# Patient Record
Sex: MALE | Race: BLACK OR AFRICAN AMERICAN | NOT HISPANIC OR LATINO | Employment: UNEMPLOYED | URBAN - METROPOLITAN AREA
[De-identification: names, ages, dates, MRNs, and addresses within clinical notes are randomized per-mention and may not be internally consistent; named-entity substitution may affect disease eponyms.]

---

## 2018-03-27 ENCOUNTER — HOSPITAL ENCOUNTER (EMERGENCY)
Facility: HOSPITAL | Age: 53
End: 2018-03-28
Attending: EMERGENCY MEDICINE | Admitting: EMERGENCY MEDICINE
Payer: COMMERCIAL

## 2018-03-27 DIAGNOSIS — F32.A DEPRESSION: ICD-10-CM

## 2018-03-27 DIAGNOSIS — R45.851 SUICIDAL IDEATION: Primary | ICD-10-CM

## 2018-03-27 LAB
AMPHETAMINES SERPL QL SCN: NEGATIVE
BARBITURATES UR QL: NEGATIVE
BENZODIAZ UR QL: NEGATIVE
COCAINE UR QL: NEGATIVE
ETHANOL EXG-MCNC: 0 MG/DL
METHADONE UR QL: NEGATIVE
OPIATES UR QL SCN: NEGATIVE
PCP UR QL: NEGATIVE
THC UR QL: POSITIVE

## 2018-03-27 PROCEDURE — 80307 DRUG TEST PRSMV CHEM ANLYZR: CPT

## 2018-03-27 PROCEDURE — 82075 ASSAY OF BREATH ETHANOL: CPT
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
## 2018-03-27 NOTE — ED NOTES
Patient provided with sandwich, crackers and soda  Remains cooperative        Meryl Ayala RN  03/27/18 0679

## 2018-03-27 NOTE — ED NOTES
AS Per Mike B from [facility] Patient is approved 3 days once admitted   Admitting placement to called [phone]

## 2018-03-27 NOTE — LETTER
[facility]  [address] [address]  Dept: [phone]      EMTALA TRANSFER CONSENT    NAME Zenaida Hanson                                         1965                              MRN [id number]    I have been informed of my rights regarding examination, treatment, and transfer   by Dr Bernard Lynn DO    Benefits: Specialized equipment and/or services available at the receiving facility (Include comment)________________________    Risks: Potential for delay in receiving treatment      Consent for Transfer:  I acknowledge that my medical condition has been evaluated and explained to me by the emergency department physician or other qualified medical person and/or my attending physician, who has recommended that I be transferred to the service of  Accepting Physician: Dr Beth Yuen at [address] [place] [address] Name, [address] : [address]  The above potential benefits of such transfer, the potential risks associated with such transfer, and the probable risks of not being transferred have been explained to me, and I fully understand them  The doctor has explained that, in my case, the benefits of transfer outweigh the risks  I agree to be transferred  I authorize the performance of emergency medical procedures and treatments upon me in both transit and upon arrival at the receiving facility  Additionally, I authorize the release of any and all medical records to the receiving facility and request they be transported with me, if possible  I understand that the safest mode of transportation during a medical emergency is an ambulance and that the Hospital advocates the use of this mode of transport  Risks of traveling to the receiving facility by car, including absence of medical control, life sustaining equipment, such as oxygen, and medical personnel has been explained to me and I fully understand them      (MARICEL CORRECT BOX BELOW)  [ X ]  I consent to the stated transfer and to be transported by ambulance/helicopter  [  ]  I consent to the stated transfer, but refuse transportation by ambulance and accept full responsibility for my transportation by car  I understand the risks of non-ambulance transfers and I exonerate the Hospital and its staff from any deterioration in my condition that results from this refusal     X___________________________________________    DATE  18  TIME________  Signature of patient or legally responsible individual signing on patient behalf           RELATIONSHIP TO PATIENT_________________________          Provider Certification    NAME Nisa Jaffe                                         1965                              MRN [id number]    A medical screening exam was performed on the above named patient  Based on the examination:    Condition Necessitating Transfer The primary encounter diagnosis was Suicidal ideation  A diagnosis of Depression was also pertinent to this visit  Patient Condition: The patient has been stabilized such that within reasonable medical probability, no material deterioration of the patient condition or the condition of the unborn child(flash) is likely to result from the transfer    Reason for Transfer: Level of Care needed not available at this facility    Transfer Requirements: [address]   · Space available and qualified personnel available for treatment as acknowledged by Shereen Rendon [phone]  · Agreed to accept transfer and to provide appropriate medical treatment as acknowledged by       Dr Duglas Ghosh  · Appropriate medical records of the examination and treatment of the patient are provided at the time of transfer   [address] [facility], [address] me  · Transfer will be performed by qualified personnel from [facility]  and appropriate transfer equipment as required, including the use of necessary and appropriate life support measures      Provider Certification: I have examined the patient and explained the following risks and benefits of being transferred/refusing transfer to the patient/family:  General risk, such as traffic hazards, adverse weather conditions, rough terrain or turbulence, possible failure of equipment (including vehicle or aircraft), or consequences of actions of persons outside the control of the transport personnel      Based on these reasonable risks and benefits to the patient and/or the unborn child(flash), and based upon the information available at the time of the patients examination, I certify that the medical benefits reasonably to be expected from the provision of appropriate medical treatments at another medical facility outweigh the increasing risks, if any, to the individuals medical condition, and in the case of labor to the unborn child, from effecting the transfer      X____________________________________________ DATE 03/28/18        TIME_______      ORIGINAL - SEND TO MEDICAL RECORDS   COPY - SEND WITH PATIENT DURING TRANSFER

## 2018-03-27 NOTE — LETTER
Section I - General Information    Name of Patient: Caden Ortiz                 : 1965    Medicare #:____________________  Transport Date: 18 (PCS is valid for round trips on this date and for all repetitive trips in the 60-day range as noted below )  Origin: [address] [address]                                                         Destination:________________________________________________  Is the pt's stay covered under Medicare Part A (PPS/DRG)     (_) YES  (_) NO  Closest appropriate facility?  (_) YES  (_) NO  If no, why is transport to more distant facility required?________________________  If hosp-hosp transfer, describe services needed at 2nd facility not available at 1st facility? _________________________________  If hospice pt, is this transport related to pt's terminal illness? (_) YES (_) NO Describe____________________________________    Section II - Medical Necessity Questionnaire  Ambulance transportation is medically necessary only if other means of transport are contraindicated or would be potentially harmful to the patient  To meet this requirement, the patient must either be "bed confined" or suffer from a condition such that transport by means other than ambulance is contraindicated by the patient's condition   The following questions must be answered by the medical professional signing below for this form to be valid:    1)  Describe the MEDICAL CONDITION (physical and/or mental) of this patient AT [address] [place] [address] that requires the patient to be transported in an ambulance and why transport by other means is contraindicated by the patient's condition:__________________________________________________________________________________________________    2) Is the patient "bed confined" as defined below?     (_) YES  (_) NO  To be "be confined" the patient must satisfy all three of the following conditions: (1) unable to get up from bed without Assistance; AND (2) unable to ambulate; AND (3) unable to sit in a chair or wheelchair  3) Can this patient safely be transported by car or wheelchair [place] (i e , seated during transport without a medical attendant or monitoring)?   (_) YES  (_) NO    4) In addition to completing questions 1-3 above, please check any of the following conditions that apply*:  *Note: supporting documentation for any boxes checked must be maintained in the patient's medical records  (_)Contractures   (_)Non-Healed Fractures  (_)Patient is confused (_)Patient is comatose (_)Moderate/severe pain on movement (_)Danger to self/others  (_)IV meds/fluids required (_)Patient is combative(_)Need or possible need for restraints (_)DVT requires elevation of lower extremity  (_)Medical attendant required (_)Requires oxygen-unable to self administer (_)Special handling/isolation/infection control precautions required (_)Unable to tolerate seated position for time needed to transport (_)Hemodynamic monitoring required en route (_)Unable to sit in a chair or wheelchair due to decubitus ulcers or other wounds (_)Cardiac monitoring required en route (_)Morbid obesity requires additional personnel/equipment to safely handle patient (_)Orthopedic device (backboard, halo, pins, traction, brace, wedge, etc,) requiring special handling during transport (_)Other(specify)_______________________________________________    Section III - Signature of Physician or Healthcare Professional  I certify that the above information is true and correct based on my evaluation of this patient, and represent that the patient requires transport by ambulance and that other forms of transport are contraindicated   I understand that this information will be used by the Centers for Medicare and Medicaid Services (CMS) to support the determination of medical necessity for ambulance services, and I represent that I have personal knowledge of the patient's condition at time of transport  (_) If this box is checked, I also certify that the patient is physically or mentally incapable of signing the ambulance service's claim and that the institution with which I am affiliated has furnished care, services, or assistance to the patient  My signature below is made on behalf of the patient pursuant to 42 CFR §424 36(b)(4)  In accordance with 42 CFR §424 37, the specific reason(s) that the patient is physically or mentally incapable of signing the claim form is as follows: _________________________________________________________________________________________________________      Signature of Physician* or Healthcare Professional______________________________________________________________  Signature Date 03/28/18 (For scheduled repetitive transports, this form is not valid for transports performed more than 60 days after this date)    Printed Name & Credentials of Physician or Healthcare Professional (MD, DO, RN, etc )________________________________  *Form must be signed by patient's attending physician for scheduled, repetitive transports   For non-repetitive, unscheduled ambulance transports, if unable to obtain the signature of the attending physician, any of the following may sign (choose appropriate option below)  (_) Physician Assistant (_)  Clinical Nurse Specialist (_)  Registered Nurse  (_)  Nurse Practitioner  (_) Discharge Planner
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
## 2018-03-28 ENCOUNTER — HOSPITAL ENCOUNTER (INPATIENT)
Facility: HOSPITAL | Age: 53
LOS: 2 days | Discharge: HOME | DRG: 751 | End: 2018-03-30
Attending: PSYCHIATRY & NEUROLOGY | Admitting: PSYCHIATRY & NEUROLOGY
Payer: COMMERCIAL

## 2018-03-28 VITALS
OXYGEN SATURATION: 98 % | HEART RATE: 66 BPM | RESPIRATION RATE: 16 BRPM | SYSTOLIC BLOOD PRESSURE: 148 MMHG | DIASTOLIC BLOOD PRESSURE: 100 MMHG | TEMPERATURE: 98.3 F

## 2018-03-28 DIAGNOSIS — F33.2 SEVERE EPISODE OF RECURRENT MAJOR DEPRESSIVE DISORDER, WITHOUT PSYCHOTIC FEATURES (HCC): Primary | ICD-10-CM

## 2018-03-28 DIAGNOSIS — F32.A DEPRESSION: ICD-10-CM

## 2018-03-28 LAB
BILIRUB UR QL STRIP: NEGATIVE
CLARITY UR: CLEAR
COLOR UR: YELLOW
GLUCOSE UR STRIP-MCNC: NEGATIVE MG/DL
HGB UR QL STRIP.AUTO: NEGATIVE
KETONES UR STRIP-MCNC: NEGATIVE MG/DL
LEUKOCYTE ESTERASE UR QL STRIP: NEGATIVE
NITRITE UR QL STRIP: NEGATIVE
PH UR STRIP.AUTO: 5.5 [PH] (ref 4.5–8)
PROT UR STRIP-MCNC: NEGATIVE MG/DL
SP GR UR STRIP.AUTO: 1.02 (ref 1–1.03)
UROBILINOGEN UR QL STRIP.AUTO: 1 E.U./DL

## 2018-03-28 PROCEDURE — 81003 URINALYSIS AUTO W/O SCOPE: CPT | Performed by: PHYSICIAN ASSISTANT

## 2018-03-28 PROCEDURE — 99285 EMERGENCY DEPT VISIT HI MDM: CPT

## 2018-03-28 RX ORDER — LORAZEPAM 2 MG/ML
2 INJECTION INTRAMUSCULAR EVERY 6 HOURS PRN
Status: DISCONTINUED | OUTPATIENT
Start: 2018-03-28 | End: 2018-03-30 | Stop reason: HOSPADM

## 2018-03-28 RX ORDER — OLANZAPINE 10 MG/1
10 TABLET ORAL
Status: CANCELLED | OUTPATIENT
Start: 2018-03-28

## 2018-03-28 RX ORDER — TRAZODONE HYDROCHLORIDE 50 MG/1
50 TABLET ORAL
Status: CANCELLED | OUTPATIENT
Start: 2018-03-28

## 2018-03-28 RX ORDER — HALOPERIDOL 5 MG
5 TABLET ORAL EVERY 6 HOURS PRN
Status: DISCONTINUED | OUTPATIENT
Start: 2018-03-28 | End: 2018-03-30 | Stop reason: HOSPADM

## 2018-03-28 RX ORDER — OLANZAPINE 10 MG/1
10 INJECTION, POWDER, LYOPHILIZED, FOR SOLUTION INTRAMUSCULAR
Status: CANCELLED | OUTPATIENT
Start: 2018-03-28

## 2018-03-28 RX ORDER — MAGNESIUM HYDROXIDE/ALUMINUM HYDROXICE/SIMETHICONE 120; 1200; 1200 MG/30ML; MG/30ML; MG/30ML
30 SUSPENSION ORAL EVERY 4 HOURS PRN
Status: CANCELLED | OUTPATIENT
Start: 2018-03-28

## 2018-03-28 RX ORDER — LORAZEPAM 1 MG/1
1 TABLET ORAL EVERY 6 HOURS PRN
Status: CANCELLED | OUTPATIENT
Start: 2018-03-28

## 2018-03-28 RX ORDER — HALOPERIDOL 5 MG/ML
5 INJECTION INTRAMUSCULAR EVERY 6 HOURS PRN
Status: DISCONTINUED | OUTPATIENT
Start: 2018-03-28 | End: 2018-03-30 | Stop reason: HOSPADM

## 2018-03-28 RX ORDER — BENZTROPINE MESYLATE 1 MG/ML
1 INJECTION INTRAMUSCULAR; INTRAVENOUS EVERY 6 HOURS PRN
Status: DISCONTINUED | OUTPATIENT
Start: 2018-03-28 | End: 2018-03-30 | Stop reason: HOSPADM

## 2018-03-28 RX ORDER — HALOPERIDOL 5 MG
5 TABLET ORAL EVERY 6 HOURS PRN
Status: CANCELLED | OUTPATIENT
Start: 2018-03-28

## 2018-03-28 RX ORDER — OLANZAPINE 10 MG/1
10 INJECTION, POWDER, LYOPHILIZED, FOR SOLUTION INTRAMUSCULAR
Status: DISCONTINUED | OUTPATIENT
Start: 2018-03-28 | End: 2018-03-30 | Stop reason: HOSPADM

## 2018-03-28 RX ORDER — HALOPERIDOL 5 MG/ML
5 INJECTION INTRAMUSCULAR EVERY 6 HOURS PRN
Status: CANCELLED | OUTPATIENT
Start: 2018-03-28

## 2018-03-28 RX ORDER — ACETAMINOPHEN 325 MG/1
650 TABLET ORAL EVERY 6 HOURS PRN
Status: DISCONTINUED | OUTPATIENT
Start: 2018-03-28 | End: 2018-03-30 | Stop reason: HOSPADM

## 2018-03-28 RX ORDER — TRAZODONE HYDROCHLORIDE 50 MG/1
50 TABLET ORAL
Status: DISCONTINUED | OUTPATIENT
Start: 2018-03-28 | End: 2018-03-30 | Stop reason: HOSPADM

## 2018-03-28 RX ORDER — LORAZEPAM 2 MG/ML
2 INJECTION INTRAMUSCULAR EVERY 6 HOURS PRN
Status: CANCELLED | OUTPATIENT
Start: 2018-03-28

## 2018-03-28 RX ORDER — OLANZAPINE 10 MG/1
10 TABLET ORAL
Status: DISCONTINUED | OUTPATIENT
Start: 2018-03-28 | End: 2018-03-30 | Stop reason: HOSPADM

## 2018-03-28 RX ORDER — RISPERIDONE 1 MG/1
1 TABLET, ORALLY DISINTEGRATING ORAL
Status: DISCONTINUED | OUTPATIENT
Start: 2018-03-28 | End: 2018-03-30 | Stop reason: HOSPADM

## 2018-03-28 RX ORDER — ACETAMINOPHEN 325 MG/1
650 TABLET ORAL EVERY 6 HOURS PRN
Status: CANCELLED | OUTPATIENT
Start: 2018-03-28

## 2018-03-28 RX ORDER — RISPERIDONE 1 MG/1
1 TABLET, ORALLY DISINTEGRATING ORAL
Status: CANCELLED | OUTPATIENT
Start: 2018-03-28

## 2018-03-28 RX ORDER — BENZTROPINE MESYLATE 1 MG/1
1 TABLET ORAL
Status: DISCONTINUED | OUTPATIENT
Start: 2018-03-28 | End: 2018-03-30 | Stop reason: HOSPADM

## 2018-03-28 RX ORDER — BENZTROPINE MESYLATE 1 MG/ML
1 INJECTION INTRAMUSCULAR; INTRAVENOUS EVERY 6 HOURS PRN
Status: CANCELLED | OUTPATIENT
Start: 2018-03-28

## 2018-03-28 RX ORDER — LORAZEPAM 1 MG/1
1 TABLET ORAL EVERY 6 HOURS PRN
Status: DISCONTINUED | OUTPATIENT
Start: 2018-03-28 | End: 2018-03-30 | Stop reason: HOSPADM

## 2018-03-28 RX ORDER — MAGNESIUM HYDROXIDE/ALUMINUM HYDROXICE/SIMETHICONE 120; 1200; 1200 MG/30ML; MG/30ML; MG/30ML
30 SUSPENSION ORAL EVERY 4 HOURS PRN
Status: DISCONTINUED | OUTPATIENT
Start: 2018-03-28 | End: 2018-03-30 | Stop reason: HOSPADM

## 2018-03-28 RX ORDER — BENZTROPINE MESYLATE 0.5 MG/1
1 TABLET ORAL
Status: CANCELLED | OUTPATIENT
Start: 2018-03-28

## 2018-03-28 RX ADMIN — CARBAMIDE PEROXIDE 6.5% 5 DROP: 6.5 LIQUID AURICULAR (OTIC) at 08:35

## 2018-03-28 NOTE — PLAN OF CARE
Problem: SELF HARM/SUICIDALITY  Goal: Will have no self-injury during hospital stay  INTERVENTIONS:  - Q 15 MINUTES: Routine safety checks  - Q WAKING SHIFT & PRN: Assess risk to determine if routine checks are adequate to maintain patient safety  - Encourage patient to participate actively in care by formulating a plan to combat response to suicidal ideation, identify supports and resources  Outcome: Progressing      Problem: DEPRESSION  Goal: Will be euthymic at discharge  INTERVENTIONS:  - Administer medication as ordered  - Provide emotional support via 1:1 interaction with staff  - Encourage involvement in milieu/groups/activities  - Monitor for social isolation  Outcome: Not Progressing

## 2018-03-28 NOTE — ED NOTES
Pt currently taking shower and completing other hygiene activities        Elaina Rodríguez, RN  03/28/18 9197
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
## 2018-03-28 NOTE — ED NOTES
Patient was accepted to [address]  to leave the [facility]  CW called Moi Alvarez informed them of this information   aware of this information     Bed Search will continue in am

## 2018-03-28 NOTE — EMTALA/ACUTE CARE TRANSFER
[facility]   [address] [address]  Dept: [phone]      EMTALA TRANSFER CONSENT    NAME Tamanna Rincon                                         1965                              MRN [id number]    I have been informed of my rights regarding examination, treatment, and transfer   by Dr Kofi Nelson 24, DO    Benefits: Specialized equipment and/or services available at the receiving facility (Include comment)________________________    Risks: Potential for delay in receiving treatment      Transfer Request   I acknowledge that my medical condition has been evaluated and explained to me by the emergency department physician or other qualified medical person and/or my attending physician who has recommended and offered to me further medical examination and treatment  I understand the Hospital's obligation with respect to the treatment and stabilization of my emergency medical condition  I nevertheless request to be transferred  I release the Hospital, the doctor, and any other persons caring for me from all responsibility or liability for any injury or ill effects that may result from my transfer and agree to accept all responsibility for the consequences of my choice to transfer, rather than receive stabilizing treatment at the Hospital  I understand that because the transfer is my request, my insurance may not provide reimbursement for the services  The Hospital will assist and direct me and my family in how to make arrangements for transfer, but the hospital is not liable for any fees charged by the transport service  In spite of this understanding, I refuse to consent to further medical examination and treatment which has been offered to me, and request transfer to    I authorize the performance of emergency medical procedures and treatments upon me in both transit and upon arrival at the receiving facility    Additionally, I authorize the release of any and all medical records to the receiving facility and request they be transported with me, if possible  I authorize the performance of emergency medical procedures and treatments upon me in both transit and upon arrival at the receiving facility  Additionally, I authorize the release of any and all medical records to the receiving facility and request they be transported with me, if possible  I understand that the safest mode of transportation during a medical emergency is an ambulance and that the Hospital advocates the use of this mode of transport  Risks of traveling to the receiving facility by car, including absence of medical control, life sustaining equipment, such as oxygen, and medical personnel has been explained to me and I fully understand them  (MARICEL CORRECT BOX BELOW)  [  ]  I consent to the stated transfer and to be transported by ambulance/helicopter  [  ]  I consent to the stated transfer, but refuse transportation by ambulance and accept full responsibility for my transportation by car  I understand the risks of non-ambulance transfers and I exonerate the Hospital and its staff from any deterioration in my condition that results from this refusal     X___________________________________________    DATE  18  TIME________  Signature of patient or legally responsible individual signing on patient behalf           RELATIONSHIP TO PATIENT_________________________          Provider Certification    NAME Iqra Parhampper                                         1965                              MRN [id number]    A medical screening exam was performed on the above named patient  Based on the examination:    Condition Necessitating Transfer The primary encounter diagnosis was Suicidal ideation  A diagnosis of Depression was also pertinent to this visit      Patient Condition: The patient has been stabilized such that within reasonable medical probability, no material deterioration of the patient condition or the condition of the unborn child(flash) is likely to result from the transfer    Reason for Transfer: Level of Care needed not available at this facility    Transfer Requirements: Facility     · Space available and qualified personnel available for treatment as acknowledged by    · Agreed to accept transfer and to provide appropriate medical treatment as acknowledged by          · Appropriate medical records of the examination and treatment of the patient are provided at the time of transfer   [address] [facility], [address] _______  · Transfer will be performed by qualified personnel from    and appropriate transfer equipment as required, including the use of necessary and appropriate life support measures  Provider Certification: I have examined the patient and explained the following risks and benefits of being transferred/refusing transfer to the patient/family:  General risk, such as traffic hazards, adverse weather conditions, rough terrain or turbulence, possible failure of equipment (including vehicle or aircraft), or consequences of actions of persons outside the control of the transport personnel      Based on these reasonable risks and benefits to the patient and/or the unborn child(flash), and based upon the information available at the time of the patients examination, I certify that the medical benefits reasonably to be expected from the provision of appropriate medical treatments at another medical facility outweigh the increasing risks, if any, to the individuals medical condition, and in the case of labor to the unborn child, from effecting the transfer      X____________________________________________ DATE 03/27/18        TIME_______      ORIGINAL - SEND TO MEDICAL RECORDS   COPY - SEND WITH PATIENT DURING TRANSFER

## 2018-03-28 NOTE — ED NOTES
Pt awake and eating breakfast tray; pt reports b/l ear fullness and inability to hear  Dr Renee Patricia alerted        Kieth Cardona RN  03/28/18 8095

## 2018-03-28 NOTE — ED NOTES
Report called to Tiana at [facility] pt scheduled for transport at 1300 will call with and change in pt condition        Yohana Holm RN  03/28/18 9311

## 2018-03-28 NOTE — TREATMENT PLAN
Nursing team will meet with you twice a day to assess for suicidal thoughts and depression  We will educate you on your illness, medications and help you develop alternative coping skills to deal with life stressors

## 2018-03-28 NOTE — ED NOTES
Patient is accepted at [facility]  Patient is accepted by Leopoldo Slice per [facility] is arranged with [employer] is scheduled for 1pm           Nurse report is to be called to [phone] prior to patient transfer

## 2018-03-29 PROBLEM — F33.2 SEVERE EPISODE OF RECURRENT MAJOR DEPRESSIVE DISORDER, WITHOUT PSYCHOTIC FEATURES (HCC): Status: ACTIVE | Noted: 2018-03-29

## 2018-03-29 LAB
ALBUMIN SERPL BCP-MCNC: 3.1 G/DL (ref 3.5–5)
ALP SERPL-CCNC: 69 U/L (ref 46–116)
ALT SERPL W P-5'-P-CCNC: 84 U/L (ref 12–78)
ANION GAP SERPL CALCULATED.3IONS-SCNC: 9 MMOL/L (ref 4–13)
AST SERPL W P-5'-P-CCNC: 50 U/L (ref 5–45)
BASOPHILS # BLD AUTO: 0.04 THOUSANDS/ΜL (ref 0–0.1)
BASOPHILS NFR BLD AUTO: 1 % (ref 0–1)
BILIRUB SERPL-MCNC: 0.4 MG/DL (ref 0.2–1)
BUN SERPL-MCNC: 14 MG/DL (ref 5–25)
CALCIUM SERPL-MCNC: 8.7 MG/DL (ref 8.3–10.1)
CHLORIDE SERPL-SCNC: 103 MMOL/L (ref 100–108)
CHOLEST SERPL-MCNC: 178 MG/DL (ref 50–200)
CO2 SERPL-SCNC: 26 MMOL/L (ref 21–32)
CREAT SERPL-MCNC: 1.03 MG/DL (ref 0.6–1.3)
EOSINOPHIL # BLD AUTO: 0.24 THOUSAND/ΜL (ref 0–0.61)
EOSINOPHIL NFR BLD AUTO: 5 % (ref 0–6)
ERYTHROCYTE [DISTWIDTH] IN BLOOD BY AUTOMATED COUNT: 14.8 % (ref 11.6–15.1)
EST. AVERAGE GLUCOSE BLD GHB EST-MCNC: 126 MG/DL
GFR SERPL CREATININE-BSD FRML MDRD: 95 ML/MIN/1.73SQ M
GLUCOSE P FAST SERPL-MCNC: 101 MG/DL (ref 65–99)
GLUCOSE SERPL-MCNC: 101 MG/DL (ref 65–140)
HBA1C MFR BLD: 6 % (ref 4.2–6.3)
HCT VFR BLD AUTO: 43.9 % (ref 36.5–49.3)
HDLC SERPL-MCNC: 79 MG/DL (ref 40–60)
HGB BLD-MCNC: 14.6 G/DL (ref 12–17)
LDLC SERPL CALC-MCNC: 83 MG/DL (ref 0–100)
LYMPHOCYTES # BLD AUTO: 1.75 THOUSANDS/ΜL (ref 0.6–4.47)
LYMPHOCYTES NFR BLD AUTO: 34 % (ref 14–44)
MCH RBC QN AUTO: 25.4 PG (ref 26.8–34.3)
MCHC RBC AUTO-ENTMCNC: 33.3 G/DL (ref 31.4–37.4)
MCV RBC AUTO: 77 FL (ref 82–98)
MONOCYTES # BLD AUTO: 0.64 THOUSAND/ΜL (ref 0.17–1.22)
MONOCYTES NFR BLD AUTO: 13 % (ref 4–12)
NEUTROPHILS # BLD AUTO: 2.41 THOUSANDS/ΜL (ref 1.85–7.62)
NEUTS SEG NFR BLD AUTO: 47 % (ref 43–75)
PLATELET # BLD AUTO: 212 THOUSANDS/UL (ref 149–390)
PMV BLD AUTO: 10.6 FL (ref 8.9–12.7)
POTASSIUM SERPL-SCNC: 3.9 MMOL/L (ref 3.5–5.3)
PROT SERPL-MCNC: 7.4 G/DL (ref 6.4–8.2)
RBC # BLD AUTO: 5.74 MILLION/UL (ref 3.88–5.62)
RPR SER QL: NORMAL
SODIUM SERPL-SCNC: 138 MMOL/L (ref 136–145)
TRIGL SERPL-MCNC: 79 MG/DL
TSH SERPL DL<=0.05 MIU/L-ACNC: 1.01 UIU/ML (ref 0.36–3.74)
WBC # BLD AUTO: 5.08 THOUSAND/UL (ref 4.31–10.16)

## 2018-03-29 PROCEDURE — 86592 SYPHILIS TEST NON-TREP QUAL: CPT | Performed by: PHYSICIAN ASSISTANT

## 2018-03-29 PROCEDURE — 85025 COMPLETE CBC W/AUTO DIFF WBC: CPT | Performed by: PHYSICIAN ASSISTANT

## 2018-03-29 PROCEDURE — 99223 1ST HOSP IP/OBS HIGH 75: CPT | Performed by: PSYCHIATRY & NEUROLOGY

## 2018-03-29 PROCEDURE — 80053 COMPREHEN METABOLIC PANEL: CPT | Performed by: PHYSICIAN ASSISTANT

## 2018-03-29 PROCEDURE — 84443 ASSAY THYROID STIM HORMONE: CPT | Performed by: PHYSICIAN ASSISTANT

## 2018-03-29 PROCEDURE — 83036 HEMOGLOBIN GLYCOSYLATED A1C: CPT | Performed by: PHYSICIAN ASSISTANT

## 2018-03-29 PROCEDURE — 99254 IP/OBS CNSLTJ NEW/EST MOD 60: CPT | Performed by: PHYSICIAN ASSISTANT

## 2018-03-29 PROCEDURE — 80061 LIPID PANEL: CPT | Performed by: PHYSICIAN ASSISTANT

## 2018-03-29 RX ORDER — ESCITALOPRAM OXALATE 5 MG/1
5 TABLET ORAL DAILY
Status: DISCONTINUED | OUTPATIENT
Start: 2018-03-29 | End: 2018-03-30 | Stop reason: HOSPADM

## 2018-03-29 RX ADMIN — ESCITALOPRAM 5 MG: 5 TABLET, FILM COATED ORAL at 11:25

## 2018-03-29 NOTE — PROGRESS NOTES
Friendly and cooperative  Present in milieu, attending groups and social with peers  Denies SI/HI  Expressed readiness for discharge  Excited to spend the Easter holiday with friends  Restful sleep  Requested and signed 72 hour notice today at 1030

## 2018-03-29 NOTE — DISCHARGE INSTR - OTHER ORDERS
[facility]  [address], [place] [address]  Tel: [phone]  Fax:([phone])    [facility] Crisis    Crisis Intervention: [address] is a confidential 7 days/week telephone support service manned by trained mental health consumers   [employer] operates daily but is not able to [address]   · [facility] provides support, a listening ear and can provide information about available services       · [facility] specializes in the concerns of mental health consumers, their families and friends   However, we are also here for anyone who has a mental health concern, is confused about or just doesn't know anything about mental health or where to get information       To reach Praneeth, call [phone] accepts calls between 6:00 AM to 10:00 AM and from 4:00 PM to 42:88 AM or click on the link to view additional information

## 2018-03-29 NOTE — H&P
Psychiatric Evaluation - [address] [place] 46 y o  male MRN: [id number]  Unit/Bed#: Wilma Gan 819-43 Encounter: [id number]    Assessment/Plan   Principal Problem:    Severe episode of recurrent major depressive disorder, without psychotic features ([facility] )    Plan:   1  Check admission labs  2  Collaborate with family for baseline assessment and disposition planning  3  Lexapro 5 mg daily for depression management  4  Patient signed 72 hr notice and focused on discharge  Since admission to unit- no 302able behavior noted  Initiate discharge planning for tomorrow if patient reports no behavior of dangerousness to self or others during this observation  Risks, benefits and possible side effects of Medications:   Risks, benefits, and possible side effects of medications explained to patient and patient verbalizes understanding  Chief Complaint: "I am feeling good now"    History of Present Illness     Patient is a 46 y o  male presents on 61 51 81 with suicidal ideation expressed to his outpatient therapist   Therapist referred him to emergency room for evaluation and was admitted to psychiatry unit further  During evaluation patient remained calmer and cooperative but guarded during entire evaluation  Patient reports that he lost many family members in 1 year  He reports following up with outpatient psychotherapist this point with he reports that he is making slow progress and have intermittent suicidal ideations for longtime  He reports that he never have intentions to act on  Patient reports low energy, guilt but denies hopelessness, suicidal ideation, homicidal ideation, manic or psychotic symptoms  Patient did agreed that does struggle with depression with family members loss  Did agreed with plan of initiating antidepressant and agreed with initiating Lexapro 5 mg daily  Patient is focused on leaving the hospital to attend a party on Easter with friends    Describes his friends as support for him and meeting with them is therapeutic for him  Patient is not in agreement with staying in the hospital for further observation and medication management if indicated  Patient signed a 72 hour notice after this evaluation  Medical Review Of Systems:  none    Psychiatric Review Of Systems:  sleep: no  appetite changes: yes  weight changes: no  energy/anergy: no  interest/pleasure/anhedonia: no  somatic symptoms: no  anxiety/panic: yes  jonas: no  guilty/hopeless: no  self injurious behavior/risky behavior: no    Historical Information     Past Psychiatric History:   History of recent admission to [facility] ~1month ago for depression  Currently in treatment with therapist   Past Suicide attempts: no  Past Violent behavior: no  Past Psychiatric medication trial: do not remember    Substance Abuse History:  UDS + Marijuana  Patient denies drug abuse      Social History     Tobacco History     Smoking Status  Former Smoker    Smokeless Tobacco Use  Never Used          Alcohol History     Alcohol Use Status  Yes Comment  social drinker          Drug Use     Drug Use Status  Yes Types  Marijuana Frequency  1 time/week Comment  last night           Sexual Activity     Sexually Active  Not Asked          Activities of Daily Living    Not Asked               Additional Substance Use Detail     Questions Responses    Alcohol Use Frequency Denies use in past 12 months    Cannabis frequency 1-2 times/week    Comment: Past occasional use on 3/27/2018 Past occasional use ->1-2 times/week on 3/28/2018     Heroin Frequency Denies use in past 12 months    Crack Cocaine Frequency Denies use in past 12 months    Methamphetamine Frequency Denies use in past 12 months    Narcotic Frequency Denies use in past 12 months    Benzodiazepine Frequency Denies use in past 12 months    Amphetamine frequency Denies use in past 12 months    Barbituate Frequency Denies use use in past 12 months    Opiate frequency Denies use in past 12 months    Last reviewed by Blayne Garay RN on 3/28/2018        I have assessed this patient for substance use within the past 12 months      Family Psychiatric History:   Not known to patient    Social History:  Marital history: single  Living arrangement, social support: Support systems: lives alone  Occupational History: unemployed  Functioning Relationships: poor support system  Other Pertinent History: Financial      Traumatic History:   Abuse: none  Other Traumatic Events: see [facility]    Past Medical History:   Diagnosis Date    Depression            Meds/Allergies   all current active meds have been reviewed  No Known Allergies    Objective   Vital signs in last 24 hours:  Temp:  [95 8 °F (35 4 °C)-97 °F (36 1 °C)] 95 8 °F (35 4 °C)  HR:  [56-96] 77  Resp:  [15-20] 16  BP: (117-159)/() 159/94    No intake or output data in the 24 hours ending 03/29/18 1331    Mental Status Evaluation:  Appearance:  casually dressed   Behavior:  guarded   Speech:  soft   Mood:  anxious and depressed   Affect:  constricted   Language: naming objects   Thought Process:  normal   Thought Content:  obsessions   Perceptual Disturbances: None   Risk Potential: Suicidal Ideations none, Homicidal Ideations none and Potential for Aggression No   Sensorium:  person and place   Cognition:  grossly intact   Consciousness:  awake    Attention: attention span appeared shorter than expected for age   Intellect: normal   Fund of Knowledge: awareness of current events: fair   Insight:  fair   Judgment: fair   Muscle Strength and Tone: arm(s): bilateral   Gait/Station: normal gait/station   Motor Activity: no abnormal movements     Laboratory results:    I have personally reviewed all pertinent laboratory/tests results    Labs in last 72 hours:   Recent Labs      03/29/18   0638   WBC  5 08   RBC  5 74*   HGB  14 6   HCT  43 9   PLT  212   RDW  14 8   NEUTROABS  2 41   NA  138   K  3 9   CL  103   CO2  26   BUN  14 CREATININE  1 03   GLUCOSE  101   CALCIUM  8 7   AST  50*   ALT  84*   ALKPHOS  69   PROT  7 4   BILITOT  0 40   CHOL  178   HDL  79*   TRIG  79   LDLCALC  83   [id number]  1 012     Admission Labs:   Admission on 03/28/2018   Component Date Value    Color, UA 03/28/2018 Yellow     Clarity, UA 03/28/2018 Clear     Specific Gravity, UA 03/28/2018 1 020     pH, UA 03/28/2018 5 5     Leukocytes, UA 03/28/2018 Negative     Nitrite, UA 03/28/2018 Negative     Protein, UA 03/28/2018 Negative     Glucose, UA 03/28/2018 Negative     Ketones, UA 03/28/2018 Negative     Urobilinogen, UA 03/28/2018 1 0     Bilirubin, UA 03/28/2018 Negative     Blood, UA 03/28/2018 Negative     WBC 03/29/2018 5 08     RBC 03/29/2018 5 74*    Hemoglobin 03/29/2018 14 6     Hematocrit 03/29/2018 43 9     MCV 03/29/2018 77*    MCH 03/29/2018 25 4*    MCHC 03/29/2018 33 3     RDW 03/29/2018 14 8     MPV 03/29/2018 10 6     Platelets 57/52/5695 212     Neutrophils Relative 03/29/2018 47     Lymphocytes Relative 03/29/2018 34     Monocytes Relative 03/29/2018 13*    Eosinophils Relative 03/29/2018 5     Basophils Relative 03/29/2018 1     Neutrophils Absolute 03/29/2018 2 41     Lymphocytes Absolute 03/29/2018 1 75     Monocytes Absolute 03/29/2018 0 64     Eosinophils Absolute 03/29/2018 0 24     Basophils Absolute 03/29/2018 0 04     Sodium 03/29/2018 138     Potassium 03/29/2018 3 9     Chloride 03/29/2018 103     CO2 03/29/2018 26     Anion Gap 03/29/2018 9     BUN 03/29/2018 14     Creatinine 03/29/2018 1 03     Glucose 03/29/2018 101     Glucose, Fasting 03/29/2018 101*    Calcium 03/29/2018 8 7     AST 03/29/2018 50*    ALT 03/29/2018 84*    Alkaline Phosphatase 03/29/2018 69     Total Protein 03/29/2018 7 4     Albumin 03/29/2018 3 1*    Total Bilirubin 03/29/2018 0 40     eGFR 03/29/2018 95     Cholesterol 03/29/2018 178     Triglycerides 03/29/2018 79     HDL, Direct 03/29/2018 79*    LDL Calculated 03/29/2018 83     Hemoglobin A1C 03/29/2018 6 0     EAG 03/29/2018 126     TSH 3RD [facility] 03/29/2018 1 012        Risks / Benefits of Treatment:     Risks, benefits, and possible side effects of medications explained to patient  The patient verbalizes understanding and agreement for treatment  Counseling / Coordination of Care:     Patient's presentation on admission and proposed treatment plan discussed with treatment team   Diagnosis, medication changes and treatment plan reviewed with patient  Recent stressors discussed with patient     Events leading to admission reviewed with patient  Importance of medication and treatment compliance reviewed with patient  Inpatient Psychiatric Certification:     Certification: Based upon physical, mental and social evaluations, I certify that inpatient psychiatric services are medically necessary for this patient for a duration of 3 midnights for the treatment of Severe episode of recurrent major depressive disorder, without psychotic features ([address] )    Available alternative community resources do not meet the patient's mental health care needs  I further attest that an established written individualized plan of care has been implemented and is outlined in the patient's medical records  This note has been constructed using a voice recognition system  There may be translation, syntax,  or grammatical errors  If you have any questions, please contact the dictating provider

## 2018-03-29 NOTE — PROGRESS NOTES
Pt pleasant during interaction  Denies SI and depression and anxiety  Pt stated that he was looking forward to attending NA this evening  Has been seclusive and sleeping for a majority of the evening  Denies questions or concerns at this time  Will continue to monitor

## 2018-03-29 NOTE — CASE MANAGEMENT
CM met with pt who confirms he was feeling suicidal following the loss of 5 of his family members in the last year  Pt states he is feeling much better and is denying SI/HI  Pt was conversant, agreeable and pleasant during interaction  Pt signed a 72 hour notice and feels ready for dc on Friday  Pt has outpatient appointments with [facility] (t) [phone] and (f) [phone]  CM spoke with Jeffery at the  who made an appointment with Isela Andrews (therapist) for 4/3/18 @ 2pm and Dr Annamarie Sams (psychiatrist) for 5/3/18 @ [facility]ely mentioned that pt will be able to get med changes done much earlier if needed  CM updated pt's dc instructions  Pt reports he is able to return home at time of dc  Pt's income is from [facility]  He denies having a weapon or a means to kill himself  CM provided pt with crisis numbers for [facility]  Pt reports he is feeling much better and verbalized readiness for dc to this   Pt updated on outpatient appointments made on his behalf  Pt verbalized understanding  Pt has prior admissions in [place] in 2018 for 45 days  Pt reports he stabilized and felt the program was very helpful  Pt reports he has no more family members left but he does have friends who are supportive  Pt reports he occasionally smokes Marijuana but declines any D&A services  Pt signed HENRIK's for [facility] only  Pt signed [place] plan

## 2018-03-29 NOTE — CONSULTS
Consultation - Shellie Cohen 46 y o  male MRN: [id number]    Unit/Bed#: Juan Carlos Sep 582-70 Encounter: [id number]      Assessment/Plan     Assessment/Plan:  1  Major Depressive Disorder with SI    -Medically cleared for psychiatric evaluation and treatment  2  Hearing loss   -chronic    History of Present Illness   HPI: Shellie Cohen is a 46y o  year old male who presents with depression and SI  He talked to his therapist about his worsening depression and he was referred to the ED for evaluation  He reports he is feeling much better now  He reports recent stressor of loss of his girlfriend to an accident  He denies chronic illness including heart disease, asthma, dm, htn, hld, or stroke  He denies recent illness including fevers, chills, cp, sob, cough, n/v/d, abdominal pain or runny/stuffy nose  [facility]  Consult performed by: Caryn Sullivan ordered by: Peggy Durand          Review of Systems   Constitutional: Negative for appetite change, chills, fatigue and fever  HENT: Negative  Eyes: Negative  Respiratory: Negative for cough, choking, chest tightness, shortness of breath and wheezing  Cardiovascular: Negative for chest pain and palpitations  Gastrointestinal: Negative for abdominal pain, constipation, diarrhea, nausea and vomiting  Genitourinary: Negative  Musculoskeletal: Negative  Skin: Negative  Neurological: Negative  Psychiatric/Behavioral: Positive for dysphoric mood and suicidal ideas  Negative for hallucinations  Historical Information   Past Medical History:   Diagnosis Date    Depression      No past surgical history on file    Social History   History   Alcohol Use    Yes     Comment: social drinker     History   Drug Use    Frequency: 1 0 time per week    Types: Marijuana     Comment: last night      History   Smoking Status    Former Smoker   Smokeless Tobacco    Never Used     Family History: non-contributory    Meds/Allergies   PTA meds:   None     No Known Allergies    Objective   Vitals: Blood pressure 159/94, pulse 77, temperature (!) 95 8 °F (35 4 °C), temperature source Tympanic, resp  rate 16, height 5' 8" (1 727 m), weight 74 1 kg (163 lb 4 8 oz), SpO2 99 %  No intake or output data in the 24 hours ending 03/29/18 0952  Invasive Devices          No matching active lines, drains, or airways          Physical Exam   Constitutional: He is oriented to person, place, and time  He appears well-developed and well-nourished  HENT:   Head: Normocephalic and atraumatic  Eyes: EOM are normal  Pupils are equal, round, and reactive to light  Neck: Normal range of motion  Cardiovascular: Normal rate and regular rhythm  Exam reveals no gallop and no friction rub  No murmur heard  Pulmonary/Chest: Effort normal  He has no wheezes  He has no rales  Abdominal: Soft  He exhibits no distension  There is no tenderness  There is no guarding  Neurological: He is alert and oriented to person, place, and time  Skin: Skin is warm and dry  Psychiatric: His speech is normal and behavior is normal  Thought content normal  His mood appears anxious  Lab Results: I have personally reviewed pertinent reports        Code Status: Level 1 - Full Code

## 2018-03-29 NOTE — TREATMENT PLAN
TREATMENT PLAN REVIEW - [address] P Alannah 46 y o  1965 male MRN: [id number]    6 [address] [place] [address] Room / Bed: [address] [facility] Encounter: [id number]          Admit Date/Time:  3/28/2018  1:49 PM    Treatment Team: Attending Provider: Aria Haile; Registered Nurse: Carlito Choi RN; Patient Care Technician: Tawanna El; Occupational Therapy Assistant: WILLA Marrero    Diagnosis: Principal Problem:    Severe episode of recurrent major depressive disorder, without psychotic features [facility]    Patient Strengths/Assets: cooperative, good past treatment response, motivation for treatment/growth, patient is on a voluntary commitment    Patient Barriers/Limitations: limited support system, low self esteem    Short Term Goals: decrease in depressive symptoms, decrease in anxiety symptoms, decrease in suicidal thoughts    Long Term Goals: improvement in depression, improvement in anxiety, stabilization of mood, free of suicidal thoughts, acceptance of need for psychiatric medications, acceptance of need for psychiatric treatment, acceptance of need for psychiatric follow up after discharge    Progress Towards Goals: starting psychiatric medications as prescribed    Recommended Treatment: medication management, patient medication education, group therapy, milieu therapy, continued Behavioral Health psychiatric evaluation/assessment process    Treatment Frequency: daily medication monitoring, group and milieu therapy daily, monitoring through interdisciplinary rounds, monitoring through weekly patient care conferences    Expected Discharge Date: 5-7 days    Discharge Plan: referral for outpatient medication management with a psychiatrist, referral for outpatient psychotherapy    Treatment Plan Created/Updated By: Aira Haile

## 2018-03-29 NOTE — PLAN OF CARE
Problem: Ineffective Coping  Goal: Participates in unit activities  Interventions:  - Provide therapeutic environment   - Provide required programming   - Redirect inappropriate behaviors    Outcome: Progressing  Pt attended all therapeutic groups and participated appropriate  He was pleasant and social with peers

## 2018-03-30 VITALS
BODY MASS INDEX: 24.75 KG/M2 | SYSTOLIC BLOOD PRESSURE: 118 MMHG | TEMPERATURE: 97.2 F | DIASTOLIC BLOOD PRESSURE: 75 MMHG | HEART RATE: 63 BPM | HEIGHT: 68 IN | WEIGHT: 163.3 LBS | RESPIRATION RATE: 18 BRPM | OXYGEN SATURATION: 99 %

## 2018-03-30 PROCEDURE — 99239 HOSP IP/OBS DSCHRG MGMT >30: CPT | Performed by: PSYCHIATRY & NEUROLOGY

## 2018-03-30 RX ORDER — ESCITALOPRAM OXALATE 5 MG/1
5 TABLET ORAL DAILY
Qty: 30 TABLET | Refills: 1 | Status: SHIPPED | OUTPATIENT
Start: 2018-03-31 | End: 2018-06-28 | Stop reason: HOSPADM

## 2018-03-30 RX ADMIN — ESCITALOPRAM 5 MG: 5 TABLET, FILM COATED ORAL at 09:19
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
## 2018-03-30 NOTE — DISCHARGE INSTR - LAB
Contact Information: If you have any questions, concerns, pended studies, tests and/or procedures, or emergencies regarding your inpatient behavioral health visit  Please contact [facility] behavioral [facility] unit [phone] and ask to speak to a , nurse or physician  A contact is available 24 hours/ 7 days a week at this number  Summary of Procedures Performed During your Stay:  Below is a list of major procedures performed during your hospital stay and a summary of results:  - No major procedures performed  Pending Studies     None        If studies are pending at discharge, follow up with your PCP and/or referring provider

## 2018-03-30 NOTE — PROGRESS NOTES
Friendly and cooperative  Expressed readiness for discharge  States his depression comes in waves and denies depression at this time  Discussed compliance with medications and OP appointments  Looking forward to the holiday on Sunday and verbalized supports within the community  No questions or concerns

## 2018-04-07 ENCOUNTER — HOSPITAL ENCOUNTER (EMERGENCY)
Facility: HOSPITAL | Age: 53
Discharge: HOME | End: 2018-04-07
Attending: EMERGENCY MEDICINE
Payer: COMMERCIAL

## 2018-04-07 VITALS
HEART RATE: 68 BPM | RESPIRATION RATE: 16 BRPM | TEMPERATURE: 97.8 F | DIASTOLIC BLOOD PRESSURE: 73 MMHG | BODY MASS INDEX: 24.33 KG/M2 | SYSTOLIC BLOOD PRESSURE: 115 MMHG | WEIGHT: 160 LBS | OXYGEN SATURATION: 95 %

## 2018-04-07 DIAGNOSIS — R45.851 SUICIDAL IDEATION: ICD-10-CM

## 2018-04-07 DIAGNOSIS — F32.A DEPRESSION: ICD-10-CM

## 2018-04-07 DIAGNOSIS — F10.929 ALCOHOL INTOXICATION (HCC): Primary | ICD-10-CM

## 2018-04-07 LAB
AMPHETAMINES SERPL QL SCN: NEGATIVE
BARBITURATES UR QL: NEGATIVE
BENZODIAZ UR QL: NEGATIVE
COCAINE UR QL: NEGATIVE
ETHANOL EXG-MCNC: 0.05 MG/DL
ETHANOL EXG-MCNC: 0.18 MG/DL
METHADONE UR QL: NEGATIVE
OPIATES UR QL SCN: NEGATIVE
PCP UR QL: NEGATIVE
THC UR QL: POSITIVE

## 2018-04-07 PROCEDURE — 99284 EMERGENCY DEPT VISIT MOD MDM: CPT

## 2018-04-07 PROCEDURE — 80307 DRUG TEST PRSMV CHEM ANLYZR: CPT | Performed by: EMERGENCY MEDICINE

## 2018-04-07 PROCEDURE — 82075 ASSAY OF BREATH ETHANOL: CPT | Performed by: EMERGENCY MEDICINE

## 2018-04-07 RX ORDER — ESCITALOPRAM OXALATE 5 MG/1
5 TABLET ORAL DAILY
Status: DISCONTINUED | OUTPATIENT
Start: 2018-04-07 | End: 2018-04-07 | Stop reason: HOSPADM

## 2018-04-07 RX ADMIN — ESCITALOPRAM 5 MG: 5 TABLET, FILM COATED ORAL at 10:32

## 2018-04-07 NOTE — ED CARE HANDOFF
Emergency Department Sign Out Note        Sign out and transfer of care from [facility]  See Separate Emergency Department note  The patient, Yohana Reynolds, was evaluated by the previous provider for depression, suicidal and was intoxicated       Workup Completed:  Labs and BAT reeval and eval by crisis  ED Course / Workup Pending (followup): Patient is nolonger suicidal   He is no longer intoxicated  Evaluation was performed by crisis and he is safe for discharge  ED Course      Procedures  MDM  CritCare Time      Disposition  Final diagnoses:   Alcohol intoxication ([facility] [address] )   Suicidal ideation   Depression     Time reflects when diagnosis was documented in both MDM as applicable and the Disposition within this note     Time User Action Codes Description Comment    4/7/2018  4:31 AM Narciso Filter Add [F10 929] Alcohol intoxication ([facility] [address] )     4/7/2018  4:31 AM Narciso Filter Add [R45 851] Suicidal ideation     4/7/2018  4:31 AM Smeriglio, Charlyne Buerger Add [F32 9] Depression       ED Disposition     None      Follow-up Information    None       Patient's Medications   Discharge Prescriptions    No medications on file     No discharge procedures on file         ED Provider  Electronically Signed by     Allyson Gipson MD  04/07/18 7796

## 2018-04-07 NOTE — DISCHARGE INSTRUCTIONS
Alcohol Intoxication   WHAT YOU NEED TO KNOW:   Alcohol intoxication is a harmful physical condition caused when you drink more alcohol than your body can handle  It is also called ethanol poisoning, or being drunk  DISCHARGE INSTRUCTIONS:   Medicine: You may be given medicine to manage the signs and symptoms of alcohol intoxication  Take your medicine as directed  Contact your healthcare provider if you think your medicine is not helping or if you have side effects  Tell him if you are allergic to any medicine  Keep a list of the medicines, vitamins, and herbs you take  Include the amounts, and when and why you take them  Bring the list or the pill bottles to follow-up visits  Keep the list with you in case of emergency  Follow up with your healthcare provider as directed:  Write down your questions so you remember to ask them during your visits  Limit or avoid alcohol:  Men should not have more than 2 drinks per day  Women should not have more than 1 drink per day  A drink is 12 ounces of beer, 5 ounces of wine, or 1½ ounces of liquor  Do not drive or operate machines when you drink alcohol:  Make sure you always have someone to drive you when you drink alcohol  For more information:   · Alcoholics Anonymous  Web Address: http://www [employer]/  Contact your healthcare provider if:   · You need help to stop drinking alcohol  · You have trouble with work or school because you drink too much alcohol  · You have physical or verbal fights because of alcohol  · You have questions or concerns about your condition or care  Return to the emergency department if:   · You have sudden trouble breathing or chest pain  · You have a seizure  · You feel sad enough to harm yourself or others  · You have hallucinations (you see or hear things that are not real)  · You cannot stop vomiting  · You were in an accident because of alcohol    © 2017 [employer] Information is for End User's use only and may not be sold, redistributed or otherwise used for commercial purposes  All illustrations and images included in CareNotes® are the copyrighted property of A D A M , Inc  or [employer]  The above information is an  only  It is not intended as medical advice for individual conditions or treatments  Talk to your doctor, nurse or pharmacist before following any medical regimen to see if it is safe and effective for you  Depression   WHAT YOU NEED TO KNOW:   Depression is a medical condition that causes feelings of sadness or hopelessness that do not go away  Depression may cause you to lose interest in things you used to enjoy  These feelings may interfere with your daily life  DISCHARGE INSTRUCTIONS:   Call 911 for any of the following:   · You think about harming yourself or someone else  Contact your healthcare provider if:   · Your symptoms do not improve  · You cannot make it to your next appointment  · You have new symptoms  · You have questions or concerns about your condition or care  Medicines:   · Antidepressants  may be given to improve or balance your mood  You may need to take this medicine for several weeks before you begin to feel better  · Take your medicine as directed  Contact your healthcare provider if you think your medicine is not helping or if you have side effects  Tell him of her if you are allergic to any medicine  Keep a list of the medicines, vitamins, and herbs you take  Include the amounts, and when and why you take them  Bring the list or the pill bottles to follow-up visits  Carry your medicine list with you in case of an emergency  Therapy  may be used to treat your depression  A therapist will help you learn to cope with your thoughts and feelings  This can be done alone or in a group  It may also be done with family members or a significant other  Self-care:   · Get regular physical activity    Try to exercise for 30 minutes, 3 to 5 days a week  Work with your healthcare provider to develop an exercise plan that you enjoy  Physical activity may improve your symptoms  · Get enough sleep  Create a routine to help you relax before bed  You can listen to music, read, or do yoga  Try to go to bed and wake up at the same time every day  Sleep is important for emotional health  · Eat a variety of healthy foods from all of the food groups  A healthy meal plan is low in fat, salt, and added sugar  Ask your healthcare provider for more information about a meal plan that is right for you  · Do not drink alcohol or use drugs  Alcohol and drugs can make your symptoms worse  Follow up with your healthcare provider as directed: Your healthcare provider will monitor your progress at follow-up visits  He or she will also monitor your medicine if you take antidepressants  Your healthcare provider will ask if the medicine is helping  Tell him or her about any side effects or problems you may have with your medicine  The type or amount of medicine may need to be changed  Write down your questions so you remember to ask them during your visits  © 2017 [address] [facility] Information is for End User's use only and may not be sold, redistributed or otherwise used for commercial purposes  All illustrations and images included in CareNotes® are the copyrighted property of A D A M , Inc  or [employer]  The above information is an  only  It is not intended as medical advice for individual conditions or treatments  Talk to your doctor, nurse or pharmacist before following any medical regimen to see if it is safe and effective for you

## 2018-04-07 NOTE — ED NOTES
Paged and spoke with Sony Vasquez, , that we have a new patient to be seen  States he will be in       Mary Donahue, RN  04/07/18 7914

## 2018-04-07 NOTE — ED PROVIDER NOTES
History  Chief Complaint   Patient presents with    Psychiatric Evaluation     patient reports increased depression and SI after losing someone close to him   patient denies plan, states "I would kill myself"  denies HI   reports VH and AH  Patient is a 53-year-old male that presents for acute on chronic symptoms of depression, suicidal ideation and alcohol intoxication  He states he was recently admitted to a psychiatric facility  Patient has not been taking his medications  Patient states he has been suffering from a stressful life event  He states that recently he lost a girlfriend  History provided by:  Patient   used: No    Psychiatric Evaluation   Presenting symptoms: hallucinations and suicidal thoughts    Presenting symptoms: no self-mutilation    Degree of incapacity (severity): Moderate  Onset quality:  Gradual  Timing:  Constant  Progression:  Worsening  Chronicity:  Recurrent  Context: alcohol use, drug abuse and noncompliance    Treatment compliance:  Some of the time  Relieved by:  Nothing  Ineffective treatments:  None tried  Associated symptoms: anxiety, irritability and poor judgment    Associated symptoms: no chest pain and no headaches    Risk factors: hx of mental illness, hx of suicide attempts and recent psychiatric admission        Prior to Admission Medications   Prescriptions Last Dose Informant Patient Reported? Taking?   escitalopram (LEXAPRO) 5 mg tablet   No No   Sig: Take 1 tablet (5 mg total) by mouth daily At 9AM      Facility-Administered Medications: None       Past Medical History:   Diagnosis Date    Depression        History reviewed  No pertinent surgical history  History reviewed  No pertinent family history  I have reviewed and agree with the history as documented      Social History   Substance Use Topics    Smoking status: Current Some Day Smoker    Smokeless tobacco: Never Used    Alcohol use Yes      Comment: social drinker Review of Systems   Constitutional: Positive for irritability  Negative for chills and fever  Respiratory: Negative for chest tightness and shortness of breath  Cardiovascular: Negative for chest pain  Gastrointestinal: Negative for nausea and vomiting  Skin: Negative for color change, pallor, rash and wound  Allergic/Immunologic: Negative for immunocompromised state  Neurological: Negative for dizziness, light-headedness and headaches  Psychiatric/Behavioral: Positive for hallucinations and suicidal ideas  Negative for behavioral problems, confusion and self-injury  The patient is nervous/anxious  All other systems reviewed and are negative  Physical Exam  ED Triage Vitals [04/07/18 0335]   Temperature Pulse Respirations Blood Pressure SpO2   97 8 °F (36 6 °C) 68 16 115/73 95 %      Temp Source Heart Rate Source Patient Position - Orthostatic VS BP Location FiO2 (%)   Oral -- Sitting Left arm --      Pain Score       --           Orthostatic Vital Signs  Vitals:    04/07/18 0335   BP: 115/73   Pulse: 68   Patient Position - Orthostatic VS: Sitting       Physical Exam   Constitutional: He is oriented to person, place, and time  He appears well-developed and well-nourished  No distress  HENT:   Head: Normocephalic and atraumatic  Mouth/Throat: Oropharynx is clear and moist    Eyes: EOM are normal  Pupils are equal, round, and reactive to light  Right eye exhibits no discharge  Left eye exhibits no discharge  Right conjunctiva is injected  Left conjunctiva is injected  No scleral icterus  Cardiovascular: Normal rate, regular rhythm, normal heart sounds and intact distal pulses  Exam reveals no friction rub  No murmur heard  Pulmonary/Chest: Effort normal and breath sounds normal  No stridor  No respiratory distress  He has no wheezes  He has no rales  Musculoskeletal: He exhibits no edema, tenderness or deformity     Neurological: He is alert and oriented to person, place, and time    Skin: Skin is warm and dry  He is not diaphoretic  Psychiatric: His speech is normal  His affect is blunt  He is agitated  He is not actively hallucinating  Thought content is not paranoid and not delusional  Cognition and memory are impaired  He expresses suicidal ideation  He expresses no homicidal ideation  He expresses suicidal plans  He expresses no homicidal plans  He is attentive  Nursing note and vitals reviewed  ED Medications  Medications - No data to display    Diagnostic Studies  Results Reviewed     Procedure Component Value Units Date/Time    Rapid drug screen, urine [[id number]]  (Abnormal) Collected:  04/07/18 0402    Lab Status:  Final result Specimen:  Urine from Urine, Clean Catch Updated:  04/07/18 0426     Amph/Meth UR Negative     Barbiturate Ur Negative     Benzodiazepine Urine Negative     Cocaine Urine Negative     Methadone Urine Negative     Opiate Urine Negative     PCP Ur Negative     THC Urine Positive (A)    Narrative:         Presumptive report  If requested, specimen will be sent to reference lab for confirmation  FOR MEDICAL PURPOSES ONLY  IF CONFIRMATION NEEDED PLEASE CONTACT THE LAB WITHIN 5 DAYS      Drug Screen Cutoff Levels:  AMPHETAMINE/METHAMPHETAMINES  1000 ng/mL  BARBITURATES     200 ng/mL  BENZODIAZEPINES     200 ng/mL  COCAINE      300 ng/mL  METHADONE      300 ng/mL  OPIATES      300 ng/mL  PHENCYCLIDINE     25 ng/mL  THC       50 ng/mL    POCT alcohol breath test [[id number]]  (Normal) Resulted:  04/07/18 0350    Lab Status:  Final result Updated:  04/07/18 0350     EXTBreath Alcohol 0 176                 No orders to display              Procedures  Procedures       Phone Contacts  ED Phone Contact    ED Course  ED Course                                MDM  Number of Diagnoses or Management Options  Alcohol intoxication ([facility] [address] ): new and requires workup  Depression: new and requires workup  Suicidal ideation: new and requires workup  Diagnosis management comments: Patient is intoxicated  Patient is having thoughts of wanting to harm himself  He told me he wants to slit his throat with a knife  Symptoms started about an hour prior to arrival   Patient states that he was told to come to the hospital in the symptoms developed  Will have crisis evaluate when sober  Amount and/or Complexity of Data Reviewed  Clinical lab tests: ordered and reviewed  Tests in the medicine section of CPT®: ordered and reviewed  Review and summarize past medical records: yes    Patient Progress  Patient progress: stable    CritCare Time    Disposition  Final diagnoses:   Alcohol intoxication ([facility] )   Suicidal ideation   Depression     Time reflects when diagnosis was documented in both MDM as applicable and the Disposition within this note     Time User Action Codes Description Comment    4/7/2018  4:31 AM Gómez Horta Add [F10 929] Alcohol intoxication ([facility] [address] )     4/7/2018  4:31 AM Gómez Horta Add [R45 851] Suicidal ideation     4/7/2018  4:31 AM Shakeel Roldan Add [F32 9] Depression       ED Disposition     None      Follow-up Information    None       Patient's Medications   Discharge Prescriptions    No medications on file     No discharge procedures on file      ED Provider  Electronically Signed by           Roni Uribe DO  04/07/18 0486

## 2018-04-07 NOTE — ED NOTES
Pt very irritable and defensive during RN assessment  Pt explained hospital policy and asked if he had any alcoholic beverages tonight and pt denied  When RN used the breathalyzer pt states "Can you just let me catch my breath alright? My wife just  " Pt loud and hostile when asked questions  Pt redirected and explained hospital behavioral health policy and why breathalyzer was necessary  Pt able to perform breathalyzer after RN explained procedure several times        Stacey Knowles, DONNIE  [phone]

## 2018-04-07 NOTE — ED NOTES
Pt is a 46 y o  male who was brought to the ED with   Chief Complaint   Patient presents with    Psychiatric Evaluation     patient reports increased depression and SI after losing someone close to him   patient denies plan, states "I would kill myself"  denies HI   reports VH and AH  Pt brought to the ED with complaints of etoh intoxication and S/I, Pt reports that last night he was drinking and thinking about his girlfriend (who dided 2 months) Pt reports that he has out pt MH services through the [facility] and sees a Psych Dr and therapis there  Pt reports that he got drunk and was feeling depressed and having S/I  Pt now reports deneies S/i  H/I,A/H,V/H  Intake Assessment completed, Safety risk Assessment completed, Pt reports that he plan to speak with is therapist about his feelings, CW discussed this case and pt plan with ED Physician who is in agreement with this plan  Pt will be discharged per ED Physician, Brie Jiménez gave pt a referral for follow up care        [address] [place] [address]

## 2018-04-07 NOTE — ED NOTES
Patient awake for breakfast   Cooperative with staff  Informed of the time and that crisis  will be in this morning to do an evaluation  States he drank last night to "drain away" his suicidal thoughts  Feels he is not so suicidal today, but was very close to doing it last night  OOB to bathroom after eating then returned to bed       Nargis Deshpande RN  04/07/18 0085

## 2018-04-08 ENCOUNTER — HOSPITAL ENCOUNTER (EMERGENCY)
Facility: HOSPITAL | Age: 53
End: 2018-04-08
Attending: EMERGENCY MEDICINE
Payer: COMMERCIAL

## 2018-04-08 VITALS
TEMPERATURE: 98.1 F | RESPIRATION RATE: 18 BRPM | OXYGEN SATURATION: 98 % | HEART RATE: 78 BPM | SYSTOLIC BLOOD PRESSURE: 121 MMHG | DIASTOLIC BLOOD PRESSURE: 74 MMHG

## 2018-04-08 DIAGNOSIS — F43.8 CHRONIC SORROW: Primary | ICD-10-CM

## 2018-04-08 DIAGNOSIS — R45.89 SUICIDAL BEHAVIOR: ICD-10-CM

## 2018-04-08 LAB
AMPHETAMINES SERPL QL SCN: NEGATIVE
BARBITURATES UR QL: NEGATIVE
BENZODIAZ UR QL: NEGATIVE
COCAINE UR QL: POSITIVE
ETHANOL EXG-MCNC: 0.01 MG/DL
METHADONE UR QL: NEGATIVE
OPIATES UR QL SCN: NEGATIVE
PCP UR QL: NEGATIVE
THC UR QL: POSITIVE

## 2018-04-08 PROCEDURE — 99285 EMERGENCY DEPT VISIT HI MDM: CPT

## 2018-04-08 PROCEDURE — 80307 DRUG TEST PRSMV CHEM ANLYZR: CPT | Performed by: EMERGENCY MEDICINE

## 2018-04-08 PROCEDURE — 82075 ASSAY OF BREATH ETHANOL: CPT | Performed by: EMERGENCY MEDICINE

## 2018-04-08 NOTE — ED NOTES
Patient is accepted at [facility]  Patient is accepted by Dr Lea Dinh per [address] is [facility] with [facility] is scheduled for 2130

## 2018-04-08 NOTE — ED NOTES
Patient presented to the ED after having increased depression  Patient was seen a few days ago in the ED for the same issue however patient stated he is unable to cope with his depression and needs to sign himself in for inpatient treatment so he does not hurt himself  Patient has been having suicdal ideations with a plan to cut himself  Patient states his girlfriend was killed in a automobile accident in January and has had other loses recently as well  Patient states he has been depressed and is unable to hold of job for fear of lose of control  Patient states he has been working with [employer] (daniel [phone]) and [employer] [place] [employer] Trsea Jarvis) to 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

## 2018-04-08 NOTE — LETTER
[facility]  [address] [facility] [address]  Dept: [phone]      EMTALA TRANSFER CONSENT    NAME Julio Taylor                                         1965                              MRN [id number]    I have been informed of my rights regarding examination, treatment, and transfer   by Dr Manasa To DO    Benefits: Continuity of care    Risks: Potential for delay in receiving treatment      Consent for Transfer:  I acknowledge that my medical condition has been evaluated and explained to me by the emergency department physician or other qualified medical person and/or my attending physician, who has recommended that I be transferred to the service of  Accepting Physician: Dr Bin Cao at [address] [place] [address] Rd Name, [address] : [place], [facility]  The above potential benefits of such transfer, the potential risks associated with such transfer, and the probable risks of not being transferred have been explained to me, and I fully understand them  The doctor has explained that, in my case, the benefits of transfer outweigh the risks  I agree to be transferred  I authorize the performance of emergency medical procedures and treatments upon me in both transit and upon arrival at the receiving facility  Additionally, I authorize the release of any and all medical records to the receiving facility and request they be transported with me, if possible  I understand that the safest mode of transportation during a medical emergency is an ambulance and that the Hospital advocates the use of this mode of transport  Risks of traveling to the receiving facility by car, including absence of medical control, life sustaining equipment, such as oxygen, and medical personnel has been explained to me and I fully understand them  (MARICEL CORRECT BOX BELOW)  [ X ]  I consent to the stated transfer and to be transported by ambulance/helicopter    [  ] I consent to the stated transfer, but refuse transportation by ambulance and accept full responsibility for my transportation by car  I understand the risks of non-ambulance transfers and I exonerate the Hospital and its staff from any deterioration in my condition that results from this refusal     X___________________________________________    DATE  18  TIME________  Signature of patient or legally responsible individual signing on patient behalf           RELATIONSHIP TO PATIENT_________________________                  Provider Certification    NAME Lael Severs Culpepper                                         1965                              MRN [id number]    A medical screening exam was performed on the above named patient  Based on the examination:    Condition Necessitating Transfer The primary encounter diagnosis was Chronic sorrow  A diagnosis of Suicidal behavior was also pertinent to this visit  Patient Condition: The patient has been stabilized such that within reasonable medical probability, no material deterioration of the patient condition or the condition of the unborn child(flash) is likely to result from the transfer    Reason for Transfer: Level of Care needed not available at this facility    Transfer Requirements: [facility]   · Space available and qualified personnel available for treatment as acknowledged by Tonie Perry  · Agreed to accept transfer and to provide appropriate medical treatment as acknowledged by       Dr Chuck Randall  · Appropriate medical records of the examination and treatment of the patient are provided at the time of transfer   [address], [address] _[place]______  · Transfer will be performed by qualified personnel from [facility]  and appropriate transfer equipment as required, including the use of necessary and appropriate life support measures      Provider Certification: I have examined the patient and explained the following risks and benefits of being transferred/refusing transfer to the patient/family:  The patient is stable for psychiatric transfer because they are medically stable, and is protected from harming him/herself or others during transport, General risk, such as traffic hazards, adverse weather conditions, rough terrain or turbulence, possible failure of equipment (including vehicle or aircraft), or consequences of actions of persons outside the control of the transport personnel      Based on these reasonable risks and benefits to the patient and/or the unborn child(flash), and based upon the information available at the time of the patients examination, I certify that the medical benefits reasonably to be expected from the provision of appropriate medical treatments at another medical facility outweigh the increasing risks, if any, to the individuals medical condition, and in the case of labor to the unborn child, from effecting the transfer      X____________________________________________ DATE 04/08/18        TIME_______      ORIGINAL - SEND TO MEDICAL RECORDS   COPY - SEND WITH PATIENT DURING TRANSFER

## 2018-04-08 NOTE — ED NOTES
Insurance Authorization:   Phone call placed to [employer] number: [phone]  Spoke to Libia Tomlin  3 days approved pending bed search  Level of care: inpatient mental health  Review on; depending on bed availability  Authorization # call upon arrival

## 2018-04-08 NOTE — ED PROVIDER NOTES
History  Chief Complaint   Patient presents with    Suicidal     Pt states his depression has been worsening  Pt states he has been having suicidal ideations and states "I would cut myself"  Pt denies HI  Pt states he has been hearing a voice telling him to cut himself  Denies visual hallucinations  Suicidal   Presenting symptoms: depression, hallucinations (command auditory, hx of these in the past) and suicidal thoughts    Presenting symptoms: no aggressive behavior, no agitation, no homicidal ideas, no paranoid behavior and no suicide attempt    Degree of incapacity (severity):  Severe  Onset quality:  Gradual  Duration:  3 months  Timing:  Constant  Progression:  Worsening  Chronicity:  Chronic  Context: stressful life event    Treatment compliance: All of the time  Relieved by:  Nothing  Worsened by:  Nothing  Associated symptoms: anhedonia and feelings of worthlessness    Associated symptoms: no abdominal pain, no appetite change, no chest pain and no fatigue    Risk factors: hx of mental illness and hx of suicide attempts        Prior to Admission Medications   Prescriptions Last Dose Informant Patient Reported? Taking?   escitalopram (LEXAPRO) 5 mg tablet 4/8/2018 at Unknown time  No Yes   Sig: Take 1 tablet (5 mg total) by mouth daily At 9AM      Facility-Administered Medications: None       Past Medical History:   Diagnosis Date    Depression        History reviewed  No pertinent surgical history  History reviewed  No pertinent family history  I have reviewed and agree with the history as documented  Social History   Substance Use Topics    Smoking status: Current Some Day Smoker     Packs/day: 0 20    Smokeless tobacco: Never Used    Alcohol use Yes      Comment: 40oz of beer a week        Review of Systems   Constitutional: Negative for activity change, appetite change, fatigue and fever  HENT: Negative for congestion, dental problem, ear pain, rhinorrhea and sore throat      Eyes: Negative for pain and redness  Respiratory: Negative for chest tightness, shortness of breath and wheezing  Cardiovascular: Negative for chest pain and palpitations  Gastrointestinal: Negative for abdominal pain, blood in stool, constipation, diarrhea, nausea and vomiting  Endocrine: Negative for cold intolerance and heat intolerance  Genitourinary: Negative for dysuria, frequency and hematuria  Musculoskeletal: Negative for arthralgias and myalgias  Skin: Negative for color change, pallor and rash  Neurological: Negative for weakness and numbness  Hematological: Does not bruise/bleed easily  Psychiatric/Behavioral: Positive for dysphoric mood, hallucinations (command auditory, hx of these in the past) and suicidal ideas  Negative for agitation, homicidal ideas and paranoia  Physical Exam  ED Triage Vitals [04/08/18 1540]   Temperature Pulse Respirations Blood Pressure SpO2   98 1 °F (36 7 °C) 93 18 146/76 98 %      Temp Source Heart Rate Source Patient Position - Orthostatic VS BP Location FiO2 (%)   Oral -- Sitting Right arm --      Pain Score       No Pain           Orthostatic Vital Signs  Vitals:    04/08/18 1540   BP: 146/76   Pulse: 93   Patient Position - Orthostatic VS: Sitting       Physical Exam   Constitutional: He is oriented to person, place, and time  He appears well-developed and well-nourished  HENT:   Mouth/Throat: No oropharyngeal exudate  TMs normal bilaterally no pharyngeal erythema no rhinorrhea nontender palpation of sinuses, normal looking turbinates   Eyes: Conjunctivae and EOM are normal    Neck: Normal range of motion  Neck supple  No meningeal signs   Cardiovascular: Normal rate, regular rhythm, normal heart sounds and intact distal pulses  Pulmonary/Chest: Effort normal and breath sounds normal  No respiratory distress  He has no wheezes  He has no rales  He exhibits no tenderness  Abdominal: Soft   Bowel sounds are normal  He exhibits no distension and no mass  There is no tenderness  No hernia  No cvat   Musculoskeletal: Normal range of motion  He exhibits no edema  Lymphadenopathy:     He has no cervical adenopathy  Neurological: He is alert and oriented to person, place, and time  No cranial nerve deficit  Skin: No rash noted  No erythema  No edema   Psychiatric: His speech is normal and behavior is normal  Judgment normal  Cognition and memory are normal  He exhibits a depressed mood  He expresses suicidal ideation  He expresses no homicidal ideation  He expresses suicidal plans  Nursing note and vitals reviewed  ED Medications  Medications - No data to display    Diagnostic Studies  Results Reviewed     Procedure Component Value Units Date/Time    Rapid drug screen, urine [[id number]] Collected:  04/08/18 1614    Lab Status: In process Specimen:  Urine from Urine, Clean Catch Updated:  04/08/18 1617    POCT alcohol breath test [[id number]]  (Normal) Resulted:  04/08/18 1613    Lab Status:  Final result Updated:  04/08/18 1613     EXTBreath Alcohol 0 014                 No orders to display              Procedures  Procedures       Phone Contacts  ED Phone Contact    ED Course  ED Course                                MDM  Number of Diagnoses or Management Options  Chronic sorrow:   Suicidal behavior:   Diagnosis management comments: Depression and SI-will consult crisis for mental health evaluation/treatment      CritCare Time    Disposition  Final diagnoses:   Chronic sorrow   Suicidal behavior     Time reflects when diagnosis was documented in both MDM as applicable and the Disposition within this note     Time User Action Codes Description Comment    4/8/2018  4:29 PM Creasie Cotton Add [F32 9] Depression     4/8/2018  4:29 PM Creasie Cotton Remove [F32 9] Depression     4/8/2018  4:29 PM Creasie Cotton Add [F43 8] Chronic sorrow     4/8/2018  4:29 PM Creasie Cotton Add [R46 89] Suicidal behavior       ED Disposition     ED Disposition Condition Comment    Transfer to Behavioral Health        Follow-up Information    None       Patient's Medications   Discharge Prescriptions    No medications on file     No discharge procedures on file      ED Provider  Electronically Signed by           Govind House MD  04/08/18 7539

## 2018-04-08 NOTE — LETTER
Section I - General Information    Name of Patient: Arthea Angelucci                 : 1965    Medicare #:____________________  Transport Date: 18 (PCS is valid for round trips on this date and for all repetitive trips in the 60-day range as noted below )  Origin: [place] [address]                                                         Destination:____[facility]____________________________________________  Is the pt's stay covered under Medicare Part A (PPS/DRG)     (_) YES  (X) NO  Closest appropriate facility?  (_) YES  (_) NO  If no, why is transport to more distant facility required?___201__________________  If hosp-hosp transfer, describe services needed at 2nd facility not available at 1st facility? _________________________________  If hospice pt, is this transport related to pt's terminal illness? (_) YES (_) NO Describe____________________________________    Section II - Medical Necessity Questionnaire  Ambulance transportation is medically necessary only if other means of transport are contraindicated or would be potentially harmful to the patient  To meet this requirement, the patient must either be "bed confined" or suffer from a condition such that transport by means other than ambulance is contraindicated by the patient's condition   The following questions must be answered by the medical professional signing below for this form to be valid:    1)  Describe the MEDICAL CONDITION (physical and/or mental) of this patient AT [address] [place] [address] that requires the patient to be transported in an ambulance and why transport by other means is contraindicated by the patient's condition:________Behavioral Health Transport_______________________________________________________________    2) Is the patient "bed confined" as defined below?     (_) YES  (X) NO  To be "be confined" the patient must satisfy all three of the following conditions: (1) unable to get up from bed without Assistance; AND (2) unable to ambulate; AND (3) unable to sit in a chair or wheelchair  3) Can this patient safely be transported by car or wheelchair [place] (i e , seated during transport without a medical attendant or monitoring)?   (_) YES  (X) NO    4) In addition to completing questions 1-3 above, please check any of the following conditions that apply*:  *Note: supporting documentation for any boxes checked must be maintained in the patient's medical records  (_)Contractures   (_)Non-Healed Fractures  (_)Patient is confused (_)Patient is comatose (_)Moderate/severe pain on movement (X)Danger to self/others  (_)IV meds/fluids required (_)Patient is combative(_)Need or possible need for restraints (_)DVT requires elevation of lower extremity  (_)Medical attendant required (_)Requires oxygen-unable to self administer (_)Special handling/isolation/infection control precautions required (_)Unable to tolerate seated position for time needed to transport (_)Hemodynamic monitoring required en route (_)Unable to sit in a chair or wheelchair due to decubitus ulcers or other wounds (_)Cardiac monitoring required en route (_)Morbid obesity requires additional personnel/equipment to safely handle patient (_)Orthopedic device (backboard, halo, pins, traction, brace, wedge, etc,) requiring special handling during transport (_)Other(specify)_______________________________________________    Section III - Signature of Physician or Healthcare Professional  I certify that the above information is true and correct based on my evaluation of this patient, and represent that the patient requires transport by ambulance and that other forms of transport are contraindicated   I understand that this information will be used by the Centers for Medicare and Medicaid Services (CMS) to support the determination of medical necessity for ambulance services, and I represent that I have personal knowledge of the patient's condition at time of transport  (_) If this box is checked, I also certify that the patient is physically or mentally incapable of signing the ambulance service's claim and that the institution with which I am affiliated has furnished care, services, or assistance to the patient  My signature below is made on behalf of the patient pursuant to 42 CFR §424 36(b)(4)  In accordance with 42 CFR §424 37, the specific reason(s) that the patient is physically or mentally incapable of signing the claim form is as follows: _________________________________________________________________________________________________________      Signature of Physician* or Healthcare Professional______________________________________________________________  Signature Date 04/08/18 (For scheduled repetitive transports, this form is not valid for transports performed more than 60 days after this date)    Printed Name & Credentials of Physician or Healthcare Professional (MD, DO, RN, etc )___Marcia PRECIADO (Crisis Worker)  *Form must be signed by patient's attending physician for scheduled, repetitive transports   For non-repetitive, unscheduled ambulance transports, if unable to obtain the signature of the attending physician, any of the following may sign (choose appropriate option below)  (_) Physician Assistant (_)  Clinical Nurse Specialist (_)  Registered Nurse  (_)  Nurse Practitioner  (X) Discharge Planner

## 2018-04-08 NOTE — ED NOTES
Faxed referral to [facility] per [place] from [place] request  Patient is willing to go to [facility]       Faxed to [phone]

## 2018-04-09 NOTE — ED NOTES
Pt aware he is leaving for [facility] in approximately 2 hours     Elwyn Heimlich, RN  04/08/18 2011

## 2018-06-19 ENCOUNTER — HOSPITAL ENCOUNTER (EMERGENCY)
Facility: HOSPITAL | Age: 53
End: 2018-06-20
Attending: EMERGENCY MEDICINE
Payer: COMMERCIAL

## 2018-06-19 DIAGNOSIS — F32.A DEPRESSION: ICD-10-CM

## 2018-06-19 DIAGNOSIS — F33.2 SEVERE EPISODE OF RECURRENT MAJOR DEPRESSIVE DISORDER, WITHOUT PSYCHOTIC FEATURES (HCC): ICD-10-CM

## 2018-06-19 DIAGNOSIS — F10.929 ALCOHOL INTOXICATION (HCC): Primary | ICD-10-CM

## 2018-06-19 DIAGNOSIS — R45.851 SUICIDAL IDEATIONS: ICD-10-CM

## 2018-06-19 PROCEDURE — 80307 DRUG TEST PRSMV CHEM ANLYZR: CPT | Performed by: EMERGENCY MEDICINE

## 2018-06-19 PROCEDURE — 36415 COLL VENOUS BLD VENIPUNCTURE: CPT | Performed by: EMERGENCY MEDICINE

## 2018-06-19 PROCEDURE — 80320 DRUG SCREEN QUANTALCOHOLS: CPT | Performed by: EMERGENCY MEDICINE

## 2018-06-19 NOTE — LETTER
[facility]  [address] [address]  Dept: [phone]      EMTALA TRANSFER CONSENT    NAME Yohana Reynolds                                         1965                              MRN [id number]    I have been informed of my rights regarding examination, treatment, and transfer   by Dr Tawana De Oliveira DO    Benefits: Specialized equipment and/or services available at the receiving facility (Include comment)________________________    Risks: Potential for delay in receiving treatment      Consent for Transfer:  I acknowledge that my medical condition has been evaluated and explained to me by the emergency department physician or other qualified medical person and/or my attending physician, who has recommended that I be transferred to the service of  Accepting Physician: DR Harsha Torres at [address] [place] [address] Name, [address] : Jorge Alberto Rodriguez ([address]) [place], [place]  The above potential benefits of such transfer, the potential risks associated with such transfer, and the probable risks of not being transferred have been explained to me, and I fully understand them  The doctor has explained that, in my case, the benefits of transfer outweigh the risks  I agree to be transferred  I authorize the performance of emergency medical procedures and treatments upon me in both transit and upon arrival at the receiving facility  Additionally, I authorize the release of any and all medical records to the receiving facility and request they be transported with me, if possible  I understand that the safest mode of transportation during a medical emergency is an ambulance and that the Hospital advocates the use of this mode of transport  Risks of traveling to the receiving facility by car, including absence of medical control, life sustaining equipment, such as oxygen, and medical personnel has been explained to me and I fully understand them      ([place] BELOW)  [  ]  I consent to the stated transfer and to be transported by ambulance/helicopter  [  ]  I consent to the stated transfer, but refuse transportation by ambulance and accept full responsibility for my transportation by car  I understand the risks of non-ambulance transfers and I exonerate the Hospital and its staff from any deterioration in my condition that results from this refusal     X___________________________________________    DATE  18  TIME________  Signature of patient or legally responsible individual signing on patient behalf           RELATIONSHIP TO PATIENT_________________________          Provider Certification    NAME Art Jaffe                                         1965                              MRN [id number]    A medical screening exam was performed on the above named patient  Based on the examination:    Condition Necessitating Transfer The primary encounter diagnosis was Alcohol intoxication ([facility] [address] )  Diagnoses of Depression, Suicidal ideations, and Severe episode of recurrent major depressive disorder, without psychotic features ([facility] [address] ) were also pertinent to this visit  Patient Condition: The patient has been stabilized such that within reasonable medical probability, no material deterioration of the patient condition or the condition of the unborn child(flash) is likely to result from the transfer    Reason for Transfer: Level of Care needed not available at this facility    Transfer Requirements: [facility] ([address] [address]) [place], [place]   · Space available and qualified personnel available for treatment as acknowledged by Ivan Vieyra ([address] [place] [address]) [phone]  · Agreed to accept transfer and to provide appropriate medical treatment as acknowledged by       DR Iveth Dinero  · Appropriate medical records of the examination and treatment of the patient are provided at the time of transfer   [facility], [address] _______  · Transfer will be performed by qualified personnel from [facility]  and appropriate transfer equipment as required, including the use of necessary and appropriate life support measures  Provider Certification: I have examined the patient and explained the following risks and benefits of being transferred/refusing transfer to the patient/family:  General risk, such as traffic hazards, adverse weather conditions, rough terrain or turbulence, possible failure of equipment (including vehicle or aircraft), or consequences of actions of persons outside the control of the transport personnel      Based on these reasonable risks and benefits to the patient and/or the unborn child(flash), and based upon the information available at the time of the patients examination, I certify that the medical benefits reasonably to be expected from the provision of appropriate medical treatments at another medical facility outweigh the increasing risks, if any, to the individuals medical condition, and in the case of labor to the unborn child, from effecting the transfer      X____________________________________________ DATE 06/20/18        TIME_______      ORIGINAL - SEND TO MEDICAL RECORDS   COPY - SEND WITH PATIENT DURING TRANSFER

## 2018-06-19 NOTE — LETTER
Section I - General Information    Name of Patient: Margaret Vargas                 : 1965    Medicare #:____________________  Transport Date: 18 (PCS is valid for round trips on this date and for all repetitive trips in the 60-day range as noted below )  [facility]                                                         Destination:[address] _[address]_______________________________________________  Is the pt's stay covered under Medicare Part A (PPS/DRG)     (_) YES  (X) NO  Closest appropriate facility? (X) YES  (_) NO  If no, why is transport to more distant facility required?________________________  If hosp-hosp transfer, describe services needed at 2nd facility not available at 1st facility? _________________________________  If hospice pt, is this transport related to pt's terminal illness? (_) YES (X) NO Describe____________________________________    Section II - Medical Necessity Questionnaire  Ambulance transportation is medically necessary only if other means of transport are contraindicated or would be potentially harmful to the patient  To meet this requirement, the patient must either be "bed confined" or suffer from a condition such that transport by means other than ambulance is contraindicated by the patient's condition   The following questions must be answered by the medical professional signing below for this form to be valid:    1)  Describe the MEDICAL CONDITION (physical and/or mental) of this patient AT [address] [place] [address] that requires the patient to be transported in an ambulance and why transport by other means is contraindicated by the patient's condition:__________________________________________________________________________________________________    2) Is the patient "bed confined" as defined below?     (_) YES  (X) NO  To be "be confined" the patient must satisfy all three of the following conditions: (1) unable to get up from bed without Assistance; AND (2) unable to ambulate; AND (3) unable to sit in a chair or wheelchair  3) Can this patient safely be transported by car or wheelchair Daxa Pratt (i e , seated during transport without a medical attendant or monitoring)?   (_) YES  (X) NO    4) In addition to completing questions 1-3 above, please check any of the following conditions that apply*:  *Note: supporting documentation for any boxes checked must be maintained in the patient's medical records  (_)Contractures   (_)Non-Healed Fractures  (_)Patient is confused (_)Patient is comatose (_)Moderate/severe pain on movement (X)Danger to self/others  (_)IV meds/fluids required (_)Patient is combative(_)Need or possible need for restraints (_)DVT requires elevation of lower extremity  (_)Medical attendant required (_)Requires oxygen-unable to self administer (_)Special handling/isolation/infection control precautions required (_)Unable to tolerate seated position for time needed to transport (_)Hemodynamic monitoring required en route (_)Unable to sit in a chair or wheelchair due to decubitus ulcers or other wounds (_)Cardiac monitoring required en route (_)Morbid obesity requires additional personnel/equipment to safely handle patient (_)Orthopedic device (backboard, halo, pins, traction, brace, wedge, etc,) requiring special handling during transport (_)Other(specify)_______________________________________________    Section III - Signature of Physician or Healthcare Professional  I certify that the above information is true and correct based on my evaluation of this patient, and represent that the patient requires transport by ambulance and that other forms of transport are contraindicated   I understand that this information will be used by the Centers for Medicare and Medicaid Services (CMS) to support the determination of medical necessity for ambulance services, and I represent that I have personal knowledge of the patient's condition at time of transport  (_) If this box is checked, I also certify that the patient is physically or mentally incapable of signing the ambulance service's claim and that the institution with which I am affiliated has furnished care, services, or assistance to the patient  My signature below is made on behalf of the patient pursuant to 42 CFR §424 36(b)(4)  In accordance with 42 CFR §424 37, the specific reason(s) that the patient is physically or mentally incapable of signing the claim form is as follows: _________________________________________________________________________________________________________      Signature of Physician* or Healthcare Professional______________________________________________________________  Signature Date 06/20/18 (For scheduled repetitive transports, this form is not valid for transports performed more than 60 days after this date)    Printed Name & Credentials of Physician or Healthcare Professional (MD, DO, RN, etc )________________________________  *Form must be signed by patient's attending physician for scheduled, repetitive transports   For non-repetitive, unscheduled ambulance transports, if unable to obtain the signature of the attending physician, any of the following may sign (choose appropriate option below)  (_) Physician Assistant (_)  Clinical Nurse Specialist (_)  Registered Nurse  (_)  Nurse Practitioner  (X) Discharge Planner

## 2018-06-20 ENCOUNTER — HOSPITAL ENCOUNTER (INPATIENT)
Facility: HOSPITAL | Age: 53
LOS: 8 days | Discharge: HOME | DRG: 751 | End: 2018-06-28
Attending: PSYCHIATRY & NEUROLOGY | Admitting: PSYCHIATRY & NEUROLOGY
Payer: COMMERCIAL

## 2018-06-20 VITALS
WEIGHT: 174.16 LBS | DIASTOLIC BLOOD PRESSURE: 83 MMHG | BODY MASS INDEX: 26.48 KG/M2 | OXYGEN SATURATION: 100 % | SYSTOLIC BLOOD PRESSURE: 122 MMHG | HEART RATE: 69 BPM | RESPIRATION RATE: 18 BRPM | TEMPERATURE: 98 F

## 2018-06-20 DIAGNOSIS — F33.2 SEVERE EPISODE OF RECURRENT MAJOR DEPRESSIVE DISORDER, WITHOUT PSYCHOTIC FEATURES (HCC): Primary | ICD-10-CM

## 2018-06-20 LAB
AMPHETAMINES SERPL QL SCN: NEGATIVE
BARBITURATES UR QL: NEGATIVE
BENZODIAZ UR QL: NEGATIVE
BILIRUB UR QL STRIP: NEGATIVE
CLARITY UR: CLEAR
COCAINE UR QL: NEGATIVE
COLOR UR: YELLOW
ETHANOL SERPL-MCNC: 126 MG/DL (ref 0–3)
GLUCOSE UR STRIP-MCNC: NEGATIVE MG/DL
HGB UR QL STRIP.AUTO: NEGATIVE
KETONES UR STRIP-MCNC: NEGATIVE MG/DL
LEUKOCYTE ESTERASE UR QL STRIP: NEGATIVE
METHADONE UR QL: NEGATIVE
NITRITE UR QL STRIP: NEGATIVE
OPIATES UR QL SCN: NEGATIVE
PCP UR QL: NEGATIVE
PH UR STRIP.AUTO: 7 [PH] (ref 4.5–8)
PROT UR STRIP-MCNC: NEGATIVE MG/DL
SP GR UR STRIP.AUTO: 1.01 (ref 1–1.03)
THC UR QL: POSITIVE
UROBILINOGEN UR QL STRIP.AUTO: 2 E.U./DL

## 2018-06-20 PROCEDURE — 99285 EMERGENCY DEPT VISIT HI MDM: CPT

## 2018-06-20 PROCEDURE — 81003 URINALYSIS AUTO W/O SCOPE: CPT | Performed by: PHYSICIAN ASSISTANT

## 2018-06-20 RX ORDER — MAGNESIUM HYDROXIDE/ALUMINUM HYDROXICE/SIMETHICONE 120; 1200; 1200 MG/30ML; MG/30ML; MG/30ML
30 SUSPENSION ORAL EVERY 4 HOURS PRN
Status: CANCELLED | OUTPATIENT
Start: 2018-06-20

## 2018-06-20 RX ORDER — ESCITALOPRAM OXALATE 5 MG/1
5 TABLET ORAL DAILY
Status: CANCELLED | OUTPATIENT
Start: 2018-06-21

## 2018-06-20 RX ORDER — OLANZAPINE 10 MG/1
10 TABLET ORAL
Status: DISCONTINUED | OUTPATIENT
Start: 2018-06-20 | End: 2018-06-28 | Stop reason: HOSPADM

## 2018-06-20 RX ORDER — ESCITALOPRAM OXALATE 5 MG/1
5 TABLET ORAL DAILY
Status: DISCONTINUED | OUTPATIENT
Start: 2018-06-21 | End: 2018-06-21

## 2018-06-20 RX ORDER — LORAZEPAM 1 MG/1
1 TABLET ORAL EVERY 6 HOURS PRN
Status: DISCONTINUED | OUTPATIENT
Start: 2018-06-20 | End: 2018-06-28 | Stop reason: HOSPADM

## 2018-06-20 RX ORDER — RISPERIDONE 1 MG/1
1 TABLET, ORALLY DISINTEGRATING ORAL
Status: CANCELLED | OUTPATIENT
Start: 2018-06-20

## 2018-06-20 RX ORDER — ACETAMINOPHEN 325 MG/1
650 TABLET ORAL EVERY 6 HOURS PRN
Status: CANCELLED | OUTPATIENT
Start: 2018-06-20

## 2018-06-20 RX ORDER — OLANZAPINE 10 MG/1
10 TABLET ORAL
Status: CANCELLED | OUTPATIENT
Start: 2018-06-20

## 2018-06-20 RX ORDER — BENZTROPINE MESYLATE 1 MG/ML
1 INJECTION INTRAMUSCULAR; INTRAVENOUS EVERY 6 HOURS PRN
Status: CANCELLED | OUTPATIENT
Start: 2018-06-20

## 2018-06-20 RX ORDER — RISPERIDONE 1 MG/1
1 TABLET, ORALLY DISINTEGRATING ORAL
Status: DISCONTINUED | OUTPATIENT
Start: 2018-06-20 | End: 2018-06-28 | Stop reason: HOSPADM

## 2018-06-20 RX ORDER — OLANZAPINE 10 MG/1
10 INJECTION, POWDER, LYOPHILIZED, FOR SOLUTION INTRAMUSCULAR
Status: CANCELLED | OUTPATIENT
Start: 2018-06-20

## 2018-06-20 RX ORDER — LORAZEPAM 2 MG/ML
2 INJECTION INTRAMUSCULAR EVERY 6 HOURS PRN
Status: DISCONTINUED | OUTPATIENT
Start: 2018-06-20 | End: 2018-06-28 | Stop reason: HOSPADM

## 2018-06-20 RX ORDER — LORAZEPAM 2 MG/ML
2 INJECTION INTRAMUSCULAR EVERY 6 HOURS PRN
Status: CANCELLED | OUTPATIENT
Start: 2018-06-20

## 2018-06-20 RX ORDER — HALOPERIDOL 5 MG
5 TABLET ORAL EVERY 6 HOURS PRN
Status: DISCONTINUED | OUTPATIENT
Start: 2018-06-20 | End: 2018-06-28 | Stop reason: HOSPADM

## 2018-06-20 RX ORDER — HALOPERIDOL 5 MG/ML
5 INJECTION INTRAMUSCULAR EVERY 6 HOURS PRN
Status: DISCONTINUED | OUTPATIENT
Start: 2018-06-20 | End: 2018-06-28 | Stop reason: HOSPADM

## 2018-06-20 RX ORDER — TRAZODONE HYDROCHLORIDE 50 MG/1
50 TABLET ORAL
Status: DISCONTINUED | OUTPATIENT
Start: 2018-06-20 | End: 2018-06-28 | Stop reason: HOSPADM

## 2018-06-20 RX ORDER — HALOPERIDOL 5 MG/ML
5 INJECTION INTRAMUSCULAR EVERY 6 HOURS PRN
Status: CANCELLED | OUTPATIENT
Start: 2018-06-20

## 2018-06-20 RX ORDER — BENZTROPINE MESYLATE 1 MG/1
1 TABLET ORAL EVERY 6 HOURS PRN
Status: DISCONTINUED | OUTPATIENT
Start: 2018-06-20 | End: 2018-06-28 | Stop reason: HOSPADM

## 2018-06-20 RX ORDER — ACETAMINOPHEN 325 MG/1
650 TABLET ORAL EVERY 6 HOURS PRN
Status: DISCONTINUED | OUTPATIENT
Start: 2018-06-20 | End: 2018-06-28 | Stop reason: HOSPADM

## 2018-06-20 RX ORDER — BENZTROPINE MESYLATE 1 MG/ML
1 INJECTION INTRAMUSCULAR; INTRAVENOUS EVERY 6 HOURS PRN
Status: DISCONTINUED | OUTPATIENT
Start: 2018-06-20 | End: 2018-06-28 | Stop reason: HOSPADM

## 2018-06-20 RX ORDER — HALOPERIDOL 5 MG
5 TABLET ORAL EVERY 6 HOURS PRN
Status: CANCELLED | OUTPATIENT
Start: 2018-06-20

## 2018-06-20 RX ORDER — BENZTROPINE MESYLATE 0.5 MG/1
1 TABLET ORAL EVERY 6 HOURS PRN
Status: CANCELLED | OUTPATIENT
Start: 2018-06-20

## 2018-06-20 RX ORDER — TRAZODONE HYDROCHLORIDE 50 MG/1
50 TABLET ORAL
Status: CANCELLED | OUTPATIENT
Start: 2018-06-20

## 2018-06-20 RX ORDER — LORAZEPAM 1 MG/1
1 TABLET ORAL EVERY 6 HOURS PRN
Status: CANCELLED | OUTPATIENT
Start: 2018-06-20

## 2018-06-20 RX ORDER — ESCITALOPRAM OXALATE 5 MG/1
5 TABLET ORAL DAILY
Status: DISCONTINUED | OUTPATIENT
Start: 2018-06-20 | End: 2018-06-20 | Stop reason: HOSPADM

## 2018-06-20 RX ORDER — OLANZAPINE 10 MG/1
10 INJECTION, POWDER, LYOPHILIZED, FOR SOLUTION INTRAMUSCULAR
Status: DISCONTINUED | OUTPATIENT
Start: 2018-06-20 | End: 2018-06-28 | Stop reason: HOSPADM

## 2018-06-20 RX ORDER — MAGNESIUM HYDROXIDE/ALUMINUM HYDROXICE/SIMETHICONE 120; 1200; 1200 MG/30ML; MG/30ML; MG/30ML
30 SUSPENSION ORAL EVERY 4 HOURS PRN
Status: DISCONTINUED | OUTPATIENT
Start: 2018-06-20 | End: 2018-06-28 | Stop reason: HOSPADM

## 2018-06-20 RX ADMIN — ESCITALOPRAM 5 MG: 5 TABLET, FILM COATED ORAL at 09:11

## 2018-06-20 NOTE — PROGRESS NOTES
Pt admitted status 201 from [facility] ED  Pt stated he is here "because I lost my mom and am going through a depression " Pt reports multiple deaths of family members in the past year  Pt said he was having SI after finding out news about mom yesterday  Denies SI now and contracts for safety  Reports being noncompliant with lexapro for the past month  Pt said he does not remember if he has ever attempted suicide  Attempted to clarify this statement and pt said "I don't want to talk about suicide, that would make me want to do it " Occasional THC use  Reports drinking one beer about once per week  Denies medical hx  Denies legal problems  Currently lives with uncle  Says he is on housing list  Cooperative with assessment but rambles and has difficulty staying on topic  Oriented to unit rules/routine  Contraband check/skin assessment completed

## 2018-06-20 NOTE — CASE MANAGEMENT
SW met with pt to discuss I/P hospitalization  Pt went to [facility] ED with depression and SI due to hearing news of his mothers passing  Pt felt he needed to check himself in  Pt was pleasant and conversant with this writer  Pt was admitted to [facility] back in April 2018 after a number of deaths in the family  Pt reports he wants to get help now before things spiral out of control  Pt reports having another hospitalization somewhere near [place] since then  Pt has also been in [facility] ED with alcohol intoxication recently  Pt reports he is open with [facility] services and has outpatient with [facility] called his therapist Nikki Balbuena ([phone]) who reports he has an appointment on 6/26/18 @ 1:45  Pt has only seen therapist once and has not yet seen a psychiatrist at [facility]  Pt is currently living with his Loralee Curling but his address on the system if [address] [place] [address] , [place], [place], [facility] which is the [place]  Pt's uncle has dementia and so he is unable to answer the phone  Pt informed this writer that pt's therapist and ICM would be able to corroborate pt's living situation  SW will explore this  Pt denies having access to a dangerous weapon and denies having any legal problems  Pt does admit he was in detention for a number of years prior to 2010 but has had no legal involvement since that time       Pt signed HENRIK's for the following people:    [address] [place] [address] ([phone]); SANTIAGO ([phone]);

## 2018-06-20 NOTE — ED NOTES
Pt awake and alert, resting comfortably on ED stretcher with easy non labored respirations   Pt remains under continuous visual and video monitoring      Morales Gill RN  06/20/18 6401

## 2018-06-20 NOTE — ED NOTES
Patient reports that he is homeless and has been depressed for months since the death of his girlfriend  Today he learned that his mother (who was living in another state) was dead  He said he became so sad and lonely that he began having thoughts that he should kill himself  He reports even hearing a voice tell him to kill himself once  He denies HI  He says he fears that he will act on his impulsive thoughts to kill himself if he is alone over the next few days  He wishes inpaitient treatment and signed a 201

## 2018-06-20 NOTE — ED NOTES
Patient is accepted at [facility] ([address])  Patient is accepted by Dr Jax Blanc(CW)    Transportation is arranged with [employer] is scheduled for 06/20/2018 @1145  Patient may go to the floor at [facility] ( [facility])        Nurse report is to be called to [phone] prior to patient transfer      [address] [place] [address]

## 2018-06-20 NOTE — ED NOTES
Report received and care assumed at this time  Pt appears to be sleeping with easy nonlabored respirations   Pt remains on continuous visual and video monitoring      Elaine Levy RN  06/20/18 5366

## 2018-06-20 NOTE — TREATMENT PLAN
RN will assess patient at least twice daily for symptoms of admission, educate patient on medications and diagnosis, and assist pt in developing and utilizing healthy coping skills

## 2018-06-20 NOTE — EMTALA/ACUTE CARE TRANSFER
[facility]   [address] [address]  Dept: [phone]      EMTALA TRANSFER CONSENT    NAME Sona Veloz                                         1965                              MRN [id number]    I have been informed of my rights regarding examination, treatment, and transfer   by Dr Luis Antonio DO    Benefits: Specialized equipment and/or services available at the receiving facility (Include comment)________________________    Risks: Potential for delay in receiving treatment      Transfer Request   I acknowledge that my medical condition has been evaluated and explained to me by the emergency department physician or other qualified medical person and/or my attending physician who has recommended and offered to me further medical examination and treatment  I understand the Hospital's obligation with respect to the treatment and stabilization of my emergency medical condition  I nevertheless request to be transferred  I release the Hospital, the doctor, and any other persons caring for me from all responsibility or liability for any injury or ill effects that may result from my transfer and agree to accept all responsibility for the consequences of my choice to transfer, rather than receive stabilizing treatment at the Hospital  I understand that because the transfer is my request, my insurance may not provide reimbursement for the services  The Hospital will assist and direct me and my family in how to make arrangements for transfer, but the hospital is not liable for any fees charged by the transport service  In spite of this understanding, I refuse to consent to further medical examination and treatment which has been offered to me, and request transfer to  [facility] Name, [address] : Zachary Cherry ([address]) [place]   I authorize the performance of emergency medical procedures and treatments upon me in both transit and upon arrival at the receiving facility  Additionally, I authorize the release of any and all medical records to the receiving facility and request they be transported with me, if possible  I authorize the performance of emergency medical procedures and treatments upon me in both transit and upon arrival at the receiving facility  Additionally, I authorize the release of any and all medical records to the receiving facility and request they be transported with me, if possible  I understand that the safest mode of transportation during a medical emergency is an ambulance and that the Hospital advocates the use of this mode of transport  Risks of traveling to the receiving facility by car, including absence of medical control, life sustaining equipment, such as oxygen, and medical personnel has been explained to me and I fully understand them  ([place] CORRECT BOX BELOW)  [  ]  I consent to the stated transfer and to be transported by ambulance/helicopter  [  ]  I consent to the stated transfer, but refuse transportation by ambulance and accept full responsibility for my transportation by car  I understand the risks of non-ambulance transfers and I exonerate the Hospital and its staff from any deterioration in my condition that results from this refusal     X___________________________________________    DATE  18  TIME________  Signature of patient or legally responsible individual signing on patient behalf           RELATIONSHIP TO PATIENT_________________________          Provider Certification    NAME Travis Jaffe                                         1965                              MRN [id number]    A medical screening exam was performed on the above named patient  Based on the examination:    Condition Necessitating Transfer The primary encounter diagnosis was Alcohol intoxication ([facility] [address] )   Diagnoses of Depression, Suicidal ideations, and Severe episode of recurrent major depressive disorder, without psychotic features [facility]) were also pertinent to this visit  Patient Condition: The patient has been stabilized such that within reasonable medical probability, no material deterioration of the patient condition or the condition of the unborn child(flash) is likely to result from the transfer    Reason for Transfer: Level of Care needed not available at this facility    Transfer Requirements: [facility] ([address] [address]) THAIS Perales   · Space available and qualified personnel available for treatment as acknowledged by Izaiah Vivar ([facility]) [phone]  · Agreed to accept transfer and to provide appropriate medical treatment as acknowledged by       DR Obie Knox  · Appropriate medical records of the examination and treatment of the patient are provided at the time of transfer   [facility], [address] _______  · Transfer will be performed by qualified personnel from [facility]  and appropriate transfer equipment as required, including the use of necessary and appropriate life support measures      Provider Certification: I have examined the patient and explained the following risks and benefits of being transferred/refusing transfer to the patient/family:  General risk, such as traffic hazards, adverse weather conditions, rough terrain or turbulence, possible failure of equipment (including vehicle or aircraft), or consequences of actions of persons outside the control of the transport personnel      Based on these reasonable risks and benefits to the patient and/or the unborn child(flash), and based upon the information available at the time of the patients examination, I certify that the medical benefits reasonably to be expected from the provision of appropriate medical treatments at another medical facility outweigh the increasing risks, if any, to the individuals medical condition, and in the case of labor to the unborn child, from effecting the transfer      X____________________________________________ DATE 06/20/18        TIME_______      ORIGINAL - SEND TO MEDICAL RECORDS   COPY - SEND WITH PATIENT DURING TRANSFER

## 2018-06-20 NOTE — ED PROVIDER NOTES
History  Chief Complaint   Patient presents with    Suicidal     Reports that he just found out that they found his mother dead in [facility] second death in last 4 months  Reports that now it is time for him to kill himself  States, "Yeah I got all kinds of plans, whichever comes up first " Joce VALADEZ, ,        46year old male presents for evaluation of suicidal ideation  States he just found out that his mother was found dead in a motel room in [place] 20 minutes PTA and now wants to kill himself  Patient reports walking to a bridge in order to jump off but there was a barrier so he brought himself here instead  Previous hx of suicidal ideations  Denies HI/AVH  Admits to drinking 1 40oz beer tonight  Denies drug abuse  Also complains of back pain from walking around a lot today  Declined pain medication  Prior to Admission Medications   Prescriptions Last Dose Informant Patient Reported? Taking?   escitalopram (LEXAPRO) 5 mg tablet   No No   Sig: Take 1 tablet (5 mg total) by mouth daily At 9AM      Facility-Administered Medications: None       Past Medical History:   Diagnosis Date    Depression     Psychiatric illness        History reviewed  No pertinent surgical history  History reviewed  No pertinent family history  I have reviewed and agree with the history as documented  Social History   Substance Use Topics    Smoking status: Former Smoker     Packs/day: 0 20    Smokeless tobacco: Never Used    Alcohol use Yes      Comment: 40oz of beer a week        Review of Systems   Constitutional: Negative for chills, diaphoresis and fever  HENT: Negative for congestion and rhinorrhea  Eyes: Negative for pain and visual disturbance  Respiratory: Negative for cough, shortness of breath and wheezing  Cardiovascular: Negative for chest pain and leg swelling  Gastrointestinal: Negative for abdominal pain, diarrhea, nausea and vomiting     Genitourinary: Negative for difficulty urinating, dysuria, frequency and urgency  Musculoskeletal: Negative for back pain and neck pain  Skin: Negative for color change and rash  Neurological: Negative for syncope, numbness and headaches  Psychiatric/Behavioral: Positive for dysphoric mood and suicidal ideas  All other systems reviewed and are negative  Physical Exam  ED Triage Vitals [06/19/18 2325]   Temperature Pulse Respirations Blood Pressure SpO2   97 9 °F (36 6 °C) 72 16 141/66 96 %      Temp Source Heart Rate Source Patient Position - Orthostatic VS BP Location FiO2 (%)   Oral Monitor Sitting Right arm --      Pain Score       No Pain           Orthostatic Vital Signs  Vitals:    06/19/18 2325 06/19/18 2345 06/20/18 0825   BP: 141/66 141/66 122/83   Pulse: 72 72 69   Patient Position - Orthostatic VS: Sitting  Sitting       Physical Exam   Constitutional: He is oriented to person, place, and time  He appears well-developed and well-nourished  No distress  HENT:   Head: Normocephalic and atraumatic  Eyes: Conjunctivae and EOM are normal  Pupils are equal, round, and reactive to light  Pupils 3mm equal and reactive   Neck: Normal range of motion  Neck supple  Cardiovascular: Normal rate and regular rhythm  Pulmonary/Chest: Effort normal and breath sounds normal  No respiratory distress  He has no wheezes  He has no rales  Abdominal: Soft  Bowel sounds are normal  There is no tenderness  There is no guarding  Musculoskeletal: Normal range of motion  He exhibits no edema or tenderness  Neurological: He is alert and oriented to person, place, and time  No cranial nerve deficit  Skin: Skin is warm  He is not diaphoretic  No erythema  Psychiatric: He has a normal mood and affect  His behavior is normal    Nursing note and vitals reviewed        ED Medications  Medications - No data to display    Diagnostic Studies  Results Reviewed     Procedure Component Value Units Date/Time    Rapid drug screen, urine [[id number]] (Abnormal) Collected:  06/19/18 2344    Lab Status:  Final result Specimen:  Urine from Urine, Clean Catch Updated:  06/20/18 0210     Amph/Meth UR Negative     Barbiturate Ur Negative     Benzodiazepine Urine Negative     Cocaine Urine Negative     Methadone Urine Negative     Opiate Urine Negative     PCP Ur Negative     THC Urine Positive (A)    Narrative:         Presumptive report  If requested, specimen will be sent to reference lab for confirmation  FOR MEDICAL PURPOSES ONLY  IF CONFIRMATION NEEDED PLEASE CONTACT THE LAB WITHIN 5 DAYS  Drug Screen Cutoff Levels:  AMPHETAMINE/METHAMPHETAMINES  1000 ng/mL  BARBITURATES     200 ng/mL  BENZODIAZEPINES     200 ng/mL  COCAINE      300 ng/mL  METHADONE      300 ng/mL  OPIATES      300 ng/mL  PHENCYCLIDINE     25 ng/mL  THC       50 ng/mL    Ethanol [[id number]]  (Abnormal) Collected:  06/19/18 2349    Lab Status:  Final result Specimen:  Blood from Arm, Right Updated:  06/20/18 0047     Ethanol Lvl 126 (H) mg/dL                  No orders to display         Procedures  Procedures      Phone Consults  ED Phone Contact    ED Course                               MDM  Number of Diagnoses or Management Options  Diagnosis management comments: 46year old male presents for SI  Obtain ethanol level, UDS, consult crisis       CritCare Time    Disposition  Final diagnoses:   Alcohol intoxication ([facility] [address] )   Depression   Suicidal ideations     Time reflects when diagnosis was documented in both MDM as applicable and the Disposition within this note     Time User Action Codes Description Comment    6/20/2018  3:59 AM [facility] Add [F10 929] Alcohol intoxication ([facility] [address] )     6/20/2018  4:00 AM [facility] M Add [F32 9] Depression     6/20/2018  4:00 AM [facility] Add [R45 851] Suicidal ideations     6/20/2018  9:07 AM Lind Dakins Add [F33 2] Severe episode of recurrent major depressive disorder, without psychotic features ([facility] [address] )     6/20/2018  9:07 AM Anabell Nicolas Marilou Panda [F33 2] Severe episode of recurrent major depressive disorder, without psychotic features [facility])       ED Disposition     ED Disposition Condition Comment    Transfer to Behavioral Health        MD Documentation      Most Recent Value   Patient Condition  The patient has been stabilized such that within reasonable medical probability, no material deterioration of the patient condition or the condition of the unborn child(flash) is likely to result from the transfer   Reason for Transfer  Level of Care needed not available at this facility   Benefits of Transfer  Specialized equipment and/or services available at the receiving facility (Include comment)________________________   Risks of Transfer  Potential for delay in receiving treatment   Accepting Physician  DR Kurt Still [address] Name, [address] [place], [place]    (Name & Tel number)  Maliha Marquez ([employer]) [phone]   Transported by (Company and Unit #)  [facility]   Sending MD  DR Donny Junior   Provider Certification  General risk, such as traffic hazards, adverse weather conditions, rough terrain or turbulence, possible failure of equipment (including vehicle or aircraft), or consequences of actions of persons outside the control of the transport personnel      RN Documentation      Most [facility] Name, [address] ([facility]) [place], [place]    (Name & Tel number)  Maliha Marquez ([employer]) [phone]   Medications Reviewed with Next Provider of Service  Yes   Transport Mode  Ambulance [SLETS]   Transported by [employer] and Unit #)  SLETS   Level of Care  Basic life support [SLETS]   Copies of Medical Records Sent  Transfer form   Patient Belongings Disposition  Sent with patient      Follow-up Information    None         Discharge Medication List as of 6/20/2018  1:06 PM      CONTINUE these medications which have NOT CHANGED    Details escitalopram (LEXAPRO) 5 mg tablet Take 1 tablet (5 mg total) by mouth daily At 9AM, Starting Sat 3/31/2018, Print           No discharge procedures on file  ED Provider  Attending physically available and evaluated [address]  I managed the patient along with the ED Attending      Electronically Signed by         Regla Mcnair DO  06/22/18 1930

## 2018-06-20 NOTE — ED NOTES
Patient moved into room 12  Pillow and blanket provided for patient  Patient used restroom upon entry to 15  Patient voices no needs at this time  Lights dimmed for patient        Dian Ceja RN  06/20/18 7123

## 2018-06-20 NOTE — ED NOTES
Pt appears to be sleeping, easy non labored respirations noted   Pt remains under continuous visual and video monitoring      Elaine Levy RN  06/20/18 0422

## 2018-06-20 NOTE — ED NOTES
Crisis worker contacted [facility] at [phone] and spoke with Care Manager, Bethel CREWS who authorized 3 days acute inpatient mental health treatment pending admission  He asked that the accepting facility contact [facility] at the number above upon admission to receive the authorization number

## 2018-06-20 NOTE — ED ATTENDING ATTESTATION
IFrance Cranker, DO, saw and evaluated the patient  I have discussed the patient with the resident/non-physician practitioner and agree with the resident's/non-physician practitioner's findings, Plan of Care, and MDM as documented in the resident's/non-physician practitioner's note, except where noted  All available labs and Radiology studies were reviewed  At this point I agree with the current assessment done in the Emergency Department  I have conducted an independent evaluation of this patient a history and physical is as follows:    47 yo male with depression and suicidal thoughts since learning this evening his mother passed away  Pt states his mother is a heroin user who lives in [place] and he got a call she had been found dead from [facility] so he walked to a bridge and was planning on jumping but there was a barrier to prevent jumpers, so he drank a 36 and when he still felt hopeless he came in for eval   Will check RUDS, ETOH and reassess when sober  Denies drug use      Final diagnoses:   Alcohol intoxication ([facility] [address] )   Depression   Suicidal ideations     Critical Care Time  CritCare Time    Procedures

## 2018-06-20 NOTE — PROGRESS NOTES
Admission Belongings:  Bedside- Erasmo Jumbo, jeans, socks & chap stick   Locker- Shoes w/laces, hat, lighter,  Paperwork in plastic bag

## 2018-06-21 LAB
ALBUMIN SERPL BCP-MCNC: 3.2 G/DL (ref 3.5–5)
ALP SERPL-CCNC: 69 U/L (ref 46–116)
ALT SERPL W P-5'-P-CCNC: 100 U/L (ref 12–78)
ANION GAP SERPL CALCULATED.3IONS-SCNC: 8 MMOL/L (ref 4–13)
AST SERPL W P-5'-P-CCNC: 60 U/L (ref 5–45)
BASOPHILS # BLD AUTO: 0.07 THOUSANDS/ΜL (ref 0–0.1)
BASOPHILS NFR BLD AUTO: 1 % (ref 0–1)
BILIRUB SERPL-MCNC: 0.5 MG/DL (ref 0.2–1)
BUN SERPL-MCNC: 11 MG/DL (ref 5–25)
CALCIUM SERPL-MCNC: 8.6 MG/DL (ref 8.3–10.1)
CHLORIDE SERPL-SCNC: 103 MMOL/L (ref 100–108)
CHOLEST SERPL-MCNC: 171 MG/DL (ref 50–200)
CO2 SERPL-SCNC: 28 MMOL/L (ref 21–32)
CREAT SERPL-MCNC: 1.13 MG/DL (ref 0.6–1.3)
EOSINOPHIL # BLD AUTO: 0.37 THOUSAND/ΜL (ref 0–0.61)
EOSINOPHIL NFR BLD AUTO: 7 % (ref 0–6)
ERYTHROCYTE [DISTWIDTH] IN BLOOD BY AUTOMATED COUNT: 13.9 % (ref 11.6–15.1)
EST. AVERAGE GLUCOSE BLD GHB EST-MCNC: 126 MG/DL
GFR SERPL CREATININE-BSD FRML MDRD: 85 ML/MIN/1.73SQ M
GLUCOSE SERPL-MCNC: 93 MG/DL (ref 65–140)
HBA1C MFR BLD: 6 % (ref 4.2–6.3)
HCT VFR BLD AUTO: 47.7 % (ref 36.5–49.3)
HDLC SERPL-MCNC: 72 MG/DL (ref 40–60)
HGB BLD-MCNC: 15.8 G/DL (ref 12–17)
IMM GRANULOCYTES # BLD AUTO: 0.03 THOUSAND/UL (ref 0–0.2)
IMM GRANULOCYTES NFR BLD AUTO: 1 % (ref 0–2)
LDLC SERPL CALC-MCNC: 84 MG/DL (ref 0–100)
LYMPHOCYTES # BLD AUTO: 1.82 THOUSANDS/ΜL (ref 0.6–4.47)
LYMPHOCYTES NFR BLD AUTO: 32 % (ref 14–44)
MCH RBC QN AUTO: 26.2 PG (ref 26.8–34.3)
MCHC RBC AUTO-ENTMCNC: 33.1 G/DL (ref 31.4–37.4)
MCV RBC AUTO: 79 FL (ref 82–98)
MONOCYTES # BLD AUTO: 0.88 THOUSAND/ΜL (ref 0.17–1.22)
MONOCYTES NFR BLD AUTO: 16 % (ref 4–12)
NEUTROPHILS # BLD AUTO: 2.45 THOUSANDS/ΜL (ref 1.85–7.62)
NEUTS SEG NFR BLD AUTO: 43 % (ref 43–75)
NONHDLC SERPL-MCNC: 99 MG/DL
NRBC BLD AUTO-RTO: 0 /100 WBCS
PLATELET # BLD AUTO: 201 THOUSANDS/UL (ref 149–390)
PMV BLD AUTO: 10.5 FL (ref 8.9–12.7)
POTASSIUM SERPL-SCNC: 4 MMOL/L (ref 3.5–5.3)
PROT SERPL-MCNC: 7.5 G/DL (ref 6.4–8.2)
RBC # BLD AUTO: 6.04 MILLION/UL (ref 3.88–5.62)
RPR SER QL: NORMAL
SODIUM SERPL-SCNC: 139 MMOL/L (ref 136–145)
TRIGL SERPL-MCNC: 74 MG/DL
TSH SERPL DL<=0.05 MIU/L-ACNC: 1.33 UIU/ML (ref 0.36–3.74)
WBC # BLD AUTO: 5.62 THOUSAND/UL (ref 4.31–10.16)

## 2018-06-21 PROCEDURE — 80053 COMPREHEN METABOLIC PANEL: CPT | Performed by: PHYSICIAN ASSISTANT

## 2018-06-21 PROCEDURE — 99221 1ST HOSP IP/OBS SF/LOW 40: CPT | Performed by: PSYCHIATRY & NEUROLOGY

## 2018-06-21 PROCEDURE — 85025 COMPLETE CBC W/AUTO DIFF WBC: CPT | Performed by: PHYSICIAN ASSISTANT

## 2018-06-21 PROCEDURE — 83036 HEMOGLOBIN GLYCOSYLATED A1C: CPT | Performed by: PHYSICIAN ASSISTANT

## 2018-06-21 PROCEDURE — 99251 PR INITL INPATIENT CONSULT NEW/ESTAB PT 20 MIN: CPT | Performed by: PHYSICIAN ASSISTANT

## 2018-06-21 PROCEDURE — 80061 LIPID PANEL: CPT | Performed by: PHYSICIAN ASSISTANT

## 2018-06-21 PROCEDURE — 84443 ASSAY THYROID STIM HORMONE: CPT | Performed by: PHYSICIAN ASSISTANT

## 2018-06-21 PROCEDURE — 86592 SYPHILIS TEST NON-TREP QUAL: CPT | Performed by: PHYSICIAN ASSISTANT

## 2018-06-21 RX ORDER — ESCITALOPRAM OXALATE 10 MG/1
10 TABLET ORAL DAILY
Status: DISCONTINUED | OUTPATIENT
Start: 2018-06-22 | End: 2018-06-28 | Stop reason: HOSPADM

## 2018-06-21 RX ADMIN — ESCITALOPRAM 5 MG: 5 TABLET, FILM COATED ORAL at 09:51

## 2018-06-21 RX ADMIN — CARBAMIDE PEROXIDE 6.5% 5 DROP: 6.5 LIQUID AURICULAR (OTIC) at 13:54

## 2018-06-21 NOTE — PLAN OF CARE
Problem: SELF HARM/SUICIDALITY  Goal: Will have no self-injury during hospital stay  INTERVENTIONS:  - Q 15 MINUTES: Routine safety checks  - Q WAKING SHIFT & PRN: Assess risk to determine if routine checks are adequate to maintain patient safety  - Encourage patient to participate actively in care by formulating a plan to combat response to suicidal ideation, identify supports and resources   Outcome: Progressing      Problem: DEPRESSION  Goal: Will be euthymic at discharge  INTERVENTIONS:  - Administer medication as ordered  - Provide emotional support via 1:1 interaction with staff  - Encourage involvement in milieu/groups/activities  - Monitor for social isolation   Outcome: Progressing      Problem: DISCHARGE PLANNING  Goal: Discharge to home or other facility with appropriate resources  INTERVENTIONS:  - Identify barriers to discharge w/patient and caregiver  - Arrange for needed discharge resources and transportation as appropriate  - Identify discharge learning needs (meds, wound care, etc )  - Arrange for interpretive services to assist at discharge as needed  - Refer to Case Management Department for coordinating discharge planning if the patient needs post-hospital services based on physician/advanced practitioner order or complex needs related to functional status, cognitive ability, or social support system   Outcome: Progressing      Problem: Ineffective Coping  Goal: Participates in unit activities  Interventions:  - Provide therapeutic environment   - Provide required programming   - Redirect inappropriate behaviors    Outcome: Progressing

## 2018-06-21 NOTE — PROGRESS NOTES
Pt remains calm and appropriate this evening  Denies SI/HI, pt asking questions regarding unit rules and schedule however remains pleasant and cooperative  Pt social with select peers and walking halls at times  Denies any concerns or questions at this time

## 2018-06-21 NOTE — PROGRESS NOTES
Pt calm and friendly in 1:1  Lying quietly in his bed  States he is feeling depressed but said he did go to two morning groups  He has been eating his meals  Pt states that he feels hopeful that he will get better over time but that losing his mother was very difficult  Pt spoke about also losing his girlfriend in January  Denies having SI  Denies any hallucinations  States he is pleased with the medication he is on adding "the doctor increased one of my pills, but I am not sure what it is " This nurse to follow up on identifying medication to let pt know what medicine he is taking  Pt straining to hear at times, ear drops ordered to help loosen earwax, to be administered later this evening  Pt reports living with his uncle and that this living arrangement is working well

## 2018-06-21 NOTE — H&P
Psychiatric Evaluation - [address] [place] 46 y o  male MRN: [id number]  Unit/Bed#: Krystyna Mann 014-96 Encounter: [id number]    Assessment/Plan   Principal Problem:    Severe episode of recurrent major depressive disorder, without psychotic features ([facility] [address] )    Plan:   1  Check admission labs  2  Collaborate with family for baseline assessment and disposition planning  3  Restart lexapro 5 mg daily and increase to 10 mg daily tomorrow for depression management  Risks, benefits and possible side effects of Medications:   Risks, benefits, and possible side effects of medications explained to patient and patient verbalizes understanding  Chief Complaint: "I don't want to go on living like this"    History of Present Illness     Patient is a 46 y o  male presents on 12 with worsening depression, anxiety and suicidal ideation wit multiple plans "all kinds of plans"  Patient is known to me from his recent admission under my care  Patient reports worsening depression due to multiple losses in family  Patient does report increased anxiety and crying episode with hopelessness and suicidal ideation  Patient did remained child-like and continues to deny endorsing manic or psychotic symptoms  Patient did agreed with above planning of restarting Lexapro at 5 mg and increasing to 10 mg for improvement with depression and anxiety  He reports feeling safe in the hospital and is consenting for safety on the unit      Stressors: poor support; financial stressors    Medical Review Of Systems:  none    Psychiatric Review Of Systems:  sleep: yes  appetite changes: yes  weight changes: no  energy/anergy: yes  interest/pleasure/anhedonia: yes  somatic symptoms: yes  anxiety/panic: yes  jonas: no  guilty/hopeless: yes  self injurious behavior/risky behavior: no    Historical Information     Past Psychiatric History:   History of recent admission to [facility] in March 2018 and in [facility] ~4 month ago for depression  Currently in treatment with therapist   Past Suicide attempts: no  Past Violent behavior: no  Past Psychiatric medication trial: lexapro  Substance Abuse History:  denies    Social History     Tobacco History     Smoking Status  Former Smoker Smoking Frequency  0 2 packs/day    Smokeless Tobacco Use  Never Used          Alcohol History     Alcohol Use Status  Yes Comment  40oz of beer a week          Drug Use     Drug Use Status  No Comment  last night           Sexual Activity     Sexually Active  Not Asked          Activities of Daily Living    Not Asked               Additional Substance Use Detail     Questions Responses    Substance Use Assessment Substance use within the past 12 months    Alcohol Use Frequency Denies use in past 12 months    Cannabis frequency 1-2 times/week    Comment: Past occasional use on 3/27/2018 Past occasional use ->1-2 times/week on 3/28/2018     Heroin Frequency Denies use in past 12 months    Cannabis method Smoke    Comment: Smoke on 6/20/2018     Cocaine frequency Never used    Comment: Never used on 6/20/2018     Crack Cocaine Frequency Denies use in past 12 months    Methamphetamine Frequency Denies use in past 12 months    Narcotic Frequency Denies use in past 12 months    Benzodiazepine Frequency Denies use in past 12 months    Amphetamine frequency Denies use in past 12 months    Barbituate Frequency Denies use use in past 12 months    Inhalant frequency Never used    Comment: Never used on 6/20/2018     Hallucinogen frequency Never used    Comment: Never used on 6/20/2018     Ecstasy frequency Never used    Comment: Never used on 6/20/2018     Other drug frequency Never used    Comment: Never used on 6/20/2018     Opiate frequency Denies use in past 12 months    Last reviewed by Susanna Olivo RN on 6/20/2018        I have assessed this patient for substance use within the past 12 months    Family Psychiatric History:   Not known to patient     Social History:  Marital history: single  Living arrangement, social support: Support systems: lives alone  Occupational History: unemployed  Functioning Relationships: poor support system  Other Pertinent History: Financial     Traumatic History:   Abuse: none  Other Traumatic Events: see HPI    Past Medical History:   Diagnosis Date    Depression     Psychiatric illness      Meds/Allergies   all current active meds have been reviewed  No Known Allergies    Objective   Vital signs in last 24 hours:  Temp:  [96 5 °F (35 8 °C)-98 °F (36 7 °C)] 97 2 °F (36 2 °C)  HR:  [52-69] 55  Resp:  [17-18] 17  BP: (122-150)/(83-94) 139/94    No intake or output data in the 24 hours ending 06/21/18 0750    Mental Status Evaluation:  Appearance:  casually dressed   Behavior:  guarded   Speech:  soft   Mood:  anxious and depressed   Affect:  constricted   Language: naming objects and repeating phrases   Thought Process:  circumstantial   Thought Content:  obsessions   Perceptual Disturbances: None   Risk Potential: Suicidal Ideations without plan, Homicidal Ideations none and Potential for Aggression No   Sensorium:  person, place and time/date   Cognition:  grossly intact   Consciousness:  awake    Attention: attention span appeared shorter than expected for age   Intellect: normal   Fund of Knowledge: awareness of current events: fair   Insight:  limited   Judgment: limited   Muscle Strength and Tone: arm(s): bilateral   Gait/Station: normal gait/station   Motor Activity: no abnormal movements     Laboratory results:    I have personally reviewed all pertinent laboratory/tests results    Labs in last 72 hours:   Recent Labs      06/21/18   0540   WBC  5 62   RBC  6 04*   HGB  15 8   HCT  47 7   PLT  201   RDW  13 9   NEUTROABS  2 45   NA  139   K  4 0   CL  103   CO2  28   BUN  11   CREATININE  1 13   GLUCOSE  93   CALCIUM  8 6   AST  60*   ALT  100*   ALKPHOS  69   PROT  7 5   BILITOT  0 50   CHOL  171   HDL  72*   TRIG  74   LDLCALC 80   [id number]  1 328     Admission Labs:   Admission on 06/20/2018   Component Date Value    Color, UA 06/20/2018 Yellow     Clarity, UA 06/20/2018 Clear     Specific Gravity, UA 06/20/2018 1 015     pH, UA 06/20/2018 7 0     Leukocytes, UA 06/20/2018 Negative     Nitrite, UA 06/20/2018 Negative     Protein, UA 06/20/2018 Negative     Glucose, UA 06/20/2018 Negative     Ketones, UA 06/20/2018 Negative     Urobilinogen, UA 06/20/2018 2 0*    Bilirubin, UA 06/20/2018 Negative     Blood, UA 06/20/2018 Negative     WBC 06/21/2018 5 62     RBC 06/21/2018 6 04*    Hemoglobin 06/21/2018 15 8     Hematocrit 06/21/2018 47 7     MCV 06/21/2018 79*    MCH 06/21/2018 26 2*    MCHC 06/21/2018 33 1     RDW 06/21/2018 13 9     MPV 06/21/2018 10 5     Platelets 72/84/6903 201     nRBC 06/21/2018 0     Neutrophils Relative 06/21/2018 43     Immat GRANS % 06/21/2018 1     Lymphocytes Relative 06/21/2018 32     Monocytes Relative 06/21/2018 16*    Eosinophils Relative 06/21/2018 7*    Basophils Relative 06/21/2018 1     Neutrophils Absolute 06/21/2018 2 45     Immature Grans Absolute 06/21/2018 0 03     Lymphocytes Absolute 06/21/2018 1 82     Monocytes Absolute 06/21/2018 0 88     Eosinophils Absolute 06/21/2018 0 37     Basophils Absolute 06/21/2018 0 07     Sodium 06/21/2018 139     Potassium 06/21/2018 4 0     Chloride 06/21/2018 103     CO2 06/21/2018 28     Anion Gap 06/21/2018 8     BUN 06/21/2018 11     Creatinine 06/21/2018 1 13     Glucose 06/21/2018 93     Calcium 06/21/2018 8 6     AST 06/21/2018 60*    ALT 06/21/2018 100*    Alkaline Phosphatase 06/21/2018 69     Total Protein 06/21/2018 7 5     Albumin 06/21/2018 3 2*    Total Bilirubin 06/21/2018 0 50     eGFR 06/21/2018 85     Cholesterol 06/21/2018 171     Triglycerides 06/21/2018 74     HDL, Direct 06/21/2018 72*    LDL Calculated 06/21/2018 84     Non-HDL-Chol (CHOL-HDL) 06/21/2018 99     TSH 3RD [facility] 06/21/2018 1 328      Risks / Benefits of Treatment:     Risks, benefits, and possible side effects of medications explained to patient  The patient verbalizes understanding and agreement for treatment  Counseling / Coordination of Care:     Patient's presentation on admission and proposed treatment plan discussed with treatment team   Diagnosis, medication changes and treatment plan reviewed with patient  Recent stressors discussed with patient     Events leading to admission reviewed with patient  Importance of medication and treatment compliance reviewed with patient  Inpatient Psychiatric Certification:     Certification: Based upon physical, mental and social evaluations, I certify that inpatient psychiatric services are medically necessary for this patient for a duration of 7 midnights for the treatment of Severe episode of recurrent major depressive disorder, without psychotic features ([facility] [address] )    Available alternative community resources do not meet the patient's mental health care needs  I further attest that an established written individualized plan of care has been implemented and is outlined in the patient's medical records  This note has been constructed using a voice recognition system  There may be translation, syntax,  or grammatical errors  If you have any questions, please contact the dictating provider
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
## 2018-06-21 NOTE — CONSULTS
Consultation - Shraddha Lomeli 46 y o  male MRN: [id number]    Unit/Bed#: [facility] 152-66 Encounter: [id number]      Assessment/Plan     Assessment:  1  Depression with SI  -medically cleared for inpatient psychiatric evaluation and treatment     2  Cerumen impaction  -Debrox drops BID  -Education provided on safe ear hygiene     History of Present Illness   HPI: Shraddha Lomeli is a 46y o  year old male who presents with increased depression  He reports he just learned of his mother's passing and that made him fear for what he would do to himself  He denies chronic illness, denies open wounds or pain, denies recent illness  Inpatient [facility]  Consult performed by: Tracy Mccall ordered by: Ruchi Cordova          Review of Systems   Constitutional: Negative for activity change, chills and diaphoresis  HENT: Negative for ear discharge, ear pain, nosebleeds, postnasal drip, rhinorrhea, sinus pain, sinus pressure and sore throat  Dimnished hearing     Eyes: Negative  Respiratory: Negative  Cardiovascular: Negative  Gastrointestinal: Negative  Genitourinary: Negative  Neurological: Negative  Psychiatric/Behavioral: Positive for dysphoric mood and suicidal ideas  Historical Information   Past Medical History:   Diagnosis Date    Depression     Psychiatric illness      No past surgical history on file  Social History   History   Alcohol Use    Yes     Comment: 40oz of beer a week     History   Drug Use No     Comment: last night      History   Smoking Status    Former Smoker    Packs/day: 0 20   Smokeless Tobacco    Never Used     Family History: non-contributory    Meds/Allergies   all current active meds have been reviewed  No Known Allergies    Objective   Vitals: Blood pressure 147/100, pulse 65, temperature (!) 97 2 °F (36 2 °C), temperature source Tympanic, resp   rate 18, height 5' 6" (1 676 m), weight 77 kg (169 lb 11 2 oz)  No intake or output data in the 24 hours ending 06/21/18 1234  Invasive Devices          No matching active lines, drains, or airways          Physical Exam   Constitutional: He is oriented to person, place, and time  He appears well-developed and well-nourished  No distress  HENT:   Head: Normocephalic and atraumatic  Eyes: EOM are normal  Pupils are equal, round, and reactive to light  Neck: Normal range of motion  Cardiovascular: Normal rate and regular rhythm  Exam reveals no gallop and no friction rub  No murmur heard  Pulmonary/Chest: Effort normal  He has no wheezes  He has no rales  Abdominal: Soft  He exhibits no distension  There is no tenderness  Neurological: He is alert and oriented to person, place, and time  Psychiatric: His speech is normal and behavior is normal  Thought content normal  His mood appears not anxious  He exhibits a depressed mood  Lab Results: I have personally reviewed pertinent reports  Imaging Studies: I have personally reviewed pertinent reports

## 2018-06-21 NOTE — TREATMENT PLAN
TREATMENT PLAN REVIEW - [address] P Alannah 46 y o  1965 male MRN: [id number]    [address] [place] [address] Room / Bed: Krystyna Sawyer/[facility] 439-55 Encounter: [id number]          Admit Date/Time:  6/20/2018  1:10 PM    Treatment Team: Attending Provider: Valeriy Aviles; Patient Care Technician: Jordi Ayala; Medications RN: Trent Rosales RN; Charge Nurse: Swapna Garvey RN; Patient Care Technician: Destinee Solorzano; Patient Care Technician: Sara Carolina; Occupational Therapy Assistant: WILLA Gee;  Medications RN: Mario Palmer RN    Diagnosis: Principal Problem:    Severe episode of recurrent major depressive disorder, without psychotic features [facility])    Patient Strengths/Assets: good past treatment response, patient is on a voluntary commitment    Patient Barriers/Limitations: limited motivation, low self esteem, noncompliant with medication    Short Term Goals: decrease in depressive symptoms, decrease in anxiety symptoms, decrease in suicidal thoughts    Long Term Goals: improvement in depression, improvement in anxiety, stabilization of mood, free of suicidal thoughts, acceptance of need for psychiatric medications, acceptance of need for psychiatric treatment, acceptance of need for psychiatric follow up after discharge    Progress Towards Goals: starting psychiatric medications as prescribed    Recommended Treatment: medication management, patient medication education, group therapy, milieu therapy, continued Behavioral Health psychiatric evaluation/assessment process    Treatment Frequency: daily medication monitoring, group and milieu therapy daily, monitoring through interdisciplinary rounds, monitoring through weekly patient care conferences    Expected Discharge Date: 5-7 days    Discharge Plan: referral for outpatient medication management with a psychiatrist, referral for outpatient psychotherapy    Treatment Plan Created/Updated By: Ron Samano

## 2018-06-21 NOTE — PROGRESS NOTES
Pt informed that the medication he is taking that was increased is Lexapro, teaching performed by this nurse and Macy Vásquez receptive to education

## 2018-06-21 NOTE — PLAN OF CARE
Problem: DEPRESSION  Goal: Will be euthymic at discharge  INTERVENTIONS:  - Administer medication as ordered  - Provide emotional support via 1:1 interaction with staff  - Encourage involvement in milieu/groups/activities  - Monitor for social isolation   Outcome: Progressing      Problem: DISCHARGE PLANNING  Goal: Discharge to home or other facility with appropriate resources  INTERVENTIONS:  - Identify barriers to discharge w/patient and caregiver  - Arrange for needed discharge resources and transportation as appropriate  - Identify discharge learning needs (meds, wound care, etc )  - Arrange for interpretive services to assist at discharge as needed  - Refer to Case Management Department for coordinating discharge planning if the patient needs post-hospital services based on physician/advanced practitioner order or complex needs related to functional status, cognitive ability, or social support system   Outcome: Progressing

## 2018-06-22 PROCEDURE — 99232 SBSQ HOSP IP/OBS MODERATE 35: CPT | Performed by: PSYCHIATRY & NEUROLOGY

## 2018-06-22 RX ORDER — RISPERIDONE 2 MG/1
2 TABLET, ORALLY DISINTEGRATING ORAL
Status: DISCONTINUED | OUTPATIENT
Start: 2018-06-22 | End: 2018-06-28 | Stop reason: HOSPADM

## 2018-06-22 RX ADMIN — RISPERIDONE 2 MG: 2 TABLET, ORALLY DISINTEGRATING ORAL at 21:53

## 2018-06-22 RX ADMIN — ESCITALOPRAM OXALATE 10 MG: 10 TABLET ORAL at 08:52

## 2018-06-22 RX ADMIN — CARBAMIDE PEROXIDE 6.5% 5 DROP: 6.5 LIQUID AURICULAR (OTIC) at 08:53

## 2018-06-22 NOTE — PLAN OF CARE
Problem: DISCHARGE PLANNING  Goal: Discharge to home or other facility with appropriate resources  INTERVENTIONS:  - Identify barriers to discharge w/patient and caregiver  - Arrange for needed discharge resources and transportation as appropriate  - Identify discharge learning needs (meds, wound care, etc )  - Arrange for interpretive services to assist at discharge as needed  - Refer to Case Management Department for coordinating discharge planning if the patient needs post-hospital services based on physician/advanced practitioner order or complex needs related to functional status, cognitive ability, or social support system   Outcome: Progressing  Pt provided with information on [facility]  Pt has appointments with [facility] and is scheduled to see a therapist on 6/26/18  Pt will need transportation provided  No further needs at [place]

## 2018-06-22 NOTE — DISCHARGE INSTR - OTHER ORDERS
[facility]  [address], [place] [address]  Tel: [phone]  Fax:([phone])    [facility] Crisis    Crisis Intervention: [address] is a confidential 7 days/week telephone support service manned by trained mental health consumers   [employer] operates daily but is not able to [address]   · [employer] provides support, a listening ear and can provide information about available services       · [employer] specializes in the concerns of mental health consumers, their families and friends   However, we are also here for anyone who has a mental health concern, is confused about or just doesn't know anything about mental health or where to get information       To reach Roma, call [phone] accepts calls between 6:00 AM to 10:00 AM and from 4:00 PM to 76:06 AM or click on the link to view additional information  TO APPLY FOR [facility]LTER IN THE [facility]: ([facility] [place] [facility])    1  Obtain a housing voucher at the [address] [place], [place] [address]d at [employer]  2  Photo identification will be required  3  Come to the [facility] [address] between 3:30 P  M  and 7:30 P M  (Dinner is served at 5:30 P M )    Para poder ser [employer] en esta MISSION tienes que hacer lo siguiente:    1  Tienes que obtener un boleto (voucher) de en la estacion de policia [address]  2  Gaston Sluder con retrato es requerida    3  La entrada a la Ronan es de 3:30 P M  Y 7:30 P M  (La comida sera servida a las 5:30 P M )

## 2018-06-22 NOTE — PROGRESS NOTES
Present in milieu, attending groups  Denies SI/HI, depression and anxiety  Reports his mother is being cremated and that is the reason he does not need to leave and attend a   Does not appear sad or distraught  Extremely polite during interaction  Compliant with medications  Reading newspaper in AM  No questions or concerns

## 2018-06-23 PROCEDURE — 99231 SBSQ HOSP IP/OBS SF/LOW 25: CPT | Performed by: PSYCHIATRY & NEUROLOGY

## 2018-06-23 RX ADMIN — CARBAMIDE PEROXIDE 6.5% 5 DROP: 6.5 LIQUID AURICULAR (OTIC) at 09:14

## 2018-06-23 RX ADMIN — ESCITALOPRAM OXALATE 10 MG: 10 TABLET ORAL at 09:13

## 2018-06-23 RX ADMIN — CARBAMIDE PEROXIDE 6.5% 5 DROP: 6.5 LIQUID AURICULAR (OTIC) at 18:07

## 2018-06-23 RX ADMIN — RISPERIDONE 2 MG: 2 TABLET, ORALLY DISINTEGRATING ORAL at 21:32

## 2018-06-23 NOTE — PROGRESS NOTES
Progress Note - Behavioral Health   Raghav Reynoso 46 y o  male MRN: [id number]  Unit/Bed#: [facility] 246-10 Encounter: [id number]    Assessment/Plan   Principal Problem:    Severe episode of recurrent major depressive disorder, without psychotic features ([address] )      Behavior over the last 24 hours:  improved  Sleep: normal  Appetite: normal  Medication side effects: No  ROS: no complaints    Mental Status Evaluation:  Appearance:  age appropriate   Behavior:  guarded   Speech:  normal pitch and normal volume   Mood:  normal   Affect:  normal   Thought Process:  normal   Thought Content:  normal   Perceptual Disturbances: None   Risk Potential: valeriano for safety n milieu   Sensorium:  person, place and time/date   Cognition:  grossly intact   Consciousness:  alert    Attention: attention span and concentration were age appropriate   Insight:  age appropriate   Judgment: age appropriate   Gait/Station: normal gait/station   Motor Activity: no abnormal movements     Progress Toward Goals:  Patient is reporting improvement compared to when he 1st came into the unit two days ago  Reports he is taking his medications and attending milieu activities  Recommended Treatment: Continue with group therapy, milieu therapy and occupational therapy  Risks, benefits and possible side effects of Medications:   Risks, benefits, and possible side effects of medications explained to patient and patient verbalizes understanding        Medications:   all current active meds have been reviewed and current meds:   Current Facility-Administered Medications   Medication Dose Route Frequency    acetaminophen (TYLENOL) tablet 650 mg  650 mg Oral Q6H PRN    aluminum-magnesium hydroxide-simethicone (MYLANTA) 200-200-20 mg/5 mL oral suspension 30 mL  30 mL Oral Q4H PRN    benztropine (COGENTIN) injection 1 mg  1 mg Intramuscular Q6H PRN    benztropine (COGENTIN) tablet 1 mg  1 mg Oral Q6H PRN    carbamide peroxide (DEBROX) 6 5 % otic solution 5 drop  5 drop Both Ears BID    escitalopram (LEXAPRO) tablet 10 mg  10 mg Oral Daily    haloperidol (HALDOL) tablet 5 mg  5 mg Oral Q6H PRN    haloperidol lactate (HALDOL) injection 5 mg  5 mg Intramuscular Q6H PRN    LORazepam (ATIVAN) 2 mg/mL injection 2 mg  2 mg Intramuscular Q6H PRN    LORazepam (ATIVAN) tablet 1 mg  1 mg Oral Q6H PRN    magnesium hydroxide (MILK OF MAGNESIA) 400 mg/5 mL oral suspension 30 mL  30 mL Oral Daily PRN    OLANZapine (ZyPREXA) IM injection 10 mg  10 mg Intramuscular Q3H PRN    OLANZapine (ZyPREXA) tablet 10 mg  10 mg Oral Q3H PRN    risperiDONE (RisperDAL M-TABS) dispersible tablet 1 mg  1 mg Oral Q3H PRN    risperiDONE (RisperDAL M-TABS) dispersible tablet 2 mg  2 mg Oral HS    traZODone (DESYREL) tablet 50 mg  50 mg Oral HS PRN

## 2018-06-23 NOTE — PROGRESS NOTES
Pt pleasant in conversation, speaks fast & rambles at times  Bizarre affect  Denies SI/HI/AH/VH  Denies depression or anxiety  Denies drug use (except for 'in the past')  Pt said, 'I'm doing okay today, I feel much better, you see, I always bring myself to the hospital when someone I know dies'  Pt has been observed appropriately social with peers

## 2018-06-23 NOTE — PROGRESS NOTES
Pt remains pleasant and calm this evening  Denies SI/HI, anxiety and depression  Pt scant in conversation however remains appropriate  Social with peers on the unit and attending groups  Denies any concerns or questions at this time

## 2018-06-24 PROCEDURE — 99231 SBSQ HOSP IP/OBS SF/LOW 25: CPT | Performed by: PSYCHIATRY & NEUROLOGY

## 2018-06-24 RX ADMIN — CARBAMIDE PEROXIDE 6.5% 5 DROP: 6.5 LIQUID AURICULAR (OTIC) at 09:24

## 2018-06-24 RX ADMIN — ESCITALOPRAM OXALATE 10 MG: 10 TABLET ORAL at 09:17

## 2018-06-24 RX ADMIN — RISPERIDONE 2 MG: 2 TABLET, ORALLY DISINTEGRATING ORAL at 22:22

## 2018-06-24 NOTE — PROGRESS NOTES
Pt remains pleasant this evening, denies SI/HI, anxiety and depression  Denies AH/VH  Denies any concerns or questions at this time

## 2018-06-24 NOTE — PROGRESS NOTES
Progress Note - Behavioral Health   Loly Mayen 46 y o  male MRN: [id number]  Unit/Bed#: [facility] 955-24 Encounter: [id number]    Assessment/Plan   Principal Problem:    Severe episode of recurrent major depressive disorder, without psychotic features ([address] )      Behavior over the last 24 hours:  improved  Sleep: normal  Appetite: normal  Medication side effects: No  ROS: no complaints    Mental Status Evaluation:  Appearance:  age appropriate   Behavior:  normal   Speech:  normal pitch and normal volume   Mood:  normal   Affect:  normal   Thought Process:  normal   Thought Content:  normal   Perceptual Disturbances: None   Risk Potential: conracting for safety in milieu   Sensorium:  person, place and time/date   Cognition:  grossly intact   Consciousness:  alert    Attention: attention span and concentration were age appropriate   Insight:  age appropriate   Judgment: age appropriate   Gait/Station: normal gait/station   Motor Activity: no abnormal movements     Progress Toward Goals: patient feeling improved compared to admission  Compliant with medications  Attending groups and being active in the milieu  Recommended Treatment: Continue with group therapy, milieu therapy and occupational therapy  Risks, benefits and possible side effects of Medications:   Risks, benefits, and possible side effects of medications explained to patient and patient verbalizes understanding        Medications:   all current active meds have been reviewed and current meds:   Current Facility-Administered Medications   Medication Dose Route Frequency    acetaminophen (TYLENOL) tablet 650 mg  650 mg Oral Q6H PRN    aluminum-magnesium hydroxide-simethicone (MYLANTA) 200-200-20 mg/5 mL oral suspension 30 mL  30 mL Oral Q4H PRN    benztropine (COGENTIN) injection 1 mg  1 mg Intramuscular Q6H PRN    benztropine (COGENTIN) tablet 1 mg  1 mg Oral Q6H PRN    carbamide peroxide (DEBROX) 6 5 % otic solution 5 drop  5 drop Both Ears BID    escitalopram (LEXAPRO) tablet 10 mg  10 mg Oral Daily    haloperidol (HALDOL) tablet 5 mg  5 mg Oral Q6H PRN    haloperidol lactate (HALDOL) injection 5 mg  5 mg Intramuscular Q6H PRN    LORazepam (ATIVAN) 2 mg/mL injection 2 mg  2 mg Intramuscular Q6H PRN    LORazepam (ATIVAN) tablet 1 mg  1 mg Oral Q6H PRN    magnesium hydroxide (MILK OF MAGNESIA) 400 mg/5 mL oral suspension 30 mL  30 mL Oral Daily PRN    OLANZapine (ZyPREXA) IM injection 10 mg  10 mg Intramuscular Q3H PRN    OLANZapine (ZyPREXA) tablet 10 mg  10 mg Oral Q3H PRN    risperiDONE (RisperDAL M-TABS) dispersible tablet 1 mg  1 mg Oral Q3H PRN    risperiDONE (RisperDAL M-TABS) dispersible tablet 2 mg  2 mg Oral HS    traZODone (DESYREL) tablet 50 mg  50 mg Oral HS PRN

## 2018-06-24 NOTE — PROGRESS NOTES
Pt remains pleasant in conversation, social with peers on the unit  Denies SI/HI, anxiety and depression  Denies any concerns or questions at this time  Will monitor

## 2018-06-24 NOTE — PROGRESS NOTES
In lounge watching tv, quiet soft spoken  seclusive with select peers, periods of irritability stated he was morning the loss of his mother

## 2018-06-24 NOTE — PLAN OF CARE
Problem: SELF HARM/SUICIDALITY  Goal: Will have no self-injury during hospital stay  INTERVENTIONS:  - Q 15 MINUTES: Routine safety checks  - Q WAKING SHIFT & PRN: Assess risk to determine if routine checks are adequate to maintain patient safety  - Encourage patient to participate actively in care by formulating a plan to combat response to suicidal ideation, identify supports and resources   Outcome: Progressing      Problem: DISCHARGE PLANNING  Goal: Discharge to home or other facility with appropriate resources  INTERVENTIONS:  - Identify barriers to discharge w/patient and caregiver  - Arrange for needed discharge resources and transportation as appropriate  - Identify discharge learning needs (meds, wound care, etc )  - Arrange for interpretive services to assist at discharge as needed  - Refer to Case Management Department for coordinating discharge planning if the patient needs post-hospital services based on physician/advanced practitioner order or complex needs related to functional status, cognitive ability, or social support system   Outcome: Progressing      Problem: Ineffective Coping  Goal: Participates in unit activities  Interventions:  - Provide therapeutic environment   - Provide required programming   - Redirect inappropriate behaviors    Outcome: Progressing

## 2018-06-24 NOTE — PLAN OF CARE
Problem: DEPRESSION  Goal: Will be euthymic at discharge  INTERVENTIONS:  - Administer medication as ordered  - Provide emotional support via 1:1 interaction with staff  - Encourage involvement in milieu/groups/activities  - Monitor for social isolation   Outcome: Completed Date Met: 06/24/18

## 2018-06-25 PROCEDURE — 99232 SBSQ HOSP IP/OBS MODERATE 35: CPT | Performed by: PSYCHIATRY & NEUROLOGY

## 2018-06-25 RX ADMIN — CARBAMIDE PEROXIDE 6.5% 5 DROP: 6.5 LIQUID AURICULAR (OTIC) at 21:23

## 2018-06-25 RX ADMIN — RISPERIDONE 2 MG: 2 TABLET, ORALLY DISINTEGRATING ORAL at 21:23

## 2018-06-25 RX ADMIN — CARBAMIDE PEROXIDE 6.5% 5 DROP: 6.5 LIQUID AURICULAR (OTIC) at 12:17

## 2018-06-25 RX ADMIN — ESCITALOPRAM OXALATE 10 MG: 10 TABLET ORAL at 08:28

## 2018-06-25 NOTE — PROGRESS NOTES
Pleasant and cooperative  Anxious for discharge  Denies SI/HI, AH/VH  Compliant with medications, denies side effects

## 2018-06-25 NOTE — PROGRESS NOTES
Progress Note - Behavioral Health   Dl Call 46 y o  male MRN: [id number]  Unit/Bed#: Wilma Gan 462-73 Encounter: [id number]    Assessment/Plan   Principal Problem:    Severe episode of recurrent major depressive disorder, without psychotic features ([facility] [address] )      Subjective:  Patient is compliant with medications with no acute side effects  Patient reports slow improvement in mood and anxiety  He continues to have fixed and falls believes regarding losing family members  Patient did remained appropriate and does not appear irritable and no behavior of agitation noted over the weekend  Patient agreed with plan of continuation of hospitalization for safety assessment and planning discharge based on his improvement  Patient reports understanding  He is seen out in milieu today attending groups  Current Medications:    Current Facility-Administered Medications:  acetaminophen 650 mg Oral Q6H PRN Creed Hooker, PA-C   aluminum-magnesium hydroxide-simethicone 30 mL Oral Q4H PRN Creed Hooker, PA-C   benztropine 1 mg Intramuscular Q6H PRN Creed Hooker, PA-C   benztropine 1 mg Oral Q6H PRN Creed Hooker, PA-C   carbamide peroxide 5 drop Both Ears BID THAIS Jordan-C   escitalopram 10 mg Oral Daily Shane Gipson   haloperidol 5 mg Oral Q6H PRN Creed Hooker, PA-C   haloperidol lactate 5 mg Intramuscular Q6H PRN Creed Hooker, PA-C   LORazepam 2 mg Intramuscular Q6H PRN Creed Hooker, PA-C   LORazepam 1 mg Oral Q6H PRN Creed Hooker, PA-C   magnesium hydroxide 30 mL Oral Daily PRN Creed Hooker, PA-C   OLANZapine 10 mg Intramuscular Q3H PRN Creed Hooker, PA-C   OLANZapine 10 mg Oral Q3H PRN Creed Hooker, PA-C   risperiDONE 1 mg Oral Q3H PRN Creed Hooker, PA-C   risperiDONE 2 mg Oral HS Shane Gipson   traZODone 50 mg Oral HS PRN Creed Hooker, PA-C       Behavioral Health Medications: all current active meds have been reviewed      Vital signs in last 24 hours:  Temp:  [96 2 °F (35 7 °C)-96 4 °F (35 8 °C)] 96 4 °F (35 8 °C)  HR:  [62-69] 62  Resp:  [16] 16  BP: (109-118)/(69-71) 109/69    Laboratory results:    I have personally reviewed all pertinent laboratory/tests results  Labs in last 72 hours: No results for input(s): WBC, RBC, HGB, HCT, PLT, RDW, NEUTROABS, NA, K, CL, CO2, BUN, CREATININE, GLUCOSE, CALCIUM, AST, ALT, ALKPHOS, PROT, ALBUMIN, BILITOT, CHOL, HDL, TRIG, LDLCALC, VALPROICTOT, CARBAMAZEPIN, LITHIUM, AMMONIA, [id number], FREET4, T3FREE, PREGTESTUR, PREGSERUM, HCG, HCGQUANT, RPR in the last 72 hours      Invalid input(s):  RBC  Admission Labs:   Admission on 06/20/2018   Component Date Value    Color, UA 06/20/2018 Yellow     Clarity, UA 06/20/2018 Clear     Specific Gravity, UA 06/20/2018 1 015     pH, UA 06/20/2018 7 0     Leukocytes, UA 06/20/2018 Negative     Nitrite, UA 06/20/2018 Negative     Protein, UA 06/20/2018 Negative     Glucose, UA 06/20/2018 Negative     Ketones, UA 06/20/2018 Negative     Urobilinogen, UA 06/20/2018 2 0*    Bilirubin, UA 06/20/2018 Negative     Blood, UA 06/20/2018 Negative     WBC 06/21/2018 5 62     RBC 06/21/2018 6 04*    Hemoglobin 06/21/2018 15 8     Hematocrit 06/21/2018 47 7     MCV 06/21/2018 79*    MCH 06/21/2018 26 2*    MCHC 06/21/2018 33 1     RDW 06/21/2018 13 9     MPV 06/21/2018 10 5     Platelets 04/19/5321 201     nRBC 06/21/2018 0     Neutrophils Relative 06/21/2018 43     Immat GRANS % 06/21/2018 1     Lymphocytes Relative 06/21/2018 32     Monocytes Relative 06/21/2018 16*    Eosinophils Relative 06/21/2018 7*    Basophils Relative 06/21/2018 1     Neutrophils Absolute 06/21/2018 2 45     Immature Grans Absolute 06/21/2018 0 03     Lymphocytes Absolute 06/21/2018 1 82     Monocytes Absolute 06/21/2018 0 88     Eosinophils Absolute 06/21/2018 0 37     Basophils Absolute 06/21/2018 0 07     Sodium 06/21/2018 139     Potassium 06/21/2018 4 0     Chloride 06/21/2018 103     CO2 06/21/2018 28     Anion Gap 06/21/2018 8     BUN 06/21/2018 11     Creatinine 06/21/2018 1 13     Glucose 06/21/2018 93     Calcium 06/21/2018 8 6     AST 06/21/2018 60*    ALT 06/21/2018 100*    Alkaline Phosphatase 06/21/2018 69     Total Protein 06/21/2018 7 5     Albumin 06/21/2018 3 2*    Total Bilirubin 06/21/2018 0 50     eGFR 06/21/2018 85     Cholesterol 06/21/2018 171     Triglycerides 06/21/2018 74     HDL, Direct 06/21/2018 72*    LDL Calculated 06/21/2018 84     Non-HDL-Chol (CHOL-HDL) 06/21/2018 99     RPR 06/21/2018 Non-Reactive     Hemoglobin A1C 06/21/2018 6 0     EAG 06/21/2018 126     TSH 3RD GENERATON 06/21/2018 1 328        Psychiatric Review of Systems:  Behavior over the last 24 hours:  Slow improvement  Sleep: normal  Appetite: normal  Medication side effects: No  ROS: no complaints    Mental Status Evaluation:  Appearance:  casually dressed   Behavior:  guarded   Speech:  soft   Mood:  anxious and depressed   Affect:  constricted   Language naming objects and repeating phrases   Thought Process:  circumstantial   Thought Content:  delusions  persecutory   Perceptual Disturbances: None   Risk Potential: Suicidal Ideations without plan, Homicidal Ideations none and Potential for Aggression No   Sensorium:  person, place and time/date   Cognition:  grossly intact   Consciousness:  awake    Attention: attention span appeared shorter than expected for age   Insight:  limited   Judgment: limited   Intellect fair   Gait/Station: normal gait/station   Motor Activity: no abnormal movements     Progress Toward Goals: progressing    Recommended Treatment:   Continue with group therapy, milieu therapy and occupational therapy      Continue following current medications:   Current Facility-Administered Medications:  acetaminophen 650 mg Oral Q6H PRN Eloy Baron PA-C   aluminum-magnesium hydroxide-simethicone 30 mL Oral Q4H PRN Eloy Baron PA-C benztropine 1 mg Intramuscular Q6H PRN Nancy Olvera, PA-C   benztropine 1 mg Oral Q6H PRN Nancy Olvera, PA-C   carbamide peroxide 5 drop Both Ears BID Tiffani Mckenzie, PA-C   escitalopram 10 mg Oral Daily Shane Gipson   haloperidol 5 mg Oral Q6H PRN Nancy Olvera, PA-C   haloperidol lactate 5 mg Intramuscular Q6H PRN Nancy Olvera, PA-C   LORazepam 2 mg Intramuscular Q6H PRN Nancy Olvera, PA-C   LORazepam 1 mg Oral Q6H PRN Nancy Olvera, PA-C   magnesium hydroxide 30 mL Oral Daily PRN Nancy Olvera, PA-C   OLANZapine 10 mg Intramuscular Q3H PRN Nancy Olvera, PA-C   OLANZapine 10 mg Oral Q3H PRN Nancy Olvera, PA-C   risperiDONE 1 mg Oral Q3H PRN Nancy Olvera, PA-C   risperiDONE 2 mg Oral HS Shane Gipson   traZODone 50 mg Oral HS PRN Nancy Olvera, PA-C       Risks, benefits and possible side effects of Medications:   Risks, benefits, and possible side effects of medications explained to patient and patient verbalizes understanding  This note has been constructed using a voice recognition system  There may be translation, syntax,  or grammatical errors  If you have any questions, please contact the dictating provider

## 2018-06-26 PROCEDURE — 99232 SBSQ HOSP IP/OBS MODERATE 35: CPT | Performed by: PSYCHIATRY & NEUROLOGY

## 2018-06-26 RX ADMIN — CARBAMIDE PEROXIDE 6.5% 5 DROP: 6.5 LIQUID AURICULAR (OTIC) at 09:01

## 2018-06-26 RX ADMIN — RISPERIDONE 2 MG: 2 TABLET, ORALLY DISINTEGRATING ORAL at 21:28

## 2018-06-26 RX ADMIN — ESCITALOPRAM OXALATE 10 MG: 10 TABLET ORAL at 09:00

## 2018-06-26 NOTE — PROGRESS NOTES
Progress Note - Behavioral Health   Ran Jeffrey 46 y o  male MRN: [id number]  Unit/Bed#: Dre Kelly 560-18 Encounter: [id number]    Assessment/Plan   Principal Problem:    Severe episode of recurrent major depressive disorder, without psychotic features ([facility] [address] )    Subjective:  Patient is compliant with medications with no acute side effects  Patient reports slow improvement in mood and anxiety with reduction and passive death wishes  Patient continues to have delusional believes about losing family members  He is seen out in milieu attending groups  Current Medications:    Current Facility-Administered Medications:  acetaminophen 650 mg Oral Q6H PRN Sheliah Genre, PA-C   aluminum-magnesium hydroxide-simethicone 30 mL Oral Q4H PRN Sheliah Genre, PA-C   benztropine 1 mg Intramuscular Q6H PRN Sheliah Genre, PA-C   benztropine 1 mg Oral Q6H PRN Sheliah Genre, PA-C   carbamide peroxide 5 drop Both Ears BID Tiffani Mckenzie, PA-C   escitalopram 10 mg Oral Daily [facility]   haloperidol 5 mg Oral Q6H PRN Sheliah Genre, PA-C   haloperidol lactate 5 mg Intramuscular Q6H PRN Sheliah Genre, PA-C   LORazepam 2 mg Intramuscular Q6H PRN Sheliah Genre, PA-C   LORazepam 1 mg Oral Q6H PRN Sheliah Genre, PA-C   magnesium hydroxide 30 mL Oral Daily PRN Sheliah Genre, PA-C   OLANZapine 10 mg Intramuscular Q3H PRN Sheliah Genre, PA-C   OLANZapine 10 mg Oral Q3H PRN Sheliah Genre, PA-C   risperiDONE 1 mg Oral Q3H PRN Sheliah Genre, PA-C   risperiDONE 2 mg Oral HS [facility]   traZODone 50 mg Oral HS PRN Shelia Genre, PA-C       Behavioral Health Medications: all current active meds have been reviewed  Vital signs in last 24 hours:  Temp:  [96 6 °F (35 9 °C)-96 9 °F (36 1 °C)] 96 9 °F (36 1 °C)  HR:  [60-88] 88  Resp:  [16] 16  BP: (127-128)/(68-81) 127/81    Laboratory results:    I have personally reviewed all pertinent laboratory/tests results    Labs in last 72 hours: No results for input(s): WBC, RBC, HGB, HCT, PLT, RDW, NEUTROABS, NA, K, CL, CO2, BUN, CREATININE, GLUCOSE, CALCIUM, AST, ALT, ALKPHOS, PROT, ALBUMIN, BILITOT, CHOL, HDL, TRIG, LDLCALC, VALPROICTOT, CARBAMAZEPIN, LITHIUM, AMMONIA, [id number], FREET4, T3FREE, PREGTESTUR, PREGSERUM, HCG, HCGQUANT, RPR in the last 72 hours      Invalid input(s):  RBC  Admission Labs:   Admission on 06/20/2018   Component Date Value    Color, UA 06/20/2018 Yellow     Clarity, UA 06/20/2018 Clear     Specific Gravity, UA 06/20/2018 1 015     pH, UA 06/20/2018 7 0     Leukocytes, UA 06/20/2018 Negative     Nitrite, UA 06/20/2018 Negative     Protein, UA 06/20/2018 Negative     Glucose, UA 06/20/2018 Negative     Ketones, UA 06/20/2018 Negative     Urobilinogen, UA 06/20/2018 2 0*    Bilirubin, UA 06/20/2018 Negative     Blood, UA 06/20/2018 Negative     WBC 06/21/2018 5 62     RBC 06/21/2018 6 04*    Hemoglobin 06/21/2018 15 8     Hematocrit 06/21/2018 47 7     MCV 06/21/2018 79*    MCH 06/21/2018 26 2*    MCHC 06/21/2018 33 1     RDW 06/21/2018 13 9     MPV 06/21/2018 10 5     Platelets 55/04/1092 201     nRBC 06/21/2018 0     Neutrophils Relative 06/21/2018 43     Immat GRANS % 06/21/2018 1     Lymphocytes Relative 06/21/2018 32     Monocytes Relative 06/21/2018 16*    Eosinophils Relative 06/21/2018 7*    Basophils Relative 06/21/2018 1     Neutrophils Absolute 06/21/2018 2 45     Immature Grans Absolute 06/21/2018 0 03     Lymphocytes Absolute 06/21/2018 1 82     Monocytes Absolute 06/21/2018 0 88     Eosinophils Absolute 06/21/2018 0 37     Basophils Absolute 06/21/2018 0 07     Sodium 06/21/2018 139     Potassium 06/21/2018 4 0     Chloride 06/21/2018 103     CO2 06/21/2018 28     Anion Gap 06/21/2018 8     BUN 06/21/2018 11     Creatinine 06/21/2018 1 13     Glucose 06/21/2018 93     Calcium 06/21/2018 8 6     AST 06/21/2018 60*    ALT 06/21/2018 100*    Alkaline Phosphatase 06/21/2018 69     Total Protein 06/21/2018 7 5     Albumin 06/21/2018 3 2*    Total Bilirubin 06/21/2018 0 50     eGFR 06/21/2018 85     Cholesterol 06/21/2018 171     Triglycerides 06/21/2018 74     HDL, Direct 06/21/2018 72*    LDL Calculated 06/21/2018 84     Non-HDL-Chol (CHOL-HDL) 06/21/2018 99     RPR 06/21/2018 Non-Reactive     Hemoglobin A1C 06/21/2018 6 0     EAG 06/21/2018 126     TSH 3RD GENERATON 06/21/2018 1 328        Psychiatric Review of Systems:  Behavior over the last 24 hours:  improving  Sleep: normal  Appetite: normal  Medication side effects: No  ROS: no complaints    Mental Status Evaluation:  Appearance:  casually dressed   Behavior:  guarded   Speech:  soft   Mood:  anxious   Affect:  increased in range   Language naming objects   Thought Process:  circumstantial   Thought Content:  delusions  persecutory   Perceptual Disturbances: None   Risk Potential: Suicidal Ideations none, Homicidal Ideations none and Potential for Aggression No   Sensorium:  person and place   Cognition:  grossly intact   Consciousness:  awake    Attention: attention span appeared shorter than expected for age   Insight:  limited   Judgment: limited   Intellect fair   Gait/Station: normal gait/station   Motor Activity: no abnormal movements     Progress Toward Goals: progressing    Recommended Treatment:   Continue with group therapy, milieu therapy and occupational therapy      Continue following current medications:   Current Facility-Administered Medications:  acetaminophen 650 mg Oral Q6H PRN Caryn Mo PA-C   aluminum-magnesium hydroxide-simethicone 30 mL Oral Q4H PRN Caryn Mo PA-C   benztropine 1 mg Intramuscular Q6H PRN Caryn Mo PA-C   benztropine 1 mg Oral Q6H PRN Wilfartunmenia LORA Mo   carbamide peroxide 5 drop Both Ears BID Tiffani Mckenzie PA-C   escitalopram 10 mg Oral Daily Shane Gipson   haloperidol 5 mg Oral Q6H PRN Dottieia LORA Mo   haloperidol lactate 5 mg Intramuscular Q6H PRN Delmar Srivastava PA-C   LORazepam 2 mg Intramuscular Q6H PRN Delmar Srivastava PA-C   LORazepam 1 mg Oral Q6H PRN Delmar Srivastava PA-C   magnesium hydroxide 30 mL Oral Daily PRN Delmar Srivastava, PA-OSEI   OLANZapine 10 mg Intramuscular Q3H PRN Delmar Srivastava, PA-OSEI   OLANZapine 10 mg Oral Q3H PRN THAIS Pope-OSEI   risperiDONE 1 mg Oral Q3H PRN Delmar Srivastava PA-C   risperiDONE 2 mg Oral HS [facility]   traZODone 50 mg Oral HS PRN Delmar Srivastava PA-C       Risks, benefits and possible side effects of Medications:   Risks, benefits, and possible side effects of medications explained to patient and patient verbalizes understanding  This note has been constructed using a voice recognition system  There may be translation, syntax,  or grammatical errors  If you have any questions, please contact the dictating provider

## 2018-06-26 NOTE — PLAN OF CARE
Ineffective Coping     Participates in unit activities Progressing        SELF HARM/SUICIDALITY     Will have no self-injury during hospital stay Progressing

## 2018-06-26 NOTE — PROGRESS NOTES
Awake and alert  Friendly and social with peers  Reports feeling "great" "It's a wonderful day"  Attends groups  Compliant with meals and medications

## 2018-06-26 NOTE — PROGRESS NOTES
Patient states he is "great"  Patient is denying all symptoms  Patient has been out in milieu and is social with others  Patient is calm and cooperative

## 2018-06-27 PROCEDURE — 99232 SBSQ HOSP IP/OBS MODERATE 35: CPT | Performed by: PSYCHIATRY & NEUROLOGY

## 2018-06-27 RX ADMIN — RISPERIDONE 2 MG: 2 TABLET, ORALLY DISINTEGRATING ORAL at 21:07

## 2018-06-27 RX ADMIN — ESCITALOPRAM OXALATE 10 MG: 10 TABLET ORAL at 09:08

## 2018-06-27 NOTE — PROGRESS NOTES
Progress Note - Behavioral Health   Saqib Lopez 46 y o  male MRN: [id number]  Unit/Bed#: Marian 562-02 Encounter: [id number]    Assessment/Plan   Principal Problem:    Severe episode of recurrent major depressive disorder, without psychotic features ([facility] [address] )    Subjective:  Patient is compliant with medications with no acute side effects  Patient reports improvement in mood and anxiety  Denies endorsing suicidal or homicidal ideation today  Agreed with plan of discharge tomorrow to outpatient follow-up, patient continues to show signs of improvement today  Current Medications:    Current Facility-Administered Medications:  acetaminophen 650 mg Oral Q6H PRN Gracia Warner, PA-C   aluminum-magnesium hydroxide-simethicone 30 mL Oral Q4H PRN [place] Warner, PA-C   benztropine 1 mg Intramuscular Q6H PRN [place] Warner, PA-C   benztropine 1 mg Oral Q6H PRN [place] Warner, PA-C   carbamide peroxide 5 drop Both Ears BID LORIN JordanC   escitalopram 10 mg Oral Daily Shane Gipson   haloperidol 5 mg Oral Q6H PRN [place] Warner, PA-C   haloperidol lactate 5 mg Intramuscular Q6H PRN [place] Warner, PA-C   LORazepam 2 mg Intramuscular Q6H PRN Gracia Warner, PA-C   LORazepam 1 mg Oral Q6H PRN [place] Warner, PA-C   magnesium hydroxide 30 mL Oral Daily PRN Gracia Warner, PA-C   OLANZapine 10 mg Intramuscular Q3H PRN [place] Warner, PA-C   OLANZapine 10 mg Oral Q3H PRN Gracia Warner, PA-C   risperiDONE 1 mg Oral Q3H PRN [place] Warner, PA-C   risperiDONE 2 mg Oral HS [facility]   traZODone 50 mg Oral HS PRN [place] Warner, PA-C       Behavioral Health Medications: all current active meds have been reviewed  Vital signs in last 24 hours:  Temp:  [96 7 °F (35 9 °C)-97 7 °F (36 5 °C)] 96 7 °F (35 9 °C)  HR:  [56-59] 59  Resp:  [16-18] 18  BP: (121-126)/(68-79) 126/79    Laboratory results:    I have personally reviewed all pertinent laboratory/tests results    Labs in last 72 hours: No results for input(s): WBC, RBC, HGB, HCT, PLT, RDW, NEUTROABS, NA, K, CL, CO2, BUN, CREATININE, GLUCOSE, CALCIUM, AST, ALT, ALKPHOS, PROT, ALBUMIN, BILITOT, CHOL, HDL, TRIG, LDLCALC, VALPROICTOT, CARBAMAZEPIN, LITHIUM, AMMONIA, [id number], FREET4, T3FREE, PREGTESTUR, PREGSERUM, HCG, HCGQUANT, RPR in the last 72 hours      Invalid input(s):  RBC  Admission Labs:   Admission on 06/20/2018   Component Date Value    Color, UA 06/20/2018 Yellow     Clarity, UA 06/20/2018 Clear     Specific Gravity, UA 06/20/2018 1 015     pH, UA 06/20/2018 7 0     Leukocytes, UA 06/20/2018 Negative     Nitrite, UA 06/20/2018 Negative     Protein, UA 06/20/2018 Negative     Glucose, UA 06/20/2018 Negative     Ketones, UA 06/20/2018 Negative     Urobilinogen, UA 06/20/2018 2 0*    Bilirubin, UA 06/20/2018 Negative     Blood, UA 06/20/2018 Negative     WBC 06/21/2018 5 62     RBC 06/21/2018 6 04*    Hemoglobin 06/21/2018 15 8     Hematocrit 06/21/2018 47 7     MCV 06/21/2018 79*    MCH 06/21/2018 26 2*    MCHC 06/21/2018 33 1     RDW 06/21/2018 13 9     MPV 06/21/2018 10 5     Platelets 82/69/4192 201     nRBC 06/21/2018 0     Neutrophils Relative 06/21/2018 43     Immat GRANS % 06/21/2018 1     Lymphocytes Relative 06/21/2018 32     Monocytes Relative 06/21/2018 16*    Eosinophils Relative 06/21/2018 7*    Basophils Relative 06/21/2018 1     Neutrophils Absolute 06/21/2018 2 45     Immature Grans Absolute 06/21/2018 0 03     Lymphocytes Absolute 06/21/2018 1 82     Monocytes Absolute 06/21/2018 0 88     Eosinophils Absolute 06/21/2018 0 37     Basophils Absolute 06/21/2018 0 07     Sodium 06/21/2018 139     Potassium 06/21/2018 4 0     Chloride 06/21/2018 103     CO2 06/21/2018 28     Anion Gap 06/21/2018 8     BUN 06/21/2018 11     Creatinine 06/21/2018 1 13     Glucose 06/21/2018 93     Calcium 06/21/2018 8 6     AST 06/21/2018 60*    ALT 06/21/2018 100*    Alkaline Phosphatase 06/21/2018 69     Total Protein 06/21/2018 7 5     Albumin 06/21/2018 3 2*    Total Bilirubin 06/21/2018 0 50     eGFR 06/21/2018 85     Cholesterol 06/21/2018 171     Triglycerides 06/21/2018 74     HDL, Direct 06/21/2018 72*    LDL Calculated 06/21/2018 84     Non-HDL-Chol (CHOL-HDL) 06/21/2018 99     RPR 06/21/2018 Non-Reactive     Hemoglobin A1C 06/21/2018 6 0     EAG 06/21/2018 126     TSH 3RD GENERATON 06/21/2018 1 328        Psychiatric Review of Systems:  Behavior over the last 24 hours:  improved  Sleep: normal  Appetite: normal  Medication side effects: No  ROS: no complaints    Mental Status Evaluation:  Appearance:  casually dressed   Behavior:  guarded   Speech:  soft   Mood:  anxious   Affect:  constricted   Language naming objects   Thought Process:  circumstantial   Thought Content:  delusions  persecutory   Perceptual Disturbances: None   Risk Potential: Suicidal Ideations none, Homicidal Ideations none and Potential for Aggression No   Sensorium:  person and place   Cognition:  grossly intact   Consciousness:  awake    Attention: attention span and concentration were age appropriate   Insight:  fair   Judgment: fair   Intellect fair   Gait/Station: normal gait/station   Motor Activity: no abnormal movements     Memory: Short and long term memory  fair     Progress Toward Goals: progressing    Recommended Treatment:   Initiate disposition planning  Continue with group therapy, milieu therapy and occupational therapy      Continue following current medications:   Current Facility-Administered Medications:  acetaminophen 650 mg Oral Q6H PRN Romel [place], PA-C   aluminum-magnesium hydroxide-simethicone 30 mL Oral Q4H PRN Romel [place], PA-C   benztropine 1 mg Intramuscular Q6H PRN Romel [place], PA-C   benztropine 1 mg Oral Q6H PRN Romel [place], PA-C   carbamide peroxide 5 drop Both Ears BID Tiffani Mckenzie PA-C   escitalopram 10 mg Oral Daily Shane Brandan   haloperidol 5 mg Oral Q6H PRN Montoya Blakes, PA-C   haloperidol lactate 5 mg Intramuscular Q6H PRN Montoya Blakes, PA-C   LORazepam 2 mg Intramuscular Q6H PRN Montoya Blakes, PA-C   LORazepam 1 mg Oral Q6H PRN Montoya Blakes, PA-C   magnesium hydroxide 30 mL Oral Daily PRN Montoya Blakes, PA-C   OLANZapine 10 mg Intramuscular Q3H PRN Montoya Blakes, PA-C   OLANZapine 10 mg Oral Q3H PRN Montoya Blakes, PA-C   risperiDONE 1 mg Oral Q3H PRN Montoya Blakes, PA-C   risperiDONE 2 mg Oral HS Shane Gipson   traZODone 50 mg Oral HS PRN Montoya Blakes, PA-C       Risks, benefits and possible side effects of Medications:   Risks, benefits, and possible side effects of medications explained to patient and patient verbalizes understanding  This note has been constructed using a voice recognition system  There may be translation, syntax,  or grammatical errors  If you have any questions, please contact the dictating provider

## 2018-06-27 NOTE — DISCHARGE INSTR - APPOINTMENTS
An application was made on your behalf for [employer] cell phone  The phone will be mailed to [employer] [facility]

## 2018-06-28 VITALS
DIASTOLIC BLOOD PRESSURE: 84 MMHG | WEIGHT: 169.7 LBS | HEART RATE: 58 BPM | HEIGHT: 66 IN | TEMPERATURE: 97.6 F | RESPIRATION RATE: 18 BRPM | SYSTOLIC BLOOD PRESSURE: 124 MMHG | BODY MASS INDEX: 27.27 KG/M2

## 2018-06-28 PROCEDURE — 99239 HOSP IP/OBS DSCHRG MGMT >30: CPT | Performed by: NURSE PRACTITIONER

## 2018-06-28 RX ORDER — RISPERIDONE 2 MG/1
2 TABLET, ORALLY DISINTEGRATING ORAL
Qty: 30 TABLET | Refills: 1 | Status: SHIPPED | OUTPATIENT
Start: 2018-06-28 | End: 2022-06-30

## 2018-06-28 RX ORDER — ESCITALOPRAM OXALATE 10 MG/1
10 TABLET ORAL DAILY
Qty: 30 TABLET | Refills: 1 | Status: ON HOLD | OUTPATIENT
Start: 2018-06-29 | End: 2022-06-30

## 2018-06-28 RX ADMIN — ESCITALOPRAM OXALATE 10 MG: 10 TABLET ORAL at 08:42

## 2018-06-28 NOTE — DISCHARGE SUMMARY
Discharge Summary - [address] [place] 46 y o  male MRN: [id number]  Unit/Bed#: [facility] 514-39 Encounter: [id number]     Admission Date: 6/20/2018         Discharge Date: 06/28/2018    Attending Psychiatrist: Olivia Hernandez    Reason for Admission/HPI:     Katheryn He is a 46year old male with a history of depression who was admitted to the inpatient psychiatric unit on a voluntary 201 commitment for worsening depression, anxiety and suicidal ideation with several plans to kill sell "all kinds of plans "  Of note patient was previously discharge from the psychiatric unit 3/28/18 for depression  Aimee Dominguez reports events preceding this admission as multiple losses in his family (mother found dead in hotel room), increased anxiety, crying episodes, suicidal ideation, feelings of hopelessness  Denies manic episodes  He reports his stressors as poor support and financial difficulties  Upon arrival to the psychiatric unit he is child-like, he appeared anxious, tearful, suicidal (feels safe on unit and in hospital and is valeriano/consenting for safety on the unit), and depressed  Past Medical History:   Diagnosis Date    Depression     Psychiatric illness      No past surgical history on file  Medications:    Medications prior to admission:    Prior to Admission Medications   Prescriptions Last Dose Informant Patient Reported?  Taking?   escitalopram (LEXAPRO) 5 mg tablet   No No   Sig: Take 1 tablet (5 mg total) by mouth daily At 9AM      Facility-Administered Medications: None       Allergies:     No Known Allergies    Objective     Vital signs in last 24 hours:    Temp:  [97 6 °F (36 4 °C)-97 8 °F (36 6 °C)] 97 6 °F (36 4 °C)  HR:  [58-80] 58  Resp:  [18] 18  BP: (124-165)/() 124/84    No intake or output data in the 24 hours ending 06/28/18 [facility]:     Aimee Dominguez was admitted to the inpatient psychiatric unit and started on [facility] checks every 5 minutes and encouraged to attend individual therapy, group therapy, milieu therapy and occupational therapy  During the hospitalization he was Behavioral Health checks every 5 minutes and encouraged to attend individual therapy, group therapy, milieu therapy and occupational therapy  Psychiatric medications were adjusted over the hospital stay  To address depressive symptoms and mood instability, Homar Thomas was treated with antidepressant Lexapro and antipsychotic medication Risperdal  Medication doses were adjusted during the hospital course  Lexapro was restarted and titrated to 10 mg po daily  Risperdal was added and titrated to 2 mg po q hs  Prior to beginning of treatment medications risks and benefits and possible side effects including risk of parkinsonian symptoms, Tardive Dyskinesia and metabolic syndrome related to treatment with antipsychotic medications and risk of suicidality related to treatment with antidepressants were reviewed with Homar Thomas  He verbalized understanding and agreement for treatment  Upon admission Homar Thomas was seen for medical clearance for inpatient treatment  Ananth's symptoms slowly improved over the hospital course  Initially after admission he was child-like, he appeared anxious, tearful, had suicidal thoughts though feels safe on unit and in hospital and is valeriano/consenting for safety on the unit, and appears depressed  With adjustment of medications and therapeutic milieu his symptoms gradually improved  At the end of treatment Homar Thomas was doing much better  His mood was doing much better at the time of discharge  Homar Thomas denied suicidal ideation, intent or plan at the time of discharge and denied homicidal ideation, intent or plan at the time of discharge  There was no overt psychosis at the time of discharge  Homar Thomas was participating appropriately in milieu at the time of discharge  Behavior was appropriate on the unit at the time of discharge   Sleep and appetite were improved  Since Beatrice Roberts was doing well at the end of the hospitalization, treatment team felt that he could be safely discharged to outpatient care  [address] Intensive  felt that at the end of the hospital stay Beatrice Roberts was at baseline and was ready for discharge  Beatrice Roberts also felt stable and ready for discharge at the end of the hospital stay  Patient states she lives with his uncle though case management is unable to reach him to confirm this so the patient was provided with information for the [facility]  The outpatient follow up with therapist and psychiatrist at [facility] was arranged by the unit  upon discharge  Mental Status at Time of Discharge:     Appearance:  age appropriate, casually dressed   Behavior:  pleasant, calm, slightly guarded   Speech:  normal rate, soft   Mood:  slightly anxious   Affect:  constricted   Thought Process:  circumstantial   Associations: circumstantial associations   Thought Content:  no overt delusions   Perceptual Disturbances: denies auditory or visual hallucinations when asked   Risk Potential: Suicidal ideation - None  Homicidal ideation - None  Potential for aggression - No   Sensorium:  oriented to person and place   Memory:  recent memory mildly impaired, remote memory mildly impaired   Consciousness:  alert and awake   Attention: attention span and concentration appear shorter than expected for age   Insight:  limited   Judgment: limited   Gait/Station: normal gait/station and normal balance   Motor Activity: no abnormal movements       Admission Diagnosis:    Principal Problem:    Severe episode of recurrent major depressive disorder, without psychotic features ([address] )      Discharge Diagnosis:     Principal Problem:    Severe episode of recurrent major depressive disorder, without psychotic features ([address] )  Resolved Problems:    * No resolved hospital problems   *      Lab Results:   I have personally reviewed all pertinent laboratory/tests results  Most Recent Labs:   Lab Results   Component Value Date    WBC 5 62 06/21/2018    RBC 6 04 (H) 06/21/2018    HGB 15 8 06/21/2018    HCT 47 7 06/21/2018     06/21/2018    RDW 13 9 06/21/2018    NEUTROABS 2 45 06/21/2018     06/21/2018    K 4 0 06/21/2018     06/21/2018    CO2 28 06/21/2018    BUN 11 06/21/2018    CREATININE 1 13 06/21/2018    GLUCOSE 93 06/21/2018    CALCIUM 8 6 06/21/2018    AST 60 (H) 06/21/2018     (H) 06/21/2018    ALKPHOS 69 06/21/2018    PROT 7 5 06/21/2018    BILITOT 0 50 06/21/2018    CHOL 171 06/21/2018    HDL 72 (H) 06/21/2018    TRIG 74 06/21/2018    LDLCALC 84 06/21/2018    OKI1UCMLZEVG 1 328 06/21/2018    RPR Non-Reactive 06/21/2018       Discharge Medications:    See after visit summary for all reconciled discharge medications provided to patient and family  Discharge instructions/Information to patient and family:     See after visit summary for information provided to patient and family  Provisions for Follow-Up Care:    See after visit summary for information related to follow-up care and any pertinent home health orders  Discharge Statement:    I spent 42 minutes discharging the patient  This time was spent on the day of discharge  I had direct contact with the patient on the day of discharge  Additional documentation is required if more than 30 minutes were spent on discharge:    I reviewed with Leopoldo Shrestha importance of compliance with medications and outpatient treatment after discharge  I discussed the medication regimen and possible side effects of the medications with Cydne Foil prior to discharge  At the time of discharge he was tolerating psychiatric medications  I discussed outpatient follow up with Leopoldo Shrestha  I reviewed with Leopoldo Shrestha crisis plan and safety plan upon discharge  Leopoldo Shrestha agreed to abstain from drug and alcohol use after discharge    Leopoldo Shrestha was competent to understand risks and benefits of withholding information and risks and benefits of his actions  Outpatient Smoking Cessation referral was offered to [facility]  He declined the referral   Smoking Cessation medication was offered to [facility]  He declined Smoking Cessation medication  [facility] is currently living with uncle though the hospital cannot confirm this  If he is homeless he agrees for a discharge to a shelter, [employer] information was provided

## 2018-06-28 NOTE — PROGRESS NOTES
Cooperative during interaction  Expressed readiness for discharge  Plans to return to live with his uncle  Encouraged compliance with medications and OP appointments  States he feels "great"  No questions or concerns

## 2018-06-28 NOTE — CASE MANAGEMENT
JESSI met with pt who verbalized readiness for dc  Pt is denying SI and says depression is diminished  Pt will continue to outpatient at [facility] and will continue to work with his - Thomas Camejomahinvladimir ([phone]) through [employer]  JESSI filled out [employer] application  Pt will be discharged on the van at 12:30 to [employer] address so he can touch base with his ICM  Pt also provided with information on [employer]  Pt does report that he lives with his Merrill Sis but this writer was not able to authenticate this  Pt has insurance and money to pay for his prescription medications  Pt will fill them out at his local [facility] in [place]

## 2018-06-28 NOTE — DISCHARGE INSTR - LAB
Contact Information: If you have any questions, concerns, pended studies, tests and/or procedures, or emergencies regarding your inpatient behavioral health visit  Please contact [place] [facility] [phone] and ask to speak to a , nurse or physician  A contact is available 24 hours/ 7 days a week at this number  Summary of Procedures Performed During your Stay:  Below is a list of major procedures performed during your hospital stay and a summary of results:  - No major procedures performed  Pending Studies     None        If studies are pending at discharge, follow up with your PCP and/or referring provider

## 2022-05-04 ENCOUNTER — PATIENT OUTREACH (OUTPATIENT)
Dept: OTHER | Facility: OTHER | Age: 57
End: 2022-05-04

## 2022-05-23 ENCOUNTER — PATIENT OUTREACH (OUTPATIENT)
Dept: OTHER | Facility: OTHER | Age: 57
End: 2022-05-23

## 2022-05-23 NOTE — PROGRESS NOTES
Monday May 23, 2022    Called client to remind him of tomorrow's appointment at [facility] for 3:00 PM ( arrival time of 2:45 PM)  Client given time and location  Client verbalized he would be there  Client pleasant in this encounter  Emotional support given

## 2022-05-23 NOTE — PROGRESS NOTES
Wednesday May 4, 2022    Encountered client at [facility] during outreach hours  Client familiar to [facility] BARBIE Nurse (PN)  Client reports he doesn't have a PCP  Agreeable for appointment at [facility]      Appointment made for 5/24/22 at 3:00 PM   Client made aware  Client pleasant in this encounter  Emotional support given

## 2022-06-24 ENCOUNTER — HOSPITAL ENCOUNTER (EMERGENCY)
Facility: HOSPITAL | Age: 57
End: 2022-06-25
Attending: EMERGENCY MEDICINE
Payer: COMMERCIAL

## 2022-06-24 DIAGNOSIS — R45.851 SUICIDAL IDEATION: Primary | ICD-10-CM

## 2022-06-24 DIAGNOSIS — Z00.8 MEDICAL CLEARANCE FOR PSYCHIATRIC ADMISSION: ICD-10-CM

## 2022-06-24 DIAGNOSIS — F33.2 SEVERE EPISODE OF RECURRENT MAJOR DEPRESSIVE DISORDER, WITHOUT PSYCHOTIC FEATURES (HCC): ICD-10-CM

## 2022-06-24 LAB
ALBUMIN SERPL BCP-MCNC: 3.2 G/DL (ref 3.5–5)
ALP SERPL-CCNC: 94 U/L (ref 46–116)
ALT SERPL W P-5'-P-CCNC: 56 U/L (ref 12–78)
AMPHETAMINES SERPL QL SCN: NEGATIVE
ANION GAP SERPL CALCULATED.3IONS-SCNC: 9 MMOL/L (ref 4–13)
AST SERPL W P-5'-P-CCNC: 40 U/L (ref 5–45)
BARBITURATES UR QL: NEGATIVE
BASOPHILS # BLD AUTO: 0.06 THOUSANDS/ΜL (ref 0–0.1)
BASOPHILS NFR BLD AUTO: 1 % (ref 0–1)
BENZODIAZ UR QL: NEGATIVE
BILIRUB SERPL-MCNC: 0.22 MG/DL (ref 0.2–1)
BILIRUB UR QL STRIP: NEGATIVE
BUN SERPL-MCNC: 11 MG/DL (ref 5–25)
CALCIUM ALBUM COR SERPL-MCNC: 9.1 MG/DL (ref 8.3–10.1)
CALCIUM SERPL-MCNC: 8.5 MG/DL (ref 8.3–10.1)
CARDIAC TROPONIN I PNL SERPL HS: 3 NG/L
CHLORIDE SERPL-SCNC: 103 MMOL/L (ref 100–108)
CLARITY UR: CLEAR
CO2 SERPL-SCNC: 25 MMOL/L (ref 21–32)
COCAINE UR QL: POSITIVE
COLOR UR: YELLOW
CREAT SERPL-MCNC: 1.29 MG/DL (ref 0.6–1.3)
EOSINOPHIL # BLD AUTO: 0.35 THOUSAND/ΜL (ref 0–0.61)
EOSINOPHIL NFR BLD AUTO: 6 % (ref 0–6)
ERYTHROCYTE [DISTWIDTH] IN BLOOD BY AUTOMATED COUNT: 14.7 % (ref 11.6–15.1)
ETHANOL EXG-MCNC: 0 MG/DL
FLUAV RNA RESP QL NAA+PROBE: NEGATIVE
FLUBV RNA RESP QL NAA+PROBE: NEGATIVE
GFR SERPL CREATININE-BSD FRML MDRD: 60 ML/MIN/1.73SQ M
GLUCOSE SERPL-MCNC: 98 MG/DL (ref 65–140)
GLUCOSE UR STRIP-MCNC: NEGATIVE MG/DL
HCT VFR BLD AUTO: 43.1 % (ref 36.5–49.3)
HGB BLD-MCNC: 13.9 G/DL (ref 12–17)
HGB UR QL STRIP.AUTO: NEGATIVE
IMM GRANULOCYTES # BLD AUTO: 0.03 THOUSAND/UL (ref 0–0.2)
IMM GRANULOCYTES NFR BLD AUTO: 1 % (ref 0–2)
KETONES UR STRIP-MCNC: NEGATIVE MG/DL
LEUKOCYTE ESTERASE UR QL STRIP: NEGATIVE
LYMPHOCYTES # BLD AUTO: 2.23 THOUSANDS/ΜL (ref 0.6–4.47)
LYMPHOCYTES NFR BLD AUTO: 35 % (ref 14–44)
MCH RBC QN AUTO: 25.3 PG (ref 26.8–34.3)
MCHC RBC AUTO-ENTMCNC: 32.3 G/DL (ref 31.4–37.4)
MCV RBC AUTO: 79 FL (ref 82–98)
METHADONE UR QL: NEGATIVE
MONOCYTES # BLD AUTO: 0.71 THOUSAND/ΜL (ref 0.17–1.22)
MONOCYTES NFR BLD AUTO: 11 % (ref 4–12)
NEUTROPHILS # BLD AUTO: 2.93 THOUSANDS/ΜL (ref 1.85–7.62)
NEUTS SEG NFR BLD AUTO: 46 % (ref 43–75)
NITRITE UR QL STRIP: NEGATIVE
NRBC BLD AUTO-RTO: 0 /100 WBCS
OPIATES UR QL SCN: NEGATIVE
OXYCODONE+OXYMORPHONE UR QL SCN: NEGATIVE
PCP UR QL: NEGATIVE
PH UR STRIP.AUTO: 6 [PH]
PLATELET # BLD AUTO: 212 THOUSANDS/UL (ref 149–390)
PMV BLD AUTO: 10.4 FL (ref 8.9–12.7)
POTASSIUM SERPL-SCNC: 3.7 MMOL/L (ref 3.5–5.3)
PROT SERPL-MCNC: 7.4 G/DL (ref 6.4–8.2)
PROT UR STRIP-MCNC: NEGATIVE MG/DL
RBC # BLD AUTO: 5.49 MILLION/UL (ref 3.88–5.62)
RSV RNA RESP QL NAA+PROBE: NEGATIVE
SARS-COV-2 RNA RESP QL NAA+PROBE: NEGATIVE
SODIUM SERPL-SCNC: 137 MMOL/L (ref 136–145)
SP GR UR STRIP.AUTO: 1.01 (ref 1–1.03)
THC UR QL: POSITIVE
TSH SERPL DL<=0.05 MIU/L-ACNC: 0.7 UIU/ML (ref 0.45–4.5)
UROBILINOGEN UR QL STRIP.AUTO: 1 E.U./DL
WBC # BLD AUTO: 6.31 THOUSAND/UL (ref 4.31–10.16)

## 2022-06-24 PROCEDURE — 85025 COMPLETE CBC W/AUTO DIFF WBC: CPT | Performed by: PHYSICIAN ASSISTANT

## 2022-06-24 PROCEDURE — 99285 EMERGENCY DEPT VISIT HI MDM: CPT | Performed by: PHYSICIAN ASSISTANT

## 2022-06-24 PROCEDURE — 93005 ELECTROCARDIOGRAM TRACING: CPT

## 2022-06-24 PROCEDURE — 82075 ASSAY OF BREATH ETHANOL: CPT | Performed by: PHYSICIAN ASSISTANT

## 2022-06-24 PROCEDURE — 0241U HB NFCT DS VIR RESP RNA 4 TRGT: CPT | Performed by: PHYSICIAN ASSISTANT

## 2022-06-24 PROCEDURE — 36415 COLL VENOUS BLD VENIPUNCTURE: CPT | Performed by: PHYSICIAN ASSISTANT

## 2022-06-24 PROCEDURE — 80307 DRUG TEST PRSMV CHEM ANLYZR: CPT | Performed by: PHYSICIAN ASSISTANT

## 2022-06-24 PROCEDURE — 80053 COMPREHEN METABOLIC PANEL: CPT | Performed by: PHYSICIAN ASSISTANT

## 2022-06-24 PROCEDURE — 84484 ASSAY OF TROPONIN QUANT: CPT | Performed by: PHYSICIAN ASSISTANT

## 2022-06-24 PROCEDURE — 84443 ASSAY THYROID STIM HORMONE: CPT | Performed by: PHYSICIAN ASSISTANT

## 2022-06-24 PROCEDURE — 81003 URINALYSIS AUTO W/O SCOPE: CPT | Performed by: PHYSICIAN ASSISTANT

## 2022-06-24 PROCEDURE — 99285 EMERGENCY DEPT VISIT HI MDM: CPT

## 2022-06-24 NOTE — ED PROVIDER NOTES
History  Chief Complaint   Patient presents with    Psychiatric Evaluation     Pt reports down on luck - girlfriend left him and reports has nothing left - Pt states time to end his life - pt reports SI to cut his throat - pt reports he thinks there always hope -      57y  o male with PMH of depression presents to the ER for psychiatric evaluation  Patient reports suicidal ideations with a plan to cut his throat with a sharp knife  Patient states he recently walked in on his girlfriend having sexual intercourse with another man  Patient went to the woods today to end his life but kids were there so he did not do it  He denies HI  He reports AH that tell him to kill himself  He denies VH  He does not see a psychiatrist or therapist  He used to take psychiatric medications but stopped taking them  His last admission was at [facility]  He is currently retired and living with his girlfriend  He smokes marijuana and tobacco  He admits to drinking beer occasionally but more lately due to his recent events  He denies fever, chills, URI symptoms, chest pain, dyspnea, N/V/D, abdominal pain, weakness or paresthesias  History provided by:  Patient   used: No        Prior to Admission Medications   Prescriptions Last Dose Informant Patient Reported? Taking?   escitalopram (LEXAPRO) 10 mg tablet   No No   Sig: Take 1 tablet (10 mg total) by mouth daily for 30 days   risperiDONE (RisperDAL M-TABS) 2 mg dispersible tablet   No No   Sig: Take 1 tablet (2 mg total) by mouth daily at bedtime for 30 days      Facility-Administered Medications: None       Past Medical History:   Diagnosis Date    Depression     Psychiatric illness        History reviewed  No pertinent surgical history  History reviewed  No pertinent family history  I have reviewed and agree with the history as documented      E-Cigarette/Vaping    E-Cigarette Use Never User      E-Cigarette/Vaping Substances     Social History     Tobacco Use  Smoking status: Former Smoker     Packs/day: 0 20    Smokeless tobacco: Never Used   Vaping Use    Vaping Use: Never used   Substance Use Topics    Alcohol use: Yes     Comment: 40oz of beer a week    Drug use: No     Frequency: 1 0 times per week     Types: Marijuana     Comment: last night        Review of Systems   Constitutional: Negative for activity change, appetite change, chills and fever  HENT: Negative for congestion, drooling, ear discharge, ear pain, facial swelling, rhinorrhea and sore throat  Eyes: Negative for redness  Respiratory: Negative for cough and shortness of breath  Cardiovascular: Negative for chest pain  Gastrointestinal: Negative for abdominal pain, diarrhea, nausea and vomiting  Musculoskeletal: Negative for neck stiffness  Skin: Negative for rash  Allergic/Immunologic: Negative for food allergies  Neurological: Negative for weakness and numbness  Psychiatric/Behavioral: Positive for hallucinations and suicidal ideas  Physical Exam  Physical Exam  Vitals and nursing note reviewed  Constitutional:       General: He is not in acute distress  Appearance: He is not toxic-appearing  HENT:      Head: Normocephalic and atraumatic  Eyes:      Conjunctiva/sclera: Conjunctivae normal    Neck:      Trachea: No tracheal deviation  Cardiovascular:      Rate and Rhythm: Normal rate  Pulmonary:      Effort: Pulmonary effort is normal  No respiratory distress  Abdominal:      General: There is no distension  Musculoskeletal:      Cervical back: Normal range of motion and neck supple  Skin:     General: Skin is warm and dry  Findings: No rash  Neurological:      Mental Status: He is alert  GCS: GCS eye subscore is 4  GCS verbal subscore is 5  GCS motor subscore is 6  Psychiatric:         Attention and Perception: He perceives auditory hallucinations  He does not perceive visual hallucinations           Mood and Affect: Affect is flat and tearful  Speech: Speech normal          Behavior: Behavior is cooperative  Thought Content: Thought content includes suicidal ideation  Thought content does not include homicidal ideation  Thought content includes suicidal plan  Thought content does not include homicidal plan           Vital Signs  ED Triage Vitals   Temperature Pulse Respirations Blood Pressure SpO2   06/24/22 1802 06/24/22 1802 06/24/22 1802 06/24/22 1802 06/24/22 1802   98 °F (36 7 °C) 74 18 156/78 97 %      Temp Source Heart Rate Source Patient Position - Orthostatic VS BP Location FiO2 (%)   06/24/22 1802 06/24/22 1802 06/24/22 1802 06/24/22 1802 --   Oral Monitor Sitting Right arm       Pain Score       06/24/22 2000       No Pain           Vitals:    06/24/22 1802 06/24/22 2000 06/25/22 0000   BP: 156/78 115/79 112/79   Pulse: 74 66 58   Patient Position - Orthostatic VS: Sitting Lying          Visual Acuity      ED Medications  Medications - No data to display    Diagnostic Studies  Results Reviewed     Procedure Component Value Units Date/Time    HS Troponin I 2hr [[id number]]  (Normal) Collected: 06/24/22 2347    Lab Status: Final result Specimen: Blood from Arm, Left Updated: 06/25/22 0022     hs TnI 2hr 3 ng/L      Delta 2hr hsTnI 0 ng/L     HS Troponin 0hr (reflex protocol) [[id number]]  (Normal) Collected: 06/24/22 2146    Lab Status: Final result Specimen: Blood from Arm, Left Updated: 06/24/22 2219     hs TnI 0hr 3 ng/L     UA w Reflex to Microscopic w Reflex to Culture [[id number]] Collected: 06/24/22 1921    Lab Status: Final result Specimen: Urine, Clean Catch Updated: 06/24/22 2211     Color, UA Yellow     Clarity, UA Clear     Specific Gravity, UA 1 015     pH, UA 6 0     Leukocytes, UA Negative     Nitrite, UA Negative     Protein, UA Negative mg/dl      Glucose, UA Negative mg/dl      Ketones, UA Negative mg/dl      Urobilinogen, UA 1 0 E U /dl      Bilirubin, UA Negative     Occult Blood, UA Negative    TSH [[id number]]  (Normal) Collected: 06/24/22 1934    Lab Status: Final result Specimen: Blood from Arm, Right Updated: 06/24/22 2032     TSH 3RD GENERATON 0 697 uIU/mL     Narrative:      Patients undergoing fluorescein dye angiography may retain small amounts of fluorescein in the body for 48-72 hours post procedure  Samples containing fluorescein can produce falsely depressed TSH values  If the patient had this procedure,a specimen should be resubmitted post fluorescein clearance        Comprehensive metabolic panel [[id number]]  (Abnormal) Collected: 06/24/22 1934    Lab Status: Final result Specimen: Blood from Arm, Right Updated: 06/24/22 2022     Sodium 137 mmol/L      Potassium 3 7 mmol/L      Chloride 103 mmol/L      CO2 25 mmol/L      ANION GAP 9 mmol/L      BUN 11 mg/dL      Creatinine 1 29 mg/dL      Glucose 98 mg/dL      Calcium 8 5 mg/dL      Corrected Calcium 9 1 mg/dL      AST 40 U/L      ALT 56 U/L      Alkaline Phosphatase 94 U/L      Total Protein 7 4 g/dL      Albumin 3 2 g/dL      Total Bilirubin 0 22 mg/dL      eGFR 60 ml/min/1 73sq m     Narrative:      National Kidney Disease Foundation guidelines for Chronic Kidney Disease (CKD):     Stage 1 with normal or high GFR (GFR > 90 mL/min/1 73 square meters)    Stage 2 Mild CKD (GFR = 60-89 mL/min/1 73 square meters)    Stage 3A Moderate CKD (GFR = 45-59 mL/min/1 73 square meters)    Stage 3B Moderate CKD (GFR = 30-44 mL/min/1 73 square meters)    Stage 4 Severe CKD (GFR = 15-29 mL/min/1 73 square meters)    Stage 5 End Stage CKD (GFR <15 mL/min/1 73 square meters)  Note: GFR calculation is accurate only with a steady state creatinine    CBC and differential [[id number]]  (Abnormal) Collected: 06/24/22 1934    Lab Status: Final result Specimen: Blood from Arm, Right Updated: 06/24/22 2002     WBC 6 31 Thousand/uL      RBC 5 49 Million/uL      Hemoglobin 13 9 g/dL      Hematocrit 43 1 %      MCV 79 fL      MCH 25 3 pg      MCHC 32 3 g/dL      RDW 14 7 %      MPV 10 4 fL      Platelets 423 Thousands/uL      nRBC 0 /100 WBCs      Neutrophils Relative 46 %      Immat GRANS % 1 %      Lymphocytes Relative 35 %      Monocytes Relative 11 %      Eosinophils Relative 6 %      Basophils Relative 1 %      Neutrophils Absolute 2 93 Thousands/µL      Immature Grans Absolute 0 03 Thousand/uL      Lymphocytes Absolute 2 23 Thousands/µL      Monocytes Absolute 0 71 Thousand/µL      Eosinophils Absolute 0 35 Thousand/µL      Basophils Absolute 0 06 Thousands/µL     Rapid drug screen, urine [[id number]]  (Abnormal) Collected: 06/24/22 1921    Lab Status: Final result Specimen: Urine, Clean Catch Updated: 06/24/22 1956     Amph/Meth UR Negative     Barbiturate Ur Negative     Benzodiazepine Urine Negative     Cocaine Urine Positive     Methadone Urine Negative     Opiate Urine Negative     PCP Ur Negative     THC Urine Positive     Oxycodone Urine Negative    Narrative:      Presumptive report  If requested, specimen will be sent to reference lab for confirmation  FOR MEDICAL PURPOSES ONLY  IF CONFIRMATION NEEDED PLEASE CONTACT THE LAB WITHIN 5 DAYS  Drug Screen Cutoff Levels:  AMPHETAMINE/METHAMPHETAMINES  1000 ng/mL  BARBITURATES     200 ng/mL  BENZODIAZEPINES     200 ng/mL  COCAINE      300 ng/mL  METHADONE      300 ng/mL  OPIATES      300 ng/mL  PHENCYCLIDINE     25 ng/mL  THC       50 ng/mL  OXYCODONE      100 ng/mL    COVID/FLU/RSV - 2 hour TAT [[id number]]  (Normal) Collected: 06/24/22 1848    Lab Status: Final result Specimen: Nares from Nose Updated: 06/24/22 1950     SARS-CoV-2 Negative     INFLUENZA A PCR Negative     INFLUENZA B PCR Negative     RSV PCR Negative    Narrative:      FOR PEDIATRIC PATIENTS - copy/paste COVID Guidelines URL to browser: [email or web address] org/  [employer]    SARS-CoV-2 assay is a Nucleic Acid Amplification assay intended for the  qualitative detection of nucleic acid from SARS-CoV-2 in nasopharyngeal  swabs  Results are for the presumptive identification of SARS-CoV-2 RNA  Positive results are indicative of infection with SARS-CoV-2, the virus  causing COVID-19, but do not rule out bacterial infection or co-infection  with other viruses  Laboratories within the [place] and its  territories are required to report all positive results to the appropriate  public health authorities  Negative results do not preclude SARS-CoV-2  infection and should not be used as the sole basis for treatment or other  patient management decisions  Negative results must be combined with  clinical observations, patient history, and epidemiological information  This test has not been FDA cleared or approved  This test has been authorized by FDA under an Emergency Use Authorization  (EUA)  This test is only authorized for the duration of time the  declaration that circumstances exist justifying the authorization of the  emergency use of an in vitro diagnostic tests for detection of SARS-CoV-2  virus and/or diagnosis of COVID-19 infection under section 564(b)(1) of  the Act, 21 U  S C  238PQM-4(L)(3), unless the authorization is terminated  or revoked sooner  The test has been validated but independent review by FDA  and CLIA is pending  Test performed using [employer] GeneXpert: This RT-PCR assay targets N2,  a region unique to SARS-CoV-2  A conserved region in the E-gene was chosen  for pan-Sarbecovirus detection which includes SARS-CoV-2      POCT alcohol breath test [[id number]]  (Normal) Resulted: 06/24/22 1844    Lab Status: Final result Updated: 06/24/22 1844     EXTBreath Alcohol 0 00                 No orders to display              Procedures  ECG 12 Lead Documentation Only    Date/Time: 6/25/2022 7:27 PM  Performed by: Radha Guzman PA-C  Authorized by: Radha Guzman PA-C     Indications / Diagnosis:  Psychiatric evaluation  ECG reviewed by me, the ED Provider: yes (Also reviewed by Dr Laron Griffith) Patient location:  ED  Interpretation:     Interpretation: abnormal    Rate:     ECG rate:  59    ECG rate assessment: bradycardic    Rhythm:     Rhythm: sinus bradycardia    Ectopy:     Ectopy: none    QRS:     QRS intervals:  Normal  ST segments:     ST segments:  Normal  T waves:     T waves: inverted      Inverted:  I, II, III, aVF, V3, V4, V5 and V6    ECG 12 Lead Documentation Only    Date/Time: 6/25/2022 11:44 PM  Performed by: Hugo Montesinos PA-C  Authorized by: Hugo Montesinos PA-C     Indications / Diagnosis:  Delta  ECG reviewed by me, the ED Provider: yes (Also reviewed by Dr Rochelle Almodovar)    Patient location:  ED  Previous ECG:     Previous ECG:  Compared to current    Comparison ECG info:  T wave inversion in AVL    Similarity:  Changes noted  Interpretation:     Interpretation: abnormal    Rate:     ECG rate:  58    ECG rate assessment: bradycardic    Rhythm:     Rhythm: sinus bradycardia    Ectopy:     Ectopy: none    QRS:     QRS intervals:  Normal  ST segments:     ST segments:  Normal  T waves:     T waves: inverted      Inverted:  I, II, III, aVL, aVF, V3, V4, V5 and V6             ED Course                               SBIRT 20yo+    Flowsheet Row Most Recent Value   SBIRT (25 yo +)    In order to provide better care to our patients, we are screening all of our patients for alcohol and drug use  Would it be okay to ask you these screening questions? No Filed at: 06/24/2022 1910                    MDM  Number of Diagnoses or Management Options  Suicidal ideation: new and requires workup  Diagnosis management comments: DDX consists of but not limited to: suicidal, schizophrenia, bipolar    Will check labs and consult Crisis  9331 - 201 signed  2026 - EKG shows inverted t waves in almost all leads  No prior EKG  Due to UDS being positive for cocaine, will add troponin and check delta EKG and troponin before clearing patient  Patient denying chest pain and dyspnea   Patient without complaints  0030 - Patient cleared for psychiatric treatment     0101 - Patient signed out to Elis Reyez PA-C awaiting placement  Patient stable  Amount and/or Complexity of Data Reviewed  Clinical lab tests: ordered and reviewed  Review and summarize past medical records: yes  Discuss the patient with other providers: yes  Independent visualization of images, tracings, or specimens: yes    Patient Progress  Patient progress: stable      Disposition  Final diagnoses:   Suicidal ideation     Time reflects when diagnosis was documented in both MDM as applicable and the Disposition within this note     Time User Action Codes Description Comment    6/25/2022 12:25 AM Judson Rodriguez [facility] [address] Suicidal ideation       ED Disposition     ED Disposition   Transfer to [address] [facility]   --    Date/Time   Sat Jun 25, 2022 12:25 AM    Comment   Tutu Friend should be transferred out to behavioral health and has been medically cleared  Follow-up Information    None         Patient's Medications   Discharge Prescriptions    No medications on file       No discharge procedures on file      PDMP Review     None          ED Provider  Electronically Signed by           Sarah Lyman PA-C  06/25/22 0103

## 2022-06-25 ENCOUNTER — HOSPITAL ENCOUNTER (INPATIENT)
Facility: HOSPITAL | Age: 57
LOS: 5 days | Discharge: HOME | DRG: 751 | End: 2022-06-30
Attending: PSYCHIATRY & NEUROLOGY | Admitting: PSYCHIATRY & NEUROLOGY
Payer: COMMERCIAL

## 2022-06-25 VITALS
TEMPERATURE: 98.6 F | DIASTOLIC BLOOD PRESSURE: 100 MMHG | WEIGHT: 143.52 LBS | SYSTOLIC BLOOD PRESSURE: 159 MMHG | RESPIRATION RATE: 18 BRPM | HEART RATE: 69 BPM | OXYGEN SATURATION: 100 % | BODY MASS INDEX: 23.16 KG/M2

## 2022-06-25 DIAGNOSIS — Z00.8 MEDICAL CLEARANCE FOR PSYCHIATRIC ADMISSION: ICD-10-CM

## 2022-06-25 DIAGNOSIS — I51.9 SYSTOLIC DYSFUNCTION: ICD-10-CM

## 2022-06-25 DIAGNOSIS — E55.9 VITAMIN D DEFICIENCY: ICD-10-CM

## 2022-06-25 DIAGNOSIS — F33.3 MAJOR DEPRESSIVE DISORDER, RECURRENT, SEVERE WITH PSYCHOTIC FEATURES (HCC): Primary | Chronic | ICD-10-CM

## 2022-06-25 DIAGNOSIS — F33.2 SEVERE EPISODE OF RECURRENT MAJOR DEPRESSIVE DISORDER, WITHOUT PSYCHOTIC FEATURES (HCC): ICD-10-CM

## 2022-06-25 DIAGNOSIS — R93.1 DECREASED CARDIAC EJECTION FRACTION: ICD-10-CM

## 2022-06-25 LAB
2HR DELTA HS TROPONIN: 0 NG/L
ATRIAL RATE: 58 BPM
ATRIAL RATE: 59 BPM
CARDIAC TROPONIN I PNL SERPL HS: 3 NG/L
P AXIS: 29 DEGREES
P AXIS: 48 DEGREES
PR INTERVAL: 148 MS
PR INTERVAL: 150 MS
QRS AXIS: 107 DEGREES
QRS AXIS: 58 DEGREES
QRSD INTERVAL: 86 MS
QRSD INTERVAL: 90 MS
QT INTERVAL: 442 MS
QT INTERVAL: 446 MS
QTC INTERVAL: 437 MS
QTC INTERVAL: 437 MS
T WAVE AXIS: 230 DEGREES
T WAVE AXIS: 242 DEGREES
VENTRICULAR RATE: 58 BPM
VENTRICULAR RATE: 59 BPM

## 2022-06-25 PROCEDURE — 93010 ELECTROCARDIOGRAM REPORT: CPT | Performed by: INTERNAL MEDICINE

## 2022-06-25 RX ORDER — BENZTROPINE MESYLATE 1 MG/ML
0.5 INJECTION INTRAMUSCULAR; INTRAVENOUS
Status: CANCELLED | OUTPATIENT
Start: 2022-06-25

## 2022-06-25 RX ORDER — PROPRANOLOL HYDROCHLORIDE 10 MG/1
10 TABLET ORAL EVERY 8 HOURS PRN
Status: DISCONTINUED | OUTPATIENT
Start: 2022-06-25 | End: 2022-06-30 | Stop reason: HOSPADM

## 2022-06-25 RX ORDER — IBUPROFEN 600 MG/1
600 TABLET ORAL EVERY 8 HOURS PRN
Status: CANCELLED | OUTPATIENT
Start: 2022-06-25

## 2022-06-25 RX ORDER — POLYETHYLENE GLYCOL 3350 17 G/17G
17 POWDER, FOR SOLUTION ORAL DAILY PRN
Status: DISCONTINUED | OUTPATIENT
Start: 2022-06-25 | End: 2022-06-26

## 2022-06-25 RX ORDER — LANOLIN ALCOHOL/MO/W.PET/CERES
3 CREAM (GRAM) TOPICAL
Status: CANCELLED | OUTPATIENT
Start: 2022-06-25

## 2022-06-25 RX ORDER — LORAZEPAM 2 MG/ML
2 INJECTION INTRAMUSCULAR EVERY 6 HOURS PRN
Status: DISCONTINUED | OUTPATIENT
Start: 2022-06-25 | End: 2022-06-30 | Stop reason: HOSPADM

## 2022-06-25 RX ORDER — MINERAL OIL AND PETROLATUM 150; 830 MG/G; MG/G
1 OINTMENT OPHTHALMIC
Status: DISCONTINUED | OUTPATIENT
Start: 2022-06-25 | End: 2022-06-30 | Stop reason: HOSPADM

## 2022-06-25 RX ORDER — OLANZAPINE 5 MG/1
5 TABLET ORAL
Status: CANCELLED | OUTPATIENT
Start: 2022-06-25

## 2022-06-25 RX ORDER — IBUPROFEN 400 MG/1
800 TABLET ORAL EVERY 8 HOURS PRN
Status: CANCELLED | OUTPATIENT
Start: 2022-06-25

## 2022-06-25 RX ORDER — HYDROXYZINE HYDROCHLORIDE 25 MG/1
25 TABLET, FILM COATED ORAL
Status: CANCELLED | OUTPATIENT
Start: 2022-06-25

## 2022-06-25 RX ORDER — BENZTROPINE MESYLATE 1 MG/ML
1 INJECTION INTRAMUSCULAR; INTRAVENOUS
Status: DISCONTINUED | OUTPATIENT
Start: 2022-06-25 | End: 2022-06-30 | Stop reason: HOSPADM

## 2022-06-25 RX ORDER — ACETAMINOPHEN 325 MG/1
650 TABLET ORAL EVERY 6 HOURS PRN
Status: CANCELLED | OUTPATIENT
Start: 2022-06-25

## 2022-06-25 RX ORDER — BENZTROPINE MESYLATE 1 MG/1
1 TABLET ORAL
Status: CANCELLED | OUTPATIENT
Start: 2022-06-25

## 2022-06-25 RX ORDER — IBUPROFEN 400 MG/1
400 TABLET ORAL EVERY 4 HOURS PRN
Status: DISCONTINUED | OUTPATIENT
Start: 2022-06-25 | End: 2022-06-30 | Stop reason: HOSPADM

## 2022-06-25 RX ORDER — MAGNESIUM HYDROXIDE/ALUMINUM HYDROXICE/SIMETHICONE 120; 1200; 1200 MG/30ML; MG/30ML; MG/30ML
30 SUSPENSION ORAL EVERY 4 HOURS PRN
Status: CANCELLED | OUTPATIENT
Start: 2022-06-25

## 2022-06-25 RX ORDER — HYDROXYZINE 50 MG/1
100 TABLET, FILM COATED ORAL
Status: DISCONTINUED | OUTPATIENT
Start: 2022-06-25 | End: 2022-06-30 | Stop reason: HOSPADM

## 2022-06-25 RX ORDER — OLANZAPINE 5 MG/1
10 TABLET ORAL
Status: CANCELLED | OUTPATIENT
Start: 2022-06-25

## 2022-06-25 RX ORDER — AMOXICILLIN 250 MG
1 CAPSULE ORAL DAILY PRN
Status: CANCELLED | OUTPATIENT
Start: 2022-06-25

## 2022-06-25 RX ORDER — OLANZAPINE 10 MG/1
5 INJECTION, POWDER, LYOPHILIZED, FOR SOLUTION INTRAMUSCULAR
Status: CANCELLED | OUTPATIENT
Start: 2022-06-25

## 2022-06-25 RX ORDER — LORAZEPAM 1 MG/1
1 TABLET ORAL
Status: CANCELLED | OUTPATIENT
Start: 2022-06-25

## 2022-06-25 RX ORDER — BENZTROPINE MESYLATE 1 MG/ML
1 INJECTION INTRAMUSCULAR; INTRAVENOUS
Status: CANCELLED | OUTPATIENT
Start: 2022-06-25

## 2022-06-25 RX ORDER — IBUPROFEN 400 MG/1
400 TABLET ORAL EVERY 4 HOURS PRN
Status: CANCELLED | OUTPATIENT
Start: 2022-06-25

## 2022-06-25 RX ORDER — LORAZEPAM 2 MG/ML
1 INJECTION INTRAMUSCULAR
Status: CANCELLED | OUTPATIENT
Start: 2022-06-25

## 2022-06-25 RX ORDER — OLANZAPINE 5 MG/1
2.5 TABLET ORAL
Status: CANCELLED | OUTPATIENT
Start: 2022-06-25

## 2022-06-25 RX ORDER — OLANZAPINE 10 MG/1
10 INJECTION, POWDER, LYOPHILIZED, FOR SOLUTION INTRAMUSCULAR
Status: DISCONTINUED | OUTPATIENT
Start: 2022-06-25 | End: 2022-06-30 | Stop reason: HOSPADM

## 2022-06-25 RX ORDER — LORAZEPAM 2 MG/ML
1 INJECTION INTRAMUSCULAR EVERY 4 HOURS PRN
Status: CANCELLED | OUTPATIENT
Start: 2022-06-25

## 2022-06-25 RX ORDER — RISPERIDONE 0.5 MG/1
0.5 TABLET, ORALLY DISINTEGRATING ORAL
Status: CANCELLED | OUTPATIENT
Start: 2022-06-25

## 2022-06-25 RX ORDER — HYDROXYZINE HYDROCHLORIDE 25 MG/1
50 TABLET, FILM COATED ORAL
Status: CANCELLED | OUTPATIENT
Start: 2022-06-25

## 2022-06-25 RX ORDER — LORAZEPAM 2 MG/ML
2 INJECTION INTRAMUSCULAR
Status: CANCELLED | OUTPATIENT
Start: 2022-06-25

## 2022-06-25 RX ORDER — HALOPERIDOL 5 MG/ML
2.5 INJECTION INTRAMUSCULAR
Status: CANCELLED | OUTPATIENT
Start: 2022-06-25

## 2022-06-25 RX ORDER — OLANZAPINE 2.5 MG/1
2.5 TABLET ORAL
Status: DISCONTINUED | OUTPATIENT
Start: 2022-06-25 | End: 2022-06-30 | Stop reason: HOSPADM

## 2022-06-25 RX ORDER — DIPHENHYDRAMINE HYDROCHLORIDE 50 MG/ML
50 INJECTION INTRAMUSCULAR; INTRAVENOUS EVERY 6 HOURS PRN
Status: DISCONTINUED | OUTPATIENT
Start: 2022-06-25 | End: 2022-06-30 | Stop reason: HOSPADM

## 2022-06-25 RX ORDER — IBUPROFEN 400 MG/1
800 TABLET ORAL EVERY 8 HOURS PRN
Status: DISCONTINUED | OUTPATIENT
Start: 2022-06-25 | End: 2022-06-30 | Stop reason: HOSPADM

## 2022-06-25 RX ORDER — OLANZAPINE 10 MG/1
2.5 INJECTION, POWDER, LYOPHILIZED, FOR SOLUTION INTRAMUSCULAR
Status: CANCELLED | OUTPATIENT
Start: 2022-06-25

## 2022-06-25 RX ORDER — IBUPROFEN 400 MG/1
400 TABLET ORAL EVERY 6 HOURS PRN
Status: CANCELLED | OUTPATIENT
Start: 2022-06-25

## 2022-06-25 RX ORDER — MAGNESIUM HYDROXIDE/ALUMINUM HYDROXICE/SIMETHICONE 120; 1200; 1200 MG/30ML; MG/30ML; MG/30ML
30 SUSPENSION ORAL EVERY 4 HOURS PRN
Status: DISCONTINUED | OUTPATIENT
Start: 2022-06-25 | End: 2022-06-30 | Stop reason: HOSPADM

## 2022-06-25 RX ORDER — OLANZAPINE 10 MG/1
5 INJECTION, POWDER, LYOPHILIZED, FOR SOLUTION INTRAMUSCULAR
Status: DISCONTINUED | OUTPATIENT
Start: 2022-06-25 | End: 2022-06-30 | Stop reason: HOSPADM

## 2022-06-25 RX ORDER — OLANZAPINE 5 MG/1
5 TABLET ORAL
Status: DISCONTINUED | OUTPATIENT
Start: 2022-06-25 | End: 2022-06-30 | Stop reason: HOSPADM

## 2022-06-25 RX ORDER — PROPRANOLOL HYDROCHLORIDE 20 MG/1
10 TABLET ORAL EVERY 8 HOURS PRN
Status: CANCELLED | OUTPATIENT
Start: 2022-06-25

## 2022-06-25 RX ORDER — POLYETHYLENE GLYCOL 3350 17 G/17G
17 POWDER, FOR SOLUTION ORAL DAILY PRN
Status: CANCELLED | OUTPATIENT
Start: 2022-06-25

## 2022-06-25 RX ORDER — HYDROXYZINE HYDROCHLORIDE 25 MG/1
100 TABLET, FILM COATED ORAL
Status: CANCELLED | OUTPATIENT
Start: 2022-06-25

## 2022-06-25 RX ORDER — IBUPROFEN 600 MG/1
600 TABLET ORAL EVERY 6 HOURS PRN
Status: DISCONTINUED | OUTPATIENT
Start: 2022-06-25 | End: 2022-06-30 | Stop reason: HOSPADM

## 2022-06-25 RX ORDER — OLANZAPINE 10 MG/1
10 INJECTION, POWDER, LYOPHILIZED, FOR SOLUTION INTRAMUSCULAR
Status: CANCELLED | OUTPATIENT
Start: 2022-06-25

## 2022-06-25 RX ORDER — BENZTROPINE MESYLATE 1 MG/1
1 TABLET ORAL
Status: DISCONTINUED | OUTPATIENT
Start: 2022-06-25 | End: 2022-06-30 | Stop reason: HOSPADM

## 2022-06-25 RX ORDER — IBUPROFEN 600 MG/1
600 TABLET ORAL EVERY 6 HOURS PRN
Status: CANCELLED | OUTPATIENT
Start: 2022-06-25

## 2022-06-25 RX ORDER — OLANZAPINE 10 MG/1
10 TABLET ORAL
Status: DISCONTINUED | OUTPATIENT
Start: 2022-06-25 | End: 2022-06-30 | Stop reason: HOSPADM

## 2022-06-25 RX ORDER — HYDROXYZINE HYDROCHLORIDE 25 MG/1
25 TABLET, FILM COATED ORAL
Status: DISCONTINUED | OUTPATIENT
Start: 2022-06-25 | End: 2022-06-30 | Stop reason: HOSPADM

## 2022-06-25 RX ORDER — LANOLIN ALCOHOL/MO/W.PET/CERES
3 CREAM (GRAM) TOPICAL
Status: DISCONTINUED | OUTPATIENT
Start: 2022-06-25 | End: 2022-06-30 | Stop reason: HOSPADM

## 2022-06-25 RX ORDER — RISPERIDONE 1 MG/1
1 TABLET, ORALLY DISINTEGRATING ORAL
Status: CANCELLED | OUTPATIENT
Start: 2022-06-25

## 2022-06-25 RX ORDER — AMOXICILLIN 250 MG
1 CAPSULE ORAL DAILY PRN
Status: DISCONTINUED | OUTPATIENT
Start: 2022-06-25 | End: 2022-06-30 | Stop reason: HOSPADM

## 2022-06-25 RX ORDER — DIPHENHYDRAMINE HYDROCHLORIDE 50 MG/ML
50 INJECTION INTRAMUSCULAR; INTRAVENOUS EVERY 6 HOURS PRN
Status: CANCELLED | OUTPATIENT
Start: 2022-06-25

## 2022-06-25 RX ORDER — RISPERIDONE 0.25 MG/1
0.25 TABLET, ORALLY DISINTEGRATING ORAL
Status: CANCELLED | OUTPATIENT
Start: 2022-06-25

## 2022-06-25 RX ORDER — MINERAL OIL AND PETROLATUM 150; 830 MG/G; MG/G
1 OINTMENT OPHTHALMIC
Status: CANCELLED | OUTPATIENT
Start: 2022-06-25

## 2022-06-25 RX ORDER — LORAZEPAM 2 MG/ML
2 INJECTION INTRAMUSCULAR EVERY 6 HOURS PRN
Status: CANCELLED | OUTPATIENT
Start: 2022-06-25

## 2022-06-25 RX ORDER — HALOPERIDOL 5 MG/ML
5 INJECTION INTRAMUSCULAR
Status: CANCELLED | OUTPATIENT
Start: 2022-06-25

## 2022-06-25 RX ORDER — HYDROXYZINE 50 MG/1
50 TABLET, FILM COATED ORAL
Status: DISCONTINUED | OUTPATIENT
Start: 2022-06-25 | End: 2022-06-30 | Stop reason: HOSPADM

## 2022-06-25 NOTE — ED NOTES
201 Signed- Rights Explained- Bed search explained- Faxed to [facility]- Original on chart      Malka Henderson  Crisis Intervention Specialist II  06/24/22
Assumed care of pt at this time, pt remains on 1:1 observation  Pt calm and cooperative  A/w p/u to [facility] at 2000 via AMBER Weber RN  06/25/22 6506
Attempted to call report, n/a at number provided  Will attempt again prior to change of shift        Jeannie Roberto RN  06/25/22 2108
Breakfast order placed      Blanche uGzman [facility]  06/25/22 3788
Breakfast provided to pt      Jaxon Lawson RN  06/25/22 6871
Faxed note to Intake to make am  aware that patient is now medically cleared and they are able to continue the review for placement on 2W 
Insurance Authorization for Admission:  Phone Call Placed to [employer] [phone]  Spoke to Lola BOONE  4 Days Approved  Level of Care AIP    Authorization #Facility to call upon placement    EVS (Eligibility Verification System) Called- 4   Bethel Kowalski with [facility]  Crisis Intervention Specialist II  06/24/22
Meal tray delivered to pt now        Jeny Griffith RN  06/25/22 5046
Patient is accepted at [facility]  Patient is accepted by Dr Jack Lopez per [address] is arranged with [employer] is scheduled for 8pm  Patient may go to the floor at anytime    Nurse report is to be called to [phone]  prior to patient transfer 
Patient presents to the Emergency Department via self referral after planning to end his life by cutting his own throat  The patient is very tearful, and expresses feelings of hopelessness at this time  The patient reports that he had been on his way to his friend's house when plans changed so he returned home  Upon entering his home and going to his room he found his girlfriend of three years engaged in sexual activity with someone else  The patient reports he never saw this coming and was completely heartbroken  The patient reports he then got a knife and walked to an area where he thought he wouldn't be disturbed with the intent of cutting his own throat  The patient reports he was interrupted by some some kids who came to that area to play, so he disposed of the knife and started walking  The patient reports he recognized he was near the hospital and had received treatment for depression and suicidal ideation before, so he chose to seek treatment  The patient states he experienced significant depression and suicidal ideation a few years ago when he lost several family members in quick succession  Patient is not currently following up with a psychiatrist or therapist, but feels it would be helpful following inpatient treatment  Patient is also open to medication management  Patient denies homicidal ideation, visual/tactile hallucinations  Patient reports a history of auditory command hallucinations that have told him to harm himself  Patient reports poor sleep and appetite as of late  Patient denies drug use, but drinks beer and smoke cigarettes socially  Patient is willing to sign a 201 at this time      Nela Hoffman  Crisis Intervention Specialist II  06/24/22
Provider and crisis @ bedside       Nayan Lopez  06/24/22 6494
Pt reports has not taken any of his medication for over 6 months        Nupur Bach RN  06/24/22 8712
Report called to Rasta Palmer at [facility]  Aware that pt is leaving at 2000        [facility], RN  06/25/22 4298
VS and suicide assessment deferred at this time  Patient sleeping       Kiera Lambert RN  06/25/22 4846
Unknown if ever smoked

## 2022-06-25 NOTE — EMTALA/ACUTE CARE TRANSFER
[facility] [address]  [address] [facility] [address] [address]  Dept: [phone]      EMTALA TRANSFER CONSENT    NAME Gabbie Ye                                         1965                              MRN [id number]    I have been informed of my rights regarding examination, treatment, and transfer   by Dr Vlad Gutiérrez, *    Benefits: Specialized equipment and/or services available at the receiving facility (Include comment)________________________    Risks: Potential for delay in receiving treatment      Consent for Transfer:  I acknowledge that my medical condition has been evaluated and explained to me by the emergency department physician or other qualified medical person and/or my attending physician, who has recommended that I be transferred to the service of  Accepting Physician: Dr Fortino Mackey at [address] [place] [address] Name, [address] : [place], [place]  The above potential benefits of such transfer, the potential risks associated with such transfer, and the probable risks of not being transferred have been explained to me, and I fully understand them  The doctor has explained that, in my case, the benefits of transfer outweigh the risks  I agree to be transferred  I authorize the performance of emergency medical procedures and treatments upon me in both transit and upon arrival at the receiving facility  Additionally, I authorize the release of any and all medical records to the receiving facility and request they be transported with me, if possible  I understand that the safest mode of transportation during a medical emergency is an ambulance and that the Hospital advocates the use of this mode of transport  Risks of traveling to the receiving facility by car, including absence of medical control, life sustaining equipment, such as oxygen, and medical personnel has been explained to me and I fully understand them      (MARICEL CORRECT BOX BELOW)  [  ]  I consent to the stated transfer and to be transported by ambulance/helicopter  [  ]  I consent to the stated transfer, but refuse transportation by ambulance and accept full responsibility for my transportation by car  I understand the risks of non-ambulance transfers and I exonerate the Hospital and its staff from any deterioration in my condition that results from this refusal     X___________________________________________    DATE  22  TIME________  Signature of patient or legally responsible individual signing on patient behalf           RELATIONSHIP TO PATIENT_________________________          Provider Certification    NAME Paola Jaffe                                         1965                              MRN [id number]    A medical screening exam was performed on the above named patient  Based on the examination:    Condition Necessitating Transfer The primary encounter diagnosis was Suicidal ideation  Diagnoses of Severe episode of recurrent major depressive disorder, without psychotic features ([facility] [address] ) and Medical clearance for psychiatric admission were also pertinent to this visit      Patient Condition: The patient has been stabilized such that within reasonable medical probability, no material deterioration of the patient condition or the condition of the unborn child(flash) is likely to result from the transfer    Reason for Transfer: Level of Care needed not available at this facility    Transfer Requirements: [address]   · Space available and qualified personnel available for treatment as acknowledged by Danielle Terry [phone]  · Agreed to accept transfer and to provide appropriate medical treatment as acknowledged by       Dr Tasha Walsh  · Appropriate medical records of the examination and treatment of the patient are provided at the time of transfer   [address] _______  · Transfer will be performed by qualified personnel from [employer]  and appropriate transfer equipment as required, including the use of necessary and appropriate life support measures  Provider Certification: I have examined the patient and explained the following risks and benefits of being transferred/refusing transfer to the patient/family:  The patient is stable for psychiatric transfer because they are medically stable, and is protected from harming him/herself or others during transport      Based on these reasonable risks and benefits to the patient and/or the unborn child(flash), and based upon the information available at the time of the patients examination, I certify that the medical benefits reasonably to be expected from the provision of appropriate medical treatments at another medical facility outweigh the increasing risks, if any, to the individuals medical condition, and in the case of labor to the unborn child, from effecting the transfer      X____________________________________________ DATE 06/25/22        TIME_______      ORIGINAL - SEND TO MEDICAL RECORDS   COPY - SEND WITH PATIENT DURING TRANSFER

## 2022-06-26 PROBLEM — Z00.8 MEDICAL CLEARANCE FOR PSYCHIATRIC ADMISSION: Status: ACTIVE | Noted: 2022-06-26

## 2022-06-26 LAB
25(OH)D3 SERPL-MCNC: 12.6 NG/ML (ref 30–100)
ALBUMIN SERPL BCP-MCNC: 3.7 G/DL (ref 3–5.2)
ALP SERPL-CCNC: 83 U/L (ref 43–122)
ALT SERPL W P-5'-P-CCNC: 40 U/L
ANION GAP SERPL CALCULATED.3IONS-SCNC: 4 MMOL/L (ref 5–14)
AST SERPL W P-5'-P-CCNC: 42 U/L (ref 17–59)
BASOPHILS # BLD AUTO: 0.07 THOUSANDS/ΜL (ref 0–0.1)
BASOPHILS NFR BLD AUTO: 1 % (ref 0–1)
BILIRUB SERPL-MCNC: 0.32 MG/DL
BUN SERPL-MCNC: 12 MG/DL (ref 5–25)
CALCIUM SERPL-MCNC: 8.7 MG/DL (ref 8.4–10.2)
CHLORIDE SERPL-SCNC: 106 MMOL/L (ref 97–108)
CHOLEST SERPL-MCNC: 174 MG/DL
CO2 SERPL-SCNC: 28 MMOL/L (ref 22–30)
CREAT SERPL-MCNC: 1.1 MG/DL (ref 0.7–1.5)
EOSINOPHIL # BLD AUTO: 0.35 THOUSAND/ΜL (ref 0–0.61)
EOSINOPHIL NFR BLD AUTO: 6 % (ref 0–6)
ERYTHROCYTE [DISTWIDTH] IN BLOOD BY AUTOMATED COUNT: 14.6 % (ref 11.6–15.1)
GFR SERPL CREATININE-BSD FRML MDRD: 73 ML/MIN/1.73SQ M
GLUCOSE P FAST SERPL-MCNC: 96 MG/DL (ref 70–99)
GLUCOSE SERPL-MCNC: 96 MG/DL (ref 70–99)
HCT VFR BLD AUTO: 48.3 % (ref 36.5–49.3)
HDLC SERPL-MCNC: 89 MG/DL
HGB BLD-MCNC: 15.4 G/DL (ref 12–17)
IMM GRANULOCYTES # BLD AUTO: 0.03 THOUSAND/UL (ref 0–0.2)
IMM GRANULOCYTES NFR BLD AUTO: 1 % (ref 0–2)
LDLC SERPL CALC-MCNC: 68 MG/DL
LYMPHOCYTES # BLD AUTO: 2.04 THOUSANDS/ΜL (ref 0.6–4.47)
LYMPHOCYTES NFR BLD AUTO: 36 % (ref 14–44)
MCH RBC QN AUTO: 25 PG (ref 26.8–34.3)
MCHC RBC AUTO-ENTMCNC: 31.9 G/DL (ref 31.4–37.4)
MCV RBC AUTO: 78 FL (ref 82–98)
MONOCYTES # BLD AUTO: 0.61 THOUSAND/ΜL (ref 0.17–1.22)
MONOCYTES NFR BLD AUTO: 11 % (ref 4–12)
NEUTROPHILS # BLD AUTO: 2.54 THOUSANDS/ΜL (ref 1.85–7.62)
NEUTS SEG NFR BLD AUTO: 45 % (ref 43–75)
NONHDLC SERPL-MCNC: 85 MG/DL
NRBC BLD AUTO-RTO: 0 /100 WBCS
PLATELET # BLD AUTO: 217 THOUSANDS/UL (ref 149–390)
PMV BLD AUTO: 10.6 FL (ref 8.9–12.7)
POTASSIUM SERPL-SCNC: 4.1 MMOL/L (ref 3.6–5)
PROT SERPL-MCNC: 7.2 G/DL (ref 5.9–8.4)
RBC # BLD AUTO: 6.16 MILLION/UL (ref 3.88–5.62)
SODIUM SERPL-SCNC: 138 MMOL/L (ref 137–147)
TRIGL SERPL-MCNC: 85 MG/DL
TSH SERPL DL<=0.05 MIU/L-ACNC: 1.19 UIU/ML (ref 0.45–4.5)
WBC # BLD AUTO: 5.64 THOUSAND/UL (ref 4.31–10.16)

## 2022-06-26 PROCEDURE — 85025 COMPLETE CBC W/AUTO DIFF WBC: CPT | Performed by: PSYCHIATRY & NEUROLOGY

## 2022-06-26 PROCEDURE — 80061 LIPID PANEL: CPT | Performed by: PSYCHIATRY & NEUROLOGY

## 2022-06-26 PROCEDURE — 82306 VITAMIN D 25 HYDROXY: CPT | Performed by: PSYCHIATRY & NEUROLOGY

## 2022-06-26 PROCEDURE — 99253 IP/OBS CNSLTJ NEW/EST LOW 45: CPT

## 2022-06-26 PROCEDURE — 80053 COMPREHEN METABOLIC PANEL: CPT | Performed by: PSYCHIATRY & NEUROLOGY

## 2022-06-26 PROCEDURE — 83036 HEMOGLOBIN GLYCOSYLATED A1C: CPT | Performed by: PSYCHIATRY & NEUROLOGY

## 2022-06-26 PROCEDURE — 86592 SYPHILIS TEST NON-TREP QUAL: CPT | Performed by: PSYCHIATRY & NEUROLOGY

## 2022-06-26 PROCEDURE — 84443 ASSAY THYROID STIM HORMONE: CPT | Performed by: PSYCHIATRY & NEUROLOGY

## 2022-06-26 PROCEDURE — 99221 1ST HOSP IP/OBS SF/LOW 40: CPT | Performed by: PSYCHIATRY & NEUROLOGY

## 2022-06-26 RX ORDER — OMEGA-3S/DHA/EPA/FISH OIL/D3 300MG-1000
400 CAPSULE ORAL DAILY
Status: DISCONTINUED | OUTPATIENT
Start: 2022-06-27 | End: 2022-06-27

## 2022-06-26 RX ORDER — POLYETHYLENE GLYCOL 3350 17 G/17G
17 POWDER, FOR SOLUTION ORAL DAILY PRN
Status: DISCONTINUED | OUTPATIENT
Start: 2022-06-26 | End: 2022-06-30 | Stop reason: HOSPADM

## 2022-06-26 NOTE — ASSESSMENT & PLAN NOTE
Vitals reviewed  Labs reviewed    ED: UDS + THC and cocaine    COVID negative    Admission labs: reviewed  EKG 6/24/22 - sinus bradycardia, rate 58, QTc 437  Patient is medically cleared for admission to the [facility] for treatment of the underlying psychiatric illness  Will continue to follow patient throughout admission

## 2022-06-26 NOTE — PLAN OF CARE
Problem: Alteration in Thoughts and Perception  Goal: Treatment Goal: Gain control of psychotic behaviors/thinking, reduce/eliminate presenting symptoms and demonstrate improved reality functioning upon discharge  Outcome: Not Progressing  Goal: Verbalize thoughts and feelings  Description: Interventions:  - Promote a nonjudgmental and trusting relationship with the patient through active listening and therapeutic communication  - Assess patient's level of functioning, behavior and potential for risk  - Engage patient in 1 on 1 interactions  - Encourage patient to express fears, feelings, frustrations, and discuss symptoms    - [place] patient to reality, help patient recognize reality-based thinking   - Administer medications as ordered and assess for potential side effects  - Provide the patient education related to the signs and symptoms of the illness and desired effects of prescribed medications  Outcome: Not Progressing  Goal: Refrain from acting on delusional thinking/internal stimuli  Description: Interventions:  - Monitor patient closely, per order   - Utilize least restrictive measures   - Set reasonable limits, give positive feedback for acceptable   - Administer medications as ordered and monitor of potential side effects  Outcome: Not Progressing     Problem: Ineffective Coping  Goal: Cooperates with admission process  Description: Interventions:   - Complete admission process  Outcome: Not Progressing  Goal: Identifies ineffective coping skills  Outcome: Not Progressing     Problem: Depression  Goal: Treatment Goal: Demonstrate behavioral control of depressive symptoms, verbalize feelings of improved mood/affect, and adopt new coping skills prior to discharge  Outcome: Not Progressing  Goal: Verbalize thoughts and feelings  Description: Interventions:  - Assess and re-assess patient's level of risk   - Engage patient in 1:1 interactions, daily, for a minimum of 15 minutes   - Encourage patient to express feelings, fears, frustrations, hopes   Outcome: Not Progressing  Goal: Refrain from harming self  Description: Interventions:  - Monitor patient closely, per order   - Supervise medication ingestion, monitor effects and side effects   Outcome: Not Progressing

## 2022-06-26 NOTE — H&P
Psychiatric Evaluation - Behavioral Health       Assessment/Plan  Principal Problem:  Major Depressive DO Recurrent Sever  Cocaine Use DO  Cannabis Use DO  Alcohol Use DO  PLAN:   1) Continue PRN medications for sleep and anxiety  2) Gather collateral information  3) Vit min D-3 400 IU Po Q AM for Vit min D Deficiency  4) group and individual therapy  5) Start DC planning  [facility] is involved with his care  6) Discussed with staff  Chief Complaint: "To cut the long story short, I am glad I am here and I got the help  "    History of Present Illness: This is a 61 AAM who was admitted after he presented himself to [facility] ED with Suicidal ideas with plan  He reported that he returned to his home and found his girlfriend of three years with his close friend in bed, he became very depressed and took a razor and went to woods near his high school  His friend fled in car  His girl friend tried to call him but he told her that he wanted her out of the house  He said he waited in the woods to cut his throat but there were kids playing and they wouldn't leave  He was annoyed by the kids but he got tired and started walking and saw the hospital and came in to ask for help  He said that he is glad that he came for help as he now knows that there are so many people who care  He said he wants to move on with his life  He has support form his [employer]  He said he is not suicidal anymore and feels that those kids were there to help him and it is sign from God  He said usually he sleeps well and eats well  He has no anxiety  No hallucinations, no delusions, No SI or HI  No OCD or jonas  PAST PSYCH HISTORY:  He was admitted to [facility] in 2018 as he got depressed, 5 close family members  due to different causes  He said he sees a therapist at [facility]  Substance Abuse History:  He said he was drinking after he found out about his girlfriend and he smokes MJ    He denied using cocaine and said that he did not know of the joint was laced  UDS was positive for Cocaine and MJ  Family Psychiatric History: Mother and sister have mental health issues  Social History:  Education:  [employer]  Learning Disabilities: None  Marital history:  None  Living arrangement, social support:  Patient lives at home his girlfriend was living with him  Occupational History:  Patient reported that he received SSD  Functioning Relationships: Good support system thru [employer]     Other Pertinent History: None      Traumatic History:   Recent losses      Med Hx; Review H& P  Past Medical History:   Diagnosis Date    Depression     Psychiatric illness      Medical Review Of Systems:  Bad back    Scheduled Meds:Review meds   Scheduled Meds:  Current Facility-Administered Medications   Medication Dose Route Frequency Provider Last Rate    aluminum-magnesium hydroxide-simethicone  30 mL Oral Q4H PRN Saray Fierro MD      artificial tear  1 application Both Eyes R4V PRN Saray Fierro MD      benztropine  1 mg Intramuscular Q4H PRN Max 6/day Saray Fierro MD      benztropine  1 mg Oral Q4H PRN Max 6/day Saray Fierro MD      [START ON 6/27/2022] cholecalciferol  400 Units Oral Daily Saray Fierro MD      hydrOXYzine HCL  50 mg Oral Q6H PRN Max 4/day Saray Fierro MD      Or    diphenhydrAMINE  50 mg Intramuscular Q6H PRN Saray Fierro MD      hydrOXYzine HCL  100 mg Oral Q6H PRN Max 4/day Saray Fierro MD      Or    LORazepam  2 mg Intramuscular Q6H PRN Saray Fierro MD      hydrOXYzine HCL  25 mg Oral Q6H PRN Max 4/day Saray Fierro MD      ibuprofen  400 mg Oral Q4H PRN Saray Fierro MD      ibuprofen  600 mg Oral Q6H PRN Saray Fierro MD      ibuprofen  800 mg Oral Q8H PRN Saray Fierro MD      melatonin  3 mg Oral HS PRN Saray Fierro MD      OLANZapine  10 mg Oral Q3H PRN Max 3/day Saray Fierro MD      Or    OLANZapine  10 mg Intramuscular Q3H PRN Max 3/day Saray Fierro MD      OLANZapine  5 mg Oral Q3H PRN Max 6/day Bonifacio Mcneal MD      Or    OLANZapine  5 mg Intramuscular Q3H PRN Max 6/day Bonifacio Mcneal MD      OLANZapine  2 5 mg Oral Q3H PRN Max 8/day Bonifacio Mcneal MD      polyethylene glycol  17 g Oral Daily PRN Bonifacio Mcneal MD      propranolol  10 mg Oral Q8H PRN Bonifacio Mcneal MD      senna-docusate sodium  1 tablet Oral Daily PRN Bonifacio Mcneal MD       Continuous Infusions:   PRN Meds:   aluminum-magnesium hydroxide-simethicone    artificial tear    benztropine    benztropine    hydrOXYzine HCL **OR** diphenhydrAMINE    hydrOXYzine HCL **OR** LORazepam    hydrOXYzine HCL    ibuprofen    ibuprofen    ibuprofen    melatonin    OLANZapine **OR** OLANZapine    OLANZapine **OR** OLANZapine    OLANZapine    polyethylene glycol    propranolol    senna-docusate sodium     No Known Allergies     Visit Vitals  /91 (BP Location: Right arm)   Pulse 76   Temp (!) 97 4 °F (36 3 °C) (Temporal)   Resp 16   Ht 5' 6" (1 676 m)   Wt 64 5 kg (142 lb 3 2 oz)   SpO2 100%   BMI 22 95 kg/m²   Smoking Status Former Smoker   BSA 1 73 m²         Mental Status Evaluation:  Appearance:  Of age  Behavior: Cooperative  Speech: Normal, goal directed  Mood: " Depressed'   Affect: Sad  Language:Intact  Thought Process: Logical   Thought Content: Normal   Perceptual Disturbances: Normal   Risk Potential: Low  Sensorium: Oriented X 3  Cognition:   Clear  Memory intact  Consciousness:  Alert and awake  Attention: Good  Intellect: Good  Fund of Knowledge:Normal   Insight: Good   Judgment: Intact  Muscle Strength and Tone: Normal   Gait/Station: Normal   Motor Activity:Normal         Lab Results: I have personally reviewed pertinent lab results  UDS + for Cocaine and MJ  Low Vit -D  NOTE:  Total of 40 minutes were spent in talking to patient completing this medical record reviewing medical chart medical decision making    Bonifacio Mcneal MD

## 2022-06-26 NOTE — CONSULTS
[id number] [facility] 1965, 61 y o  male MRN: [id number]  Unit/Bed#: [facility] at -01 Encounter: [id number]  Primary Care Provider: No primary care provider on file  Date and time admitted to hospital: 6/25/2022  8:49 PM    Inpatient consult for Medical Clearance for [address] [place] [address] patient  Consult performed by: Aloysius Hamman, PA-C  Consult ordered by: Lorrie Nowak MD          Medical clearance for psychiatric admission  Assessment & Plan  Vitals reviewed  Labs reviewed    ED: UDS + THC and cocaine    COVID negative    Admission labs: reviewed  EKG 6/24/22 - sinus bradycardia, rate 58, QTc 437  Patient is medically cleared for admission to the [facility] for treatment of the underlying psychiatric illness  Will continue to follow patient throughout admission      Severe episode of recurrent major depressive disorder, without psychotic features [facility])  Assessment & Plan  Management per psychiatry     ECT Clearance:   History of recent seizure or stroke:  no   History of pheochromocytoma:  no   History of active bleeding (Intracranial hemorrhage, aneurysm or AVM):  no   History of metallic implants in the head or neck:  no   History of increased intracranial pressure with mass effect:  No    EKG within 3 months? EKG 6/24/22 - Sinus bradycardia, rate 58, QTc 437       Based on above criteria, Patient IS medically cleared for ECT should it be recommended  Counseling / Coordination of Care Time: 30 minutes  Greater than 50% of total time spent on patient counseling and coordination of care  Collaboration of Care: Were Recommendations Directly Discussed with Primary Treatment Team? - Yes     History of Present Illness:  Tamanna Rincon is a 61 y o  male who is originally admitted to the psychiatry service due to depression and SI  We are consulted for medical clearance for admission to [address] [place] [address] and treatment of underlying psychiatric illness       Originally presented to the ED due to worsening SI  Patient has a past psychiatric history of depression   Denies any past medical history  Patient seen and examined  Currently denies any dizziness, headaches, chest pain, shortness of breath, nausea/vomiting/diarrhea, urinary symptoms at this time  Endorses feeling much better  Will continue to follow with patient throughout hospitalization  Review of Systems:    Review of Systems   Constitutional: Negative for activity change, chills and fever  HENT: Negative for congestion, rhinorrhea and sore throat  Eyes: Negative for visual disturbance  Respiratory: Negative for cough, chest tightness and shortness of breath  Cardiovascular: Negative for chest pain and palpitations  Gastrointestinal: Negative for abdominal pain, constipation, diarrhea, nausea and vomiting  Genitourinary: Negative for difficulty urinating, dysuria, frequency and urgency  Musculoskeletal: Negative for arthralgias and myalgias  Skin: Negative for rash  Neurological: Negative for seizures, syncope, weakness and headaches  Psychiatric/Behavioral: Positive for dysphoric mood and suicidal ideas  All other systems reviewed and are negative  Past Medical and Surgical History:     Past Medical History:   Diagnosis Date    Depression     Psychiatric illness        No past surgical history on file  Meds/Allergies:    PTA meds:   Prior to Admission Medications   Prescriptions Last Dose Informant Patient Reported?  Taking?   escitalopram (LEXAPRO) 10 mg tablet   No No   Sig: Take 1 tablet (10 mg total) by mouth daily for 30 days   risperiDONE (RisperDAL M-TABS) 2 mg dispersible tablet   No No   Sig: Take 1 tablet (2 mg total) by mouth daily at bedtime for 30 days      Facility-Administered Medications: None       Allergies: No Known Allergies    Social History:     Marital Status: Single    Substance Use History:   Social History     Substance and Sexual Activity   Alcohol Use Yes    Comment: 40oz of beer a week     Social History     Tobacco Use   Smoking Status Former Smoker    Packs/day: 0 20   Smokeless Tobacco Never Used     Social History     Substance and Sexual Activity   Drug Use No    Frequency: 1 0 times per week    Types: Marijuana    Comment: last night        Family History:    History reviewed  No pertinent family history  Physical Exam:     Vitals:   Blood Pressure: 129/86 (06/26/22 0755)  Pulse: 67 (06/26/22 0755)  Temperature: (!) 97 2 °F (36 2 °C) (06/26/22 0755)  Temp Source: Temporal (06/26/22 0755)  Respirations: 16 (06/26/22 0755)  Height: 5' 6" (167 6 cm) (06/25/22 2104)  Weight - Scale: 64 5 kg (142 lb 3 2 oz) (06/26/22 0755)  SpO2: 100 % (06/26/22 0755)    Physical Exam  Vitals and nursing note reviewed  Constitutional:       Appearance: Normal appearance  HENT:      Head: Normocephalic and atraumatic  Nose: No congestion  Mouth/Throat:      Mouth: Mucous membranes are moist    Eyes:      Conjunctiva/sclera: Conjunctivae normal    Cardiovascular:      Rate and Rhythm: Normal rate and regular rhythm  Pulses: Normal pulses  Heart sounds: Normal heart sounds  Pulmonary:      Effort: Pulmonary effort is normal       Breath sounds: Normal breath sounds  No wheezing  Abdominal:      General: Bowel sounds are normal       Palpations: Abdomen is soft  Tenderness: There is no abdominal tenderness  Musculoskeletal:         General: Normal range of motion  Skin:     General: Skin is warm and dry  Neurological:      Mental Status: He is alert  Psychiatric:         Mood and Affect: Mood and affect normal          Speech: Speech normal          Behavior: Behavior is cooperative  Additional Data:     Lab Results: I have personally reviewed pertinent reports        Results from last 7 days   Lab Units 06/26/22  0608   WBC Thousand/uL 5 64   HEMOGLOBIN g/dL 15 4   HEMATOCRIT % 48 3   PLATELETS Thousands/uL 217   NEUTROS PCT % 45   LYMPHS PCT % 36   MONOS PCT % 11   EOS PCT % 6     Results from last 7 days   Lab Units 06/26/22  0608   SODIUM mmol/L 138   POTASSIUM mmol/L 4 1   CHLORIDE mmol/L 106   CO2 mmol/L 28   BUN mg/dL 12   CREATININE mg/dL 1 10   ANION GAP mmol/L 4*   CALCIUM mg/dL 8 7   ALBUMIN g/dL 3 7   TOTAL BILIRUBIN mg/dL 0 32   ALK PHOS U/L 83   ALT U/L 40   AST U/L 42   GLUCOSE RANDOM mg/dL 96             Lab Results   Component Value Date/Time    HGBA1C 6 0 06/21/2018 05:40 AM    HGBA1C 6 0 03/29/2018 06:38 AM           EKG, Pathology, and Other Studies Reviewed on Admission:   EKG 6/24/22 - Sinus bradycardia, rate 58, QTc 437   ·     ** Please Note: This note has been constructed using a voice recognition system   **

## 2022-06-26 NOTE — NURSING NOTE
Pt pleasant on approach and cooperative with care  Pt social with peers on unit and friendly towards staff  Pt spoke at length about reason for admission which pt reported was r/t walking in on girlfriend cheating on him with a good friend of the patient  Pt expressed that he is grateful he did not complete suicide plan  Pt reported children were playing in area which deterred him from his plan to cut his throat, when he left to take a walk "to come back later"  When pt walked past a hospital, he decided to walk in to get help  Pt reported that he plans to re-evaluate his life goals while inpatient and is hopeful that he is able to move on from the recent experience  Pt currently denies passive or active SI's and is glad he is still alive  Pt denies HI's, AH's, VH's  Will continue to monitor
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
## 2022-06-27 PROBLEM — F33.3 MAJOR DEPRESSIVE DISORDER, RECURRENT, SEVERE WITH PSYCHOTIC FEATURES (HCC): Chronic | Status: ACTIVE | Noted: 2018-03-29

## 2022-06-27 PROBLEM — F10.10 ALCOHOL ABUSE, EPISODIC: Chronic | Status: ACTIVE | Noted: 2022-06-27

## 2022-06-27 PROBLEM — R94.31 ABNORMAL EKG: Status: ACTIVE | Noted: 2022-06-27

## 2022-06-27 PROBLEM — F33.2 MAJOR DEPRESSIVE DISORDER, RECURRENT, SEVERE WITHOUT PSYCHOTIC FEATURES (HCC): Chronic | Status: ACTIVE | Noted: 2018-03-29

## 2022-06-27 PROBLEM — F12.10 CANNABIS ABUSE, CONTINUOUS: Chronic | Status: ACTIVE | Noted: 2022-06-27

## 2022-06-27 PROBLEM — F14.10 COCAINE ABUSE, CONTINUOUS (HCC): Chronic | Status: ACTIVE | Noted: 2022-06-27

## 2022-06-27 LAB
2HR DELTA HS TROPONIN: >0 NG/L
4HR DELTA HS TROPONIN: >0 NG/L
ATRIAL RATE: 63 BPM
BACTERIA UR QL AUTO: NORMAL /HPF
BILIRUB UR QL STRIP: NEGATIVE
CARDIAC TROPONIN I PNL SERPL HS: 2 NG/L
CARDIAC TROPONIN I PNL SERPL HS: 2 NG/L
CARDIAC TROPONIN I PNL SERPL HS: <2 NG/L
CLARITY UR: CLEAR
COLOR UR: YELLOW
EST. AVERAGE GLUCOSE BLD GHB EST-MCNC: 126 MG/DL
GLUCOSE UR STRIP-MCNC: NEGATIVE MG/DL
HBA1C MFR BLD: 6 %
HGB UR QL STRIP.AUTO: NEGATIVE
KETONES UR STRIP-MCNC: NEGATIVE MG/DL
LEUKOCYTE ESTERASE UR QL STRIP: NEGATIVE
NITRITE UR QL STRIP: NEGATIVE
NON-SQ EPI CELLS URNS QL MICRO: NORMAL /HPF
P AXIS: 38 DEGREES
PH UR STRIP.AUTO: 7 [PH]
PR INTERVAL: 146 MS
PROT UR STRIP-MCNC: NEGATIVE MG/DL
QRS AXIS: 25 DEGREES
QRSD INTERVAL: 96 MS
QT INTERVAL: 420 MS
QTC INTERVAL: 429 MS
RBC #/AREA URNS AUTO: NORMAL /HPF
RPR SER QL: NORMAL
SP GR UR STRIP.AUTO: 1.01 (ref 1–1.04)
T WAVE AXIS: 240 DEGREES
T4 FREE SERPL-MCNC: 0.9 NG/DL (ref 0.76–1.46)
UROBILINOGEN UA: NEGATIVE MG/DL
VENTRICULAR RATE: 63 BPM
WBC #/AREA URNS AUTO: NORMAL /HPF

## 2022-06-27 PROCEDURE — 84439 ASSAY OF FREE THYROXINE: CPT

## 2022-06-27 PROCEDURE — 81001 URINALYSIS AUTO W/SCOPE: CPT | Performed by: PSYCHIATRY & NEUROLOGY

## 2022-06-27 PROCEDURE — 99232 SBSQ HOSP IP/OBS MODERATE 35: CPT

## 2022-06-27 PROCEDURE — 93010 ELECTROCARDIOGRAM REPORT: CPT | Performed by: INTERNAL MEDICINE

## 2022-06-27 PROCEDURE — 93005 ELECTROCARDIOGRAM TRACING: CPT

## 2022-06-27 PROCEDURE — 99232 SBSQ HOSP IP/OBS MODERATE 35: CPT | Performed by: PSYCHIATRY & NEUROLOGY

## 2022-06-27 PROCEDURE — 84484 ASSAY OF TROPONIN QUANT: CPT

## 2022-06-27 RX ORDER — ESCITALOPRAM OXALATE 10 MG/1
10 TABLET ORAL DAILY
Status: DISCONTINUED | OUTPATIENT
Start: 2022-06-27 | End: 2022-06-30 | Stop reason: HOSPADM

## 2022-06-27 RX ORDER — RISPERIDONE 1 MG/1
1 TABLET, FILM COATED ORAL
Status: DISCONTINUED | OUTPATIENT
Start: 2022-06-27 | End: 2022-06-28

## 2022-06-27 RX ORDER — OMEGA-3S/DHA/EPA/FISH OIL/D3 300MG-1000
800 CAPSULE ORAL DAILY
Status: DISCONTINUED | OUTPATIENT
Start: 2022-06-28 | End: 2022-06-30 | Stop reason: HOSPADM

## 2022-06-27 RX ADMIN — ESCITALOPRAM OXALATE 10 MG: 10 TABLET ORAL at 13:26

## 2022-06-27 RX ADMIN — RISPERIDONE 1 MG: 1 TABLET ORAL at 21:06

## 2022-06-27 RX ADMIN — CHOLECALCIFEROL TAB 10 MCG (400 UNIT) 400 UNITS: 10 TAB at 08:52

## 2022-06-27 NOTE — NURSING NOTE
Patient is pleasant and cooperative  He appears depressed  He has been visible and social in the dayroom today  Attending group  Pt endorses good appetite and sleep  Denies SI  Does not report any unmet needs

## 2022-06-27 NOTE — PROGRESS NOTES
[facility]  Progress Note Martha Bains 1965, 61 y o  male MRN: [id number]  Unit/Bed#: [facility] 382-01 Encounter: [id number]  Primary Care Provider: No primary care provider on file  Date and time admitted to hospital: 2022  8:49 PM    Abnormal EKG  Assessment & Plan  Patient had repeat EKG  showing normal sinus rhythm with rate of 63, QTC wnl  · Monitor criteria for LVH, T-wave inversions in leads II, III, AVF, aVL, V 3-V6  · Similar findings on EKG from     · Patient has no EKGs prior to admission to review  · Electrolytes wnl, TSH normal, no T4 to review  · Denies any chest pain, diaphoresis, shortness of breath, palpitations  · No history of heart disease - denies hypertension, CHF, history of arrhythmias, family history of heart disease  · History of substance use- nicotine/cigarettes, alcohol, cocaine, THC/marijuana    · Will order troponins  · Check T4  · Echo due to LVH pattern    Nurse Coordination of Care Discussion:  Discussed with nursing    Discussions with Specialists or Other Care Team Provider:  None    Education and Discussions with Family / Patient:  Discussed with patient    Time Spent for Care: 30 minutes  More than 50% of total time spent on counseling and coordination of care as described above  Current Length of Stay: 2 day(s)    Code Status: Level 1 - Full Code      Subjective:   Patient seen examined  Denies any current complaints or concerns  No chest pain, diaphoresis, palpitations, shortness of breath, extremity pain/swelling, abdominal pain, nausea/vomiting  States he has never had any heart issues in the past, unsure of any prior EKGs  Denies history of arrhythmias or family history of heart disease      Objective:     Vitals:   Temp (24hrs), Av 8 °F (36 6 °C), Min:97 8 °F (36 6 °C), Max:97 8 °F (36 6 °C)    Temp:  [97 8 °F (36 6 °C)] 97 8 °F (36 6 °C)  HR:  [65] 65  Resp:  [16] 16  BP: (127)/(80) 127/80  SpO2:  [100 %] 100 %  Body mass index is 22 95 kg/m²  Physical Exam:     Physical Exam  Constitutional:       General: He is not in acute distress  Appearance: Normal appearance  He is not diaphoretic  HENT:      Head: Normocephalic and atraumatic  Eyes:      Conjunctiva/sclera: Conjunctivae normal       Pupils: Pupils are equal, round, and reactive to light  Cardiovascular:      Rate and Rhythm: Normal rate and regular rhythm  Pulses: Normal pulses  Heart sounds: Normal heart sounds  No murmur heard  Pulmonary:      Effort: Pulmonary effort is normal       Breath sounds: Normal breath sounds  No wheezing  Abdominal:      Palpations: Abdomen is soft  Tenderness: There is no abdominal tenderness  Musculoskeletal:      Right lower leg: No edema  Left lower leg: No edema  Skin:     General: Skin is warm and dry  Neurological:      Mental Status: He is alert and oriented to person, place, and time  Psychiatric:         Mood and Affect: Mood and affect normal          Speech: Speech normal          Behavior: Behavior is cooperative  Additional Data:     Labs:    Results from last 7 days   Lab Units 06/26/22  0608   WBC Thousand/uL 5 64   HEMOGLOBIN g/dL 15 4   HEMATOCRIT % 48 3   PLATELETS Thousands/uL 217   NEUTROS PCT % 45   LYMPHS PCT % 36   MONOS PCT % 11   EOS PCT % 6     Results from last 7 days   Lab Units 06/26/22  0608   SODIUM mmol/L 138   POTASSIUM mmol/L 4 1   CHLORIDE mmol/L 106   CO2 mmol/L 28   BUN mg/dL 12   CREATININE mg/dL 1 10   ANION GAP mmol/L 4*   CALCIUM mg/dL 8 7   ALBUMIN g/dL 3 7   TOTAL BILIRUBIN mg/dL 0 32   ALK PHOS U/L 83   ALT U/L 40   AST U/L 42   GLUCOSE RANDOM mg/dL 96                     * I Have Reviewed All Lab Data Listed Above  * Additional Pertinent Lab Tests Reviewed:  [facility] Review[facility]    Imaging:    Imaging Reports Reviewed Today Include:  None      Last 24 Hours Medication List:   Current Facility-Administered Medications   Medication Dose Route Frequency Provider Last Rate    aluminum-magnesium hydroxide-simethicone  30 mL Oral Q4H PRN Tho Miramontes MD      artificial tear  1 application Both Eyes R6M PRN Tho Miramontes MD      benztropine  1 mg Intramuscular Q4H PRN Max 6/day Tho Miramontes MD      benztropine  1 mg Oral Q4H PRN Max 6/day Tho Miramontes MD      [START ON 6/28/2022] cholecalciferol  800 Units Oral Daily Sindy Loop, PA-C      hydrOXYzine HCL  50 mg Oral Q6H PRN Max 4/day Tho Miramontes MD      Or    diphenhydrAMINE  50 mg Intramuscular Q6H PRN Tho Miramontes MD      escitalopram  10 mg Oral Daily Wiliam Hoffmann MD      hydrOXYzine HCL  100 mg Oral Q6H PRN Max 4/day Tho Miramontes MD      Or    LORazepam  2 mg Intramuscular Q6H PRN Tho Miramontes MD      hydrOXYzine HCL  25 mg Oral Q6H PRN Max 4/day Tho Miramontes MD      ibuprofen  400 mg Oral Q4H PRN Tho Miramontes MD      ibuprofen  600 mg Oral Q6H PRN Tho Miramontes MD      ibuprofen  800 mg Oral Q8H PRN Tho Miramontes MD      melatonin  3 mg Oral HS PRN Tho Miramontes MD      OLANZapine  10 mg Oral Q3H PRN Max 3/day Tho Miramontes MD      Or    OLANZapine  10 mg Intramuscular Q3H PRN Max 3/day Tho Miramontes MD      OLANZapine  5 mg Oral Q3H PRN Max 6/day Tho Miramontes MD      Or    OLANZapine  5 mg Intramuscular Q3H PRN Max 6/day Tho Miramontes MD      OLANZapine  2 5 mg Oral Q3H PRN Max 8/day Tho Miramontes MD      polyethylene glycol  17 g Oral Daily PRN Tho Miramontes MD      propranolol  10 mg Oral Q8H PRN Tho Miramontes MD      risperiDONE  1 mg Oral HS Wiliam Hoffmann MD      senna-docusate sodium  1 tablet Oral Daily PRN Tho Miramontes MD          Today, Patient Was Seen By: Sindy Bonner PA-C      ** Please Note: Dictation voice to text software may have been used in the creation of this document   **

## 2022-06-27 NOTE — PLAN OF CARE
Problem: Alteration in Thoughts and Perception  Goal: Treatment Goal: Gain control of psychotic behaviors/thinking, reduce/eliminate presenting symptoms and demonstrate improved reality functioning upon discharge  Outcome: Progressing  Goal: Verbalize thoughts and feelings  Description: Interventions:  - Promote a nonjudgmental and trusting relationship with the patient through active listening and therapeutic communication  - Assess patient's level of functioning, behavior and potential for risk  - Engage patient in 1 on 1 interactions  - Encourage patient to express fears, feelings, frustrations, and discuss symptoms    - [place] patient to reality, help patient recognize reality-based thinking   - Administer medications as ordered and assess for potential side effects  - Provide the patient education related to the signs and symptoms of the illness and desired effects of prescribed medications  Outcome: Progressing  Goal: Refrain from acting on delusional thinking/internal stimuli  Description: Interventions:  - Monitor patient closely, per order   - Utilize least restrictive measures   - Set reasonable limits, give positive feedback for acceptable   - Administer medications as ordered and monitor of potential side effects  Outcome: Progressing     Problem: Ineffective Coping  Goal: Cooperates with admission process  Description: Interventions:   - Complete admission process  Outcome: Progressing  Goal: Identifies ineffective coping skills  Outcome: Progressing     Problem: Depression  Goal: Treatment Goal: Demonstrate behavioral control of depressive symptoms, verbalize feelings of improved mood/affect, and adopt new coping skills prior to discharge  Outcome: Progressing  Goal: Verbalize thoughts and feelings  Description: Interventions:  - Assess and re-assess patient's level of risk   - Engage patient in 1:1 interactions, daily, for a minimum of 15 minutes   - Encourage patient to express feelings, fears, frustrations, hopes   Outcome: Progressing  Goal: Refrain from harming self  Description: Interventions:  - Monitor patient closely, per order   - Supervise medication ingestion, monitor effects and side effects   Outcome: Progressing     Problem: DISCHARGE PLANNING  Goal: Discharge to home or other facility with appropriate resources  Description: INTERVENTIONS:  - Identify barriers to discharge w/patient and caregiver  - Arrange for needed discharge resources and transportation as appropriate  - Identify discharge learning needs (meds, wound care, etc )  - Arrange for interpretive services to assist at discharge as needed  - Refer to Case Management Department for coordinating discharge planning if the patient needs post-hospital services based on physician/advanced practitioner order or complex needs related to functional status, cognitive ability, or social support system  Outcome: Progressing

## 2022-06-27 NOTE — PROGRESS NOTES
06/27/22 [place]   Team Meeting   Meeting Type Tx Team Meeting   Team Members Present   Team Members Present Physician;Nurse;   Physician Team Member [address] Team Member Odell Zhong [facility]    Care Management Team Member [place]   Patient/Family Present   Patient Present Yes   Patient's Family Present No   Pt seen for tx team meeting  Pt educated on med management, case management and group therapy  Pt pleasant and cooperative  Tx plan reviewed and signed

## 2022-06-27 NOTE — PROGRESS NOTES
06/27/22 1503   Team Meeting   Meeting Type Daily Rounds   Team Members Present   Team Members Present Physician;Nurse;   Physician Team Member [address] Team Member [facility] Management Team Member [place]   Patient/Family Present   Patient Present No   Patient's Family Present No   Readmit score 14  Pt new 201 admission due to SI with plan to cut throat  Pt triggered by finding gf of 3 years cheating on him  Pt reports hx of CAH to harm self  UDS + THC, cocaine
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
## 2022-06-27 NOTE — NURSING NOTE
Patient's calm, and compliant with meds and unit policies  Patient's withdrawn and guarded to self this shift  Denies all  No c/o pain, discomfort, anxiety reported or noted  Will monitor

## 2022-06-27 NOTE — PROGRESS NOTES
[facility] Group Note     06/27/22 1000   Activity/Group Checklist   Group Life Skills  (Social Anxiety and Shared Interests)   Attendance Attended   Attendance Duration (min) 16-30  (in and out of group)   Interactions Interacted appropriately   Affect/Mood Appropriate;Calm   Goals Achieved Discussed coping strategies; Able to listen to others; Able to engage in interactions; Able to reflect/comment on own behavior;Able to recieve feedback; Able to give feedback to another

## 2022-06-27 NOTE — PROGRESS NOTES
Progress Note - Behavioral Health     Michael Lax 61 y o  male MRN: [id number]   Unit/Bed#: [facility] 382-01 Encounter: [id number]    Behavior over the last 24 hours: improving  Leon Chong states he feels less depressed and reports improvement in psychotic symptoms  He states that auditory hallucinations are less frequent and denies any current suicidal thoughts  He reports that his psychiatric symptoms returned prior to admission after he found out that his girlfriend was cheating on him  Compliant with medications  Attends some groups  Sleep: normal  Appetite: normal  Medication side effects: No   ROS: no complaints, denies any shortness of breath, chest pain or abdominal pain, all other systems are negative    Mental Status Evaluation:    Appearance:  dressed in hospital attire   Behavior:  cooperative   Speech:  normal rate and volume   Mood:  anxious, less depressed   Affect:  constricted   Thought Process:  organized, concrete   Associations: concrete associations   Thought Content:  no overt delusions   Perceptual Disturbances: auditory hallucinations with commands to kill self still present, but less frequent, no visual hallucinations   Risk Potential: Suicidal ideation - None at present, but had suicidal ideation with plan to cut throat prior to admission    Homicidal ideation - None  Potential for aggression - No   Sensorium:  oriented to person, place and time/date   Memory:  recent and remote memory grossly intact   Consciousness:  alert and awake   Attention/Concentration: attention span and concentration appear shorter than expected for age   Insight:  improving and moderate   Judgment: improving and moderate   Gait/Station: normal gait/station, normal balance   Motor Activity: no abnormal movements     Vital signs in last 24 hours:    Temp:  [97 4 °F (36 3 °C)] 97 4 °F (36 3 °C)  HR:  [76] 76  BP: (133)/(91) 133/91    Laboratory results: I have personally reviewed all pertinent laboratory/tests results    Most Recent Labs:   Lab Results   Component Value Date    WBC 5 64 06/26/2022    RBC 6 16 (H) 06/26/2022    HGB 15 4 06/26/2022    HCT 48 3 06/26/2022     06/26/2022    RDW 14 6 06/26/2022    NEUTROABS 2 54 06/26/2022    SODIUM 138 06/26/2022    K 4 1 06/26/2022     06/26/2022    CO2 28 06/26/2022    BUN 12 06/26/2022    CREATININE 1 10 06/26/2022    GLUC 96 06/26/2022    CALCIUM 8 7 06/26/2022    AST 42 06/26/2022    ALT 40 06/26/2022    ALKPHOS 83 06/26/2022    TP 7 2 06/26/2022    ALB 3 7 06/26/2022    TBILI 0 32 06/26/2022    CHOLESTEROL 174 06/26/2022    HDL 89 06/26/2022    TRIG 85 06/26/2022    LDLCALC 68 06/26/2022    NONHDLC 85 06/26/2022    OBK4TGBSLQOU 1 190 06/26/2022    RPR Non-Reactive 06/26/2022    HGBA1C 6 0 06/21/2018     06/21/2018       Suicide/Homicide Risk Assessment:    Risk of Harm to Self:   Nursing Suicide Risk Assessment Last 24 hours: C-SSRS Risk (Since Last Contact)  Calculated C-SSRS Risk Score (Since Last Contact): No Risk Indicated  Current Specific Risk Factors include: diagnosis of depression, current depressive symptoms, current psychotic symptoms  Protective Factors: no current suicidal ideation, ability to communicate with staff on the unit, taking medications as ordered on the unit  Based on today's assessment, Sharla Uriostegui presents the following risk of harm to self: low    Risk of Harm to Others:  Nursing Homicide Risk Assessment: Violence Risk to Others: Denies within past 6 months  Based on today's assessment, Sharla Uriostegui presents the following risk of harm to others: low    The following interventions are recommended: behavioral checks every 7 minutes, continued hospitalization on locked unit    Progress Toward Goals: progressing slowly, still anxious, less depressed, no longer endorses suicidal thoughts, still reports psychotic symptoms    Assessment/Plan   Principal Problem:    Major depressive disorder, recurrent, severe with psychotic features [facility])  Active Problems:    Cocaine abuse, continuous (HCC)    Cannabis abuse, continuous    Alcohol abuse, episodic    Medical clearance for psychiatric admission      Recommended Treatment:     Planned medication and treatment changes:     All current active medications have been reviewed  Encourage group therapy, milieu therapy and occupational therapy  Behavioral Health checks every 7 minutes  Restart Lexapro 10 mg daily to help with depressive symptoms  Restart Risperdal 1 mg at bedtime and titrate dose to help with psychotic symptoms    Continue all other medications:    Current Facility-Administered Medications   Medication Dose Route Frequency Provider Last Rate    aluminum-magnesium hydroxide-simethicone  30 mL Oral Q4H PRN Kari Foster MD      artificial tear  1 application Both Eyes L5S PRN Kari Foster MD      benztropine  1 mg Intramuscular Q4H PRN Max 6/day Kari Foster MD      benztropine  1 mg Oral Q4H PRN Max 6/day Kari Foster MD      cholecalciferol  400 Units Oral Daily Kari Foster MD      hydrOXYzine HCL  50 mg Oral Q6H PRN Max 4/day Kari Foster MD      Or    diphenhydrAMINE  50 mg Intramuscular Q6H PRN Kari Foster MD      escitalopram  10 mg Oral Daily Benita Chairez MD      hydrOXYzine HCL  100 mg Oral Q6H PRN Max 4/day Kari Foster MD      Or    LORazepam  2 mg Intramuscular Q6H PRN Kari Foster MD      hydrOXYzine HCL  25 mg Oral Q6H PRN Max 4/day Kari Foster MD      ibuprofen  400 mg Oral Q4H PRN Kari Foster MD      ibuprofen  600 mg Oral Q6H PRN Kari Foster MD      ibuprofen  800 mg Oral Q8H PRN Kari Foster MD      melatonin  3 mg Oral HS PRN Kari Foster MD      OLANZapine  10 mg Oral Q3H PRN Max 3/day Kari Foster MD      Or    OLANZapine  10 mg Intramuscular Q3H PRN Max 3/day Kari Foster MD      OLANZapine  5 mg Oral Q3H PRN Max 6/day Kari Foster MD      Or    OLANZapine  5 mg Intramuscular Q3H PRN Max 6/day Kari Foster MD      OLANZapine  2 5 mg Oral Q3H PRN Max 8/day Hernandez Blanco MD      polyethylene glycol  17 g Oral Daily PRN Hernandez Blanco MD      propranolol  10 mg Oral Q8H PRN Hernandez Blanco MD      risperiDONE  1 mg Oral HS July Mcclellan MD      senna-docusate sodium  1 tablet Oral Daily PRN Hernandez Blanco MD       Risks / Benefits of Treatment:    Risks, benefits, and possible side effects of medications explained to patient including risk of parkinsonian symptoms, Tardive Dyskinesia and metabolic syndrome related to treatment with antipsychotic medications and risk of suicidality and serotonin syndrome related to treatment with antidepressants  The patient verbalizes understanding and agreement for treatment  Counseling / Coordination of Care:    Patient's progress discussed with staff in treatment team meeting  Medications, treatment progress and treatment plan reviewed with patient  Medication changes discussed with patient      Connee Rinne, MD 06/27/22

## 2022-06-27 NOTE — ASSESSMENT & PLAN NOTE
Patient had repeat EKG 6/27 showing normal sinus rhythm with rate of 63, QTC wnl  · Monitor criteria for LVH, T-wave inversions in leads II, III, AVF, aVL, V 3-V6  · Similar findings on EKG from 06/24    · Patient has no EKGs prior to admission to review  · Electrolytes wnl, TSH normal, no T4 to review  · Denies any chest pain, diaphoresis, shortness of breath, palpitations  · No history of heart disease - denies hypertension, CHF, history of arrhythmias, family history of heart disease  · History of substance use- nicotine/cigarettes, alcohol, cocaine, THC/marijuana    · Will order troponins  · Check T4  · Echo due to LVH pattern

## 2022-06-28 ENCOUNTER — APPOINTMENT (INPATIENT)
Dept: NON INVASIVE DIAGNOSTICS | Facility: HOSPITAL | Age: 57
DRG: 751 | End: 2022-06-28
Payer: COMMERCIAL

## 2022-06-28 LAB
AORTIC ROOT: 3 CM
APICAL FOUR CHAMBER EJECTION FRACTION: 41 %
E WAVE DECELERATION TIME: 199 MS
FRACTIONAL SHORTENING: 21 (ref 28–44)
INTERVENTRICULAR SEPTUM IN DIASTOLE (PARASTERNAL SHORT AXIS VIEW): 1 CM
INTERVENTRICULAR SEPTUM: 1 CM (ref 0.6–1.1)
LAAS-AP2: 21.4 CM2
LAAS-AP4: 14.3 CM2
LEFT ATRIUM AREA SYSTOLE SINGLE PLANE A4C: 14.9 CM2
LEFT ATRIUM SIZE: 3.9 CM
LEFT INTERNAL DIMENSION IN SYSTOLE: 4.4 CM (ref 2.1–4)
LEFT VENTRICULAR INTERNAL DIMENSION IN DIASTOLE: 5.6 CM (ref 3.5–6)
LEFT VENTRICULAR POSTERIOR WALL IN END DIASTOLE: 0.9 CM
LEFT VENTRICULAR STROKE VOLUME: 66 ML
LVSV (TEICH): 66 ML
MV E'TISSUE VEL-SEP: 7 CM/S
MV PEAK A VEL: 0.85 M/S
MV PEAK E VEL: 85 CM/S
MV STENOSIS PRESSURE HALF TIME: 58 MS
MV VALVE AREA P 1/2 METHOD: 3.79
RIGHT ATRIUM AREA SYSTOLE A4C: 11.6 CM2
RIGHT VENTRICLE ID DIMENSION: 3.2 CM
SL CV LEFT ATRIUM LENGTH A2C: 5.2 CM
SL CV LV EF: 34
SL CV PED ECHO LEFT VENTRICLE DIASTOLIC VOLUME (MOD BIPLANE) 2D: 151 ML
SL CV PED ECHO LEFT VENTRICLE SYSTOLIC VOLUME (MOD BIPLANE) 2D: 85 ML
TR MAX PG: 22 MMHG
TR PEAK VELOCITY: 2.4 M/S
TRICUSPID VALVE PEAK REGURGITATION VELOCITY: 2.35 M/S

## 2022-06-28 PROCEDURE — 99254 IP/OBS CNSLTJ NEW/EST MOD 60: CPT | Performed by: INTERNAL MEDICINE

## 2022-06-28 PROCEDURE — 99232 SBSQ HOSP IP/OBS MODERATE 35: CPT | Performed by: PSYCHIATRY & NEUROLOGY

## 2022-06-28 PROCEDURE — 93306 TTE W/DOPPLER COMPLETE: CPT

## 2022-06-28 PROCEDURE — 93306 TTE W/DOPPLER COMPLETE: CPT | Performed by: INTERNAL MEDICINE

## 2022-06-28 RX ORDER — METOPROLOL SUCCINATE 25 MG/1
25 TABLET, EXTENDED RELEASE ORAL DAILY
Status: DISCONTINUED | OUTPATIENT
Start: 2022-06-28 | End: 2022-06-30 | Stop reason: HOSPADM

## 2022-06-28 RX ORDER — LISINOPRIL 10 MG/1
5 TABLET ORAL DAILY
Status: DISCONTINUED | OUTPATIENT
Start: 2022-06-28 | End: 2022-06-30 | Stop reason: HOSPADM

## 2022-06-28 RX ORDER — ARIPIPRAZOLE 5 MG/1
5 TABLET ORAL
Status: DISCONTINUED | OUTPATIENT
Start: 2022-06-28 | End: 2022-06-29

## 2022-06-28 RX ADMIN — ARIPIPRAZOLE 5 MG: 5 TABLET ORAL at 21:24

## 2022-06-28 RX ADMIN — LISINOPRIL 5 MG: 10 TABLET ORAL at 12:27

## 2022-06-28 RX ADMIN — METOPROLOL SUCCINATE 25 MG: 25 TABLET, FILM COATED, EXTENDED RELEASE ORAL at 12:27

## 2022-06-28 RX ADMIN — CHOLECALCIFEROL TAB 10 MCG (400 UNIT) 800 UNITS: 10 TAB at 08:11

## 2022-06-28 RX ADMIN — ESCITALOPRAM OXALATE 10 MG: 10 TABLET ORAL at 08:11

## 2022-06-28 NOTE — NURSING NOTE
Pt is calm, cooperative and pleasant  Pt is out in the milieu, social with select peers  Pt denies HI, SI, VH, AH  Pt called cousin in regards to his dog at home, states his cousin is going over to care for the dog  No complaints or concerns at this time  Will monitor

## 2022-06-28 NOTE — NURSING NOTE
Patient is pleasant, calm, and cooperative  Pt denies SI, HI, AH, VH  Pt is visible in the milieu during breakfast, socializing with peers  Pt is medication and meal complaint  No knew complaints at this time  Will monitor

## 2022-06-28 NOTE — PROGRESS NOTES
06/28/22 1350   Team Meeting   Meeting Type Daily Rounds   Team Members Present   Team Members Present Physician;Nurse;   Physician Team Member [address] Team Member OTILIA OTT [facility] Management Team Member [place]   Patient/Family Present   Patient Present No   Patient's Family Present No   Pt pleasant, cooperative  Med/meal compliant  DC risperdal due to EKG changes  Trial abilify  DC poss Thursday

## 2022-06-28 NOTE — CONSULTS
Cardiology Consultation  MD Danielle Longoria MD Monika Rist, DO, MD Dewayne Bates DO, Pb Dunn DO, [facility]  ----------------------------------------------------------------  [address]  [address] [place] [address], [address]     Nadine Hutson 61 y o  male MRN: [id number]  Unit/Bed#: [facility] 382-01 Encounter: [id number]      Reason for Consultation:  Cardiomyopathy      ASSESSMENT:    Newly discovered cardiomyopathy of unknown etiology  o LVEF 34%, mild LV dilatation, grade 1 diastolic dysfunction, mild LA dilatation, trace MR/TR w/ PASP 25 mmHg, June 2022   Abnormal ECG with inferior and anterolateral T-wave abnormalities   Alcohol use   History of cocaine and THC use   Tobacco use   Major depressive disorder    PLAN:  Echocardiogram demonstrated moderate to severe cardiomyopathy which is newly discovered  ECG showed inferolateral T-wave abnormalities, but cardiac enzymes have been negative over 3 sets  With dilated cardiomyopathy, this is likely a longstanding finding  This may be related to alcohol or cocaine use; cessation advised  He has no symptoms concerning for angina and no signs or symptoms of active heart failure; examines to be euvolemic  Only minimal transaminitis on admission and TSH within normal limits; check HIV and iron studies  Would start afterload reducers/neurohormonal blockade  Start Toprol-XL 25 mg daily and lisinopril 5 mg daily and uptitrate as blood pressure tolerates  We have discussed life vest and given that the patient is in inpatient psychiatric unit, would hold off on setting the vest until time of discharge  Eventual ischemic evaluation following discharge    Signed: Mary Llanos DO, [facility], FACP      History of Present Illness:  Nadine Hutson is a 61 y o  male with alcohol, cocaine, tobacco and THC use presented with suicidal ideations    The patient found his girlfriend 3 years in bed with a close friend  Following the episode, he became depressed with suicidal ideations  He admits to increased alcohol use following the episode  Due to his suicidal ideations, he presented to [facility] inpatient psych unit  Drug screen demonstrated cocaine and THC in his urine  Aside from the acute episode, he has been asymptomatic  He has no personal history cardiovascular disease of which she is aware  He denies a family history of cardiovascular disease or sudden cardiac death  Denies any chest pain, pressure, tightness or squeezing  Denies lightheadedness, dizziness or palpitations  Denies lower extremity swelling, orthopnea or paroxysmal nocturnal dyspnea  ECG was performed at [id number] [place] [address] [address] facility demonstrated inferior and anterolateral T-wave abnormalities  Echocardiogram was performed demonstrating ejection fraction of 34%  We have been asked to see him regarding this finding  Denies any lightheadedness, dizziness, palpitations  Denies loss of consciousness  After ECG was performed, troponins were found to be negative over 3 sets  Review of Systems:  Review of Systems   Constitutional: Negative for decreased appetite, fever, weight gain and weight loss  HENT: Negative for congestion and sore throat  Eyes: Negative for visual disturbance  Cardiovascular: Negative for chest pain, dyspnea on exertion, leg swelling, near-syncope and palpitations  Respiratory: Negative for cough and shortness of breath  Hematologic/Lymphatic: Negative for bleeding problem  Skin: Negative for rash  Musculoskeletal: Negative for myalgias and neck pain  Gastrointestinal: Negative for abdominal pain and nausea  Neurological: Negative for light-headedness and weakness  Psychiatric/Behavioral: Negative for depression  Past Medical History:   Diagnosis Date    Depression     Substance use        History reviewed  No pertinent surgical history      Social History     Socioeconomic History    Marital status: Single     Spouse name: None    Number of children: None    Years of education: None    Highest education level: None   Occupational History    None   Tobacco Use    Smoking status: Former Smoker     Packs/day: 0 20    Smokeless tobacco: Never Used   Vaping Use    Vaping Use: Never used   Substance and Sexual Activity    Alcohol use: Yes     Comment: 40oz of beer a week    Drug use: No     Frequency: 1 0 times per week     Types: Marijuana     Comment: last night     Sexual activity: None   Other Topics Concern    None   Social History Narrative    None     Social Determinants of Health     Financial Resource Strain: Not on file   Food Insecurity: Not on file   Transportation Needs: Not on file   Physical Activity: Not on file   Stress: Not on file   Social Connections: Not on file   Intimate Partner Violence: Not on file   Housing Stability: Not on file       History reviewed  No pertinent family history  No Known Allergies    No current facility-administered medications on file prior to encounter       Current Outpatient Medications on File Prior to Encounter   Medication Sig    escitalopram (LEXAPRO) 10 mg tablet Take 1 tablet (10 mg total) by mouth daily for 30 days    risperiDONE (RisperDAL M-TABS) 2 mg dispersible tablet Take 1 tablet (2 mg total) by mouth daily at bedtime for 30 days        Current Facility-Administered Medications   Medication Dose Route Frequency Provider Last Rate    aluminum-magnesium hydroxide-simethicone  30 mL Oral Q4H PRN Beatris Mora MD      ARIPiprazole  5 mg Oral HS Rafael Mcnulty MD      artificial tear  1 application Both Eyes O9W PRN Beatris Mora MD      benztropine  1 mg Intramuscular Q4H PRN Max 6/day Beatris Mora MD      benztropine  1 mg Oral Q4H PRN Max 6/day Beatris Mora MD      cholecalciferol  800 Units Oral Daily Hillary Goodson PA-C      hydrOXYzine HCL  50 mg Oral Q6H PRN Max 4/day Tonya Metcalf Annelle Hatchet, MD      Or   Vishal Acosta diphenhydrAMINE  50 mg Intramuscular Q6H PRN Beryle Cradle, MD      escitalopram  10 mg Oral Daily Tosin Puentes MD      hydrOXYzine HCL  100 mg Oral Q6H PRN Max 4/day Beryle Cradle, MD      Or    LORazepam  2 mg Intramuscular Q6H PRN Beryle Cradle, MD      hydrOXYzine HCL  25 mg Oral Q6H PRN Max 4/day Beryle Cradle, MD      ibuprofen  400 mg Oral Q4H PRN Beryle Cradle, MD      ibuprofen  600 mg Oral Q6H PRN Beryle Cradle, MD      ibuprofen  800 mg Oral Q8H PRN Beryle Cradle, MD      lisinopril  5 mg Oral Daily Refugia KitLORA adler      melatonin  3 mg Oral HS PRN Beryle Cradle, MD      metoprolol succinate  25 mg Oral Daily Refugia Kitnayely, PA-OSEI      OLANZapine  10 mg Oral Q3H PRN Max 3/day Beryle Cradle, MD      Or    OLANZapine  10 mg Intramuscular Q3H PRN Max 3/day Beryle Cradle, MD      OLANZapine  5 mg Oral Q3H PRN Max 6/day Beryle Cradle, MD      Or    OLANZapine  5 mg Intramuscular Q3H PRN Max 6/day Beryle Cradle, MD      OLANZapine  2 5 mg Oral Q3H PRN Max 8/day Beryle Cradle, MD      polyethylene glycol  17 g Oral Daily PRN Beryle Cradle, MD      propranolol  10 mg Oral Q8H PRN Beryle Cradle, MD      senna-docusate sodium  1 tablet Oral Daily PRN Beryle Cradle, MD              Vitals:    06/27/22 1454 06/28/22 0747 06/28/22 0958 06/28/22 1225   BP: 127/80 134/82 132/84 129/81   BP Location: Right arm Right arm  Right arm   Pulse: 65 57 70 56   Resp: 16 16     Temp: 97 8 °F (36 6 °C) (!) 97 1 °F (36 2 °C)     TempSrc: Temporal Temporal     SpO2: 100% 100%     Weight:   64 4 kg (142 lb)    Height:   5' 6" (1 676 m)        No intake/output data recorded      No intake or output data in the 24 hours ending 06/28/22 1249    Weight change:     PHYSICAL EXAMINATION:  Gen: Awake, Alert, NAD  Head/eyes: AT/NC, pupils equal and round, Anicteric  ENT: mmm  Neck: Supple, No elevated JVP, trachea midline  Resp: CTA bilaterally no w/r/r  CV: RRR +S1, S2, No m/r/g  Abd: Soft, NT/ND + BS  Ext: no LE edema bilaterally  Neuro: Follows commands, moves all extermities  Psych: Appropriate affect, normal mood, pleasant attitude, non-combative  Skin: warm; no rash, erythema or venous stasis changes on exposed skin    Lab Results:  Results from last 7 days   Lab Units 06/26/22  0608   WBC Thousand/uL 5 64   HEMOGLOBIN g/dL 15 4   HEMATOCRIT % 48 3   PLATELETS Thousands/uL 217     Results from last 7 days   Lab Units 06/26/22  0608   POTASSIUM mmol/L 4 1   CHLORIDE mmol/L 106   CO2 mmol/L 28   BUN mg/dL 12   CREATININE mg/dL 1 10   CALCIUM mg/dL 8 7   ALK PHOS U/L 83   ALT U/L 40   AST U/L 42     No results found for: TROPONINT      Results from last 7 days   Lab Units 06/27/22  2127 06/27/22  1907 06/27/22  1705   HS TNI 0HR ng/L  --   --  <2   HS TNI 2HR ng/L  --  2  --    HS TNI 4HR ng/L 2  --   --              Results from last 7 days   Lab Units 06/27/22  1705   FREE T4 ng/dL 0 90       No results found for this or any previous visit  No results found for this or any previous visit  No results found for this or any previous visit  No results found for this or any previous visit  CXR: No results found for this or any previous visit  No results found for this or any previous visit  ECG:  Sinus rhythm, voltage criteria for LVH, anterior and inferolateral T-wave abnormalities    This note was completed in part utilizing [employer] Direct Software  Grammatical errors, random word insertions, spelling mistakes, and incomplete sentences may be an occasional consequence of this system secondary to software limitations, ambient noise, and hardware issues  If you have any questions or concerns about the content, text, or information contained within the body of this dictation, please contact the provider for clarification

## 2022-06-28 NOTE — NURSING NOTE
Patient pleasant upon approach, denies SI/HI/AVH denies pain and discomfort  Patient visible on unit socializing with peers  Med and meal compliant, no behaviors

## 2022-06-28 NOTE — PROGRESS NOTES
Progress Note - Behavioral Health     Ibeth Sánchez 61 y o  male MRN: [id number]   Unit/Bed#: [facility] 382-01 Encounter: [id number]    Documentation, nursing notes, medication reconciliation, labs, and vitals reviewed  Patient was seen for continuing care and reviewed with treatment team  No acute events over the past 24 hours  Per nursing report, Rhonda Mcgovern has been behaviorally appropriate and medication compliant  Medication changes over the past 24 hours: Lexapro 10 mg and Risperdal 1 mg were restarted yesterday  No reported or observed side effects  On evaluation today, Rhonda Mcgovern is pleasant throughout evaluation  He reports improvement in psychotic symptoms  AH have resolved  He discussed recent breakup leading to admission  He said he is trying to remain positive and move forward  He rates depression 5/10  No suicidal or homicial ideation, plan, or intent  He does not exhibit any symptoms of jonas on evaluation  He is concerned about his dog at home alone  He has attempted to reach out to a neighbor and family member to check on his dog  He is unsure if someone will be able to today  CM was notified       Psychiatric ROS:  Behavior over the last 24 hours: unchanged  Sleep: normal  Appetite: normal  Medication side effects: No   ROS: no complaints, all other systems are negative    Mental Status Evaluation:    Appearance:  casually dressed, adequate grooming   Behavior:  pleasant, cooperative, calm   Speech:  normal rate and volume   Mood:  depressed   Affect:  blunted   Thought Process:  circumstantial   Associations: intact associations   Thought Content:  no overt delusions   Perceptual Disturbances: denies auditory hallucinations when asked, does not appear responding to internal stimuli   Risk Potential: Suicidal ideation - None  Homicidal ideation - None  Potential for aggression - No   Sensorium:  oriented to person, place, time/date and situation   Memory:  recent and remote memory grossly intact Consciousness:  alert and awake   Attention/Concentration: attention span and concentration are age appropriate   Insight:  improving   Judgment: improving   Gait/Station: normal gait/station, normal balance   Motor Activity: no abnormal movements     Vital signs in last 24 hours:    Temp:  [97 1 °F (36 2 °C)-97 8 °F (36 6 °C)] 97 1 °F (36 2 °C)  HR:  [57-70] 70  Resp:  [16] 16  BP: (127-134)/(80-84) 132/84    Laboratory results: I have personally reviewed all pertinent laboratory/tests results    Results from the past 24 hours:   Recent Results (from the past 24 hour(s))   ECG 12 lead    Collection Time: 06/27/22  1:18 PM   Result Value Ref Range    Ventricular Rate 63 BPM    Atrial Rate 63 BPM    PA Interval 146 ms    QRSD Interval 96 ms    QT Interval 420 ms    QTC Interval 429 ms    P Axis 38 degrees    QRS Axis 25 degrees    T Wave Axis 240 degrees   Urinalysis    Collection Time: 06/27/22  5:04 PM   Result Value Ref Range    Clarity, UA Clear Clear, Other    Color, UA Yellow Straw, Yellow, Pale Yellow    Specific [place], UA 1 010 1 003 - 1 040    pH, UA 7 0 4 5, 5 0, 5 5, 6 0, 6 5, 7 0, 7 5, 8 0    Glucose, UA Negative Negative mg/dl    Ketones, UA Negative Negative mg/dl    Occult Blood, UA Negative Negative    Protein, UA Negative Negative mg/dl    Nitrite, UA Negative Negative    Bilirubin, UA Negative Negative    Leukocytes, UA Negative Negative    WBC, UA None Seen None Seen, 2-4, 5-60 /hpf    RBC, UA None Seen None Seen, 2-4 /hpf    Bacteria, UA None Seen None Seen, Occasional /hpf    Epithelial Cells None Seen None Seen, Occasional /hpf    UROBILINOGEN UA Negative 1 0, Negative mg/dL   HS Troponin 0hr (reflex protocol)    Collection Time: 06/27/22  5:05 PM   Result Value Ref Range    hs TnI 0hr <2 "Refer to ACS Flowchart"- see link ng/L   T4, free    Collection Time: 06/27/22  5:05 PM   Result Value Ref Range    Free T4 0 90 0 76 - 1 46 ng/dL   HS Troponin I 2hr    Collection Time: 06/27/22  7:07 PM Result Value Ref Range    hs TnI 2hr 2 "Refer to ACS Flowchart"- see link ng/L    Delta 2hr hsTnI >0 <20 ng/L   HS Troponin I 4hr    Collection Time: 06/27/22  9:27 PM   Result Value Ref Range    hs TnI 4hr 2 "Refer to ACS Flowchart"- see link ng/L    Delta 4hr hsTnI >0 <20 ng/L   Echo complete w/ contrast if indicated    Collection Time: 06/28/22 10:00 AM   Result Value Ref Range    LA size 3 9 cm    Triscuspid Valve Regurgitation Peak Gradient 22 0 mmHg    Tricuspid valve peak regurgitation velocity 2 35 m/s    LVPWd 0 90 cm    Left Atrium Area-systolic Apical Two Chamber 21 4 cm2    Left Atrium Area-systolic Four Chamber 84 8 cm2    MV E' Tissue Velocity Septal 7 cm/s    TR Peak Raji 2 4 m/s    IVSd 2 27 cm    LV DIASTOLIC VOLUME (MOD BIPLANE) 2D 151 mL    LEFT VENTRICLE SYSTOLIC VOLUME (MOD BIPLANE) 2D 85 mL    Left ventricular stroke volume (2D) 66 00 mL    A4C EF 41 %    LA length (A2C) 5 20 cm    LVIDd 5 60 cm    IVS 1 cm    LVIDS 4 40 cm    FS 21 28 - 44 %    Ao root 3 00 cm    RVID d 3 2 cm    MV valve area p 1/2 method 3 79 cm2    E wave deceleration time 199 ms    MV Peak E Raji 85 cm/s    MV Peak A Raji 0 85 m/s    KRZYSZTOF A4C 14 9 cm2    RAA A4C 11 6 cm2    MV stenosis pressure 1/2 time 58 ms    LVSV, 2D 66 mL     Most Recent Labs:   Lab Results   Component Value Date    WBC 5 64 06/26/2022    RBC 6 16 (H) 06/26/2022    HGB 15 4 06/26/2022    HCT 48 3 06/26/2022     06/26/2022    RDW 14 6 06/26/2022    NEUTROABS 2 54 06/26/2022    SODIUM 138 06/26/2022    K 4 1 06/26/2022     06/26/2022    CO2 28 06/26/2022    BUN 12 06/26/2022    CREATININE 1 10 06/26/2022    GLUC 96 06/26/2022    CALCIUM 8 7 06/26/2022    AST 42 06/26/2022    ALT 40 06/26/2022    ALKPHOS 83 06/26/2022    TP 7 2 06/26/2022    ALB 3 7 06/26/2022    TBILI 0 32 06/26/2022    CHOLESTEROL 174 06/26/2022    HDL 89 06/26/2022    TRIG 85 06/26/2022    LDLCALC 68 06/26/2022    [place] 85 06/26/2022    CTN8XVIXCNDB 1 190 06/26/2022 FREET4 0 90 06/27/2022    RPR Non-Reactive 06/26/2022    HGBA1C 6 0 (H) 06/26/2022     06/26/2022       Suicide/Homicide Risk Assessment:    Risk of Harm to Self:   Nursing Suicide Risk Assessment Last 24 hours: C-SSRS Risk (Since Last Contact)  Calculated C-SSRS Risk Score (Since Last Contact): No Risk Indicated  Current Specific Risk Factors include: current depressive symptoms, recent rejection  Based on today's assessment, Johnson Alexis presents the following risk of harm to self: minimal    Risk of Harm to Others:  Nursing Homicide Risk Assessment: Violence Risk to Others: Denies within past 6 months  Current Specific Risk Factors include: multiple stressors  Based on today's assessment, Johnson Alexis presents the following risk of harm to others: minimal    The following interventions are recommended: behavioral checks every 7 minutes, continued hospitalization on locked unit    Progress Toward Goals: progressing    Assessment/Plan   Principal Problem:    Major depressive disorder, recurrent, severe with psychotic features ([facility] )  Active Problems:    Medical clearance for psychiatric admission    Cocaine abuse, continuous ([facility] )    Cannabis abuse, continuous    Alcohol abuse, episodic    Abnormal EKG      Recommended Treatment:     Planned medication and treatment changes: All current active medications have been reviewed  Encourage group therapy, milieu therapy and occupational therapy  Behavioral Health checks every 7 minutes  Discontinue Risperdal due to EKG changes  Initiate Abilify 5 mg daily     Continue all other medications as scheduled:    Current Facility-Administered Medications   Medication Dose Route Frequency Provider Last Rate    aluminum-magnesium hydroxide-simethicone  30 mL Oral Q4H PRN Beryle Cradle, MD      ARIPiprazole  5 mg Oral HS Tosin Puentes MD      artificial tear  1 application Both Eyes U6S PRN Beryle Cradle, MD      benztropine  1 mg Intramuscular Q4H PRN Max 6/day Beryle Cradle, MD  benztropine  1 mg Oral Q4H PRN Max 6/day Andressa Hsieh MD      cholecalciferol  800 Units Oral Daily Chris Hamilton PA-C      hydrOXYzine HCL  50 mg Oral Q6H PRN Max 4/day Andressa Hsieh MD      Or    diphenhydrAMINE  50 mg Intramuscular Q6H PRN Andressa Hsieh MD      escitalopram  10 mg Oral Daily Adali Arenas MD      hydrOXYzine HCL  100 mg Oral Q6H PRN Max 4/day Andressa Hsieh MD      Or    LORazepam  2 mg Intramuscular Q6H PRN Andressa Hsieh MD      hydrOXYzine HCL  25 mg Oral Q6H PRN Max 4/day Andressa Hsieh MD      ibuprofen  400 mg Oral Q4H PRN Andressa Hsieh MD      ibuprofen  600 mg Oral Q6H PRN Andressa Hsieh MD      ibuprofen  800 mg Oral Q8H PRN Andressa Hsieh MD      melatonin  3 mg Oral HS PRN Andressa Hsieh MD      OLANZapine  10 mg Oral Q3H PRN Max 3/day Andressa Hsieh MD      Or    OLANZapine  10 mg Intramuscular Q3H PRN Max 3/day Andressa Hsieh MD      OLANZapine  5 mg Oral Q3H PRN Max 6/day Andressa Hsieh MD      Or    OLANZapine  5 mg Intramuscular Q3H PRN Max 6/day Andressa Hsieh MD      OLANZapine  2 5 mg Oral Q3H PRN Max 8/day Andressa Hsieh MD      polyethylene glycol  17 g Oral Daily PRN Andressa Hsieh MD      propranolol  10 mg Oral Q8H PRN Andressa Hsieh MD      senna-docusate sodium  1 tablet Oral Daily PRN Andressa Hsieh MD       Risks / Benefits of Treatment:    Risks, benefits, and possible side effects of medications explained to patient and patient verbalizes understanding and agreement for treatment  Counseling / Coordination of Care:    Patient's progress discussed with staff in treatment team meeting  Medications, treatment progress and treatment plan reviewed with patient      NABILA Young 06/28/22

## 2022-06-28 NOTE — PLAN OF CARE
Problem: Alteration in Thoughts and Perception  Goal: Treatment Goal: Gain control of psychotic behaviors/thinking, reduce/eliminate presenting symptoms and demonstrate improved reality functioning upon discharge  Outcome: Progressing  Goal: Verbalize thoughts and feelings  Description: Interventions:  - Promote a nonjudgmental and trusting relationship with the patient through active listening and therapeutic communication  - Assess patient's level of functioning, behavior and potential for risk  - Engage patient in 1 on 1 interactions  - Encourage patient to express fears, feelings, frustrations, and discuss symptoms    - [place] patient to reality, help patient recognize reality-based thinking   - Administer medications as ordered and assess for potential side effects  - Provide the patient education related to the signs and symptoms of the illness and desired effects of prescribed medications  Outcome: Progressing  Goal: Refrain from acting on delusional thinking/internal stimuli  Description: Interventions:  - Monitor patient closely, per order   - Utilize least restrictive measures   - Set reasonable limits, give positive feedback for acceptable   - Administer medications as ordered and monitor of potential side effects  Outcome: Progressing     Problem: Ineffective Coping  Goal: Identifies ineffective coping skills  Outcome: Progressing     Problem: Depression  Goal: Treatment Goal: Demonstrate behavioral control of depressive symptoms, verbalize feelings of improved mood/affect, and adopt new coping skills prior to discharge  Outcome: Progressing  Goal: Verbalize thoughts and feelings  Description: Interventions:  - Assess and re-assess patient's level of risk   - Engage patient in 1:1 interactions, daily, for a minimum of 15 minutes   - Encourage patient to express feelings, fears, frustrations, hopes   Outcome: Progressing  Goal: Refrain from harming self  Description: Interventions:  - Monitor patient closely, per order   - Supervise medication ingestion, monitor effects and side effects   Outcome: Progressing

## 2022-06-29 LAB
FERRITIN SERPL-MCNC: 213 NG/ML (ref 8–388)
HIV 1+2 AB+HIV1 P24 AG SERPL QL IA: NORMAL
HIV1 P24 AG SER QL: NORMAL
IRON SATN MFR SERPL: 18 % (ref 20–50)
IRON SERPL-MCNC: 63 UG/DL (ref 65–175)
TIBC SERPL-MCNC: 348 UG/DL (ref 250–450)

## 2022-06-29 PROCEDURE — 83540 ASSAY OF IRON: CPT | Performed by: INTERNAL MEDICINE

## 2022-06-29 PROCEDURE — 83550 IRON BINDING TEST: CPT | Performed by: INTERNAL MEDICINE

## 2022-06-29 PROCEDURE — 82728 ASSAY OF FERRITIN: CPT | Performed by: INTERNAL MEDICINE

## 2022-06-29 PROCEDURE — 87806 HIV AG W/HIV1&2 ANTB W/OPTIC: CPT | Performed by: INTERNAL MEDICINE

## 2022-06-29 PROCEDURE — 99232 SBSQ HOSP IP/OBS MODERATE 35: CPT | Performed by: INTERNAL MEDICINE

## 2022-06-29 PROCEDURE — 99232 SBSQ HOSP IP/OBS MODERATE 35: CPT | Performed by: PSYCHIATRY & NEUROLOGY

## 2022-06-29 RX ORDER — ARIPIPRAZOLE 10 MG/1
10 TABLET ORAL
Status: DISCONTINUED | OUTPATIENT
Start: 2022-06-29 | End: 2022-06-30 | Stop reason: HOSPADM

## 2022-06-29 RX ADMIN — CHOLECALCIFEROL TAB 10 MCG (400 UNIT) 800 UNITS: 10 TAB at 08:04

## 2022-06-29 RX ADMIN — ESCITALOPRAM OXALATE 10 MG: 10 TABLET ORAL at 08:03

## 2022-06-29 RX ADMIN — ARIPIPRAZOLE 10 MG: 10 TABLET ORAL at 21:17

## 2022-06-29 RX ADMIN — METOPROLOL SUCCINATE 25 MG: 25 TABLET, FILM COATED, EXTENDED RELEASE ORAL at 08:03

## 2022-06-29 RX ADMIN — LISINOPRIL 5 MG: 10 TABLET ORAL at 08:04

## 2022-06-29 NOTE — PROGRESS NOTES
06/28/22 1415   Activity/Group Checklist   Group Other (Comment)  (Group Art Therapy/Psychodynamic, Open Choice with Discussion)   Attendance Attended   Attendance Duration (min) Greater than 60   Interactions Interacted appropriately   Affect/Mood Appropriate   Goals Achieved Able to listen to others; Able to engage in interactions; Able to recieve feedback; Able to give feedback to another  (Able to engage materials; full participation)

## 2022-06-29 NOTE — NURSING NOTE
Pt calm/cooperative with staff  Denied SI/HI/AVH  Self-isolative out of room fr snacks only  states he had a long day and attended group  No agitated nor anxious behaviors to note   Pt remains flat but pleasant

## 2022-06-29 NOTE — NURSING NOTE
Pt is cooperative, pleasant, and calm  Pt denies SI, HI, VH, AH, and pain  Visible in the milieu, social with select peers  Pt is meal and medication compliant  Pt is attending groups  No new complaints or concerns at this time, will monitor

## 2022-06-29 NOTE — PROGRESS NOTES
Progress Note - Behavioral Health Medford Zhang 61 y o  male MRN: [id number]   Unit/Bed#: [facility] 382-01 Encounter: [id number]    Documentation, nursing notes, medication reconciliation, labs, and vitals reviewed  Patient was seen for continuing care and reviewed with treatment team  No acute events over the past 24 hours  Per nursing report, Megan Grubbs remains behaviorally appropriate, med compliant, no concerns  Medication changes over the past 24 hours-Risperdal was discontinued  Abilify 5 mg initiated last evening  Continues to tolerate current medications with no adverse effects  On evaluation today, Megan Grubbs reports doing well in terms of depression and anxiety  He endorses adequate sleep and appetite  No suicidal or homicial ideation, plan, or intent  Denies perceptual disturbances and does not exhibit any symptoms of jonas on evaluation  He is agreeable to further dose titration of Abilify  He became upset when discussing discharge date and prefers to discharge tomorrow rather than Friday  He was able to maintain behavioral control       Psychiatric ROS:  Behavior over the last 24 hours: unchanged  Sleep: normal  Appetite: normal  Medication side effects: No   ROS: no complaints, all other systems are negative    Mental Status Evaluation:    Appearance:  casually dressed, adequate grooming   Behavior:  cooperative   Speech:  normal rate and volume   Mood:  anxious   Affect:  blunted   Thought Process:  coherent, goal directed   Associations: concrete associations   Thought Content:  no overt delusions   Perceptual Disturbances: denies auditory hallucinations when asked, does not appear responding to internal stimuli   Risk Potential: Suicidal ideation - None  Homicidal ideation - None  Potential for aggression - No   Sensorium:  oriented to person, place, time/date and situation   Memory:  recent and remote memory grossly intact   Consciousness:  alert and awake   Attention/Concentration: attention span and concentration are age appropriate   Insight:  improving   Judgment: improving   Gait/Station: normal gait/station, normal balance   Motor Activity: no abnormal movements     Vital signs in last 24 hours:    Temp:  [96 7 °F (35 9 °C)-97 °F (36 1 °C)] 96 7 °F (35 9 °C)  HR:  [55-64] 55  Resp:  [16] 16  BP: (122-129)/(71-81) 125/71    Laboratory results: I have personally reviewed all pertinent laboratory/tests results    Results from the past 24 hours:   Recent Results (from the past 24 hour(s))   Rapid HIV 1/2 AB-AG Combo    Collection Time: 06/29/22  5:53 AM   Result Value Ref Range    Rapid HIV 1 AND 2 Non-Reactive Non-Reactive    HIV-1 P24 Ag Screen Non-Reactive Non-Reactive     Most Recent Labs:   Lab Results   Component Value Date    WBC 5 64 06/26/2022    RBC 6 16 (H) 06/26/2022    HGB 15 4 06/26/2022    HCT 48 3 06/26/2022     06/26/2022    RDW 14 6 06/26/2022    NEUTROABS 2 54 06/26/2022    SODIUM 138 06/26/2022    K 4 1 06/26/2022     06/26/2022    CO2 28 06/26/2022    BUN 12 06/26/2022    CREATININE 1 10 06/26/2022    GLUC 96 06/26/2022    CALCIUM 8 7 06/26/2022    AST 42 06/26/2022    ALT 40 06/26/2022    ALKPHOS 83 06/26/2022    TP 7 2 06/26/2022    ALB 3 7 06/26/2022    TBILI 0 32 06/26/2022    CHOLESTEROL 174 06/26/2022    HDL 89 06/26/2022    TRIG 85 06/26/2022    LDLCALC 68 06/26/2022    NONHDLC 85 06/26/2022    OUH6JDKRFDWY 1 190 06/26/2022    FREET4 0 90 06/27/2022    RPR Non-Reactive 06/26/2022    HGBA1C 6 0 (H) 06/26/2022     06/26/2022       Suicide/Homicide Risk Assessment:    Risk of Harm to Self:   Nursing Suicide Risk Assessment Last 24 hours: C-SSRS Risk (Since Last Contact)  Calculated C-SSRS Risk Score (Since Last Contact): No Risk Indicated  Based on today's assessment, Laurencrista Barr presents the following risk of harm to self: none    Risk of Harm to Others:  Nursing Homicide Risk Assessment: Violence Risk to Others: Denies within past 6 months  Based on today's assessment, Braxton Barajas presents the following risk of harm to others: none    The following interventions are recommended: behavioral checks every 7 minutes, continued hospitalization on locked unit    Progress Toward Goals: continues to improve    Assessment/Plan   Principal Problem:    Major depressive disorder, recurrent, severe with psychotic features ([facility] )  Active Problems:    Medical clearance for psychiatric admission    Cocaine abuse, continuous ([facility] )    Cannabis abuse, continuous    Alcohol abuse, episodic    Abnormal EKG      Recommended Treatment:     Planned medication and treatment changes:     All current active medications have been reviewed  Encourage group therapy, milieu therapy and occupational therapy  Behavioral Health checks every 7 minutes   Increase Abilify to 10 mg at bedtime  Continue all other medications:    Current Facility-Administered Medications   Medication Dose Route Frequency Provider Last Rate    aluminum-magnesium hydroxide-simethicone  30 mL Oral Q4H PRN Kari Foster MD      ARIPiprazole  10 mg Oral HS NABILA Kumar      artificial tear  1 application Both Eyes C1M PRN Kari Foster MD      benztropine  1 mg Intramuscular Q4H PRN Max 6/day Kari Foster MD      benztropine  1 mg Oral Q4H PRN Max 6/day Kari Foster MD      cholecalciferol  800 Units Oral Daily Andreas Johnson PA-C      hydrOXYzine HCL  50 mg Oral Q6H PRN Max 4/day Kari Foster MD      Or    diphenhydrAMINE  50 mg Intramuscular Q6H PRN Kari Foster MD      escitalopram  10 mg Oral Daily Benita Chairez MD      hydrOXYzine HCL  100 mg Oral Q6H PRN Max 4/day Kari Foster MD      Or    LORazepam  2 mg Intramuscular Q6H PRN Kari Foster MD      hydrOXYzine HCL  25 mg Oral Q6H PRN Max 4/day Kari Foster MD      ibuprofen  400 mg Oral Q4H PRN Kari Foster MD      ibuprofen  600 mg Oral Q6H PRBEN Foster MD      ibuprofen  800 mg Oral Q8H PRN Kari Foster MD      lisinopril  5 mg Oral Daily Eladia Roy PA-C      melatonin  3 mg Oral HS PRN John Davila MD      metoprolol succinate  25 mg Oral Daily Eladia Roy PA-C      OLANZapine  10 mg Oral Q3H PRN Max 3/day John Davila MD      Or    OLANZapine  10 mg Intramuscular Q3H PRN Max 3/day John Davila MD      OLANZapine  5 mg Oral Q3H PRN Max 6/day John Davila MD      Or    OLANZapine  5 mg Intramuscular Q3H PRN Max 6/day John Davila MD      OLANZapine  2 5 mg Oral Q3H PRN Max 8/day John Davila MD      polyethylene glycol  17 g Oral Daily PRN John Davila MD      propranolol  10 mg Oral Q8H PRN John Davila MD      senna-docusate sodium  1 tablet Oral Daily PRN John Davila MD       Risks / Benefits of Treatment:    Risks, benefits, and possible side effects of medications explained to patient and patient verbalizes understanding and agreement for treatment  Counseling / Coordination of Care:    Patient's progress discussed with staff in treatment team meeting  Medications, treatment progress and treatment plan reviewed with patient      Derick Clemente 06/29/22

## 2022-06-29 NOTE — PLAN OF CARE
Problem: Alteration in Thoughts and Perception  Goal: Treatment Goal: Gain control of psychotic behaviors/thinking, reduce/eliminate presenting symptoms and demonstrate improved reality functioning upon discharge  6/29/2022 1028 by Jennifer Knox  Outcome: Progressing  6/29/2022 0937 by Jennifer Knox  Outcome: Progressing  Goal: Verbalize thoughts and feelings  Description: Interventions:  - Promote a nonjudgmental and trusting relationship with the patient through active listening and therapeutic communication  - Assess patient's level of functioning, behavior and potential for risk  - Engage patient in 1 on 1 interactions  - Encourage patient to express fears, feelings, frustrations, and discuss symptoms    - [place] patient to reality, help patient recognize reality-based thinking   - Administer medications as ordered and assess for potential side effects  - Provide the patient education related to the signs and symptoms of the illness and desired effects of prescribed medications  6/29/2022 1028 by Jennifer Knox  Outcome: Progressing  6/29/2022 0937 by Jennifer Knox  Outcome: Progressing  Goal: Refrain from acting on delusional thinking/internal stimuli  Description: Interventions:  - Monitor patient closely, per order   - Utilize least restrictive measures   - Set reasonable limits, give positive feedback for acceptable   - Administer medications as ordered and monitor of potential side effects  6/29/2022 1028 by Jennifer Knox  Outcome: Progressing  6/29/2022 0937 by Jennifer Knox  Outcome: Progressing     Problem: Ineffective Coping  Goal: Cooperates with admission process  Description: Interventions:   - Complete admission process  Outcome: Progressing  Goal: Identifies ineffective coping skills  6/29/2022 1028 by Jennifer Knox  Outcome: Progressing  6/29/2022 0937 by Jennifer Knox  Outcome: Progressing     Problem: Depression  Goal: Treatment Goal: Demonstrate behavioral control of depressive symptoms, verbalize feelings of improved mood/affect, and adopt new coping skills prior to discharge  6/29/2022 1028 by Galina Mancilla  Outcome: Progressing  6/29/2022 0937 by Galina Mancilla  Outcome: Progressing  Goal: Verbalize thoughts and feelings  Description: Interventions:  - Assess and re-assess patient's level of risk   - Engage patient in 1:1 interactions, daily, for a minimum of 15 minutes   - Encourage patient to express feelings, fears, frustrations, hopes   6/29/2022 1028 by Galina Mancilla  Outcome: Progressing  6/29/2022 0937 by Galina Mancilla  Outcome: Progressing  Goal: Refrain from harming self  Description: Interventions:  - Monitor patient closely, per order   - Supervise medication ingestion, monitor effects and side effects   6/29/2022 1028 by Galina Mancilla  Outcome: Progressing  6/29/2022 0937 by Galina Mancilla  Outcome: Progressing

## 2022-06-29 NOTE — PROGRESS NOTES
Progress Note - Cardiology   Andrews Harris 61 y o  male MRN: [id number]  Unit/Bed#: [facility] 382-01 Encounter: [id number]    Assessment:    · Newly diagnosed cardiomyopathy of unknown etiology, alcohol?, cocaine? · LVEF 34%, mild LV enlargement, grade 1 diastolic dysfunction, mild left atrial dilatation, PASP 25 mmHg June 2022  · Abnormal ECG with inferior and anterior lateral T-wave abnormalities  · History of alcohol abuse  · History of cocaine and THC abuse  · History of tobacco abuse  · Major depressive disorder      Plan:     Would not send patient home on a life vest   He is not on a monitor now and does not have a life vest in the hospital   In addition, he needs to establish a habit of compliance before getting involved in this type a technology   If patient compliant with office visits, would repeat echocardiogram in 3 months and if he EF 35% or less, recommend an ICD  If patient not compliant, would not recommend an ICD   Patient advised to not use alcohol or cocaine      Interval history:  Patient feeling good and has no complaints  Anxious to be discharged tomorrow      PROBLEM LIST:    Patient Active Problem List    Diagnosis Date Noted    Cocaine abuse, continuous ([facility] ) 06/27/2022    Cannabis abuse, continuous 06/27/2022    Alcohol abuse, episodic 06/27/2022    Abnormal EKG 06/27/2022    Medical clearance for psychiatric admission 06/26/2022    Major depressive disorder, recurrent, severe with psychotic features ([facility] ) 03/29/2018       Vitals: /71 (BP Location: Right arm)   Pulse 55   Temp (!) 96 7 °F (35 9 °C) (Temporal)   Resp 16   Ht 5' 6" (1 676 m)   Wt 64 4 kg (142 lb)   SpO2 99%   BMI 22 92 kg/m²   PREVIOUS WEIGHTS:   Weight (last 2 days)     Date/Time Weight    06/28/22 0958 64 4 (142)          Wt Readings from Last 2 Encounters:   06/28/22 64 4 kg (142 lb)   06/24/22 65 1 kg (143 lb 8 3 oz)       Labs/Data:        Results from last 7 days   Lab Units 06/26/22  0608 06/24/22 1934   WBC Thousand/uL 5 64 6 31   HEMOGLOBIN g/dL 15 4 13 9   HEMATOCRIT % 48 3 43 1   MCV fL 78* 79*   MCH pg 25 0* 25 3*   MCHC g/dL 31 9 32 3   PLATELETS Thousands/uL 217 212     Results from last 7 days   Lab Units 06/26/22  0608 06/24/22 1934   EGFR ml/min/1 73sq m 73 60   SODIUM mmol/L 138 137   POTASSIUM mmol/L 4 1 3 7   CHLORIDE mmol/L 106 103   CO2 mmol/L 28 25   BUN mg/dL 12 11   CREATININE mg/dL 1 10 1 29     Results from last 7 days   Lab Units 06/26/22  0608 06/24/22 1934   CALCIUM mg/dL 8 7 8 5   AST U/L 42 40   ALT U/L 40 56   ALK PHOS U/L 83 94       Lab Results   Component Value Date    CHOLESTEROL 174 06/26/2022    TRIG 85 06/26/2022    HDL 89 06/26/2022    LDLCALC 68 06/26/2022    HGBA1C 6 0 (H) 06/26/2022         Current Facility-Administered Medications:     aluminum-magnesium hydroxide-simethicone (MYLANTA) oral suspension 30 mL, 30 mL, Oral, Q4H PRN, Lisa Gutierrez MD    ARIPiprazole (ABILIFY) tablet 5 mg, 5 mg, Oral, HS, Josie Marquez MD, 5 mg at 06/28/22 2124    artificial tear (LUBRIFRESH P M ) ophthalmic ointment 1 application, 1 application, Both Eyes, Q3H PRN, Lisa Gutierrez MD    benztropine (COGENTIN) injection 1 mg, 1 mg, Intramuscular, Q4H PRN Max 6/day, Lisa Gutierrez MD    benztropine (COGENTIN) tablet 1 mg, 1 mg, Oral, Q4H PRN Max 6/day, Lisa Gutierrez MD    cholecalciferol (VITAMIN D3) tablet 800 Units, 800 Units, Oral, Daily, Ryan Gan PA-C, 800 Units at 06/29/22 0804    hydrOXYzine HCL (ATARAX) tablet 50 mg, 50 mg, Oral, Q6H PRN Max 4/day **OR** diphenhydrAMINE (BENADRYL) injection 50 mg, 50 mg, Intramuscular, Q6H PRN, Lisa Gutierrez MD    escitalopram (LEXAPRO) tablet 10 mg, 10 mg, Oral, Daily, Josie Marquez MD, 10 mg at 06/29/22 0803    hydrOXYzine HCL (ATARAX) tablet 100 mg, 100 mg, Oral, Q6H PRN Max 4/day **OR** LORazepam (ATIVAN) injection 2 mg, 2 mg, Intramuscular, Q6H PRN, Lisa Gutierrez MD    hydrOXYzine HCL (ATARAX) tablet 25 mg, 25 mg, Oral, Q6H PRN Max 4/day, Mariza Cohen MD    ibuprofen (MOTRIN) tablet 400 mg, 400 mg, Oral, Q4H PRN, Mariza Cohen MD    ibuprofen (MOTRIN) tablet 600 mg, 600 mg, Oral, Q6H PRN, Mariza Cohen MD    ibuprofen (MOTRIN) tablet 800 mg, 800 mg, Oral, Q8H PRN, Mariza Cohen MD    lisinopril (ZESTRIL) tablet 5 mg, 5 mg, Oral, Daily, THAIS Sanabria-C, 5 mg at 06/29/22 0804    melatonin tablet 3 mg, 3 mg, Oral, HS PRN, Mariza Cohen MD    metoprolol succinate (TOPROL-XL) 24 hr tablet 25 mg, 25 mg, Oral, Daily, THAIS Sanabria-C, 25 mg at 06/29/22 0803    OLANZapine (ZyPREXA) tablet 10 mg, 10 mg, Oral, Q3H PRN Max 3/day **OR** OLANZapine (ZyPREXA) IM injection 10 mg, 10 mg, Intramuscular, Q3H PRN Max 3/day, Mariza Cohen MD    OLANZapine (ZyPREXA) tablet 5 mg, 5 mg, Oral, Q3H PRN Max 6/day **OR** OLANZapine (ZyPREXA) IM injection 5 mg, 5 mg, Intramuscular, Q3H PRN Max 6/day, Mariza Cohen MD    OLANZapine (ZyPREXA) tablet 2 5 mg, 2 5 mg, Oral, Q3H PRN Max 8/day, Mariza Cohen MD    polyethylene glycol (MIRALAX) packet 17 g, 17 g, Oral, Daily PRN, Mariza Cohen MD    propranolol (INDERAL) tablet 10 mg, 10 mg, Oral, Q8H PRN, Mariza Cohen MD    senna-docusate sodium (SENOKOT S) 8 6-50 mg per tablet 1 tablet, 1 tablet, Oral, Daily PRN, Mariaz Cohen MD  No Known Allergies    No intake or output data in the 24 hours ending 06/29/22 0906    Invasive Devices  Report    None                 Review of Systems   Constitutional: Negative for activity change  Respiratory: Negative for cough, chest tightness, shortness of breath and wheezing  Cardiovascular: Negative for chest pain, palpitations and leg swelling  Musculoskeletal: Negative for gait problem  Skin: Negative for color change  Neurological: Negative for dizziness, tremors, syncope, weakness, light-headedness and headaches  Psychiatric/Behavioral: Negative for agitation and confusion  Physical Exam  Vitals reviewed     Constitutional:       General: He is not in acute distress  Appearance: He is well-developed  HENT:      Head: Normocephalic and atraumatic  Neck:      Thyroid: No thyromegaly  Vascular: No carotid bruit or JVD  Trachea: No tracheal deviation  Cardiovascular:      Rate and Rhythm: Normal rate and regular rhythm  Pulses: Normal pulses  Heart sounds: Normal heart sounds  No murmur heard  No friction rub  No gallop  Pulmonary:      Effort: Pulmonary effort is normal  No respiratory distress  Breath sounds: Normal breath sounds  No wheezing, rhonchi or rales  Chest:      Chest wall: No tenderness  Musculoskeletal:      Cervical back: Normal range of motion and neck supple  Right lower leg: No edema  Left lower leg: No edema  Skin:     General: Skin is warm and dry  Neurological:      General: No focal deficit present  Mental Status: He is alert and oriented to person, place, and time  Psychiatric:         Mood and Affect: Mood normal          Behavior: Behavior normal          Thought Content: Thought content normal          Judgment: Judgment normal          ======================================================     Imaging:   I have personally reviewed pertinent reports  EKG:  Not on a monitor      Portions of the record may have been created with voice recognition software  Occasional wrong word or "sound a like" substitutions may have occurred due to the inherent limitations of voice recognition software  Read the chart carefully and recognize, using context, where substitutions have occurred

## 2022-06-29 NOTE — NURSING NOTE
Patient is calm and cooperative  Pt is social with select peers and visible in the milieu  Medication and meal compliant  Writer asked pt if anyone has been taking care of his dog at home  Pt stated that his cousin was able to check on the dog  No new concerns at this time, will monitor

## 2022-06-30 VITALS
DIASTOLIC BLOOD PRESSURE: 85 MMHG | BODY MASS INDEX: 22.82 KG/M2 | OXYGEN SATURATION: 100 % | SYSTOLIC BLOOD PRESSURE: 139 MMHG | HEART RATE: 56 BPM | RESPIRATION RATE: 16 BRPM | WEIGHT: 142 LBS | TEMPERATURE: 97.5 F | HEIGHT: 66 IN

## 2022-06-30 PROBLEM — Z00.8 MEDICAL CLEARANCE FOR PSYCHIATRIC ADMISSION: Status: RESOLVED | Noted: 2022-06-26 | Resolved: 2022-06-30

## 2022-06-30 PROCEDURE — 99232 SBSQ HOSP IP/OBS MODERATE 35: CPT | Performed by: INTERNAL MEDICINE

## 2022-06-30 PROCEDURE — 99238 HOSP IP/OBS DSCHRG MGMT 30/<: CPT | Performed by: PSYCHIATRY & NEUROLOGY

## 2022-06-30 RX ORDER — ESCITALOPRAM OXALATE 10 MG/1
10 TABLET ORAL DAILY
Qty: 30 TABLET | Refills: 1 | Status: ON HOLD | OUTPATIENT
Start: 2022-06-30 | End: 2022-08-01 | Stop reason: SDUPTHER

## 2022-06-30 RX ORDER — METOPROLOL SUCCINATE 25 MG/1
25 TABLET, EXTENDED RELEASE ORAL DAILY
Qty: 30 TABLET | Refills: 1 | Status: SHIPPED | OUTPATIENT
Start: 2022-07-01 | End: 2022-08-01

## 2022-06-30 RX ORDER — OMEGA-3S/DHA/EPA/FISH OIL/D3 300MG-1000
800 CAPSULE ORAL DAILY
Qty: 60 TABLET | Refills: 1 | Status: SHIPPED | OUTPATIENT
Start: 2022-07-01 | End: 2022-08-01

## 2022-06-30 RX ORDER — ARIPIPRAZOLE 10 MG/1
10 TABLET ORAL
Qty: 30 TABLET | Refills: 1 | Status: SHIPPED | OUTPATIENT
Start: 2022-06-30 | End: 2022-08-01

## 2022-06-30 RX ORDER — LISINOPRIL 5 MG/1
5 TABLET ORAL DAILY
Qty: 30 TABLET | Refills: 1 | Status: SHIPPED | OUTPATIENT
Start: 2022-07-01 | End: 2022-08-01

## 2022-06-30 RX ADMIN — METOPROLOL SUCCINATE 25 MG: 25 TABLET, FILM COATED, EXTENDED RELEASE ORAL at 08:21

## 2022-06-30 RX ADMIN — CHOLECALCIFEROL TAB 10 MCG (400 UNIT) 800 UNITS: 10 TAB at 08:21

## 2022-06-30 RX ADMIN — ESCITALOPRAM OXALATE 10 MG: 10 TABLET ORAL at 08:21

## 2022-06-30 RX ADMIN — LISINOPRIL 5 MG: 10 TABLET ORAL at 08:21

## 2022-06-30 NOTE — PROGRESS NOTES
06/30/22 1317   Team Meeting   Meeting Type Daily Rounds   Team Members Present   Team Members Present Physician;Nurse;   Physician Team Member [address] Team Member OTILIA OTT [facility] Team Member [place]   Patient/Family Present   Patient Present No   Patient's Family Present No   Dc today

## 2022-06-30 NOTE — DISCHARGE INSTR - OTHER ORDERS
If you are experiencing a mental health emergency, you may call the [address] [facility] 24 hours a day, 7 days per week at [phone]  In [facility], call [phone]  When you need someone to listen, the [place] is available for 16 hours a day, 7 days a week, from the time of 7-10am and 2pm-2am   It is not available from the hours of 2am-6am and 10am-2pm  A representative can be reached at [phone]  HOW TO GET SUBSTANCE ABUSE HELP:  If you or someone you know has a drug or alcohol problem, there is help:  [place] [address]: [address]: [phone]  An assessment is the first step  In addition to those listed there are other programs available in the area but assessment is best to determine an appropriate level of care  If you DO NOT have Medical Assistance (MA) or [employer], an assessment can be scheduled at one of these providers:  [facility], [address] [place]  [phone]   [facility]  [address] , [place], [address] [place]  [address]  [address]  [facility]  [address] [facility], [address] [facility]   [address] , [place], [address] [facility]  [address] [facility] , [place], [address] [facility]  [address] [facility] , [address], [place], [address] [place]  [phone]     If you [address], an assessment can be scheduled at one of these providers:  [place] [facility]  [address] , [place], [address] [facility]  [address]  [facility], [address] [place]  [address]  D&A Intake Unit  [address] [facility]  [address] , [address] Floor, [address]  [phone] [address], [address], [place], [address] [facility] [address]  [address] , [place], [address] [place]  [phone]   [facility] [facility]  [address] [facility]  [phone]   NET ([facility])  [address], [facility], [address]  [address] [facility]  [facility], [address] [facility]   [address] , [place], 98 [facility]  [address] [place] , [place], [address] [facility]  [address] , [address], [place], [address] [place]  [phone]     If you [address], an assessment can be scheduled at one of these providers  Please contact these Providers to determine if they are in your network plan:  [facility] D&A Intake Unit  [address] [facility] [address] , 1st Floor, [facility], [address]  [address]  [address] , [place], [address] [place]  [phone]   [facility] [facility]  [address] [facility]  [phone]   NET ([facility])  [address], [facility], [address]  [address] [place] [address] [address] [place] [address] [address] [address] , [address], [place], [address] [place]

## 2022-06-30 NOTE — PROGRESS NOTES
Discussed with patient: AUDIT score of 7 UDS/Identified Substance(s) used: THC, cocaine  Risks discussed included: mental health decomp, health risks, financial risks, legal risks  Recommendations discussed: inpatient vs outpatient services  Patient's response: Pt refused all inpatient and outpatient d/a services   D/A agencies listed on AVS

## 2022-06-30 NOTE — NURSING NOTE
AVS and prescriptions printed and reviewed with pt  Pt verbalized understanding and had no questions  Pt has no complaints or concerns  Pt walking off unit with belongings by his side

## 2022-06-30 NOTE — PLAN OF CARE
Pt to dc today  Pt denies SI/HI, AVH  Pt oriented x3  Pt to return home and reports he will walk  Pt to follow up with [facility] on 7/5 at 1415  Scripts sent to preferred pharmacy      dc address: [address]  P: [phone]

## 2022-06-30 NOTE — NURSING NOTE
Pt is pleasant, calm, and cooperative  Denies SI, HI, AH, VH  Medication and meal compliant  Social with staff and select peers in the milieu  Pt stated to writer he needs numbers out of his phone to contact his cousin to check on his dog  No new complaints or concerns at this time, will monitor

## 2022-06-30 NOTE — SOCIAL WORK
CM left  at [facility] and [address] [place] [address] Internal Medicine to schedule PCP appt  PAT CEJA PCP Lesly Wolff does not have new pt appt availability until September/October

## 2022-06-30 NOTE — DISCHARGE SUMMARY
Discharge Summary - [address] [place] 61 y o  male MRN: [id number]  Unit/Bed#: Saul Michelle 382-01 Encounter: [id number]     Admission Date: 6/25/2022         Discharge Date: No discharge date for patient encounter  Attending Psychiatrist: Nicolasa Richard MD    Reason for Admission/HPI:   Per Dr Blair Bentley initial evaluation 6/26/22: This is a 61 AAM who was admitted after he presented himself to [address] [place] [address] ED with Suicidal ideas with plan  He reported that he returned to his home and found his girlfriend of three years with his close friend in bed, he became very depressed and took a razor and went to woods near his high school  His friend fled in car  His girl friend tried to call him but he told her that he wanted her out of the house  He said he waited in the woods to cut his throat but there were kids playing and they wouldn't leave  He was annoyed by the kids but he got tired and started walking and saw the hospital and came in to ask for help  He said that he is glad that he came for help as he now knows that there are so many people who care  He said he wants to move on with his life  He has support form his [employer]  He said he is not suicidal anymore and feels that those kids were there to help him and it is sign from God  He said usually he sleeps well and eats well  He has no anxiety  No hallucinations, no delusions, No SI or HI  No OCD or jonas             Social History     Tobacco History     Smoking Status  Former Smoker Smoking Frequency  0 2 packs/day    Smokeless Tobacco Use  Never Used          Alcohol History     Alcohol Use Status  Yes Comment  40oz of beer a week          Drug Use     Drug Use Status  No Comment  last night           Sexual Activity     Sexually Active  Not Asked          Activities of Daily Living    Not Asked               Additional Substance Use Detail     Questions Responses    Substance Use Assessment Substance use within the past 12 months    Alcohol Use Frequency Denies use in past 12 months    Cannabis frequency 1-2 times/week    Comment: Past occasional use on 3/27/2018 Past occasional use ->1-2 times/week on 3/28/2018     Heroin Frequency Denies use in past 12 months    Cannabis method Smoke    Comment: Smoke on 6/20/2018     Cocaine frequency Never used    Comment: Never used on 6/20/2018     Crack Cocaine Frequency Denies use in past 12 months    Methamphetamine Frequency Denies use in past 12 months    Narcotic Frequency Denies use in past 12 months    Benzodiazepine Frequency Denies use in past 12 months    Amphetamine frequency Denies use in past 12 months    Barbituate Frequency Denies use use in past 12 months    Inhalant frequency Never used    Comment: Never used on 6/20/2018     Hallucinogen frequency Never used    Comment: Never used on 6/20/2018     Ecstasy frequency Never used    Comment: Never used on 6/20/2018     Other drug frequency Never used    Comment: Never used on 6/20/2018     Opiate frequency Denies use in past 12 months    Last reviewed by Fabricio Driscoll RN on 6/25/2022          Past Medical History:   Diagnosis Date    Abnormal EKG     Depression     Substance use      Past Surgical History:   Procedure Laterality Date    NO PAST SURGERIES         Medications: All current active medications have been reviewed  Allergies:     No Known Allergies    Objective     Vital signs in last 24 hours:    Temp:  [97 5 °F (36 4 °C)-97 9 °F (36 6 °C)] 97 5 °F (36 4 °C)  HR:  [56] 56  Resp:  [16] 16  BP: (136-139)/(78-85) 139/85      Intake/Output Summary (Last 24 hours) at 6/30/2022 1103  Last data filed at 6/29/2022 1500  Gross per 24 hour   Intake 0 ml   Output --   Net 0 ml       Hospital Course:     Mike Bañuelos was admitted to the inpatient psychiatric unit and started on Behavioral Health checks every 7 minutes   During the hospitalization he was encouraged to attend individual therapy, group therapy, milieu therapy and occupational therapy  Psychiatric medications were restarted during the hospital stay  To address depressive symptoms and psychotic symptoms, Braxton Barajas was treated with antidepressant Lexapro and antipsychotic medication Risperdal  Medication doses were added initially, but then discontinued (Risperdal) and restarted at the dose of (Lexapro 10 mg) during the hospital course  Abilify was added and titrated to 10 mg daily  Prior to beginning of treatment medications risks and benefits and possible side effects including risk of parkinsonian symptoms, Tardive Dyskinesia and metabolic syndrome related to treatment with antipsychotic medications and risk of suicidality and serotonin syndrome related to treatment with antidepressants were reviewed with Braxton Barajas  He verbalized understanding and agreement for treatment  Upon admission Braxton Barajas was seen by medical service for medical clearance for inpatient treatment and medical follow up  Ananth's symptoms gradually improved over the hospital course  Initially after admission he was still feeling depressed and psychotic  With adjustment of medications and therapeutic milieu his symptoms gradually resolved  At the end of treatment Braxton Barajas was doing much better  His mood was significantly improved at the time of discharge  Braxton Barajas denied suicidal ideation, intent or plan at the time of discharge and denied homicidal ideation, intent or plan at the time of discharge  There was no overt psychosis at the time of discharge  Auditory hallucinations were resolved  Braxton Barajas was participating appropriately in milieu at the time of discharge  Behavior was appropriate on the unit at the time of discharge  Sleep and appetite were improved  Since Braxton Barajas was doing well at the end of the hospitalization, treatment team felt that Braxton Barajas could be safely discharged to outpatient care      The outpatient follow up with [facility] for psychiatric intake was arranged by the unit  upon discharge  Mental Status at Time of Discharge:     Appearance:  casually dressed, adequate grooming   Behavior:  pleasant, cooperative   Speech:  normal rate and volume   Mood:  improved   Affect:  normal range and intensity   Thought Process:  coherent, goal directed   Associations: intact associations   Thought Content:  no overt delusions   Perceptual Disturbances: none   Risk Potential: Suicidal ideation - None  Homicidal ideation - None  Potential for aggression - No   Sensorium:  oriented to person, place, time/date and situation   Memory:  recent and remote memory grossly intact   Consciousness:  alert and awake   Attention/Concentration: attention span and concentration are age appropriate   Insight:  improved   Judgment: improved   Gait/Station: normal gait/station, normal balance   Motor Activity: no abnormal movements       Admission Diagnosis:    Principal Problem:    Major depressive disorder, recurrent, severe with psychotic features ([facility] [address] )  Active Problems:    Cocaine abuse, continuous ([facility] [address] )    Cannabis abuse, continuous    Alcohol abuse, episodic    Abnormal EKG      Discharge Diagnosis:     Principal Problem:    Major depressive disorder, recurrent, severe with psychotic features ([facility] [address] )  Active Problems:    Cocaine abuse, continuous ([facility] [address] )    Cannabis abuse, continuous    Alcohol abuse, episodic    Abnormal EKG  Resolved Problems:    Medical clearance for psychiatric admission      Lab Results:   I have personally reviewed all pertinent laboratory/tests results    Most Recent Labs:   Lab Results   Component Value Date    WBC 5 64 06/26/2022    RBC 6 16 (H) 06/26/2022    HGB 15 4 06/26/2022    HCT 48 3 06/26/2022     06/26/2022    RDW 14 6 06/26/2022    NEUTROABS 2 54 06/26/2022    SODIUM 138 06/26/2022    K 4 1 06/26/2022     06/26/2022    CO2 28 06/26/2022    BUN 12 06/26/2022    CREATININE 1 10 06/26/2022    GLUC 96 06/26/2022    GLUF 96 06/26/2022    CALCIUM 8 7 06/26/2022    AST 42 06/26/2022    ALT 40 06/26/2022    ALKPHOS 83 06/26/2022    TP 7 2 06/26/2022    ALB 3 7 06/26/2022    TBILI 0 32 06/26/2022    CHOLESTEROL 174 06/26/2022    HDL 89 06/26/2022    TRIG 85 06/26/2022    LDLCALC 68 06/26/2022    NONHDLC 85 06/26/2022    XWR3OMHTQQNS 1 190 06/26/2022    FREET4 0 90 06/27/2022    RPR Non-Reactive 06/26/2022    HGBA1C 6 0 (H) 06/26/2022     06/26/2022       Discharge Medications:    See after visit summary for all reconciled discharge medications provided to patient and family  Discharge instructions/Information to patient and family:     See after visit summary for information provided to patient and family  Provisions for Follow-Up Care:    See after visit summary for information related to follow-up care and any pertinent home health orders  Discharge Statement:    I spent 30 minutes discharging the patient  This time was spent on the day of discharge  I had direct contact with the patient on the day of discharge  Additional documentation is required if more than 30 minutes were spent on discharge:    I reviewed with Geovani Bhagat importance of compliance with medications and outpatient treatment after discharge  I discussed the medication regimen and possible side effects of the medications with Geovani Bhagat prior to discharge  At the time of discharge he was tolerating psychiatric medications  I reviewed with Geovani Bhagat crisis plan and safety plan upon discharge  I discussed with Geovani Bhagat recommendation to follow up with outpatient drug and alcohol counseling and AA meetings  Geovani Bhagat agreed to abstain from drug and alcohol use after discharge      Discharge on Two Antipsychotic Medications: NABILA Reed 06/30/22

## 2022-06-30 NOTE — PROGRESS NOTES
06/29/22 1315   Team Meeting   Meeting Type Daily Rounds   Team Members Present   Team Members Present Physician;Nurse;   Physician Team Member [address] Team Member OTILIA OTT [facility] Team Member [place]   Patient/Family Present   Patient Present No   Patient's Family Present No   Pt denies all  Med/meal compliant  DC tomorrow

## 2022-06-30 NOTE — PROGRESS NOTES
CARDIOLOGY INPATIENT FOLLOW-UP PROGRESS NOTE  *-*-*-*-*-*-*-*-*-*-*-*-*-*-*-*-*-*-*-*-*-*-*-*-*-*-*-*-*-*-*-*-*-*-*-*-*-*-*-*-*-*-*-*-*-*-*-*-*-*-*-*-*-*-  [id number] DATE: 06/30/22 12:45 PM   AUTHOR: Jay Boggs MD  PATIENT: Surinder Conway   1965    [id number]   61 y o    male  INPATIENT HOSPITALIST PHYSICIAN: Ju Ferrell, *  DATE OF ADMISSION: 6/25/2022  8:49 PM; LENGTH OF STAY: 5 days  *-*-*-*-*-*-*-*-*-*-*-*-*-*-*-*-*-*-*-*-*-*-*-*-*-*-*-*-*-*-*-*-*-*-*-*-*-*-*-*-*-*-*-*-*-*-*-*-*-*-*-*-*-*-    CARDIOLOGY ASSESSMENT:  1  Newly diagnosed cardiomyopathy- unspecified etiology  2  AHA Stage B   3  Alcohol abuse  4  Cocaine and THC abuse  5  Major depressive disorder  6  Iron deficiency    *-*-*-*-*-*-*-*-*-*-*-*-*-*-*-*-*-*-*-*-*-*-*-*-*-*-*-*-*-*-*-*-*-*-*-*-*-*-*-*-*-*-*-*-*-*-*-*-*-*-*-*-*-*-    CURRENT CARDIAC MEDICATIONS: Metoprolol succinate 25mg daily, Lisinopril 5mg daily    PERTINENT LABS AND OTHER INVESTIGATIONS: Stable renal function and electrolytes, HS trop negative, normal lipids, normal TSH negative HIV    ECHOCARDIOGRAM AND OTHER CARDIAC TESTS RESULTS:  Echocardiogram June 28, 2022 showed mildly dilated left ventricular cavity with severely reduced left ventricular systolic function with global hypokinesis, EF 16%, grade 1 diastolic dysfunction, normal right ventricular size and systolic function, mild left atrial enlargement, normal right atrial size, normal aortic and mitral valve, trace mitral and tricuspid and pulmonic valve regurgitation, no obvious pulmonary hypertension, no pericardial effusion  *-*-*-*-*-*-*-*-*-*-*-*-*-*-*-*-*-*-*-*-*-*-*-*-*-*-*-*-*-*-*-*-*-*-*-*-*-*-*-*-*-*-*-*-*-*-*-*-*-*-*-*-*-*-  Patient overall stable from cardiac perspective with no symptoms that suggest decompensated heart failure  Examination is benign  Blood pressure is reasonably well controlled on recently started heart failure medications  Is euvolemic      PLAN:  -- patient may be discharged to home on current cardiac regimen  -- outpatient cardiology follow-up has been arranged for 2022  -- will plan for regadenason nuclear stress test as outpatient  -- patient is advised about smoking and alcohol and drug use cessation  -- he should be advised to come back to the emergency room if he develops any concerning symptoms such as increasing shortness of breath syncope palpitations or chest pain or any other concerning symptoms  *-*-*-*-*-*-*-*-*-*-*-*-*-*-*-*-*-*-*-*-*-*-*-*-*-*-*-*-*-*-*-*-*-*-*-*-*-*-*-*-*-*-*-*-*-*-*-*-*-*-*-*-*-*-  INTERVAL CHANGES / HISTORY OF PRESENT ILLNESS:   No acute events overnight patient  Patient denies any chest pain shortness of breath or dizziness or lightheadedness or palpitations  Is excited to being discharged to home  *-*-*-*-*-*-*-*-*-*-*-*-*-*-*-*-*-*-*-*-*-*-*-*-*-*-*-*-*-*-*-*-*-*-*-*-*-*-*-*-*-*-*-*-*-*-*-*-*-*-*-*-*-*    PERTINENT REVIEW OF SYMPTOMS:  As noted above in HPI  *-*-*-*-*-*-*-*-*-*-*-*-*-*-*-*-*-*-*-*-*-*-*-*-*-*-*-*-*-*-*-*-*-*-*-*-*-*-*-*-*-*-*-*-*-*-*-*-*-*-*-*-*-*-  VITAL SIGNS:  Vitals:    22 1543 22 0700 22 1500 22   BP: 122/75 125/71 136/78 139/85   BP Location: Right arm Right arm Right arm Right arm   Pulse: 64 55 56 56   Resp: 16 16  16   Temp: (!) 97 °F (36 1 °C) (!) 96 7 °F (35 9 °C) 97 9 °F (36 6 °C) 97 5 °F (36 4 °C)   TempSrc: Tympanic Temporal Temporal Temporal   SpO2: 100% 99% 100% 100%   Weight:       Height:          Temp (24hrs), Av 7 °F (36 5 °C), Min:97 5 °F (36 4 °C), Max:97 9 °F (36 6 °C)  Current: Temperature: 97 5 °F (36 4 °C)    Weight    22 2104 22 0755 22 0958   Weight: 64 kg (141 lb) 64 5 kg (142 lb 3 2 oz) 64 4 kg (142 lb)      Body mass index is 22 92 kg/m²   Wt Readings from Last 3 Encounters:   22 64 4 kg (142 lb)   22 65 1 kg (143 lb 8 3 oz)   18 77 kg (169 lb 11 2 oz)      Intake/Output Summary (Last 24 hours) at 6/30/2022 1245  Last data filed at 6/29/2022 1500  Gross per 24 hour   Intake 0 ml   Output --   Net 0 ml          *-*-*-*-*-*-*-*-*-*-*-*-*-*-*-*-*-*-*-*-*-*-*-*-*-*-*-*-*-*-*-*-*-*-*-*-*-*-*-*-*-*-*-*-*-*-*-*-*-*-*-*-*-*-  PHYSICAL EXAM:  General Appearance:    Alert, cooperative, in no respiratory distress, appears stated age, normal built   Head, Eyes, ENT:    No obvious abnormality, moist mucous mebranes  Neck:   Supple, no carotid bruit or JVD   Back:     Symmetric, no curvature  Lungs:     Respirations unlabored  Clear to auscultation bilaterally,    Chest wall:    No tenderness or deformity   Heart:    Regular rate and rhythm, S1 and S2 normal, no murmur, rub  or gallop  Abdomen:     Soft, non-tender, No obvious masses, or organomegaly   Extremities:   Extremities warm, no cyanosis or edema    Skin:   Skin color, texture, turgor normal, no rashes or lesions     *-*-*-*-*-*-*-*-*-*-*-*-*-*-*-*-*-*-*-*-*-*-*-*-*-*-*-*-*-*-*-*-*-*-*-*-*-*-*-*-*-*-*-*-*-*-*-*-*-*-*-*-*-*-  TELEMETRY, LAST ECG:  Telemetry reviewed      Not on telemetry Results for orders placed or performed during the hospital encounter of 06/25/22   ECG 12 lead   Result Value    Ventricular Rate 63    Atrial Rate 63    GA Interval 146    QRSD Interval 96    QT Interval 420    QTC Interval 429    P Axis 38    QRS Axis 25    T Wave Axis 240    Narrative    Normal sinus rhythm  Moderate voltage criteria for LVH, may be normal variant  T wave abnormality, consider inferior ischemia  T wave abnormality, consider anterolateral ischemia  Abnormal ECG  When compared with ECG of 24-JUN-2022 23:44,  No significant change was found  Confirmed by Christen De León [id number]) on 6/27/2022 8:30:34 PM      *-*-*-*-*-*-*-*-*-*-*-*-*-*-*-*-*-*-*-*-*-*-*-*-*-*-*-*-*-*-*-*-*-*-*-*-*-*-*-*-*-*-*-*-*-*-*-*-*-*-*-*-*-*-      CURRENT SCHEDULED MEDICATIONS:    Current Facility-Administered Medications:     aluminum-magnesium hydroxide-simethicone (MYLANTA) oral suspension 30 mL, 30 mL, Oral, Q4H PRN, Lisa Gutierrez MD    ARIPiprazole (ABILIFY) tablet 10 mg, 10 mg, Oral, HS, Brigida Kennedy Natividadk, CRNP, 10 mg at 06/29/22 2117    artificial tear (LUBRIFRESH P M ) ophthalmic ointment 1 application, 1 application, Both Eyes, Q3H PRN, Lisa Gutierrez MD    benztropine (COGENTIN) injection 1 mg, 1 mg, Intramuscular, Q4H PRN Max 6/day, Lisa Gutierrez MD    benztropine (COGENTIN) tablet 1 mg, 1 mg, Oral, Q4H PRN Max 6/day, Lisa Gtuierrez MD    cholecalciferol (VITAMIN D3) tablet 800 Units, 800 Units, Oral, Daily, Ryan Gan PA-C, 800 Units at 06/30/22 8098    hydrOXYzine HCL (ATARAX) tablet 50 mg, 50 mg, Oral, Q6H PRN Max 4/day **OR** diphenhydrAMINE (BENADRYL) injection 50 mg, 50 mg, Intramuscular, Q6H PRN, Lisa Gutierrez MD    escitalopram (LEXAPRO) tablet 10 mg, 10 mg, Oral, Daily, Josie Marquez MD, 10 mg at 06/30/22 4235    hydrOXYzine HCL (ATARAX) tablet 100 mg, 100 mg, Oral, Q6H PRN Max 4/day **OR** LORazepam (ATIVAN) injection 2 mg, 2 mg, Intramuscular, Q6H PRN, Lisa Gutierrez MD    hydrOXYzine HCL (ATARAX) tablet 25 mg, 25 mg, Oral, Q6H PRN Max 4/day, Lisa Gutierrez MD    ibuprofen (MOTRIN) tablet 400 mg, 400 mg, Oral, Q4H PRN, Lisa Gutierrez MD    ibuprofen (MOTRIN) tablet 600 mg, 600 mg, Oral, Q6H PRN, Lisa Gutierrez MD    ibuprofen (MOTRIN) tablet 800 mg, 800 mg, Oral, Q8H PRN, Lisa Gutierrez MD    lisinopril (ZESTRIL) tablet 5 mg, 5 mg, Oral, Daily, Ryan Gan PA-C, 5 mg at 06/30/22 0690    melatonin tablet 3 mg, 3 mg, Oral, HS PRN, Lisa Gutierrez MD    metoprolol succinate (TOPROL-XL) 24 hr tablet 25 mg, 25 mg, Oral, Daily, Ryan Gan PA-C, 25 mg at 06/30/22 7137    OLANZapine (ZyPREXA) tablet 10 mg, 10 mg, Oral, Q3H PRN Max 3/day **OR** OLANZapine (ZyPREXA) IM injection 10 mg, 10 mg, Intramuscular, Q3H PRN Max 3/day, Lisa Gutierrez MD    OLANZapine (ZyPREXA) tablet 5 mg, 5 mg, Oral, Q3H PRN Max 6/day **OR** OLANZapine (ZyPREXA) IM injection 5 mg, 5 mg, Intramuscular, Q3H PRN Max 6/day, Doron Espinosa MD    OLANZapine (ZyPREXA) tablet 2 5 mg, 2 5 mg, Oral, Q3H PRN Max 8/day, Doron Espinosa MD    polyethylene glycol (MIRALAX) packet 17 g, 17 g, Oral, Daily PRN, Doron Espinosa MD    propranolol (INDERAL) tablet 10 mg, 10 mg, Oral, Q8H PRN, Doron Espinosa MD    senna-docusate sodium (SENOKOT S) 8 6-50 mg per tablet 1 tablet, 1 tablet, Oral, Daily PRN, Doron Espinosa MD ALLERGIES:  No Known Allergies     *-*-*-*-*-*-*-*-*-*-*-*-*-*-*-*-*-*-*-*-*-*-*-*-*-*-*-*-*-*-*-*-*-*-*-*-*-*-*-*-*-*-*-*-*-*-*-*-*-*-*-*-*-*  LABORATORY DATA:  I have personally reviewed pertinent labs  CMP:  Results from last 7 days   Lab Units 06/26/22  0608 06/24/22  1934   SODIUM mmol/L 138 137   POTASSIUM mmol/L 4 1 3 7   CHLORIDE mmol/L 106 103   CO2 mmol/L 28 25   BUN mg/dL 12 11   CREATININE mg/dL 1 10 1 29   CALCIUM mg/dL 8 7 8 5   ALK PHOS U/L 83 94   ALT U/L 40 56   AST U/L 42 40               Cardiac Profile:       Invalid input(s): CK, CKMBP  Results from last 7 days   Lab Units 06/26/22  0608   HEMOGLOBIN A1C % 6 0*            CBC:   Results from last 7 days   Lab Units 06/26/22  0608 06/24/22  1934   WBC Thousand/uL 5 64 6 31   HEMOGLOBIN g/dL 15 4 13 9   HEMATOCRIT % 48 3 43 1   PLATELETS Thousands/uL 217 212     PT/INR: No results found for: PT, INR, Microbiology:          *-*-*-*-*-*-*-*-*-*-*-*-*-*-*-*-*-*-*-*-*-*-*-*-*-*-*-*-*-*-*-*-*-*-*-*-*-*-*-*-*-*-*-*-*-*-*-*-*-*-*-*-*-*-  IMAGING STUDIES REPORTS: Imaging studies results reviewed    No valid procedures specified  No Chest XR results available for this patient  *-*-*-*-*-*-*-*-*-*-*-*-*-*-*-*-*-*-*-*-*-*-*-*-*-*-*-*-*-*-*-*-*-*-*-*-*-*-*-*-*-*-*-*-*-*-*-*-*-*-*-*-*-*-    No results found for this or any previous visit  No results found for this or any previous visit  No results found for this or any previous visit  No results found for this or any previous visit  *-*-*-*-*-*-*-*-*-*-*-*-*-*-*-*-*-*-*-*-*-*-*-*-*-*-*-*-*-*-*-*-*-*-*-*-*-*-*-*-*-*-*-*-*-*-*-*-*-*-*-*-*-*-  SIGNATURES:   @[email or web address]@   Selena Clubs, MD

## 2022-06-30 NOTE — BH TRANSITION RECORD
Contact Information: If you have any questions, concerns, pended studies, tests and/or procedures, or emergencies regarding your inpatient behavioral health visit  Please contact [address] [place] [address] behavioral health unit 3P [phone] and ask to speak to a , nurse or physician  A contact is available 24 hours/ 7 days a week at this number  Summary of Procedures Performed During your Stay:  Below is a list of major procedures performed during your hospital stay and a summary of results:  - Cardiac Procedures/Studies: ECHO, EKG  Pending Studies (From admission, onward)    None        If studies are pending at discharge, follow up with your PCP and/or referring provider

## 2022-06-30 NOTE — DISCHARGE INSTR - APPOINTMENTS
Edwin Valentine or Daniela, our Mike and Susana, will be calling you after your discharge, on the phone number that you provided  They will be available as an additional support, if needed  If you wish to speak with one of them, you may contact Antwan Ramirez at [phone] or Zamzam Singh at [phone]

## 2022-06-30 NOTE — PROGRESS NOTES
Pt received in bed  Room-seclusive out for meals/snack only  Denied SI/HI/AVH very quiet and focused on discharge  Cooperative with shift assessment appears bright

## 2022-06-30 NOTE — PLAN OF CARE
[facility] cardiology saw patient and directly scheduled a follow up cardiology appt with pt  PCP appt for cardiology referral no longer necessary

## 2022-07-01 ENCOUNTER — TRANSITIONAL CARE MANAGEMENT (OUTPATIENT)
Dept: INTERNAL MEDICINE CLINIC | Facility: CLINIC | Age: 57
End: 2022-07-01

## 2022-07-11 ENCOUNTER — HOSPITAL ENCOUNTER (EMERGENCY)
Facility: HOSPITAL | Age: 57
End: 2022-07-12
Attending: EMERGENCY MEDICINE | Admitting: EMERGENCY MEDICINE
Payer: COMMERCIAL

## 2022-07-11 DIAGNOSIS — F32.A DEPRESSION WITH SUICIDAL IDEATION: Primary | ICD-10-CM

## 2022-07-11 DIAGNOSIS — R45.851 DEPRESSION WITH SUICIDAL IDEATION: Primary | ICD-10-CM

## 2022-07-11 LAB
ALBUMIN SERPL BCP-MCNC: 4 G/DL (ref 3–5.2)
ALP SERPL-CCNC: 80 U/L (ref 43–122)
ALT SERPL W P-5'-P-CCNC: 41 U/L
AMPHETAMINES SERPL QL SCN: NEGATIVE
ANION GAP SERPL CALCULATED.3IONS-SCNC: 7 MMOL/L (ref 5–14)
AST SERPL W P-5'-P-CCNC: 44 U/L (ref 17–59)
BARBITURATES UR QL: NEGATIVE
BASOPHILS # BLD AUTO: 0.06 THOUSANDS/ΜL (ref 0–0.1)
BASOPHILS NFR BLD AUTO: 1 % (ref 0–1)
BENZODIAZ UR QL: NEGATIVE
BILIRUB SERPL-MCNC: 0.4 MG/DL
BUN SERPL-MCNC: 9 MG/DL (ref 5–25)
CALCIUM SERPL-MCNC: 8.9 MG/DL (ref 8.4–10.2)
CHLORIDE SERPL-SCNC: 108 MMOL/L (ref 97–108)
CO2 SERPL-SCNC: 24 MMOL/L (ref 22–30)
COCAINE UR QL: POSITIVE
CREAT SERPL-MCNC: 1.11 MG/DL (ref 0.7–1.5)
EOSINOPHIL # BLD AUTO: 0.32 THOUSAND/ΜL (ref 0–0.61)
EOSINOPHIL NFR BLD AUTO: 5 % (ref 0–6)
ERYTHROCYTE [DISTWIDTH] IN BLOOD BY AUTOMATED COUNT: 14.2 % (ref 11.6–15.1)
ETHANOL EXG-MCNC: 0 MG/DL
FLUAV RNA RESP QL NAA+PROBE: NEGATIVE
FLUBV RNA RESP QL NAA+PROBE: NEGATIVE
GFR SERPL CREATININE-BSD FRML MDRD: 72 ML/MIN/1.73SQ M
GLUCOSE SERPL-MCNC: 109 MG/DL (ref 70–99)
HCT VFR BLD AUTO: 44.5 % (ref 36.5–49.3)
HGB BLD-MCNC: 14.3 G/DL (ref 12–17)
IMM GRANULOCYTES # BLD AUTO: 0.02 THOUSAND/UL (ref 0–0.2)
IMM GRANULOCYTES NFR BLD AUTO: 0 % (ref 0–2)
LYMPHOCYTES # BLD AUTO: 2.25 THOUSANDS/ΜL (ref 0.6–4.47)
LYMPHOCYTES NFR BLD AUTO: 36 % (ref 14–44)
MCH RBC QN AUTO: 25.1 PG (ref 26.8–34.3)
MCHC RBC AUTO-ENTMCNC: 32.1 G/DL (ref 31.4–37.4)
MCV RBC AUTO: 78 FL (ref 82–98)
METHADONE UR QL: NEGATIVE
MONOCYTES # BLD AUTO: 0.69 THOUSAND/ΜL (ref 0.17–1.22)
MONOCYTES NFR BLD AUTO: 11 % (ref 4–12)
NEUTROPHILS # BLD AUTO: 2.95 THOUSANDS/ΜL (ref 1.85–7.62)
NEUTS SEG NFR BLD AUTO: 47 % (ref 43–75)
NRBC BLD AUTO-RTO: 0 /100 WBCS
OPIATES UR QL SCN: NEGATIVE
OXYCODONE+OXYMORPHONE UR QL SCN: NEGATIVE
PCP UR QL: NEGATIVE
PLATELET # BLD AUTO: 208 THOUSANDS/UL (ref 149–390)
PMV BLD AUTO: 9.3 FL (ref 8.9–12.7)
POTASSIUM SERPL-SCNC: 3.9 MMOL/L (ref 3.6–5)
PROT SERPL-MCNC: 7.5 G/DL (ref 5.9–8.4)
RBC # BLD AUTO: 5.7 MILLION/UL (ref 3.88–5.62)
RSV RNA RESP QL NAA+PROBE: NEGATIVE
SARS-COV-2 RNA RESP QL NAA+PROBE: NEGATIVE
SODIUM SERPL-SCNC: 139 MMOL/L (ref 137–147)
THC UR QL: POSITIVE
TSH SERPL DL<=0.05 MIU/L-ACNC: 0.54 UIU/ML (ref 0.45–4.5)
WBC # BLD AUTO: 6.29 THOUSAND/UL (ref 4.31–10.16)

## 2022-07-11 PROCEDURE — 0241U HB NFCT DS VIR RESP RNA 4 TRGT: CPT | Performed by: EMERGENCY MEDICINE

## 2022-07-11 PROCEDURE — 99285 EMERGENCY DEPT VISIT HI MDM: CPT

## 2022-07-11 PROCEDURE — 93005 ELECTROCARDIOGRAM TRACING: CPT

## 2022-07-11 PROCEDURE — 80307 DRUG TEST PRSMV CHEM ANLYZR: CPT | Performed by: EMERGENCY MEDICINE

## 2022-07-11 PROCEDURE — 80053 COMPREHEN METABOLIC PANEL: CPT | Performed by: EMERGENCY MEDICINE

## 2022-07-11 PROCEDURE — 84443 ASSAY THYROID STIM HORMONE: CPT | Performed by: EMERGENCY MEDICINE

## 2022-07-11 PROCEDURE — 82075 ASSAY OF BREATH ETHANOL: CPT | Performed by: EMERGENCY MEDICINE

## 2022-07-11 PROCEDURE — 85025 COMPLETE CBC W/AUTO DIFF WBC: CPT | Performed by: EMERGENCY MEDICINE

## 2022-07-11 PROCEDURE — 99285 EMERGENCY DEPT VISIT HI MDM: CPT | Performed by: EMERGENCY MEDICINE

## 2022-07-11 PROCEDURE — 36415 COLL VENOUS BLD VENIPUNCTURE: CPT | Performed by: EMERGENCY MEDICINE

## 2022-07-11 NOTE — LETTER
[facility] EMERGENCY DEPARTMENT  [facility] [id number]  [phone]  Dept: [phone]      EMTALA TRANSFER CONSENT    NAME Johanna MAZARIEGOS 1965                              MRN [id number]    I have been informed of my rights regarding examination, treatment, and transfer   by Dr Anna Anderson MD    Benefits: Continuity of care    Risks: Potential for delay in receiving treatment      { ED EMTALA TRANSFER CHOICES:[id number]}    I authorize the performance of emergency medical procedures and treatments upon me in both transit and upon arrival at the receiving facility  Additionally, I authorize the release of any and all medical records to the receiving facility and request they be transported with me, if possible  I understand that the safest mode of transportation during a medical emergency is an ambulance and that the Hospital advocates the use of this mode of transport  Risks of traveling to the receiving facility by car, including absence of medical control, life sustaining equipment, such as oxygen, and medical personnel has been explained to me and I fully understand them  (MARICEL CORRECT BOX BELOW)  [  ]  I consent to the stated transfer and to be transported by ambulance/helicopter  [  ]  I consent to the stated transfer, but refuse transportation by ambulance and accept full responsibility for my transportation by car    I understand the risks of non-ambulance transfers and I exonerate the Hospital and its staff from any deterioration in my condition that results from this refusal     X___________________________________________    DATE  22  TIME________  Signature of patient or legally responsible individual signing on patient behalf           RELATIONSHIP TO PATIENT_____self____________________          Provider Certification    NAME Johanna Mills DOB 1965                              MRN [id number]    A medical screening exam was performed on the above named patient  Based on the examination:    Condition Necessitating Transfer The encounter diagnosis was Depression with suicidal ideation  Patient Condition: The patient has been stabilized such that within reasonable medical probability, no material deterioration of the patient condition or the condition of the unborn child(flash) is likely to result from the transfer    Reason for Transfer: No bed available at level of patient's needs    Transfer Requirements: [address] [place] [address]   · Space available and qualified personnel available for treatment as acknowledged by Wendy Viera ; [phone]  · Agreed to accept transfer and to provide appropriate medical treatment as acknowledged by       Dr Mary Roberto  · Appropriate medical records of the examination and treatment of the patient are provided at the time of transfer   [address] [place] [address], [address] ____ap___  · Transfer will be performed by qualified personnel from [facility]  and appropriate transfer equipment as required, including the use of necessary and appropriate life support measures      Provider Certification: I have examined the patient and explained the following risks and benefits of being transferred/refusing transfer to the patient/family:  General risk, such as traffic hazards, adverse weather conditions, rough terrain or turbulence, possible failure of equipment (including vehicle or aircraft), or consequences of actions of persons outside the control of the transport personnel      Based on these reasonable risks and benefits to the patient and/or the unborn child(flash), and based upon the information available at the time of the patients examination, I certify that the medical benefits reasonably to be expected from the provision of appropriate medical treatments at another medical facility outweigh the increasing risks, if any, to the individuals medical condition, and in the case of labor to the unborn child, from effecting the transfer      X____________________________________________ DATE 07/12/22        TIME_______      ORIGINAL - SEND TO MEDICAL RECORDS   COPY - SEND WITH PATIENT DURING TRANSFER

## 2022-07-12 VITALS
BODY MASS INDEX: 23.73 KG/M2 | DIASTOLIC BLOOD PRESSURE: 99 MMHG | WEIGHT: 147 LBS | SYSTOLIC BLOOD PRESSURE: 133 MMHG | OXYGEN SATURATION: 100 % | TEMPERATURE: 98.2 F | HEART RATE: 62 BPM | RESPIRATION RATE: 18 BRPM

## 2022-07-12 LAB
ATRIAL RATE: 56 BPM
P AXIS: 36 DEGREES
PR INTERVAL: 150 MS
QRS AXIS: 25 DEGREES
QRSD INTERVAL: 100 MS
QT INTERVAL: 436 MS
QTC INTERVAL: 420 MS
T WAVE AXIS: 234 DEGREES
VENTRICULAR RATE: 56 BPM

## 2022-07-12 PROCEDURE — 93010 ELECTROCARDIOGRAM REPORT: CPT | Performed by: INTERNAL MEDICINE

## 2022-07-12 RX ORDER — ARIPIPRAZOLE 5 MG/1
10 TABLET ORAL
Status: DISCONTINUED | OUTPATIENT
Start: 2022-07-12 | End: 2022-07-12 | Stop reason: HOSPADM

## 2022-07-12 RX ORDER — ESCITALOPRAM OXALATE 10 MG/1
10 TABLET ORAL DAILY
Status: DISCONTINUED | OUTPATIENT
Start: 2022-07-12 | End: 2022-07-12 | Stop reason: HOSPADM

## 2022-07-12 RX ORDER — LISINOPRIL 10 MG/1
5 TABLET ORAL DAILY
Status: DISCONTINUED | OUTPATIENT
Start: 2022-07-12 | End: 2022-07-12 | Stop reason: HOSPADM

## 2022-07-12 RX ORDER — METOPROLOL SUCCINATE 25 MG/1
25 TABLET, EXTENDED RELEASE ORAL DAILY
Status: DISCONTINUED | OUTPATIENT
Start: 2022-07-12 | End: 2022-07-12 | Stop reason: HOSPADM

## 2022-07-12 RX ADMIN — METOPROLOL SUCCINATE 25 MG: 25 TABLET, FILM COATED, EXTENDED RELEASE ORAL at 11:07

## 2022-07-12 RX ADMIN — ESCITALOPRAM OXALATE 10 MG: 10 TABLET ORAL at 11:08

## 2022-07-12 RX ADMIN — LISINOPRIL 5 MG: 10 TABLET ORAL at 11:08

## 2022-07-12 RX ADMIN — ARIPIPRAZOLE 10 MG: 5 TABLET ORAL at 00:40

## 2022-07-12 NOTE — ED PROVIDER NOTES
n  History  Chief Complaint   Patient presents with    Psychiatric Evaluation     States increased depression due to relationship stressors/ recent breakup  SI no plan  Denies HI  States he is hearing voices telling him to "do it"  Denies AH  48-year-old male with a history of depression presenting for psychiatric evaluation  Patient states since this morning he has had increasing suicidal ideation without a plan  He endorses increasing auditory hallucinations in addition to racing thoughts  Denies visual hallucinations and homicidal ideations  Patient otherwise denies acute complaints  He states he is taking his medications as prescribed  Psychiatric Evaluation  Presenting symptoms: hallucinations and suicidal thoughts    Presenting symptoms: no agitation, no homicidal ideas and no paranoid behavior    Degree of incapacity (severity): Moderate  Onset quality:  Gradual  Timing:  Constant  Progression:  Worsening  Chronicity:  Recurrent  Context: drug abuse    Context: not alcohol use    Treatment compliance:  Most of the time  Associated symptoms: no abdominal pain, no anhedonia, no appetite change, no chest pain, no headaches and no insomnia    Risk factors: hx of mental illness        Prior to Admission Medications   Prescriptions Last Dose Informant Patient Reported? Taking?    ARIPiprazole (ABILIFY) 10 mg tablet   No No   Sig: Take 1 tablet (10 mg total) by mouth daily at bedtime   cholecalciferol (VITAMIN D3) 400 units tablet   No No   Sig: Take 2 tablets (800 Units total) by mouth daily   escitalopram (LEXAPRO) 10 mg tablet   No No   Sig: Take 1 tablet (10 mg total) by mouth daily   lisinopril (ZESTRIL) 5 mg tablet   No No   Sig: Take 1 tablet (5 mg total) by mouth daily   metoprolol succinate (TOPROL-XL) 25 mg 24 hr tablet   No No   Sig: Take 1 tablet (25 mg total) by mouth daily      Facility-Administered Medications: None       Past Medical History:   Diagnosis Date    Abnormal EKG     Depression     Substance use        Past Surgical History:   Procedure Laterality Date    NO PAST SURGERIES         History reviewed  No pertinent family history  I have reviewed and agree with the history as documented  E-Cigarette/Vaping    E-Cigarette Use Never User      E-Cigarette/Vaping Substances     Social History     Tobacco Use    Smoking status: Former Smoker     Packs/day: 0 20    Smokeless tobacco: Never Used   Vaping Use    Vaping Use: Never used   Substance Use Topics    Alcohol use: Yes     Comment: 40oz of beer a week    Drug use: No     Frequency: 1 0 times per week     Types: Marijuana     Comment: last night        Review of Systems   Constitutional: Negative for appetite change, chills and fever  HENT: Negative for congestion, rhinorrhea and sore throat  Eyes: Negative for pain, discharge and visual disturbance  Respiratory: Negative for cough and shortness of breath  Cardiovascular: Negative for chest pain, palpitations and leg swelling  Gastrointestinal: Negative for abdominal pain, diarrhea, nausea and vomiting  Genitourinary: Negative for dysuria, frequency and hematuria  Musculoskeletal: Negative for arthralgias and myalgias  Skin: Negative for color change and rash  Neurological: Negative for dizziness, weakness, light-headedness, numbness and headaches  Hematological: Does not bruise/bleed easily  Psychiatric/Behavioral: Positive for hallucinations and suicidal ideas  Negative for agitation, homicidal ideas and paranoia  The patient does not have insomnia  Physical Exam  Physical Exam  Vitals and nursing note reviewed  Constitutional:       General: He is not in acute distress  Appearance: Normal appearance  HENT:      Head: Normocephalic and atraumatic  Nose: Nose normal       Mouth/Throat:      Mouth: Mucous membranes are moist       Pharynx: No oropharyngeal exudate or posterior oropharyngeal erythema     Eyes:      General: No scleral icterus  Extraocular Movements: Extraocular movements intact  Conjunctiva/sclera: Conjunctivae normal       Pupils: Pupils are equal, round, and reactive to light  Cardiovascular:      Rate and Rhythm: Normal rate and regular rhythm  Pulmonary:      Effort: Pulmonary effort is normal  No respiratory distress  Breath sounds: No wheezing, rhonchi or rales  Abdominal:      General: There is no distension  Palpations: Abdomen is soft  Tenderness: There is no abdominal tenderness  There is no guarding or rebound  Musculoskeletal:         General: No deformity  Cervical back: Neck supple  Skin:     General: Skin is warm and dry  Findings: No rash  Neurological:      General: No focal deficit present  Mental Status: He is alert and oriented to person, place, and time  Mental status is at baseline  Psychiatric:      Comments: Flat affect, thought content normal         Vital Signs  ED Triage Vitals [07/11/22 2030]   Temperature Pulse Respirations Blood Pressure SpO2   98 4 °F (36 9 °C) 79 18 108/64 98 %      Temp Source Heart Rate Source Patient Position - Orthostatic VS BP Location FiO2 (%)   Oral Monitor Sitting Left arm --      Pain Score       --           Vitals:    07/11/22 2030   BP: 108/64   Pulse: 79   Patient Position - Orthostatic VS: Sitting         Visual Acuity      ED Medications  Medications - No data to display    Diagnostic Studies  Results Reviewed     Procedure Component Value Units Date/Time    TSH [[id number]]  (Normal) Collected: 07/11/22 2127    Lab Status: Final result Specimen: Blood from Arm, Right Updated: 07/11/22 2217     TSH 3RD GENERATON 0 541 uIU/mL     Narrative:      Patients undergoing fluorescein dye angiography may retain small amounts of fluorescein in the body for 48-72 hours post procedure  Samples containing fluorescein can produce falsely depressed TSH values   If the patient had this procedure,a specimen should be resubmitted post fluorescein clearance  FLU/RSV/COVID - if FLU/RSV clinically relevant [[id number]]  (Normal) Collected: 07/11/22 2122    Lab Status: Final result Specimen: Nares from Nose Updated: 07/11/22 2212     SARS-CoV-2 Negative     INFLUENZA A PCR Negative     INFLUENZA B PCR Negative     RSV PCR Negative    Narrative:      FOR PEDIATRIC PATIENTS - copy/paste COVID Guidelines URL to browser: [email or web address]    SARS-CoV-2 assay is a Nucleic Acid Amplification assay intended for the  qualitative detection of nucleic acid from SARS-CoV-2 in nasopharyngeal  swabs  Results are for the presumptive identification of SARS-CoV-2 RNA  Positive results are indicative of infection with SARS-CoV-2, the virus  causing COVID-19, but do not rule out bacterial infection or co-infection  with other viruses  Laboratories within the [place] and its  territories are required to report all positive results to the appropriate  public health authorities  Negative results do not preclude SARS-CoV-2  infection and should not be used as the sole basis for treatment or other  patient management decisions  Negative results must be combined with  clinical observations, patient history, and epidemiological information  This test has not been FDA cleared or approved  This test has been authorized by FDA under an Emergency Use Authorization  (EUA)  This test is only authorized for the duration of time the  declaration that circumstances exist justifying the authorization of the  emergency use of an in vitro diagnostic tests for detection of SARS-CoV-2  virus and/or diagnosis of COVID-19 infection under section 564(b)(1) of  the Act, 21 U  S C  112GNB-5(O)(9), unless the authorization is terminated  or revoked sooner  The test has been validated but independent review by FDA  and CLIA is pending  Test performed using [employer]:  This RT-PCR assay targets N2,  a region unique to SARS-CoV-2  A conserved region in the E-gene was chosen  for pan-Sarbecovirus detection which includes SARS-CoV-2  Rapid drug screen, urine [[id number]]  (Abnormal) Collected: 07/11/22 2121    Lab Status: Final result Specimen: Urine, Clean Catch Updated: 07/11/22 2156     Amph/Meth UR Negative     Barbiturate Ur Negative     Benzodiazepine Urine Negative     Cocaine Urine Positive     Methadone Urine Negative     Opiate Urine Negative     PCP Ur Negative     THC Urine Positive     Oxycodone Urine Negative    Narrative:      Presumptive report  If requested, specimen will be sent to reference lab for confirmation  FOR MEDICAL PURPOSES ONLY  IF CONFIRMATION NEEDED PLEASE CONTACT THE LAB WITHIN 5 DAYS      Drug Screen Cutoff Levels:  AMPHETAMINE/METHAMPHETAMINES  1000 ng/mL  BARBITURATES     200 ng/mL  BENZODIAZEPINES     200 ng/mL  COCAINE      300 ng/mL  METHADONE      300 ng/mL  OPIATES      300 ng/mL  PHENCYCLIDINE     25 ng/mL  THC       50 ng/mL  OXYCODONE      100 ng/mL    Comprehensive metabolic panel [[id number]]  (Abnormal) Collected: 07/11/22 2127    Lab Status: Final result Specimen: Blood from Arm, Right Updated: 07/11/22 2146     Sodium 139 mmol/L      Potassium 3 9 mmol/L      Chloride 108 mmol/L      CO2 24 mmol/L      ANION GAP 7 mmol/L      BUN 9 mg/dL      Creatinine 1 11 mg/dL      Glucose 109 mg/dL      Calcium 8 9 mg/dL      AST 44 U/L      ALT 41 U/L      Alkaline Phosphatase 80 U/L      Total Protein 7 5 g/dL      Albumin 4 0 g/dL      Total Bilirubin 0 40 mg/dL      eGFR 72 ml/min/1 73sq m     Narrative:      [facility] guidelines for Chronic Kidney Disease (CKD):     Stage 1 with normal or high GFR (GFR > 90 mL/min/1 73 square meters)    Stage 2 Mild CKD (GFR = 60-89 mL/min/1 73 square meters)    Stage 3A Moderate CKD (GFR = 45-59 mL/min/1 73 square meters)    Stage 3B Moderate CKD (GFR = 30-44 mL/min/1 73 square meters)    Stage 4 Severe CKD (GFR = 15-29 mL/min/1 73 square meters)    Stage 5 End Stage CKD (GFR <15 mL/min/1 73 square meters)  Note: GFR calculation is accurate only with a steady state creatinine    CBC and differential [[id number]]  (Abnormal) Collected: 07/11/22 2127    Lab Status: Final result Specimen: Blood from Arm, Right Updated: 07/11/22 2134     WBC 6 29 Thousand/uL      RBC 5 70 Million/uL      Hemoglobin 14 3 g/dL      Hematocrit 44 5 %      MCV 78 fL      MCH 25 1 pg      MCHC 32 1 g/dL      RDW 14 2 %      MPV 9 3 fL      Platelets 151 Thousands/uL      nRBC 0 /100 WBCs      Neutrophils Relative 47 %      Immat GRANS % 0 %      Lymphocytes Relative 36 %      Monocytes Relative 11 %      Eosinophils Relative 5 %      Basophils Relative 1 %      Neutrophils Absolute 2 95 Thousands/µL      Immature Grans Absolute 0 02 Thousand/uL      Lymphocytes Absolute 2 25 Thousands/µL      Monocytes Absolute 0 69 Thousand/µL      Eosinophils Absolute 0 32 Thousand/µL      Basophils Absolute 0 06 Thousands/µL     POCT alcohol breath test [[id number]]  (Normal) Resulted: 07/11/22 2122    Lab Status: Final result Updated: 07/11/22 2122     EXTBreath Alcohol 0                 No orders to display              Procedures  ECG 12 Lead Documentation Only    Date/Time: 7/12/2022 12:08 AM  Performed by: Rosa Isela Connor MD  Authorized by: Rosa Isela Connor MD     Indications / Diagnosis:  Eval for dysrhythmia  ECG reviewed by me, the ED Provider: yes    Patient location:  ED  Previous ECG:     Previous ECG:  Compared to current    Comparison ECG info:  6/27/2022    Similarity:  No change  Interpretation:     Interpretation: abnormal    Rate:     ECG rate:  56    ECG rate assessment: bradycardic    Rhythm:     Rhythm: sinus bradycardia    Ectopy:     Ectopy: none    QRS:     QRS axis:  Normal    QRS intervals:  Normal  Conduction:     Conduction: normal    ST segments:     ST segments:  Normal  T waves:     T waves: non-specific and inverted      Inverted:  AVF  Comments:      Compared to previous EKGs, T wave changes are old  No acute ischemic changes or malignant dysrhythmia             ED Course  ED Course as of 07/12/22 0011   Mon Jul 11, 2022 2133 EXTBreath Alcohol: 0   2155 Comprehensive metabolic panel(!)  Grossly normal   2155 CBC and differential(!)  Grossly normal   2158 [facility] URINE(!): Positive   2158 COCAINE URINE(!): Positive   2223 TSH 3RD GENERATON: 0 541   2223 SARS-COV-2: Negative   2223 INFLU A PCR: Negative   2223 INFLU B PCR: Negative   2223 RSV PCR: Negative   2255 Patient signed 201  MDM  Number of Diagnoses or Management Options  Depression with suicidal ideation  Diagnosis management comments: 39-year-old male presenting for psychiatric evaluation with increasing suicidal ideation and auditory hallucinations  Medical screening EKG and laboratory studies were performed  EKG shows sinus bradycardia with nonspecific T-wave changes but no evidence of acute ischemia or malignant dysrhythmia  Laboratory studies obtained are grossly normal   UDS positive for cocaine and marijuana  Patient met with ED crisis and signed a 201  Patient is medically appropriate for psychiatric disposition  Currently awaiting placement  Patient signed out to my colleague pending final disposition  Will continue safety plan and order home medications  Disposition  Final diagnoses:   Depression with suicidal ideation     Time reflects when diagnosis was documented in both MDM as applicable and the Disposition within this note     Time User Action Codes Description Comment    7/11/2022  9:56 PM Elisa Holt Add Ada FERNANDO,  [id number]] Depression with suicidal ideation       ED Disposition     ED Disposition   Transfer to [address] [place] [address]   --    Date/Time   Mon Jul 11, 2022  9:56 PM    Comment   Ha Diaz should be transferred out to behavioral health and has been medically cleared  Follow-up Information    None         Patient's Medications   Discharge Prescriptions    No medications on file       No discharge procedures on file      PDMP Review     None          ED Provider  Electronically Signed by           Sha Hernandez MD  07/12/22 7974

## 2022-07-12 NOTE — ED NOTES
Transportation: 1730 pickup  [facility] to provide transportaion    RUTH completed    Jakin is aware of admission time

## 2022-07-12 NOTE — ED NOTES
Patient is accepted at [facility]  Patient is accepted by Dr Lanny Turcios per Maybee in admissions  Transportation is arranged with TBD  Transportation is scheduled for TBD  Patient may go to the floor at D once discharge times are established  Nurse report is to be called to ** prior to patient transfer  Ed from [facility] to complete precert  Admissions will follow up with AM crisis worker to arrange transport once discharge times are established

## 2022-07-12 NOTE — ED NOTES
Pt given turkey sandwich, chips, pretzels and water/apple juice       Janet Portillo RN  07/11/22 2927

## 2022-07-12 NOTE — ED NOTES
Bed search:    [facility] male [facility]  [place]-no answer  [facility]-adult dc beds  [facility]-adult low acuity beds  [facility]-no beds  [facility]-full  [facility]-wd beds  [place]- low acuity adult dc beds  [place]-1 adult bed (can be straight psych or dual)    Clinical faxed to [facility]  Bed search to continue if needed

## 2022-07-12 NOTE — ED NOTES
Patient presented to the ED as a self-referral  Pt reported suicidal ideations without a current plan and command auditory hallucinations to "do it" (referring to harming self)  Pt reports the suicidal ideations acutely began a few hours prior to arrival  Pt reports increase in depression due to a recent break up and relationship stressors  Pt would not elaboratefurther, but chart review indicated Pt found his significant other in bed with his close friend leading up to his most recent admission  Pt reports he attended his intake on 7/5, but he is unable to provide any further information  Pt reports he has not been eating or sleeping well  Pt was most recently discharged from [facility] on 6/30/22  Pt reports that he was suicidal with a plan to cut himself leading up to his previous admission, but did not act on it due to people being around him  Pt denied homicidal ideations and visual hallucinations  He reports command auditory hallucinations to "do it"  It is unclear if Pt has been compliant with medications as he previously reported  Pt does appear guarded throughout assessment  Pt is in agreement with signing a 201 and appears to understand his voluntary treatment rights  ED attending in agreement with treatment plan

## 2022-07-12 NOTE — ED NOTES
Assumed care of pt at this time  Pt resting with no signs of distress noted  Virtual 1:1 in progress with personnel in view of patient  Will continue to monitor        Opal Tiwari RN  07/11/22 7990

## 2022-07-13 ENCOUNTER — TELEPHONE (OUTPATIENT)
Dept: CARDIOLOGY CLINIC | Facility: CLINIC | Age: 57
End: 2022-07-13

## 2022-07-13 NOTE — TELEPHONE ENCOUNTER
Called pt left vm of appt change due to Renetta schedule change put pt in with Dr Corin Frances 7/19 waiting for pt to call and confirm

## 2022-07-25 ENCOUNTER — HOSPITAL ENCOUNTER (INPATIENT)
Facility: HOSPITAL | Age: 57
LOS: 5 days | Discharge: HOME | DRG: 751 | End: 2022-08-01
Attending: EMERGENCY MEDICINE | Admitting: PSYCHIATRY & NEUROLOGY
Payer: COMMERCIAL

## 2022-07-25 DIAGNOSIS — F32.A DEPRESSION WITH SUICIDAL IDEATION: ICD-10-CM

## 2022-07-25 DIAGNOSIS — F39 UNSPECIFIED MOOD (AFFECTIVE) DISORDER (HCC): Primary | ICD-10-CM

## 2022-07-25 DIAGNOSIS — R44.0 AUDITORY HALLUCINATIONS: ICD-10-CM

## 2022-07-25 DIAGNOSIS — R45.851 DEPRESSION WITH SUICIDAL IDEATION: ICD-10-CM

## 2022-07-25 DIAGNOSIS — F33.2 SEVERE EPISODE OF RECURRENT MAJOR DEPRESSIVE DISORDER, WITHOUT PSYCHOTIC FEATURES (HCC): ICD-10-CM

## 2022-07-25 DIAGNOSIS — E55.9 VITAMIN D DEFICIENCY: ICD-10-CM

## 2022-07-25 DIAGNOSIS — Z00.8 MEDICAL CLEARANCE FOR PSYCHIATRIC ADMISSION: ICD-10-CM

## 2022-07-25 LAB
ALBUMIN SERPL BCP-MCNC: 4 G/DL (ref 3.5–5)
ALP SERPL-CCNC: 85 U/L (ref 43–122)
ALT SERPL W P-5'-P-CCNC: 46 U/L
AMPHETAMINES SERPL QL SCN: NEGATIVE
ANION GAP SERPL CALCULATED.3IONS-SCNC: 8 MMOL/L (ref 5–14)
AST SERPL W P-5'-P-CCNC: 39 U/L (ref 17–59)
ATRIAL RATE: 60 BPM
BACTERIA UR QL AUTO: NORMAL /HPF
BARBITURATES UR QL: NEGATIVE
BASOPHILS # BLD AUTO: 0.06 THOUSANDS/ΜL (ref 0–0.1)
BASOPHILS NFR BLD AUTO: 1 % (ref 0–1)
BENZODIAZ UR QL: NEGATIVE
BILIRUB SERPL-MCNC: 0.58 MG/DL (ref 0.2–1)
BILIRUB UR QL STRIP: NEGATIVE
BUN SERPL-MCNC: 13 MG/DL (ref 5–25)
CALCIUM SERPL-MCNC: 8.9 MG/DL (ref 8.4–10.2)
CHLORIDE SERPL-SCNC: 105 MMOL/L (ref 96–108)
CLARITY UR: CLEAR
CO2 SERPL-SCNC: 25 MMOL/L (ref 21–32)
COCAINE UR QL: POSITIVE
COLOR UR: ABNORMAL
CREAT SERPL-MCNC: 1.02 MG/DL (ref 0.7–1.5)
EOSINOPHIL # BLD AUTO: 0.34 THOUSAND/ΜL (ref 0–0.61)
EOSINOPHIL NFR BLD AUTO: 6 % (ref 0–6)
ERYTHROCYTE [DISTWIDTH] IN BLOOD BY AUTOMATED COUNT: 14.3 % (ref 11.6–15.1)
ETHANOL EXG-MCNC: 0 MG/DL
GFR SERPL CREATININE-BSD FRML MDRD: 80 ML/MIN/1.73SQ M
GLUCOSE SERPL-MCNC: 127 MG/DL (ref 70–99)
GLUCOSE UR STRIP-MCNC: NEGATIVE MG/DL
HCT VFR BLD AUTO: 43.4 % (ref 36.5–49.3)
HGB BLD-MCNC: 14 G/DL (ref 12–17)
HGB UR QL STRIP.AUTO: NEGATIVE
IMM GRANULOCYTES # BLD AUTO: 0.03 THOUSAND/UL (ref 0–0.2)
IMM GRANULOCYTES NFR BLD AUTO: 1 % (ref 0–2)
KETONES UR STRIP-MCNC: NEGATIVE MG/DL
LEUKOCYTE ESTERASE UR QL STRIP: 25
LYMPHOCYTES # BLD AUTO: 1.5 THOUSANDS/ΜL (ref 0.6–4.47)
LYMPHOCYTES NFR BLD AUTO: 24 % (ref 14–44)
MCH RBC QN AUTO: 25.2 PG (ref 26.8–34.3)
MCHC RBC AUTO-ENTMCNC: 32.3 G/DL (ref 31.4–37.4)
MCV RBC AUTO: 78 FL (ref 82–98)
METHADONE UR QL: NEGATIVE
MONOCYTES # BLD AUTO: 0.65 THOUSAND/ΜL (ref 0.17–1.22)
MONOCYTES NFR BLD AUTO: 11 % (ref 4–12)
NEUTROPHILS # BLD AUTO: 3.59 THOUSANDS/ΜL (ref 1.85–7.62)
NEUTS SEG NFR BLD AUTO: 57 % (ref 43–75)
NITRITE UR QL STRIP: NEGATIVE
NON-SQ EPI CELLS URNS QL MICRO: NORMAL /HPF
NRBC BLD AUTO-RTO: 0 /100 WBCS
OPIATES UR QL SCN: NEGATIVE
OXYCODONE+OXYMORPHONE UR QL SCN: NEGATIVE
P AXIS: 40 DEGREES
PCP UR QL: NEGATIVE
PH UR STRIP.AUTO: 6 [PH]
PLATELET # BLD AUTO: 193 THOUSANDS/UL (ref 149–390)
PMV BLD AUTO: 9.5 FL (ref 8.9–12.7)
POTASSIUM SERPL-SCNC: 3.9 MMOL/L (ref 3.5–5.3)
PR INTERVAL: 152 MS
PROT SERPL-MCNC: 7.6 G/DL (ref 6.4–8.4)
PROT UR STRIP-MCNC: NEGATIVE MG/DL
QRS AXIS: 38 DEGREES
QRSD INTERVAL: 98 MS
QT INTERVAL: 430 MS
QTC INTERVAL: 430 MS
RBC # BLD AUTO: 5.56 MILLION/UL (ref 3.88–5.62)
RBC #/AREA URNS AUTO: NORMAL /HPF
SARS-COV-2 RNA RESP QL NAA+PROBE: NEGATIVE
SODIUM SERPL-SCNC: 138 MMOL/L (ref 135–147)
SP GR UR STRIP.AUTO: 1.01 (ref 1–1.04)
T WAVE AXIS: 258 DEGREES
THC UR QL: POSITIVE
TSH SERPL DL<=0.05 MIU/L-ACNC: 0.32 UIU/ML (ref 0.45–4.5)
UROBILINOGEN UA: NEGATIVE MG/DL
VENTRICULAR RATE: 60 BPM
WBC # BLD AUTO: 6.17 THOUSAND/UL (ref 4.31–10.16)
WBC #/AREA URNS AUTO: NORMAL /HPF

## 2022-07-25 PROCEDURE — 82075 ASSAY OF BREATH ETHANOL: CPT | Performed by: EMERGENCY MEDICINE

## 2022-07-25 PROCEDURE — 99285 EMERGENCY DEPT VISIT HI MDM: CPT

## 2022-07-25 PROCEDURE — 93005 ELECTROCARDIOGRAM TRACING: CPT

## 2022-07-25 PROCEDURE — 36415 COLL VENOUS BLD VENIPUNCTURE: CPT | Performed by: EMERGENCY MEDICINE

## 2022-07-25 PROCEDURE — 85025 COMPLETE CBC W/AUTO DIFF WBC: CPT | Performed by: EMERGENCY MEDICINE

## 2022-07-25 PROCEDURE — U0005 INFEC AGEN DETEC AMPLI PROBE: HCPCS | Performed by: EMERGENCY MEDICINE

## 2022-07-25 PROCEDURE — 80061 LIPID PANEL: CPT

## 2022-07-25 PROCEDURE — 81003 URINALYSIS AUTO W/O SCOPE: CPT | Performed by: EMERGENCY MEDICINE

## 2022-07-25 PROCEDURE — 84443 ASSAY THYROID STIM HORMONE: CPT | Performed by: EMERGENCY MEDICINE

## 2022-07-25 PROCEDURE — 80307 DRUG TEST PRSMV CHEM ANLYZR: CPT | Performed by: EMERGENCY MEDICINE

## 2022-07-25 PROCEDURE — U0003 INFECTIOUS AGENT DETECTION BY NUCLEIC ACID (DNA OR RNA); SEVERE ACUTE RESPIRATORY SYNDROME CORONAVIRUS 2 (SARS-COV-2) (CORONAVIRUS DISEASE [COVID-19]), AMPLIFIED PROBE TECHNIQUE, MAKING USE OF HIGH THROUGHPUT TECHNOLOGIES AS DESCRIBED BY CMS-2020-01-R: HCPCS | Performed by: EMERGENCY MEDICINE

## 2022-07-25 PROCEDURE — 84443 ASSAY THYROID STIM HORMONE: CPT

## 2022-07-25 PROCEDURE — 99285 EMERGENCY DEPT VISIT HI MDM: CPT | Performed by: EMERGENCY MEDICINE

## 2022-07-25 PROCEDURE — 81001 URINALYSIS AUTO W/SCOPE: CPT | Performed by: EMERGENCY MEDICINE

## 2022-07-25 PROCEDURE — 93010 ELECTROCARDIOGRAM REPORT: CPT | Performed by: INTERNAL MEDICINE

## 2022-07-25 PROCEDURE — 80053 COMPREHEN METABOLIC PANEL: CPT | Performed by: EMERGENCY MEDICINE

## 2022-07-25 NOTE — ED NOTES
Insurance   EVS (Eligibility Verification System) called - [phone]    Automated system indicates: **active with [facility]  ID # [id number]

## 2022-07-25 NOTE — ED PROVIDER NOTES
History  Chief Complaint   Patient presents with    Psychiatric Evaluation     States had an argument with his girlfriend today due to her cheating on him  States she threatened to leave him "I don't know what I would do with her leaving  We have been together for a long time  I think it is time to end my life"  States he was trying to find a knife to cut his wrist or throat "I think that's the quickest way  States "I was thinking about hurting her first and then ending my life"  +command AH to end his life  62 yo male with a history of polysubstance abuse and major depressive disorder presents to the ED requesting a psychiatric evaluation  The patient states he got into a verbal altercation with his girlfriend earlier today because he caught her cheating on him  She threatened to leave him during the argument "so I started thinking about killing myself"  The patient says he is planning to "slit my wrist or throat" with a knife  (+) Vague HI towards his girlfriend "but I don't have the strength"  (+) Command hallucinations --> "voices telling me to kill myself"  (+) ETOH intake earlier today  No visual hallucinations  No other specific complaints  Of note, the patient is adamantly requesting food because "I haven't eaten all day"  Prior to Admission Medications   Prescriptions Last Dose Informant Patient Reported? Taking?    ARIPiprazole (ABILIFY) 10 mg tablet   No No   Sig: Take 1 tablet (10 mg total) by mouth daily at bedtime   cholecalciferol (VITAMIN D3) 400 units tablet   No No   Sig: Take 2 tablets (800 Units total) by mouth daily   escitalopram (LEXAPRO) 10 mg tablet   No No   Sig: Take 1 tablet (10 mg total) by mouth daily   lisinopril (ZESTRIL) 5 mg tablet   No No   Sig: Take 1 tablet (5 mg total) by mouth daily   metoprolol succinate (TOPROL-XL) 25 mg 24 hr tablet   No No   Sig: Take 1 tablet (25 mg total) by mouth daily      Facility-Administered Medications: None       Past Medical History: Diagnosis Date    Abnormal EKG     Depression     Substance use        Past Surgical History:   Procedure Laterality Date    NO PAST SURGERIES         History reviewed  No pertinent family history  I have reviewed and agree with the history as documented  E-Cigarette/Vaping    E-Cigarette Use Never User      E-Cigarette/Vaping Substances     Social History     Tobacco Use    Smoking status: Current Every Day Smoker     Packs/day: 0 20    Smokeless tobacco: Never Used   Vaping Use    Vaping Use: Never used   Substance Use Topics    Alcohol use: Yes     Comment: 40oz of beer a week every now and then"    Drug use: No     Frequency: 1 0 times per week     Types: Marijuana     Comment: last night        Review of Systems   Constitutional: Negative for chills and fever  HENT: Negative for sore throat  Respiratory: Negative for cough and shortness of breath  Cardiovascular: Negative for chest pain and palpitations  Gastrointestinal: Negative for abdominal pain, diarrhea, nausea and vomiting  Endocrine: Negative for cold intolerance and heat intolerance  Genitourinary: Negative for dysuria and flank pain  Musculoskeletal: Negative for back pain  Skin: Negative for rash  Allergic/Immunologic: Negative for immunocompromised state  Neurological: Negative for headaches  Hematological: Negative for adenopathy  Psychiatric/Behavioral: Positive for dysphoric mood, hallucinations and suicidal ideas  The patient is not nervous/anxious  Physical Exam  Physical Exam  Constitutional:       General: He is not in acute distress  Appearance: He is well-developed  HENT:      Head: Normocephalic and atraumatic  Eyes:      Pupils: Pupils are equal, round, and reactive to light  Cardiovascular:      Rate and Rhythm: Normal rate and regular rhythm  Pulmonary:      Effort: Pulmonary effort is normal  No respiratory distress  Breath sounds: Normal breath sounds     Abdominal: General: There is no distension  Palpations: Abdomen is soft  Tenderness: There is no abdominal tenderness  Musculoskeletal:         General: Normal range of motion  Cervical back: Normal range of motion and neck supple  Skin:     General: Skin is warm and dry  Neurological:      Mental Status: He is alert and oriented to person, place, and time  Psychiatric:         Attention and Perception: He perceives auditory hallucinations  He does not perceive visual hallucinations  Mood and Affect: Mood normal          Speech: Speech normal          Behavior: Behavior is cooperative  Thought Content: Thought content includes homicidal and suicidal ideation  Thought content includes suicidal plan  Thought content does not include homicidal plan  Vital Signs  ED Triage Vitals [07/25/22 1626]   Temperature Pulse Respirations Blood Pressure SpO2   98 2 °F (36 8 °C) 71 18 133/68 98 %      Temp Source Heart Rate Source Patient Position - Orthostatic VS BP Location FiO2 (%)   Oral Monitor Sitting Left arm --      Pain Score       --           Vitals:    07/25/22 1626 07/25/22 1953 07/25/22 2315   BP: 133/68 107/65 107/65   Pulse: 71 77 58   Patient Position - Orthostatic VS: Sitting  Lying         Visual Acuity      ED Medications  Medications - No data to display    Diagnostic Studies  Results Reviewed     Procedure Component Value Units Date/Time    Rapid drug screen, urine [[id number]]  (Abnormal) Collected: 07/25/22 1852    Lab Status: Final result Specimen: Urine, Clean Catch Updated: 07/25/22 1922     Amph/Meth UR Negative     Barbiturate Ur Negative     Benzodiazepine Urine Negative     Cocaine Urine Positive     Methadone Urine Negative     Opiate Urine Negative     PCP Ur Negative     THC Urine Positive     Oxycodone Urine Negative    Narrative:      Presumptive report  If requested, specimen will be sent to reference lab for confirmation  FOR MEDICAL PURPOSES ONLY     IF CONFIRMATION NEEDED PLEASE CONTACT THE LAB WITHIN 5 DAYS  Drug Screen Cutoff Levels:  AMPHETAMINE/METHAMPHETAMINES  1000 ng/mL  BARBITURATES     200 ng/mL  BENZODIAZEPINES     200 ng/mL  COCAINE      300 ng/mL  METHADONE      300 ng/mL  OPIATES      300 ng/mL  PHENCYCLIDINE     25 ng/mL  THC       50 ng/mL  OXYCODONE      100 ng/mL    Urine Microscopic [[id number]]  (Normal) Collected: 07/25/22 1852    Lab Status: Final result Specimen: Urine, Clean Catch Updated: 07/25/22 1922     RBC, UA None Seen /hpf      WBC, UA 2-4 /hpf      Epithelial Cells Occasional /hpf      Bacteria, UA None Seen /hpf     UA w Reflex to Microscopic w Reflex to Culture [[id number]]  (Abnormal) Collected: 07/25/22 1852    Lab Status: Final result Specimen: Urine, Clean Catch Updated: 07/25/22 1901     Color, UA Straw     Clarity, UA Clear     Specific Gravity, UA 1 015     pH, UA 6 0     Leukocytes, UA 25 0     Nitrite, UA Negative     Protein, UA Negative mg/dl      Glucose, UA Negative mg/dl      Ketones, UA Negative mg/dl      Bilirubin, UA Negative     Occult Blood, UA Negative     UROBILINOGEN UA Negative mg/dL     COVID only [[id number]]  (Normal) Collected: 07/25/22 1735    Lab Status: Final result Specimen: Nares from Nose Updated: 07/25/22 1831     SARS-CoV-2 Negative    Narrative:      FOR PEDIATRIC PATIENTS - copy/paste COVID Guidelines URL to browser: [email or web address]    SARS-CoV-2 assay is a Nucleic Acid Amplification assay intended for the  qualitative detection of nucleic acid from SARS-CoV-2 in nasopharyngeal  swabs  Results are for the presumptive identification of SARS-CoV-2 RNA  Positive results are indicative of infection with SARS-CoV-2, the virus  causing COVID-19, but do not rule out bacterial infection or co-infection  with other viruses   Laboratories within the [place] and its  territories are required to report all positive results to the appropriate  public health authorities  Negative results do not preclude SARS-CoV-2  infection and should not be used as the sole basis for treatment or other  patient management decisions  Negative results must be combined with  clinical observations, patient history, and epidemiological information  This test has not been FDA cleared or approved  This test has been authorized by FDA under an Emergency Use Authorization  (EUA)  This test is only authorized for the duration of time the  declaration that circumstances exist justifying the authorization of the  emergency use of an in vitro diagnostic tests for detection of SARS-CoV-2  virus and/or diagnosis of COVID-19 infection under section 564(b)(1) of  the Act, 21 U  S C  428FUS-6(A)(7), unless the authorization is terminated  or revoked sooner  The test has been validated but independent review by FDA  and CLIA is pending  Test performed using [employer] [facility]: This RT-PCR assay targets N2,  a region unique to SARS-CoV-2  A conserved region in the E-gene was chosen  for pan-Sarbecovirus detection which includes SARS-CoV-2  TSH [[id number]]  (Abnormal) Collected: 07/25/22 1713    Lab Status: Final result Specimen: Blood from Arm, Right Updated: 07/25/22 1824     TSH 3RD GENERATON 0 324 uIU/mL     Narrative:      Patients undergoing fluorescein dye angiography may retain small amounts of fluorescein in the body for 48-72 hours post procedure  Samples containing fluorescein can produce falsely depressed TSH values  If the patient had this procedure,a specimen should be resubmitted post fluorescein clearance        Comprehensive metabolic panel [[id number]]  (Abnormal) Collected: 07/25/22 1713    Lab Status: Final result Specimen: Blood from Arm, Right Updated: 07/25/22 1731     Sodium 138 mmol/L      Potassium 3 9 mmol/L      Chloride 105 mmol/L      CO2 25 mmol/L      ANION GAP 8 mmol/L      BUN 13 mg/dL      Creatinine 1 02 mg/dL      Glucose 127 mg/dL Calcium 8 9 mg/dL      AST 39 U/L      ALT 46 U/L      Alkaline Phosphatase 85 U/L      Total Protein 7 6 g/dL      Albumin 4 0 g/dL      Total Bilirubin 0 58 mg/dL      eGFR 80 ml/min/1 73sq m     Narrative:      National Kidney Disease Foundation guidelines for Chronic Kidney Disease (CKD):     Stage 1 with normal or high GFR (GFR > 90 mL/min/1 73 square meters)    Stage 2 Mild CKD (GFR = 60-89 mL/min/1 73 square meters)    Stage 3A Moderate CKD (GFR = 45-59 mL/min/1 73 square meters)    Stage 3B Moderate CKD (GFR = 30-44 mL/min/1 73 square meters)    Stage 4 Severe CKD (GFR = 15-29 mL/min/1 73 square meters)    Stage 5 End Stage CKD (GFR <15 mL/min/1 73 square meters)  Note: GFR calculation is accurate only with a steady state creatinine    CBC and differential [[id number]]  (Abnormal) Collected: 07/25/22 1713    Lab Status: Final result Specimen: Blood from Arm, Right Updated: 07/25/22 1718     WBC 6 17 Thousand/uL      RBC 5 56 Million/uL      Hemoglobin 14 0 g/dL      Hematocrit 43 4 %      MCV 78 fL      MCH 25 2 pg      MCHC 32 3 g/dL      RDW 14 3 %      MPV 9 5 fL      Platelets 160 Thousands/uL      nRBC 0 /100 WBCs      Neutrophils Relative 57 %      Immat GRANS % 1 %      Lymphocytes Relative 24 %      Monocytes Relative 11 %      Eosinophils Relative 6 %      Basophils Relative 1 %      Neutrophils Absolute 3 59 Thousands/µL      Immature Grans Absolute 0 03 Thousand/uL      Lymphocytes Absolute 1 50 Thousands/µL      Monocytes Absolute 0 65 Thousand/µL      Eosinophils Absolute 0 34 Thousand/µL      Basophils Absolute 0 06 Thousands/µL     POCT alcohol breath test [[id number]]  (Normal) Resulted: 07/25/22 1712    Lab Status: Final result Updated: 07/25/22 1712     EXTBreath Alcohol 0 000                 No orders to display              Procedures  ECG 12 Lead Documentation Only    Date/Time: 7/25/2022 7:18 PM  Performed by: Catalina Concepcion MD  Authorized by: Catalina Concepcion MD Indications / Diagnosis:  Psych eval  ECG reviewed by me, the ED Provider: yes    Interpretation:     Interpretation: abnormal    Rate:     ECG rate:  60 bpm    ECG rate assessment: normal    Rhythm:     Rhythm: sinus rhythm    Ectopy:     Ectopy: none    QRS:     QRS axis:  Normal    QRS intervals:  Normal  Conduction:     Conduction: normal    ST segments:     ST segments:  Non-specific  T waves:     T waves: inverted      Inverted:  V5, V6, aVF, II and III             ED Course                                             MDM  Number of Diagnoses or Management Options  Auditory hallucinations  Depression with suicidal ideation  Diagnosis management comments: The patient is very well appearing with stable vital signs and a benign exam  (+) Vague SI and HI with command AH  He is pleasant and cooperative with the history/exam  Case discussed with Crisis --> they will evaluate the patient in the ED  Disposition per crisis worker recommendations  00:35 The patient signed 201 paperwork  Inpatient bed search initiated  Amount and/or Complexity of Data Reviewed  Clinical lab tests: ordered and reviewed  Tests in the medicine section of CPT®: ordered and reviewed    Patient Progress  Patient progress: stable      Disposition  Final diagnoses:   Depression with suicidal ideation   Auditory hallucinations     Time reflects when diagnosis was documented in both MDM as applicable and the Disposition within this note     Time User Action Codes Description Comment    7/25/2022  4:55 PM Brad Valdes Add [Y95  A,  R45 851] Depression with suicidal ideation     7/25/2022  4:55 PM Brad Valdes Add [R44 0] Auditory hallucinations       ED Disposition     ED Disposition   Transfer to [address] [place] [address]   --    Date/Time   Mon Jul 25, 2022  4:57 PM    Comment   Nadine Ped should be transferred out to inpatient U and has been medically cleared             Follow-up Information    None Patient's Medications   Discharge Prescriptions    No medications on file       No discharge procedures on file      PDMP Review     None          ED Provider  Electronically Signed by           Christi Franks MD  07/26/22 9556

## 2022-07-26 PROCEDURE — 99253 IP/OBS CNSLTJ NEW/EST LOW 45: CPT | Performed by: STUDENT IN AN ORGANIZED HEALTH CARE EDUCATION/TRAINING PROGRAM

## 2022-07-26 PROCEDURE — 96372 THER/PROPH/DIAG INJ SC/IM: CPT

## 2022-07-26 RX ORDER — BENZTROPINE MESYLATE 1 MG/ML
1 INJECTION INTRAMUSCULAR; INTRAVENOUS ONCE
Status: COMPLETED | OUTPATIENT
Start: 2022-07-26 | End: 2022-07-26

## 2022-07-26 RX ORDER — LORAZEPAM 2 MG/ML
2 INJECTION INTRAMUSCULAR ONCE
Status: COMPLETED | OUTPATIENT
Start: 2022-07-26 | End: 2022-07-26

## 2022-07-26 RX ORDER — HALOPERIDOL 5 MG/ML
10 INJECTION INTRAMUSCULAR ONCE
Status: COMPLETED | OUTPATIENT
Start: 2022-07-26 | End: 2022-07-26

## 2022-07-26 RX ADMIN — HALOPERIDOL LACTATE 10 MG: 5 INJECTION, SOLUTION INTRAMUSCULAR at 15:30

## 2022-07-26 RX ADMIN — LORAZEPAM 2 MG: 2 INJECTION INTRAMUSCULAR; INTRAVENOUS at 15:30

## 2022-07-26 RX ADMIN — BENZTROPINE MESYLATE 1 MG: 1 INJECTION INTRAMUSCULAR; INTRAVENOUS at 15:30
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
## 2022-07-26 NOTE — ED NOTES
Pt now calm, apologized for his behavior, requested to have restraints removed 'ill lay down, im cool now, Ill just take a nap"  Right arm and left leg removed, explained to pt if his behavior continues to be acceptable we can remove all restraints later  Pt ok with plan    1:1 continues     [facility]  07/26/22 6562

## 2022-07-26 NOTE — ED PROVIDER NOTES
History  Chief Complaint   Patient presents with    Psychiatric Evaluation     States had an argument with his girlfriend today due to her cheating on him  States she threatened to leave him "I don't know what I would do with her leaving  We have been together for a long time  I think it is time to end my life"  States he was trying to find a knife to cut his wrist or throat "I think that's the quickest way  States "I was thinking about hurting her first and then ending my life"  +command AH to end his life  HPI    Prior to Admission Medications   Prescriptions Last Dose Informant Patient Reported? Taking? ARIPiprazole (ABILIFY) 10 mg tablet   No No   Sig: Take 1 tablet (10 mg total) by mouth daily at bedtime   cholecalciferol (VITAMIN D3) 400 units tablet   No No   Sig: Take 2 tablets (800 Units total) by mouth daily   escitalopram (LEXAPRO) 10 mg tablet   No No   Sig: Take 1 tablet (10 mg total) by mouth daily   lisinopril (ZESTRIL) 5 mg tablet   No No   Sig: Take 1 tablet (5 mg total) by mouth daily   metoprolol succinate (TOPROL-XL) 25 mg 24 hr tablet   No No   Sig: Take 1 tablet (25 mg total) by mouth daily      Facility-Administered Medications: None       Past Medical History:   Diagnosis Date    Abnormal EKG     Depression     Substance use        Past Surgical History:   Procedure Laterality Date    NO PAST SURGERIES         History reviewed  No pertinent family history  I have reviewed and agree with the history as documented      E-Cigarette/Vaping    E-Cigarette Use Never User      E-Cigarette/Vaping Substances     Social History     Tobacco Use    Smoking status: Current Every Day Smoker     Packs/day: 0 20    Smokeless tobacco: Never Used   Vaping Use    Vaping Use: Never used   Substance Use Topics    Alcohol use: Yes     Comment: 40oz of beer a week every now and then"    Drug use: No     Frequency: 1 0 times per week     Types: Marijuana     Comment: last night         Review of Systems    Physical Exam  ED Triage Vitals [07/25/22 1626]   Temperature Pulse Respirations Blood Pressure SpO2   98 2 °F (36 8 °C) 71 18 133/68 98 %      Temp Source Heart Rate Source Patient Position - Orthostatic VS BP Location FiO2 (%)   Oral Monitor Sitting Left arm --      Pain Score       --             Orthostatic Vital Signs  Vitals:    07/25/22 2315 07/26/22 0155 07/26/22 0622 07/26/22 1216   BP: 107/65 108/63 133/84 131/86   Pulse: 58 56 60 68   Patient Position - Orthostatic VS: Lying Lying Lying Sitting       Physical Exam    ED Medications  Medications   haloperidol lactate (HALDOL) injection 10 mg (10 mg Intramuscular Given 7/26/22 1530)   LORazepam (ATIVAN) injection 2 mg (2 mg Intramuscular Given 7/26/22 1530)   benztropine (COGENTIN) injection 1 mg (1 mg Intramuscular Given 7/26/22 1530)       Diagnostic Studies  Results Reviewed     Procedure Component Value Units Date/Time    Rapid drug screen, urine [[id number]]  (Abnormal) Collected: 07/25/22 1852    Lab Status: Final result Specimen: Urine, Clean Catch Updated: 07/25/22 1922     Amph/Meth UR Negative     Barbiturate Ur Negative     Benzodiazepine Urine Negative     Cocaine Urine Positive     Methadone Urine Negative     Opiate Urine Negative     PCP Ur Negative     THC Urine Positive     Oxycodone Urine Negative    Narrative:      Presumptive report  If requested, specimen will be sent to reference lab for confirmation  FOR MEDICAL PURPOSES ONLY  IF CONFIRMATION NEEDED PLEASE CONTACT THE LAB WITHIN 5 DAYS      Drug Screen Cutoff Levels:  AMPHETAMINE/METHAMPHETAMINES  1000 ng/mL  BARBITURATES     200 ng/mL  BENZODIAZEPINES     200 ng/mL  COCAINE      300 ng/mL  METHADONE      300 ng/mL  OPIATES      300 ng/mL  PHENCYCLIDINE     25 ng/mL  THC       50 ng/mL  OXYCODONE      100 ng/mL    Urine Microscopic [[id number]]  (Normal) Collected: 07/25/22 1852    Lab Status: Final result Specimen: Urine, Clean Catch Updated: 07/25/22 1922     RBC, UA None Seen /hpf      WBC, UA 2-4 /hpf      Epithelial Cells Occasional /hpf      Bacteria, UA None Seen /hpf     UA w Reflex to Microscopic w Reflex to Culture [[id number]]  (Abnormal) Collected: 07/25/22 1852    Lab Status: Final result Specimen: Urine, Clean Catch Updated: 07/25/22 1901     Color, UA Straw     Clarity, UA Clear     Specific Gravity, UA 1 015     pH, UA 6 0     Leukocytes, UA 25 0     Nitrite, UA Negative     Protein, UA Negative mg/dl      Glucose, UA Negative mg/dl      Ketones, UA Negative mg/dl      Bilirubin, UA Negative     Occult Blood, UA Negative     UROBILINOGEN UA Negative mg/dL     COVID only [[id number]]  (Normal) Collected: 07/25/22 1735    Lab Status: Final result Specimen: Nares from Nose Updated: 07/25/22 1831     SARS-CoV-2 Negative    Narrative:      FOR PEDIATRIC PATIENTS - copy/paste COVID Guidelines URL to browser: [email or web address]  [email or web address]    SARS-CoV-2 assay is a Nucleic Acid Amplification assay intended for the  qualitative detection of nucleic acid from SARS-CoV-2 in nasopharyngeal  swabs  Results are for the presumptive identification of SARS-CoV-2 RNA  Positive results are indicative of infection with SARS-CoV-2, the virus  causing COVID-19, but do not rule out bacterial infection or co-infection  with other viruses  Laboratories within the [place] and its  territories are required to report all positive results to the appropriate  public health authorities  Negative results do not preclude SARS-CoV-2  infection and should not be used as the sole basis for treatment or other  patient management decisions  Negative results must be combined with  clinical observations, patient history, and epidemiological information  This test has not been FDA cleared or approved  This test has been authorized by FDA under an Emergency Use Authorization  (EUA)   This test is only authorized for the duration of time the  declaration that circumstances exist justifying the authorization of the  emergency use of an in vitro diagnostic tests for detection of SARS-CoV-2  virus and/or diagnosis of COVID-19 infection under section 564(b)(1) of  the Act, 21 U  S C  910XUP-5(C)(5), unless the authorization is terminated  or revoked sooner  The test has been validated but independent review by FDA  and CLIA is pending  Test performed using [employer] Gene[employer]pert: This RT-PCR assay targets N2,  a region unique to SARS-CoV-2  A conserved region in the E-gene was chosen  for pan-Sarbecovirus detection which includes SARS-CoV-2  TSH [[id number]]  (Abnormal) Collected: 07/25/22 1713    Lab Status: Final result Specimen: Blood from Arm, Right Updated: 07/25/22 1824     TSH 3RD GENERATON 0 324 uIU/mL     Narrative:      Patients undergoing fluorescein dye angiography may retain small amounts of fluorescein in the body for 48-72 hours post procedure  Samples containing fluorescein can produce falsely depressed TSH values  If the patient had this procedure,a specimen should be resubmitted post fluorescein clearance        Comprehensive metabolic panel [[id number]]  (Abnormal) Collected: 07/25/22 1713    Lab Status: Final result Specimen: Blood from Arm, Right Updated: 07/25/22 1731     Sodium 138 mmol/L      Potassium 3 9 mmol/L      Chloride 105 mmol/L      CO2 25 mmol/L      ANION GAP 8 mmol/L      BUN 13 mg/dL      Creatinine 1 02 mg/dL      Glucose 127 mg/dL      Calcium 8 9 mg/dL      AST 39 U/L      ALT 46 U/L      Alkaline Phosphatase 85 U/L      Total Protein 7 6 g/dL      Albumin 4 0 g/dL      Total Bilirubin 0 58 mg/dL      eGFR 80 ml/min/1 73sq m     Narrative:      Noemy guidelines for Chronic Kidney Disease (CKD):     Stage 1 with normal or high GFR (GFR > 90 mL/min/1 73 square meters)    Stage 2 Mild CKD (GFR = 60-89 mL/min/1 73 square meters)    Stage 3A Moderate CKD (GFR = 45-59 mL/min/1 73 square meters)    Stage 3B Moderate CKD (GFR = 30-44 mL/min/1 73 square meters)    Stage 4 Severe CKD (GFR = 15-29 mL/min/1 73 square meters)    Stage 5 End Stage CKD (GFR <15 mL/min/1 73 square meters)  Note: GFR calculation is accurate only with a steady state creatinine    CBC and differential [[id number]]  (Abnormal) Collected: 07/25/22 1713    Lab Status: Final result Specimen: Blood from Arm, Right Updated: 07/25/22 1718     WBC 6 17 Thousand/uL      RBC 5 56 Million/uL      Hemoglobin 14 0 g/dL      Hematocrit 43 4 %      MCV 78 fL      MCH 25 2 pg      MCHC 32 3 g/dL      RDW 14 3 %      MPV 9 5 fL      Platelets 926 Thousands/uL      nRBC 0 /100 WBCs      Neutrophils Relative 57 %      Immat GRANS % 1 %      Lymphocytes Relative 24 %      Monocytes Relative 11 %      Eosinophils Relative 6 %      Basophils Relative 1 %      Neutrophils Absolute 3 59 Thousands/µL      Immature Grans Absolute 0 03 Thousand/uL      Lymphocytes Absolute 1 50 Thousands/µL      Monocytes Absolute 0 65 Thousand/µL      Eosinophils Absolute 0 34 Thousand/µL      Basophils Absolute 0 06 Thousands/µL     POCT alcohol breath test [[id number]]  (Normal) Resulted: 07/25/22 1712    Lab Status: Final result Updated: 07/25/22 1712     EXTBreath Alcohol 0 000                 No orders to display         Procedures  Procedures      ED Course                                       MDM    Disposition  Final diagnoses:   Depression with suicidal ideation   Auditory hallucinations     Time reflects when diagnosis was documented in both MDM as applicable and the Disposition within this note     Time User Action Codes Description Comment    7/25/2022  4:55 PM Miguelangel Camilo [F32  A,  R4 851] Depression with suicidal ideation     7/25/2022  4:55 PM Miguelangel Camilo [R44 0] Auditory hallucinations       ED Disposition     ED Disposition   Transfer to [address] [place] [address]   --    Date/Time   Mon Jul 25, 2022  4:57 PM    Comment   Jones Molina should be transferred out to inpatient BHU and has been medically cleared  MD Documentation    Ajith Biswas Most Recent Value   Sending MD Dr Amari Hercules    None         Patient's Medications   Discharge Prescriptions    No medications on file     No discharge procedures on file  PDMP Review     None           ED Provider  Attending physically available and evaluated [address]  I managed the patient along with the ED Attending      Electronically Signed by

## 2022-07-26 NOTE — RESTRAINT FACE TO FACE
Restraint Face to Face   Cottle Aliment 61 y o  male MRN: [id number]  Unit/Bed#: U6M3 Encounter: [id number]      Physical Evaluation agitated  Purpose for Restraints/ Seclusion High risk for self harm, High risk for harm to others and high risk for flight  Patient's reaction to the intervention poor  Patient's medical condition stable  Patient's Behavioral condition agitated, threatening, violent  Restraints to be Continued
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
## 2022-07-26 NOTE — ED NOTES
Insurance Authorization for admission:   Phone call placed to [facility]  Phone number: [phone]  Spoke to [facility]      5 days approved  Level of care: [facility] dual   Review on TBD     Authorization # To be issued upon arrival

## 2022-07-26 NOTE — ED NOTES
Pt sleeping, left arm and right leg remain in restraints  No distress noted, respirations even and unlabored   1:1 continues for safety     Aaliyah France RN  07/26/22 3735
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
## 2022-07-27 LAB — SARS-COV-2 RNA RESP QL NAA+PROBE: NEGATIVE

## 2022-07-27 PROCEDURE — U0003 INFECTIOUS AGENT DETECTION BY NUCLEIC ACID (DNA OR RNA); SEVERE ACUTE RESPIRATORY SYNDROME CORONAVIRUS 2 (SARS-COV-2) (CORONAVIRUS DISEASE [COVID-19]), AMPLIFIED PROBE TECHNIQUE, MAKING USE OF HIGH THROUGHPUT TECHNOLOGIES AS DESCRIBED BY CMS-2020-01-R: HCPCS | Performed by: EMERGENCY MEDICINE

## 2022-07-27 PROCEDURE — U0005 INFEC AGEN DETEC AMPLI PROBE: HCPCS | Performed by: EMERGENCY MEDICINE

## 2022-07-27 RX ORDER — OLANZAPINE 2.5 MG/1
2.5 TABLET ORAL
Status: DISCONTINUED | OUTPATIENT
Start: 2022-07-27 | End: 2022-08-01 | Stop reason: HOSPADM

## 2022-07-27 RX ORDER — ACETAMINOPHEN 325 MG/1
650 TABLET ORAL EVERY 4 HOURS PRN
Status: DISCONTINUED | OUTPATIENT
Start: 2022-07-27 | End: 2022-08-01 | Stop reason: HOSPADM

## 2022-07-27 RX ORDER — ACETAMINOPHEN 325 MG/1
650 TABLET ORAL EVERY 6 HOURS PRN
Status: DISCONTINUED | OUTPATIENT
Start: 2022-07-27 | End: 2022-08-01 | Stop reason: HOSPADM

## 2022-07-27 RX ORDER — OLANZAPINE 5 MG/1
5 TABLET ORAL
Status: DISCONTINUED | OUTPATIENT
Start: 2022-07-27 | End: 2022-08-01 | Stop reason: HOSPADM

## 2022-07-27 RX ORDER — OLANZAPINE 10 MG/1
10 INJECTION, POWDER, LYOPHILIZED, FOR SOLUTION INTRAMUSCULAR
Status: DISCONTINUED | OUTPATIENT
Start: 2022-07-27 | End: 2022-08-01 | Stop reason: HOSPADM

## 2022-07-27 RX ORDER — PROPRANOLOL HYDROCHLORIDE 10 MG/1
10 TABLET ORAL EVERY 8 HOURS PRN
Status: DISCONTINUED | OUTPATIENT
Start: 2022-07-27 | End: 2022-08-01 | Stop reason: HOSPADM

## 2022-07-27 RX ORDER — ACETAMINOPHEN 325 MG/1
975 TABLET ORAL EVERY 6 HOURS PRN
Status: DISCONTINUED | OUTPATIENT
Start: 2022-07-27 | End: 2022-08-01 | Stop reason: HOSPADM

## 2022-07-27 RX ORDER — BENZTROPINE MESYLATE 1 MG/1
1 TABLET ORAL 2 TIMES DAILY PRN
Status: DISCONTINUED | OUTPATIENT
Start: 2022-07-27 | End: 2022-08-01 | Stop reason: HOSPADM

## 2022-07-27 RX ORDER — OLANZAPINE 10 MG/1
5 INJECTION, POWDER, LYOPHILIZED, FOR SOLUTION INTRAMUSCULAR
Status: DISCONTINUED | OUTPATIENT
Start: 2022-07-27 | End: 2022-08-01 | Stop reason: HOSPADM

## 2022-07-27 RX ORDER — IBUPROFEN 600 MG/1
600 TABLET ORAL ONCE
Status: COMPLETED | OUTPATIENT
Start: 2022-07-27 | End: 2022-07-27

## 2022-07-27 RX ORDER — OLANZAPINE 10 MG/1
10 TABLET ORAL
Status: DISCONTINUED | OUTPATIENT
Start: 2022-07-27 | End: 2022-08-01 | Stop reason: HOSPADM

## 2022-07-27 RX ORDER — ACETAMINOPHEN 325 MG/1
650 TABLET ORAL ONCE
Status: COMPLETED | OUTPATIENT
Start: 2022-07-27 | End: 2022-07-27

## 2022-07-27 RX ADMIN — IBUPROFEN 600 MG: 600 TABLET ORAL at 19:14

## 2022-07-27 RX ADMIN — ACETAMINOPHEN 650 MG: 325 TABLET ORAL at 15:12

## 2022-07-27 NOTE — ED NOTES
[facility]  [facility] (no answer)   [facility]) no appropriate bed  [facility]) no beds    [address] [place] [address] (no answer)   [facility] (no answer)   [facility]) no appropriate bed  [facility]) no beds   [facility]) no beds
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
## 2022-07-27 NOTE — ED NOTES
"They are going to make me stay, last time they put me somewhere I was there forever and I don't want to do it",  Pt informed psychiatry will be here later today to see him  Pt asking for new scrubs and new set given to pt, pt ambulated to BR and changed   Pt asking for apple juice and given to pt  1:1 remains with pt      Theo Chakraborty, DONNIE  07/27/22 7448

## 2022-07-28 PROBLEM — F39 UNSPECIFIED MOOD (AFFECTIVE) DISORDER (HCC): Status: ACTIVE | Noted: 2022-07-28

## 2022-07-28 LAB
25(OH)D3 SERPL-MCNC: 19.2 NG/ML (ref 30–100)
ALBUMIN SERPL BCP-MCNC: 4.2 G/DL (ref 3.5–5)
ALP SERPL-CCNC: 96 U/L (ref 43–122)
ALT SERPL W P-5'-P-CCNC: 53 U/L
ANION GAP SERPL CALCULATED.3IONS-SCNC: 6 MMOL/L (ref 5–14)
AST SERPL W P-5'-P-CCNC: 56 U/L (ref 17–59)
BASOPHILS # BLD AUTO: 0.08 THOUSANDS/ΜL (ref 0–0.1)
BASOPHILS NFR BLD AUTO: 1 % (ref 0–1)
BILIRUB SERPL-MCNC: 0.41 MG/DL (ref 0.2–1)
BILIRUB UR QL STRIP: NEGATIVE
BUN SERPL-MCNC: 16 MG/DL (ref 5–25)
CALCIUM SERPL-MCNC: 9 MG/DL (ref 8.4–10.2)
CHLORIDE SERPL-SCNC: 102 MMOL/L (ref 96–108)
CHOLEST SERPL-MCNC: 157 MG/DL
CLARITY UR: CLEAR
CO2 SERPL-SCNC: 31 MMOL/L (ref 21–32)
COLOR UR: NORMAL
CREAT SERPL-MCNC: 1.32 MG/DL (ref 0.7–1.5)
EOSINOPHIL # BLD AUTO: 0.25 THOUSAND/ΜL (ref 0–0.61)
EOSINOPHIL NFR BLD AUTO: 4 % (ref 0–6)
ERYTHROCYTE [DISTWIDTH] IN BLOOD BY AUTOMATED COUNT: 14.7 % (ref 11.6–15.1)
GFR SERPL CREATININE-BSD FRML MDRD: 59 ML/MIN/1.73SQ M
GLUCOSE P FAST SERPL-MCNC: 110 MG/DL (ref 70–99)
GLUCOSE SERPL-MCNC: 110 MG/DL (ref 70–99)
GLUCOSE UR STRIP-MCNC: NEGATIVE MG/DL
HCT VFR BLD AUTO: 50.5 % (ref 36.5–49.3)
HDLC SERPL-MCNC: 72 MG/DL
HGB BLD-MCNC: 16.1 G/DL (ref 12–17)
HGB UR QL STRIP.AUTO: NEGATIVE
IMM GRANULOCYTES # BLD AUTO: 0.03 THOUSAND/UL (ref 0–0.2)
IMM GRANULOCYTES NFR BLD AUTO: 1 % (ref 0–2)
KETONES UR STRIP-MCNC: NEGATIVE MG/DL
LDLC SERPL CALC-MCNC: 74 MG/DL
LEUKOCYTE ESTERASE UR QL STRIP: NEGATIVE
LYMPHOCYTES # BLD AUTO: 1.59 THOUSANDS/ΜL (ref 0.6–4.47)
LYMPHOCYTES NFR BLD AUTO: 28 % (ref 14–44)
MCH RBC QN AUTO: 25.2 PG (ref 26.8–34.3)
MCHC RBC AUTO-ENTMCNC: 31.9 G/DL (ref 31.4–37.4)
MCV RBC AUTO: 79 FL (ref 82–98)
MONOCYTES # BLD AUTO: 0.66 THOUSAND/ΜL (ref 0.17–1.22)
MONOCYTES NFR BLD AUTO: 12 % (ref 4–12)
NEUTROPHILS # BLD AUTO: 3.08 THOUSANDS/ΜL (ref 1.85–7.62)
NEUTS SEG NFR BLD AUTO: 54 % (ref 43–75)
NITRITE UR QL STRIP: NEGATIVE
NONHDLC SERPL-MCNC: 85 MG/DL
NRBC BLD AUTO-RTO: 0 /100 WBCS
PH UR STRIP.AUTO: 7 [PH]
PLATELET # BLD AUTO: 224 THOUSANDS/UL (ref 149–390)
PMV BLD AUTO: 9.8 FL (ref 8.9–12.7)
POTASSIUM SERPL-SCNC: 4.3 MMOL/L (ref 3.5–5.3)
PROT SERPL-MCNC: 8.2 G/DL (ref 6.4–8.4)
PROT UR STRIP-MCNC: NEGATIVE MG/DL
RBC # BLD AUTO: 6.39 MILLION/UL (ref 3.88–5.62)
SODIUM SERPL-SCNC: 139 MMOL/L (ref 135–147)
SP GR UR STRIP.AUTO: 1.01 (ref 1–1.04)
TRIGL SERPL-MCNC: 57 MG/DL
TSH SERPL DL<=0.05 MIU/L-ACNC: 0.32 UIU/ML (ref 0.45–4.5)
UROBILINOGEN UA: NEGATIVE MG/DL
WBC # BLD AUTO: 5.69 THOUSAND/UL (ref 4.31–10.16)

## 2022-07-28 PROCEDURE — 85025 COMPLETE CBC W/AUTO DIFF WBC: CPT

## 2022-07-28 PROCEDURE — 82306 VITAMIN D 25 HYDROXY: CPT

## 2022-07-28 PROCEDURE — 99221 1ST HOSP IP/OBS SF/LOW 40: CPT | Performed by: PSYCHIATRY & NEUROLOGY

## 2022-07-28 PROCEDURE — 80053 COMPREHEN METABOLIC PANEL: CPT

## 2022-07-28 RX ORDER — ESCITALOPRAM OXALATE 10 MG/1
10 TABLET ORAL DAILY
Status: DISCONTINUED | OUTPATIENT
Start: 2022-07-28 | End: 2022-08-01 | Stop reason: HOSPADM

## 2022-07-28 RX ORDER — MELATONIN
1000 DAILY
Status: DISCONTINUED | OUTPATIENT
Start: 2022-07-28 | End: 2022-08-01 | Stop reason: HOSPADM

## 2022-07-28 RX ORDER — ESCITALOPRAM OXALATE 10 MG/1
10 TABLET ORAL DAILY
Status: DISCONTINUED | OUTPATIENT
Start: 2022-07-28 | End: 2022-07-28

## 2022-07-28 RX ORDER — ARIPIPRAZOLE 10 MG/1
10 TABLET ORAL DAILY
Status: DISCONTINUED | OUTPATIENT
Start: 2022-07-28 | End: 2022-07-28

## 2022-07-28 RX ORDER — ARIPIPRAZOLE 10 MG/1
10 TABLET ORAL DAILY
Status: DISCONTINUED | OUTPATIENT
Start: 2022-07-28 | End: 2022-08-01 | Stop reason: HOSPADM

## 2022-07-28 RX ADMIN — ARIPIPRAZOLE 10 MG: 10 TABLET ORAL at 16:20

## 2022-07-28 RX ADMIN — OLANZAPINE 10 MG: 10 TABLET, FILM COATED ORAL at 10:35

## 2022-07-28 RX ADMIN — ESCITALOPRAM OXALATE 10 MG: 10 TABLET ORAL at 16:20

## 2022-07-28 NOTE — PLAN OF CARE
Problem: SELF HARM/SUICIDALITY  Goal: Will have no self-injury during hospital stay  Description: INTERVENTIONS:  - Q 15 MINUTES: Routine safety checks  - Q WAKING SHIFT & PRN: Assess risk to determine if routine checks are adequate to maintain patient safety  - Encourage patient to participate actively in care by formulating a plan to combat response to suicidal ideation, identify supports and resources  Outcome: Progressing     Problem: DEPRESSION  Goal: Will be euthymic at discharge  Description: INTERVENTIONS:  - Administer medication as ordered  - Provide emotional support via 1:1 interaction with staff  - Encourage involvement in milieu/groups/activities  - Monitor for social isolation  Outcome: Not Progressing     Problem: ANXIETY  Goal: Will report anxiety at manageable levels  Description: INTERVENTIONS:  - Administer medication as ordered  - Teach and encourage coping skills  - Provide emotional support  - Assess patient/family for anxiety and ability to cope  Outcome: Not Progressing     Problem: ANXIETY  Goal: By discharge: Patient will verbalize 2 strategies to deal with anxiety  Description: Interventions:  - Identify any obvious source/trigger to anxiety  - Staff will assist patient in applying identified coping technique/skills  - Encourage attendance of scheduled groups and activities  Outcome: Not Progressing     Problem: SUBSTANCE USE/ABUSE  Goal: Will have no detox symptoms and will verbalize plan for changing substance-related behavior  Description: INTERVENTIONS:  - Monitor physical status and assess for symptoms of withdrawal  - Administer medication as ordered  - Provide emotional support with 1 on 1 interaction with staff  - Encourage recovery focused program/ addiction education  - Assess for verbalization of changing behaviors related to substance abuse  - Initiate consults and referrals as appropriate (Case Management, Spiritual Care, etc )  Outcome: Not Progressing     Problem: SUBSTANCE USE/ABUSE  Goal: By discharge, will develop insight into their chemical dependency and sustain motivation to continue in recovery  Description: INTERVENTIONS:  - Attends all daily group sessions and scheduled AA groups  - Actively practices coping skills through participation in the therapeutic community and adherence to program rules  - Reviews and completes assignments from individual treatment plan  - Assist patient development of understanding of their personal cycle of addiction and relapse triggers  Outcome: Not Progressing     Problem: SUBSTANCE USE/ABUSE  Goal: Will have no detox symptoms and will verbalize plan for changing substance-related behavior  Description: INTERVENTIONS:  - Monitor physical status and assess for symptoms of withdrawal  - Administer medication as ordered  - Provide emotional support with 1 on 1 interaction with staff  - Encourage recovery focused program/ addiction education  - Assess for verbalization of changing behaviors related to substance abuse  - Initiate consults and referrals as appropriate (Case Management, Spiritual Care, etc )  Outcome: Not Progressing     Problem: SUBSTANCE USE/ABUSE  Goal: By discharge, will develop insight into their chemical dependency and sustain motivation to continue in recovery  Description: INTERVENTIONS:  - Attends all daily group sessions and scheduled AA groups  - Actively practices coping skills through participation in the therapeutic community and adherence to program rules  - Reviews and completes assignments from individual treatment plan  - Assist patient development of understanding of their personal cycle of addiction and relapse triggers  Outcome: Not Progressing     Problem: SUBSTANCE USE/ABUSE  Goal: By discharge, patient will have ongoing treatment plan addressing chemical dependency  Description: INTERVENTIONS:  - Assist patient with resources and/or appointments for ongoing recovery based living  Outcome: Not Progressing     Problem: INVOLUNTARY ADMIT  Goal: Will cooperate with staff recommendations and doctor's orders and will demonstrate appropriate behavior  Description: INTERVENTIONS:  - Treat underlying conditions and offer medication as ordered  - Educate regarding involuntary admission procedures and rules  - Utilize positive consistent limit setting strategies to support patient and staff safety  Outcome: Not Progressing

## 2022-07-28 NOTE — NURSING NOTE
Pt approached the nurses' station irritable, complaining of his room being dark  When staff offered to turn on accessory lighting or bathroom light, pt declined, stating "I don't know what my roommate has going on in there  I think I'm living with a [facility] " in a derogatory manner  Pt demanding room change in morning

## 2022-07-28 NOTE — PLAN OF CARE
Problem: SELF HARM/SUICIDALITY  Goal: Will have no self-injury during hospital stay  Description: INTERVENTIONS:  - Q 15 MINUTES: Routine safety checks  - Q WAKING SHIFT & PRN: Assess risk to determine if routine checks are adequate to maintain patient safety  - Encourage patient to participate actively in care by formulating a plan to combat response to suicidal ideation, identify supports and resources  Outcome: Progressing     Problem: DEPRESSION  Goal: Will be euthymic at discharge  Description: INTERVENTIONS:  - Administer medication as ordered  - Provide emotional support via 1:1 interaction with staff  - Encourage involvement in milieu/groups/activities  - Monitor for social isolation  Outcome: Progressing     Problem: ANXIETY  Goal: Will report anxiety at manageable levels  Description: INTERVENTIONS:  - Administer medication as ordered  - Teach and encourage coping skills  - Provide emotional support  - Assess patient/family for anxiety and ability to cope  Outcome: Progressing  Goal: By discharge: Patient will verbalize 2 strategies to deal with anxiety  Description: Interventions:  - Identify any obvious source/trigger to anxiety  - Staff will assist patient in applying identified coping technique/skills  - Encourage attendance of scheduled groups and activities  Outcome: Progressing     Problem: SUBSTANCE USE/ABUSE  Goal: Will have no detox symptoms and will verbalize plan for changing substance-related behavior  Description: INTERVENTIONS:  - Monitor physical status and assess for symptoms of withdrawal  - Administer medication as ordered  - Provide emotional support with 1 on 1 interaction with staff  - Encourage recovery focused program/ addiction education  - Assess for verbalization of changing behaviors related to substance abuse  - Initiate consults and referrals as appropriate (Case Management, Spiritual Care, etc )  Outcome: Progressing  Goal: By discharge, will develop insight into their chemical dependency and sustain motivation to continue in recovery  Description: INTERVENTIONS:  - Attends all daily group sessions and scheduled AA groups  - Actively practices coping skills through participation in the therapeutic community and adherence to program rules  - Reviews and completes assignments from individual treatment plan  - Assist patient development of understanding of their personal cycle of addiction and relapse triggers  Outcome: Progressing  Goal: By discharge, patient will have ongoing treatment plan addressing chemical dependency  Description: INTERVENTIONS:  - Assist patient with resources and/or appointments for ongoing recovery based living  Outcome: Progressing     Problem: INVOLUNTARY ADMIT  Goal: Will cooperate with staff recommendations and doctor's orders and will demonstrate appropriate behavior  Description: INTERVENTIONS:  - Treat underlying conditions and offer medication as ordered  - Educate regarding involuntary admission procedures and rules  - Utilize positive consistent limit setting strategies to support patient and staff safety  Outcome: Progressing     Problem: Risk for Violence/Aggression Toward Others  Goal: Attend and participate in unit activities, including therapeutic, recreational, and educational groups  Description: Interventions:  - Provide therapeutic and educational activities daily, encourage attendance and participation, and document same in the medical record   Outcome: Not Progressing     Problem: DISCHARGE PLANNING  Goal: Discharge to home or other facility with appropriate resources  Description: INTERVENTIONS:  - Identify barriers to discharge w/patient and caregiver  - Arrange for needed discharge resources and transportation as appropriate  - Identify discharge learning needs (meds, wound care, etc )  - Arrange for interpretive services to assist at discharge as needed  - Refer to Case Management Department for coordinating discharge planning if the patient needs post-hospital services based on physician/advanced practitioner order or complex needs related to functional status, cognitive ability, or social support system  Outcome: Progressing

## 2022-07-28 NOTE — ASSESSMENT & PLAN NOTE
Patient is medically cleared for admission to the [facility] for treatment of the underlying psychiatric illness
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
## 2022-07-28 NOTE — CONSULTS
[id number] [facility] 1965, 61 y o  male MRN: [id number]  Unit/Bed#: [facility] 201-15 Encounter: [id number]  Primary Care Provider: No primary care provider on file  Date and time admitted to hospital: 7/25/2022  4:33 PM    Inpatient consult for Medical Clearance for [address] [place] [address] patient  Consult performed by: Deanna Pang PA-C  Consult ordered by: Thelma Drake MD          Medical clearance for psychiatric admission  Assessment & Plan  Patient is medically cleared for admission to the [facility] for treatment of the underlying psychiatric illness      This patient is a 30 day readmission  He was admitted from 06/26/22 - 06/30/22  Vs, labs, and chart reviewed  See initial consult note by Ana Wallace from 06/26/22  Please contact [facility]IM with any acute concerns
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
## 2022-07-28 NOTE — PROGRESS NOTES
Met with Eliezer Barnes to complete his admission self assessment  He identified his stressors are a fight with his girlfriend since she was cheating on him and thanking  of self harm  What he likes about his life is that he can keep his head up high no matter what he is going through  What he likes least is being in the hospital he was at [facility] a month ago  He graduated high school He does not work he is on disability  He enjoys reading,music   Art/crafts  He would like to learn: self esteem, assertiveness, anger  Management, knowledge of his illness, coping skills and about his medications, relationships, relaxation, healthy lifestyles and community support and resources  He is ready for discharge when his mind is clear and he is focused

## 2022-07-28 NOTE — NURSING NOTE
Patient became increasingly agitated when staff asked him to get off the phone due to group starting and an altercation with another peer  He was verbally aggressive towards staff and stated "man fuck this place, everyone here treats me like garbage  I want to get out of here " "My mom is sick, don't ask me to get off the fucking phone " Pt would not follow redirection  Security was called and pt was escorted to his room until he is able to remain calm  PRN Zyprexa 10 mg po given for severe agitation  Will monitor

## 2022-07-28 NOTE — TREATMENT PLAN
TREATMENT PLAN REVIEW - [address] [facility] Alannah 61 y o  1965 male MRN: [id number]    [address] [address] Room / Bed: [address] 549-34 Encounter: [id number]        Admit Date/Time:  7/25/2022  4:33 PM    Treatment Team: Attending Provider: Hemant Sky MD; Care Manager: Viri Shin; Patient Care Technician: Juanito Flores; Registered Nurse: Nay Levy RN; Patient Care Assistant: Mee Cartagena;  : Telma Heard    Diagnosis: Active Problems:    Medical clearance for psychiatric admission    Patient Strengths/Assets: capable of independent living, compliant with medication, good past treatment response, good physical health, negotiates basic needs     Patient Barriers/Limitations: chronic mental illness, involuntary commitment status, limited insight, limited support system, relationship problems, resistance to treatment, substance abuse    Short Term Goals: Decreased depression, Decreased anxiety, Less paranoia, Decrease in suicidal thoughts, Decreasing homicidal thoughts, Decreased agitation, Fewer aggressive outbursts, Stay safe on the unit, Remain free from restraints, Improved ability to express basic needs, Improved insight, Improved self-care, Improved impulse control, Improved sleep, Improved appetite, increased socialization with peers on the unit, acceptance of need for psychiatric treatment    Long Term Goals: mood stabilization, resolution of suicidal thoughts, resolution of homicidal thoughts, no self harm behavior, good impulse control, good insight, acceptance of need for psychiatric treatment, acceptance of need for psychiatric follow up after discharge, acceptance of psychiatric diagnosis, good self care, good sleep, good appetite, appropriate interaction with peers, appropriate interaction with family, stable living arrangements upon discharge    Progress Towards Goals: start psychiatric medications as prescribed    Recommended Treatment: medication management, patient medication education, group therapy, milieu therapy, supportive therapy, continued psychiatric evaluation    Treatment Frequency: daily medication monitoring, daily group and milieu therapy , monitoring through daily interdisciplinary rounds, monitoring medication levels as idicated    Estimated Discharge Date: 7-10 days    Discharge Plan: return to previous living arrangement, referrals as indicated, referral for outpatient medication management with a psychiatrist, referral for outpatient psychotherapy    Treatment Plan Created/Updated By: Ran Cantrell MD

## 2022-07-29 PROCEDURE — 99232 SBSQ HOSP IP/OBS MODERATE 35: CPT | Performed by: PSYCHIATRY & NEUROLOGY

## 2022-07-29 RX ADMIN — CHOLECALCIFEROL TAB 25 MCG (1000 UNIT) 1000 UNITS: 25 TAB at 08:39

## 2022-07-29 RX ADMIN — ARIPIPRAZOLE 10 MG: 10 TABLET ORAL at 08:39

## 2022-07-29 RX ADMIN — ESCITALOPRAM OXALATE 10 MG: 10 TABLET ORAL at 08:39

## 2022-07-29 NOTE — PROGRESS NOTES
Progress Note - Behavioral Health   Stephani Dunn 61 y o  male MRN: [id number]  Unit/Bed#: [facility] 351-02 Encounter: [id number]    Assessment/Plan   Principal Problem:    Unspecified mood (affective) disorder (HCC)  Active Problems:    Medical clearance for psychiatric admission    Recommended Treatment:   Continue Lexapro 10 mg daily for mood and anxiety  Continue Abilify 10 mg daily for mood augmentation  Continue with group therapy, milieu therapy and occupational therapy  Continue frequent safety checks and vitals per unit protocol  Continue medical management per SLIM recommendations  Case discussed with treatment team  Continue coordinating disposition with case management  Risks, benefits and possible side effects of Medications: Risks, benefits, and possible side effects of medications have been explained to the patient, who verbalizes understanding    ------------------------------------------------------------    Subjective: Stephani Dunn is a 61 y o  male with history of major depressive disorder with psychotic features who is admitted to [address] [address] Heart unit 3B on a 302 commitment for suicidal ideation with plan to cut himself with a knife, homicidal ideation towards girlfriend, and command auditory hallucinations of "voices telling me to kill myself"  Per nursing report, Indio Ken has been cooperative on the unit, social with peers watching movie series, although he was initially irritable, derogatory, demanding room changes, complaining that he is still inpatient when he feels "fine", stating he is a "volunteer which if you look in the dictionary means I can leave any time", and is compliant with medications  Over the last 24 hours, patient was administered PRN medications: Zyprexa 10 mg for agitation (7/28 1035)  Today, Indio Ken is consenting for safety on the unit  Patient was seen and evaluated alongside attending physician for continuity of care  On interview, patient is circumstantial at times, perseverative about discharge, irritable at times, brighter than previous encounters, anxious at times, demonstrates constricted affect  He reports "fine" mood, stating it is "much better" than previous  He endorses "great" energy levels, adding he is "back to myself"  Patient states he had "good" sleep for a total of "8 hours"  He denies difficulty falling asleep, nightmares, or nighttime awakenings  Patient reports "great" appetite, feeling satiated after meals  Patient then proceeds to apologize to the team, stating he was feeling "cranky" during earlier encounters because "I was sleeping on a bed downstairs for the last few days", "the bed downstairs hurt my back"  Patient shares with the team that he also apologized to other staff members before seeing the team today  He reports that he needs to be discharged and return home to take care of his pets, stating that is why he is becoming increasingly frustrated at the prospect of continued inpatient stay saying "my animals, a dog and a cat, are home", "there's a heat wave", "need to make sure they get food and water", "need to go home to take care of them", "don't want to harm animals"  Team informed patient that the care of his animals by his family can be coordinated with the support of case management  Patient denies experiencing auditory or visual hallucinations  He denies current passive or active suicidal ideation, intent, or plan  Patient denies current passive or active homicidal ideation, intent, or plan  He adds that he was "feeling rough on Sunday and Monday", referring to his suicidal and homicidal ideation after the argument with girlfriend, and adds that he is "fine now" before expressing "do not understand why I am being held here against my will"   He continued to be perseverative about discharge, mentioning multiple times "I am being held here against my will", "feels like you have me in senior care", "it's like [facility]"  Team reminds the patient that he is going to have a [address] hearing today and is educated on what a 18 is and his rights  The purpose of a 303 was explained to patient after which he was invited to attend and informed that he could speak if he wished to do so  Patient appeared irritable and frustrated but was agreeable to attending the mental health court hearing  During the mental health court hearing with [place], patient was noted to be perseverative about discharge, mentioning how he needed to take care of his cat and dog at home  The ruling of the hearing was that the 303 commitment was upheld for up to 20 days as of 7/29 and expiring on 8/18  Progress Toward Goals: Progressing    Psychiatric Review of Systems:  Behavior over the last 24 hours: improved  Sleep: "good" sleep for a total of "8 hours"  He denies difficulty falling asleep, nightmares, or nighttime awakenings  Appetite: adequate, "great" appetite, feeling satiated after meals  Medication side effects: none verbalized  Denies at this time  ROS: Complete review of systems is negative except as noted above  Vital signs in last 24 hours:  Temp:  [97 3 °F (36 3 °C)-97 4 °F (36 3 °C)] 97 3 °F (36 3 °C)  HR:  [55-62] 55  Resp:  [15-16] 16  BP: (118-157)/(67-75) 157/75    Mental Status Exam:  Appearance:  Black male, alert, limited eye contact, appears stated age, casually dressed, marginal grooming/hygiene  Short beard and short curly black hair with white streak of hair and some hairless patches     Behavior:  calm and less guarded than previous, walking and sitting comfortably   Motor: restless and fidgety and normal gait and balance   Speech:  spontaneous, clear, increased rate at times, normal volume and coherent   Mood:  "fine", adding it is "much better" than previous   Affect:  constricted, anxious, irritable and brighter than previous   Thought Process:  perseverative, circumstantial, normal rate of thoughts   Thought Content: no verbalized delusions, mild paranoia   Perceptual disturbances: no reported hallucinations, denies current hallucinations and does not appear to be responding to internal stimuli at this time   Risk Potential: No active or passive suicidal or homicidal ideation was verbalized during interview   Cognition: oriented to self and situation, appears to be of average intelligence and cognition not formally tested   Insight:  Limited   Judgment: Limited     Current Medications:  Current Facility-Administered Medications   Medication Dose Route Frequency Provider Last Rate    acetaminophen  650 mg Oral Q6H PRN Angel Quintero MD      acetaminophen  650 mg Oral Q4H PRN Angel Quintero MD      acetaminophen  975 mg Oral Q6H PRN Angel Quintero MD      ARIPiprazole  10 mg Oral Daily Angel Quintero MD      benztropine  1 mg Oral BID PRN Angel Quintero MD      cholecalciferol  1,000 Units Oral Daily Angel Quintero MD      escitalopram  10 mg Oral Daily Angel Quintero MD      nicotine polacrilex  2 mg Oral Q2H PRN Angel Quintero MD      OLANZapine  10 mg Oral Q3H PRN Max 3/day Angel Quintero MD      Or    OLANZapine  10 mg Intramuscular Q3H PRN Max 3/day Angel Quintero MD      OLANZapine  5 mg Oral Q3H PRN Max 6/day Angel Quintero MD      Or    OLANZapine  5 mg Intramuscular Q3H PRN Max 6/day Angel Quintero MD      OLANZapine  2 5 mg Oral Q3H PRN Max 8/day Angel Quintero MD      propranolol  10 mg Oral Q8H PRN Angel Quintero MD       Behavioral Health Medications: all current active meds have been reviewed  Changes as in plan section above  Laboratory results:  I have personally reviewed all pertinent laboratory/tests results    Recent Results (from the past 48 hour(s))   COVID only    Collection Time: 07/27/22 11:28 AM    Specimen: Nose; Nares   Result Value Ref Range    SARS-CoV-2 Negative Negative   Vitamin D 25 hydroxy    Collection Time: 07/28/22  8:18 AM   Result Value Ref Range Vit D, 25-Hydroxy 19 2 (L) 30 0 - 100 0 ng/mL   Comprehensive metabolic panel    Collection Time: 07/28/22  8:18 AM   Result Value Ref Range    Sodium 139 135 - 147 mmol/L    Potassium 4 3 3 5 - 5 3 mmol/L    Chloride 102 96 - 108 mmol/L    CO2 31 21 - 32 mmol/L    ANION GAP 6 5 - 14 mmol/L    BUN 16 5 - 25 mg/dL    Creatinine 1 32 0 70 - 1 50 mg/dL    Glucose 110 (H) 70 - 99 mg/dL    Glucose, Fasting 110 (H) 70 - 99 mg/dL    Calcium 9 0 8 4 - 10 2 mg/dL    AST 56 17 - 59 U/L    ALT 53 (H) <50 U/L    Alkaline Phosphatase 96 43 - 122 U/L    Total Protein 8 2 6 4 - 8 4 g/dL    Albumin 4 2 3 5 - 5 0 g/dL    Total Bilirubin 0 41 0 20 - 1 00 mg/dL    eGFR 59 ml/min/1 73sq m   CBC and differential    Collection Time: 07/28/22  8:18 AM   Result Value Ref Range    WBC 5 69 4 31 - 10 16 Thousand/uL    RBC 6 39 (H) 3 88 - 5 62 Million/uL    Hemoglobin 16 1 12 0 - 17 0 g/dL    Hematocrit 50 5 (H) 36 5 - 49 3 %    MCV 79 (L) 82 - 98 fL    MCH 25 2 (L) 26 8 - 34 3 pg    MCHC 31 9 31 4 - 37 4 g/dL    RDW 14 7 11 6 - 15 1 %    MPV 9 8 8 9 - 12 7 fL    Platelets 223 537 - 531 Thousands/uL    nRBC 0 /100 WBCs    Neutrophils Relative 54 43 - 75 %    Immat GRANS % 1 0 - 2 %    Lymphocytes Relative 28 14 - 44 %    Monocytes Relative 12 4 - 12 %    Eosinophils Relative 4 0 - 6 %    Basophils Relative 1 0 - 1 %    Neutrophils Absolute 3 08 1 85 - 7 62 Thousands/µL    Immature Grans Absolute 0 03 0 00 - 0 20 Thousand/uL    Lymphocytes Absolute 1 59 0 60 - 4 47 Thousands/µL    Monocytes Absolute 0 66 0 17 - 1 22 Thousand/µL    Eosinophils Absolute 0 25 0 00 - 0 61 Thousand/µL    Basophils Absolute 0 08 0 00 - 0 10 Thousands/µL   UA (URINE) with reflex to Scope    Collection Time: 07/28/22  8:38 PM   Result Value Ref Range    Color, UA Straw Straw, Yellow, Pale Yellow    Clarity, UA Clear Clear, Other    Specific Gravity, UA 1 015 1 003 - 1 040    pH, UA 7 0 4 5, 5 0, 5 5, 6 0, 6 5, 7 0, 7 5, 8 0    Leukocytes, UA Negative Negative Nitrite, UA Negative Negative    Protein, UA Negative Negative mg/dl    Glucose, UA Negative Negative mg/dl    Ketones, UA Negative Negative mg/dl    Bilirubin, UA Negative Negative    Occult Blood, UA Negative Negative    UROBILINOGEN UA Negative 1 0, Negative mg/dL        Maty Sol MD

## 2022-07-29 NOTE — PROGRESS NOTES
07/29/22 1403   Team Meeting   Meeting Type Tx Team Meeting   Team Members Present   Team Members Present Physician;Nurse;   Physician Team Member Dr Wale Corona Team Member [address] Management Team Member Lisa   Patient/Family Present   Patient Present Yes   Patient's Family Present No   Tx plan was reviewed and discussed with Pt  Pt was encouraged to attend groups  Medication was discussed with Pt  Pt refused to sign tx plan

## 2022-07-29 NOTE — PROGRESS NOTES
07/29/22 3189   Team Meeting   Meeting Type Daily Rounds   Team Members Present   Team Members Present Physician;Nurse;   Physician Team Member Dr Rush Runner Team Member [address] [facility] [facility] Management Team Member Lisa   Patient/Family Present   Patient Present No   Patient's Family Present No   This patient is a 30 day readmission and was most recently discharged on: 6/30/2022    The previous discharge plan was: Return home with follow up appointment at [facility] on 7/5/2022    [facility] Readmission Risk score is: 32    The identified triggers/events leading up to this admission include: Fight with his girlfriend which led to increasing thoughts of suicide    Initial Plans for this admission (and who will be involved in treatment and discharge planning) include: Restart medication and monitor, group therapy, connection and referral to OP services and community supports  Pt received PO PRN for agitation during morning shift yesterday  Pt is medication compliant and was restarted on Abilify 10mg and Lexapro 10mg with discussion of further titration  Pt's 303 hearing this morning with [facility] and 303 was upheld

## 2022-07-29 NOTE — PROGRESS NOTES
[facility] Group Note     07/29/22 1430   Activity/Group Checklist   Group Life Skills  (Mindfulness and Perspectives)   Attendance Attended   Attendance Duration (min) 31-45  (in and out of group)   Interactions Did not interact  (with peers)   Affect/Mood Calm;Constricted   Goals Achieved Able to listen to others; Able to recieve feedback; Able to give feedback to another

## 2022-07-29 NOTE — NURSING NOTE
Patient has been social with peers watching a series of movies  He is cooperative and calm  Patient did not have any scheduled medications but dies pain,anxiety, and agitation  H`e alsp denies SI/HI/AVH  Will continue to monitor and support

## 2022-07-29 NOTE — NURSING NOTE
Patient has been complaining that he is still inpatient when he feels "fine" and he is a "volunteer which if you look in the dictionary means I can leave any time "  Patient also would like his room changed because his "roommate likes it dark and if he gets up to use the bathroom he cant turn on the light and he could fall and hit his head, or die  And that would be a law suit against us "  Patient reports feeling good and denies SI/HI and A/V hallucinations  He reports he has pets at home he needs to take care of

## 2022-07-29 NOTE — SOCIAL WORK
303 hearing held with [facility]  Pt was in attendance and was not in agreement with 303  303 was upheld for 20 days   303 as of 7/29 and expires 8/18

## 2022-07-29 NOTE — PLAN OF CARE
Problem: SELF HARM/SUICIDALITY  Goal: Will have no self-injury during hospital stay  Description: INTERVENTIONS:  - Q 15 MINUTES: Routine safety checks  - Q WAKING SHIFT & PRN: Assess risk to determine if routine checks are adequate to maintain patient safety  - Encourage patient to participate actively in care by formulating a plan to combat response to suicidal ideation, identify supports and resources  Outcome: Progressing     Problem: DEPRESSION  Goal: Will be euthymic at discharge  Description: INTERVENTIONS:  - Administer medication as ordered  - Provide emotional support via 1:1 interaction with staff  - Encourage involvement in milieu/groups/activities  - Monitor for social isolation  Outcome: Progressing     Problem: ANXIETY  Goal: Will report anxiety at manageable levels  Description: INTERVENTIONS:  - Administer medication as ordered  - Teach and encourage coping skills  - Provide emotional support  - Assess patient/family for anxiety and ability to cope  Outcome: Progressing  Goal: By discharge: Patient will verbalize 2 strategies to deal with anxiety  Description: Interventions:  - Identify any obvious source/trigger to anxiety  - Staff will assist patient in applying identified coping technique/skills  - Encourage attendance of scheduled groups and activities  Outcome: Progressing     Problem: SUBSTANCE USE/ABUSE  Goal: Will have no detox symptoms and will verbalize plan for changing substance-related behavior  Description: INTERVENTIONS:  - Monitor physical status and assess for symptoms of withdrawal  - Administer medication as ordered  - Provide emotional support with 1 on 1 interaction with staff  - Encourage recovery focused program/ addiction education  - Assess for verbalization of changing behaviors related to substance abuse  - Initiate consults and referrals as appropriate (Case Management, Spiritual Care, etc )  Outcome: Progressing  Goal: By discharge, will develop insight into their chemical dependency and sustain motivation to continue in recovery  Description: INTERVENTIONS:  - Attends all daily group sessions and scheduled AA groups  - Actively practices coping skills through participation in the therapeutic community and adherence to program rules  - Reviews and completes assignments from individual treatment plan  - Assist patient development of understanding of their personal cycle of addiction and relapse triggers  Outcome: Progressing  Goal: By discharge, patient will have ongoing treatment plan addressing chemical dependency  Description: INTERVENTIONS:  - Assist patient with resources and/or appointments for ongoing recovery based living  Outcome: Progressing     Problem: INVOLUNTARY ADMIT  Goal: Will cooperate with staff recommendations and doctor's orders and will demonstrate appropriate behavior  Description: INTERVENTIONS:  - Treat underlying conditions and offer medication as ordered  - Educate regarding involuntary admission procedures and rules  - Utilize positive consistent limit setting strategies to support patient and staff safety  Outcome: Progressing     Problem: Risk for Violence/Aggression Toward Others  Goal: Attend and participate in unit activities, including therapeutic, recreational, and educational groups  Description: Interventions:  - Provide therapeutic and educational activities daily, encourage attendance and participation, and document same in the medical record   Outcome: Progressing     Problem: DISCHARGE PLANNING  Goal: Discharge to home or other facility with appropriate resources  Description: INTERVENTIONS:  - Identify barriers to discharge w/patient and caregiver  - Arrange for needed discharge resources and transportation as appropriate  - Identify discharge learning needs (meds, wound care, etc )  - Arrange for interpretive services to assist at discharge as needed  - Refer to Case Management Department for coordinating discharge planning if the patient needs post-hospital services based on physician/advanced practitioner order or complex needs related to functional status, cognitive ability, or social support system  Outcome: Progressing

## 2022-07-30 LAB
T4 FREE SERPL-MCNC: 1.05 NG/DL (ref 0.76–1.46)
TSH SERPL DL<=0.05 MIU/L-ACNC: 0.44 UIU/ML (ref 0.45–4.5)

## 2022-07-30 PROCEDURE — 99232 SBSQ HOSP IP/OBS MODERATE 35: CPT | Performed by: PSYCHIATRY & NEUROLOGY

## 2022-07-30 PROCEDURE — 84439 ASSAY OF FREE THYROXINE: CPT | Performed by: PSYCHIATRY & NEUROLOGY

## 2022-07-30 PROCEDURE — 84443 ASSAY THYROID STIM HORMONE: CPT | Performed by: PSYCHIATRY & NEUROLOGY

## 2022-07-30 RX ADMIN — ARIPIPRAZOLE 10 MG: 10 TABLET ORAL at 09:37

## 2022-07-30 RX ADMIN — ESCITALOPRAM OXALATE 10 MG: 10 TABLET ORAL at 09:37

## 2022-07-30 RX ADMIN — CHOLECALCIFEROL TAB 25 MCG (1000 UNIT) 1000 UNITS: 25 TAB at 09:37

## 2022-07-30 NOTE — PLAN OF CARE
Problem: SELF HARM/SUICIDALITY  Goal: Will have no self-injury during hospital stay  Description: INTERVENTIONS:  - Q 15 MINUTES: Routine safety checks  - Q WAKING SHIFT & PRN: Assess risk to determine if routine checks are adequate to maintain patient safety  - Encourage patient to participate actively in care by formulating a plan to combat response to suicidal ideation, identify supports and resources  Outcome: Progressing     Problem: DEPRESSION  Goal: Will be euthymic at discharge  Description: INTERVENTIONS:  - Administer medication as ordered  - Provide emotional support via 1:1 interaction with staff  - Encourage involvement in milieu/groups/activities  - Monitor for social isolation  Outcome: Progressing     Problem: ANXIETY  Goal: Will report anxiety at manageable levels  Description: INTERVENTIONS:  - Administer medication as ordered  - Teach and encourage coping skills  - Provide emotional support  - Assess patient/family for anxiety and ability to cope  Outcome: Progressing  Goal: By discharge: Patient will verbalize 2 strategies to deal with anxiety  Description: Interventions:  - Identify any obvious source/trigger to anxiety  - Staff will assist patient in applying identified coping technique/skills  - Encourage attendance of scheduled groups and activities  Outcome: Progressing     Problem: SUBSTANCE USE/ABUSE  Goal: Will have no detox symptoms and will verbalize plan for changing substance-related behavior  Description: INTERVENTIONS:  - Monitor physical status and assess for symptoms of withdrawal  - Administer medication as ordered  - Provide emotional support with 1 on 1 interaction with staff  - Encourage recovery focused program/ addiction education  - Assess for verbalization of changing behaviors related to substance abuse  - Initiate consults and referrals as appropriate (Case Management, Spiritual Care, etc )  Outcome: Progressing  Goal: By discharge, will develop insight into their chemical dependency and sustain motivation to continue in recovery  Description: INTERVENTIONS:  - Attends all daily group sessions and scheduled AA groups  - Actively practices coping skills through participation in the therapeutic community and adherence to program rules  - Reviews and completes assignments from individual treatment plan  - Assist patient development of understanding of their personal cycle of addiction and relapse triggers  Outcome: Progressing  Goal: By discharge, patient will have ongoing treatment plan addressing chemical dependency  Description: INTERVENTIONS:  - Assist patient with resources and/or appointments for ongoing recovery based living  Outcome: Progressing     Problem: INVOLUNTARY ADMIT  Goal: Will cooperate with staff recommendations and doctor's orders and will demonstrate appropriate behavior  Description: INTERVENTIONS:  - Treat underlying conditions and offer medication as ordered  - Educate regarding involuntary admission procedures and rules  - Utilize positive consistent limit setting strategies to support patient and staff safety  Outcome: Progressing     Problem: DISCHARGE PLANNING  Goal: Discharge to home or other facility with appropriate resources  Description: INTERVENTIONS:  - Identify barriers to discharge w/patient and caregiver  - Arrange for needed discharge resources and transportation as appropriate  - Identify discharge learning needs (meds, wound care, etc )  - Arrange for interpretive services to assist at discharge as needed  - Refer to Case Management Department for coordinating discharge planning if the patient needs post-hospital services based on physician/advanced practitioner order or complex needs related to functional status, cognitive ability, or social support system  Outcome: Progressing

## 2022-07-30 NOTE — PROGRESS NOTES
Progress Note - Behavioral Health     Annelise Ross 61 y o  male MRN: [id number]   Unit/Bed#: [facility] [place] 352-01 Encounter: [id number]    Patient was seen and evaluated for continuity of care  As per staff yesterday afternoon, patient reported frustration with being in the hospital   He reported feeling well and was focused on having pets at home that he needs to take care of  Patient has 303 hearing was held yesterday and was up held  As per nighttime nursing report, patient was visible and social with peers and did not have any complaints  During the interview today, patient was apologetic and was noted to be calmer as compared to yesterday  He describes his mood as great   He states that he slept 8 hours last night  He states that he has been attending groups and talking to people  He notes doing well overall in terms of his energy level and appetite  He denies SI/HI/AVH/delusions and denies any plan or intent to harm himself or others  He continues to be medication adherent and denies side effects  He remains focused on discharge  He continues to appear distracted at times during the interview but does not appear to be preoccupied  He does not appear to be responding to any internal stimuli  We reviewed medication options and treatment alternatives during the appointment  He agrees to continue with his current medication regimen as prescribed  He was noted to be less irritable today in affect and continues to appear constricted and anxious  He was noted to be more reactive in affect is compared to previous days  He was noted to be less circumstantial in terms of his thought process      Sleep: slept 8 hours  Appetite: normal  Medication side effects: No   ROS: no complaints, all other systems are negative    Mental Status Evaluation:    Appearance:   male, improving eye contact, disheveled, dressed casually, no acute distress   Behavior:  cooperative, less guarded, no psychomotor agitation or slowing, anxious appearing at times   Speech:  clear, coherent, spontaneous, soft at times   Mood:  "great"   Affect:  Constricted, anxious, less irritable, more reactive   Thought Process:  less disorganized, perseverative at times   Associations: intact associations   Thought Content:  mild paranoia   Perceptual Disturbances: no auditory hallucinations, no visual hallucinations, denies auditory hallucinations when asked, does not appear responding to internal stimuli, appears distracted   Risk Potential: Suicidal ideation - None  Homicidal ideation - None  Potential for aggression - No   Sensorium:  oriented to person, place, time/date and situation   Memory:  recent and remote memory grossly intact   Consciousness:  alert and awake   Attention/Concentration: attention span and concentration appear shorter than expected for age   Insight:  Limited but improving   Judgment: Limited but improving   Gait/Station: normal gait/station   Motor Activity: no abnormal movements     Vital signs in last 24 hours:    Temp:  [97 4 °F (36 3 °C)-97 9 °F (36 6 °C)] 97 4 °F (36 3 °C)  HR:  [55-61] 55  Resp:  [16] 16  BP: (139-156)/(80-86) 156/86    Laboratory results: I have personally reviewed all pertinent laboratory/tests results    Results from the past 24 hours:   Recent Results (from the past 24 hour(s))   TSH, 3rd generation with Free T4 reflex    Collection Time: 07/30/22  8:13 AM   Result Value Ref Range    TSH 3RD GENERATON 0 443 (L) 0 450 - 4 500 uIU/mL     Most Recent Labs:   Lab Results   Component Value Date    WBC 5 69 07/28/2022    RBC 6 39 (H) 07/28/2022    HGB 16 1 07/28/2022    HCT 50 5 (H) 07/28/2022     07/28/2022    RDW 14 7 07/28/2022    NEUTROABS 3 08 07/28/2022    SODIUM 139 07/28/2022    K 4 3 07/28/2022     07/28/2022    CO2 31 07/28/2022    BUN 16 07/28/2022    CREATININE 1 32 07/28/2022    GLUC 110 (H) 07/28/2022    CALCIUM 9 0 07/28/2022    AST 56 07/28/2022    ALT 53 (H) 07/28/2022    ALKPHOS 96 07/28/2022    TP 8 2 07/28/2022    ALB 4 2 07/28/2022    TBILI 0 41 07/28/2022    CHOLESTEROL 157 07/25/2022    HDL 72 07/25/2022    TRIG 57 07/25/2022    LDLCALC 74 07/25/2022    NONHDLC 85 07/25/2022    [id number] 0 443 (L) 07/30/2022    FREET4 0 90 06/27/2022    RPR Non-Reactive 06/26/2022    HGBA1C 6 0 (H) 06/26/2022     06/26/2022     Progress Toward Goals: progressing slowly    Assessment/Plan   Principal Problem:    Unspecified mood (affective) disorder (HCC)  Active Problems:    Medical clearance for psychiatric admission      Recommended Treatment:     Planned medication and treatment changes:     All current active medications have been reviewed  Encourage group therapy, milieu therapy and occupational therapy  Behavioral Health checks every 7 minutes    1) Continue Lexapro 10 mg daily for mood and anxiety    2) Continue Abilify 10mg daily for mood adjunct    3) Continue to encourage patient to attend group  4) Continue to encourage patient to remain visible in the milieu  5) Continue to encourage patient to remain in behavioral control on the unit  6) CM to continue care coordination for patient  7) SLIM medical management    Current Facility-Administered Medications   Medication Dose Route Frequency Provider Last Rate    acetaminophen  650 mg Oral Q6H PRN Stacey Bonds MD      acetaminophen  650 mg Oral Q4H PRN Stacey Bonds MD      acetaminophen  975 mg Oral Q6H PRN Stacey Bonds MD      ARIPiprazole  10 mg Oral Daily Stacey Bonds MD      benztropine  1 mg Oral BID PRN Stacey Bonds MD      cholecalciferol  1,000 Units Oral Daily Stacey Bonds MD      escitalopram  10 mg Oral Daily Stacey Bonds MD      nicotine polacrilex  2 mg Oral Q2H PRN Stacey Bonds MD      OLANZapine  10 mg Oral Q3H PRN Max 3/day Stacey Bonds MD      Or    OLANZapine  10 mg Intramuscular Q3H PRN Max 3/day Stacey Bonds MD      OLANZapine  5 mg Oral Q3H PRN Max 6/day Stacey Bonds MD Or    OLANZapine  5 mg Intramuscular Q3H PRN Max 6/day Stacey Bonds MD      OLANZapine  2 5 mg Oral Q3H PRN Max 8/day Stacey Bonds MD      propranolol  10 mg Oral Q8H PRN Stacey Bonds MD       Risks / Benefits of Treatment:    Risks, benefits, and possible side effects of medications explained to patient and patient verbalizes understanding and agreement for treatment  Counseling / Coordination of Care: Total floor / unit time spent today 20 minutes  Greater than 50% of total time was spent with the patient and / or family counseling and / or coordination of care  A description of counseling / coordination of care:  Patient's progress discussed with staff in treatment team meeting  Medications, treatment progress and treatment plan reviewed with patient  Importance of medication and treatment compliance reviewed with patient  Reassurance and supportive therapy provided  Encouraged participation in milieu and group therapy on the unit      Aleyda Vo MD 07/30/22

## 2022-07-31 PROCEDURE — 99232 SBSQ HOSP IP/OBS MODERATE 35: CPT | Performed by: PSYCHIATRY & NEUROLOGY

## 2022-07-31 RX ADMIN — ESCITALOPRAM OXALATE 10 MG: 10 TABLET ORAL at 08:29

## 2022-07-31 RX ADMIN — ARIPIPRAZOLE 10 MG: 10 TABLET ORAL at 08:29

## 2022-07-31 RX ADMIN — CHOLECALCIFEROL TAB 25 MCG (1000 UNIT) 1000 UNITS: 25 TAB at 08:29

## 2022-07-31 RX ADMIN — ACETAMINOPHEN 650 MG: 325 TABLET ORAL at 21:16

## 2022-07-31 NOTE — NURSING NOTE
Patient has been out on the unit, med/meal compliant, cooperative, and denies SI/HI and A/V hallucinations  Patient is looking forward to discharge

## 2022-07-31 NOTE — NURSING NOTE
Pt denies AH VH HI, SI, depression and anxiety  States readiness for discharge  He has been visible with peers on the unit

## 2022-07-31 NOTE — PROGRESS NOTES
Progress Note - Behavioral Health     Sadaf Aaron 61 y o  male MRN: [id number]   Unit/Bed#: [facility] Encounter: [id number]    Patient was seen and evaluated for continuity of care  As per staff yesterday morning, patient was noted to be calm and cooperative, medication and meal adherent  As per nighttime nursing report last night, patient was noted to be visible with peers on the unit  As per nursing report this morning, patient was noted to be visible on the unit and remains focused on discharge  During the interview today, patient describes his mood as great   He states that he slept 8 hours last night  He states that he attended groups yesterday, has been socializing, and watched a movie with peers last night  He notes doing well overall in terms of his energy level  He notes doing well overall in terms of his appetite  He denies SI/HI/AVH/delusions and denies any plan or intent to harm himself or others  He has been less irritable as compared to previous days  He remains focused on discharge  He has been taking his medications as prescribed and denies side effects  He states that he would like to follow up and obtain an appointment for outpatient psychiatry and therapy on discharge  Patient has been in good behavioral control and did not require any p r n  Medications throughout the weekend  He is looking forward to discharge soon  Tentative discharge 08/01/2022  Patient agrees to continue with his current medication regimen as prescribed      Sleep: normal, slept 8 hours  Appetite: normal  Medication side effects: No   ROS: no complaints, all other systems are negative    Mental Status Evaluation:    Appearance:   male, better eye contact, no acute distress, fair hygiene, dressed casually   Behavior:  cooperative, less guarded, less anxious appearing, no psychomotor slowing or agitation noted   Speech:  clear, coherent, talkative today, spontaneous   Mood:  "great" Affect:  constricted, more reactive, less irritable, less anxious   Thought Process:  logical, goal directed, still at times perseverative   Associations: intact associations   Thought Content:  normal, no overt delusions   Perceptual Disturbances: no auditory hallucinations, no visual hallucinations, denies auditory hallucinations when asked, does not appear responding to internal stimuli, does not appear distracted or preoccupied   Risk Potential: Suicidal ideation - None  Homicidal ideation - None  Potential for aggression - No   Sensorium:  oriented to person, place, time/date and situation   Memory:  recent and remote memory grossly intact   Consciousness:  alert and awake   Attention/Concentration: attention span and concentration are age appropriate   Insight:  improving   Judgment: improving   Gait/Station: normal gait/station   Motor Activity: no abnormal movements     Vital signs in last 24 hours:    Temp:  [97 9 °F (36 6 °C)-98 1 °F (36 7 °C)] 97 9 °F (36 6 °C)  HR:  [56-68] 56  Resp:  [16] 16  BP: (150-153)/(92-97) 153/92    Laboratory results: I have personally reviewed all pertinent laboratory/tests results    Results from the past 24 hours: No results found for this or any previous visit (from the past 24 hour(s))    Most Recent Labs:   Lab Results   Component Value Date    WBC 5 69 07/28/2022    RBC 6 39 (H) 07/28/2022    HGB 16 1 07/28/2022    HCT 50 5 (H) 07/28/2022     07/28/2022    RDW 14 7 07/28/2022    NEUTROABS 3 08 07/28/2022    SODIUM 139 07/28/2022    K 4 3 07/28/2022     07/28/2022    CO2 31 07/28/2022    BUN 16 07/28/2022    CREATININE 1 32 07/28/2022    GLUC 110 (H) 07/28/2022    CALCIUM 9 0 07/28/2022    AST 56 07/28/2022    ALT 53 (H) 07/28/2022    ALKPHOS 96 07/28/2022    TP 8 2 07/28/2022    ALB 4 2 07/28/2022    TBILI 0 41 07/28/2022    CHOLESTEROL 157 07/25/2022    HDL 72 07/25/2022    TRIG 57 07/25/2022    LDLCALC 74 07/25/2022    NONHDLC 85 07/25/2022 [id number] 0 443 (L) 07/30/2022    FREET4 1 05 07/30/2022    RPR Non-Reactive 06/26/2022    HGBA1C 6 0 (H) 06/26/2022     06/26/2022     Progress Toward Goals: progressing slowly    Assessment/Plan   Principal Problem:    Unspecified mood (affective) disorder (HCC)  Active Problems:    Medical clearance for psychiatric admission      Recommended Treatment:     Planned medication and treatment changes:     All current active medications have been reviewed  Encourage group therapy, milieu therapy and occupational therapy  Behavioral Health checks every 7 minutes    1) Continue Lexapro 10 mg daily for mood and anxiety    2) Continue Abilify 10mg daily for mood adjunct    3) Continue encouraging patient for group attendance  4) Continue encouraging patient to stay visible on the unit  5) Continue encouraging patient to stay in good behavioral control in the milieu  6) CM to continue coordinating patient care  7) Continue with medical recommendations as per SLIM    Current Facility-Administered Medications   Medication Dose Route Frequency Provider Last Rate    acetaminophen  650 mg Oral Q6H PRN Joslyn Suarez MD      acetaminophen  650 mg Oral Q4H PRN Joslyn Suarez MD      acetaminophen  975 mg Oral Q6H PRN Joslyn Suarez MD      ARIPiprazole  10 mg Oral Daily Joslyn Suarez MD      benztropine  1 mg Oral BID PRN Joslyn Suarez MD      cholecalciferol  1,000 Units Oral Daily Joslyn Suarez MD      escitalopram  10 mg Oral Daily Joslyn Suarez MD      nicotine polacrilex  2 mg Oral Q2H PRN Joslyn Suarez MD      OLANZapine  10 mg Oral Q3H PRN Max 3/day Joslyn Suarez MD      Or    OLANZapine  10 mg Intramuscular Q3H PRN Max 3/day Joslyn Suarez MD      OLANZapine  5 mg Oral Q3H PRN Max 6/day Joslyn Suarez MD      Or    OLANZapine  5 mg Intramuscular Q3H PRN Max 6/day Joslyn Suarez MD      OLANZapine  2 5 mg Oral Q3H PRN Max 8/day Joslyn Suarez MD      propranolol  10 mg Oral Q8H PRN Joslyn Suarez MD Risks / Benefits of Treatment:    Risks, benefits, and possible side effects of medications explained to patient and patient verbalizes understanding and agreement for treatment  Counseling / Coordination of Care: Total floor / unit time spent today 20 minutes  Greater than 50% of total time was spent with the patient and / or family counseling and / or coordination of care  A description of counseling / coordination of care:  Patient's progress discussed with staff in treatment team meeting  Medications, treatment progress and treatment plan reviewed with patient  Reassurance and supportive therapy provided  Encouraged participation in milieu and group therapy on the unit      Jordan James MD 07/31/22

## 2022-08-01 VITALS
BODY MASS INDEX: 22.82 KG/M2 | RESPIRATION RATE: 16 BRPM | DIASTOLIC BLOOD PRESSURE: 98 MMHG | HEART RATE: 66 BPM | TEMPERATURE: 97.2 F | HEIGHT: 67 IN | OXYGEN SATURATION: 100 % | WEIGHT: 145.4 LBS | SYSTOLIC BLOOD PRESSURE: 143 MMHG

## 2022-08-01 PROCEDURE — 99238 HOSP IP/OBS DSCHRG MGMT 30/<: CPT | Performed by: PSYCHIATRY & NEUROLOGY

## 2022-08-01 RX ORDER — MELATONIN
1000 DAILY
Qty: 30 TABLET | Refills: 0 | Status: SHIPPED | OUTPATIENT
Start: 2022-08-01 | End: 2022-08-31

## 2022-08-01 RX ORDER — ESCITALOPRAM OXALATE 10 MG/1
10 TABLET ORAL DAILY
Qty: 30 TABLET | Refills: 0 | Status: CANCELLED | OUTPATIENT
Start: 2022-08-01

## 2022-08-01 RX ORDER — ESCITALOPRAM OXALATE 10 MG/1
10 TABLET ORAL DAILY
Qty: 30 TABLET | Refills: 0 | Status: SHIPPED | OUTPATIENT
Start: 2022-08-01 | End: 2022-08-31

## 2022-08-01 RX ORDER — ARIPIPRAZOLE 10 MG/1
10 TABLET ORAL DAILY
Qty: 30 TABLET | Refills: 0 | Status: SHIPPED | OUTPATIENT
Start: 2022-08-01

## 2022-08-01 RX ADMIN — CHOLECALCIFEROL TAB 25 MCG (1000 UNIT) 1000 UNITS: 25 TAB at 08:23

## 2022-08-01 RX ADMIN — ARIPIPRAZOLE 10 MG: 10 TABLET ORAL at 08:23

## 2022-08-01 RX ADMIN — ESCITALOPRAM OXALATE 10 MG: 10 TABLET ORAL at 08:23

## 2022-08-01 NOTE — PROGRESS NOTES
Met with Prince Ascencio who appears as depressed isolating to his room  His biggest stressors are being homeless and the death of his father 6 months ago  His father was his main support  He does not like anything about himself or his life and could not offer any specifics  He did graduate high school  He is on SSI and does not work receiving $900 per month  His coping skills are reading, music, sports  He would like to learn about relaxation and community supports  He knows he will e ready for discharge when he changes his attitude about himself

## 2022-08-01 NOTE — PLAN OF CARE
Problem: SELF HARM/SUICIDALITY  Goal: Will have no self-injury during hospital stay  Description: INTERVENTIONS:  - Q 15 MINUTES: Routine safety checks  - Q WAKING SHIFT & PRN: Assess risk to determine if routine checks are adequate to maintain patient safety  - Encourage patient to participate actively in care by formulating a plan to combat response to suicidal ideation, identify supports and resources  8/1/2022 1128 by Carly Gleason RN  Outcome: Completed  8/1/2022 0944 by Carly Gleason RN  Outcome: Progressing     Problem: DEPRESSION  Goal: Will be euthymic at discharge  Description: INTERVENTIONS:  - Administer medication as ordered  - Provide emotional support via 1:1 interaction with staff  - Encourage involvement in milieu/groups/activities  - Monitor for social isolation  8/1/2022 1128 by Carly Gleason RN  Outcome: Completed  8/1/2022 0944 by Carly Gleason RN  Outcome: Progressing     Problem: ANXIETY  Goal: Will report anxiety at manageable levels  Description: INTERVENTIONS:  - Administer medication as ordered  - Teach and encourage coping skills  - Provide emotional support  - Assess patient/family for anxiety and ability to cope  8/1/2022 1128 by Carly Gleason RN  Outcome: Completed  8/1/2022 0944 by Carly Gleason RN  Outcome: Progressing  Goal: By discharge: Patient will verbalize 2 strategies to deal with anxiety  Description: Interventions:  - Identify any obvious source/trigger to anxiety  - Staff will assist patient in applying identified coping technique/skills  - Encourage attendance of scheduled groups and activities  8/1/2022 1128 by Carly Gleason RN  Outcome: Completed  8/1/2022 0944 by Carly Gleason RN  Outcome: Progressing     Problem: SUBSTANCE USE/ABUSE  Goal: Will have no detox symptoms and will verbalize plan for changing substance-related behavior  Description: INTERVENTIONS:  - Monitor physical status and assess for symptoms of withdrawal  - Administer medication as ordered  - Provide emotional support with 1 on 1 interaction with staff  - Encourage recovery focused program/ addiction education  - Assess for verbalization of changing behaviors related to substance abuse  - Initiate consults and referrals as appropriate (Case Management, Spiritual Care, etc )  8/1/2022 1128 by Catherine Mello RN  Outcome: Completed  8/1/2022 0944 by Catherine Mello RN  Outcome: Progressing  Goal: By discharge, will develop insight into their chemical dependency and sustain motivation to continue in recovery  Description: INTERVENTIONS:  - Attends all daily group sessions and scheduled AA groups  - Actively practices coping skills through participation in the therapeutic community and adherence to program rules  - Reviews and completes assignments from individual treatment plan  - Assist patient development of understanding of their personal cycle of addiction and relapse triggers  8/1/2022 1128 by Catherine Mello RN  Outcome: Completed  8/1/2022 0944 by Catherine Mello RN  Outcome: Progressing  Goal: By discharge, patient will have ongoing treatment plan addressing chemical dependency  Description: INTERVENTIONS:  - Assist patient with resources and/or appointments for ongoing recovery based living  8/1/2022 1128 by Catherine Mello RN  Outcome: Completed  8/1/2022 0944 by Catherine Mello RN  Outcome: Progressing     Problem: INVOLUNTARY ADMIT  Goal: Will cooperate with staff recommendations and doctor's orders and will demonstrate appropriate behavior  Description: INTERVENTIONS:  - Treat underlying conditions and offer medication as ordered  - Educate regarding involuntary admission procedures and rules  - Utilize positive consistent limit setting strategies to support patient and staff safety  8/1/2022 1128 by Catherine Mello RN  Outcome: Completed  8/1/2022 0944 by Catherine Mello RN  Outcome: Progressing Problem: Risk for Violence/Aggression Toward Others  Goal: Attend and participate in unit activities, including therapeutic, recreational, and educational groups  Description: Interventions:  - Provide therapeutic and educational activities daily, encourage attendance and participation, and document same in the medical record   Outcome: Completed     Problem: DISCHARGE PLANNING  Goal: Discharge to home or other facility with appropriate resources  Description: INTERVENTIONS:  - Identify barriers to discharge w/patient and caregiver  - Arrange for needed discharge resources and transportation as appropriate  - Identify discharge learning needs (meds, wound care, etc )  - Arrange for interpretive services to assist at discharge as needed  - Refer to Case Management Department for coordinating discharge planning if the patient needs post-hospital services based on physician/advanced practitioner order or complex needs related to functional status, cognitive ability, or social support system  Outcome: Progressing

## 2022-08-01 NOTE — PLAN OF CARE
Pt signed [facility] for [facility]  Worker called [facility] and was advised Pt has been scheduled 2x and did not attend either appointment  [facility] unable to schedule due to two no shows  Pt signed [facility] for [facility]   Worker called and scheduled intake appointment for 8/15 at 12pm

## 2022-08-01 NOTE — DISCHARGE SUMMARY
Discharge Summary - [address] [place] 61 y o  male MRN: [id number]  Unit/Bed#: Segundo Thomas Encounter: [id number]     Admission Date:   Admission Orders (From admission, onward)       Ordered        07/27/22 1212  ED TO SAME [facility] UNIT (using Admission Navigator) - Admit Patient to [address] [place] [address]  Once                              Discharge Date: 8/1/2022    Attending Psychiatrist: Orion Clark MD    Reason for Admission:   Ubaldo Kumar is a 61 y o  male, admitted to the inpatient behavioral health unit at [address] [place] [address] , as a involuntarily 302 commitment, subsequent to thoughts of harming someone and violent behavior in emergency room  Vinay Tapia reported depressive symptoms with SI and HI  Please see initial HPI by Dr Patti Escobar on 7/28/2022    "Ubaldo Kumar is a 61 y o  male with history of major depressive disorder with psychotic features who is admitted to [address] on a 302 commitment for suicidal ideation with plan to cut himself with a knife, homicidal ideation towards girlfriend, and command auditory hallucinations of "voices telling me to kill myself"  As per ED physician Lynette Guo MD on 07/25/2022 3:40 PM: "60 yo male with a history of polysubstance abuse and major depressive disorder presents to the ED requesting a psychiatric evaluation  The patient states he got into a verbal altercation with his girlfriend earlier today because he caught her cheating on him  She threatened to leave him during the argument "so I started thinking about killing myself"  The patient says he is planning to "slit my wrist or throat" with a knife  (+) Vague HI towards his girlfriend "but I don't have the strength"  (+) Command hallucinations --> "voices telling me to kill myself"  (+) ETOH intake earlier today  No visual hallucinations  No other specific complaints   Of note, the patient is adamantly requesting food because "I haven't eaten all day" "     As per ED crisis worker Francois Jarquin on 07/25/2022 at 6:47 PM: "Patient presented to the ED as a self-referral due to finding out his significant other was cheating on him, causing a fight  Pt reported he attempted to find a knife so he could cut himself, but was unsuccessful  Pt also expressed thoughts of wanting to hurt his girlfriend then kill himself  Pt reported command auditory hallucinations to harm himself and end his life; Pt also expressed visual hallucinations, but does not elaborate further  Pt reports normal sleep and appetite  Pt reports he went to his [facility] appointment after discharging for [facility] and has been on/off compliant with his medications  Suicidal ideation acutely began today  Pt denies paranoia  Pt was last admitted 6/25-6/30/22  Pt reports that he does not often use cocaine, although he is positive in his UDS  Pt denies legal invovlement  Pt is in agreement with signing a 201  ED attending is in agreement with treatment plan  Pt verbalized understanding of voluntary treatment rights "     Per chart review, Vinay Tapia presented to the ED for psychiatric evaluation due to suicidal ideation (with plan to "slit my wrist or throat"), homicidal ideation toward girlfriend, and command auditory hallucinations (of voices "telling me to kill myself")  This was preceded by an argument with his girlfriend after she cheated on him  Psychiatry was consulted to see him in the ED on 7/26/22 and noted that patient was demanding to be discharged from the hospital  Patient was noted to be irritable and exhibit psychomotor agitation while in the ED  Due to accusatory and aggressive behaviors, security and staff intervention was required  Patient was placed on a 302 involuntary commitment and required chemical and physical restraints for agitation while in the ED for cursing at staff and 'slamming furniture'   After arrival to the behavioral health unit, security was called this morning because patient required PRN medication (Zyprexa 10 mg by mouth) for agitation after he slammed the phone, became verbally aggressive toward staff, and was not redirectable  On interview, patient was guarded and evasive, had limited cooperativity, and demonstrated irritable affect  Patient told the team that he presented to the hospital after "an argument with my girl because she cheated on me and is going around"  He states that he wanted to "do something to myself" following the verbal altercation but decided that he needed to get help for his thoughts"  Patient adds that coming to the hospital made him come to my senses  He goes on to say "when I was downstairs, I told them I want to go home and the thoughts are gone now  He denies current passive or active suicidal ideation, intent, or plan  Patient adds "on Sunday or Monday, I was thinking about harming myself"  He then expresses frustration, making paranoid statements about staff "they are trying to keep me here", "I need to go", "I got fine after I went downstairs", "I am better now"  Patient denies passive or active homicidal ideation, intent, or plan  Patient is perseverative about discharge and interrupts the psychiatric evaluation multiple times to state "I don't need to be here", "I want to go"  He also exhibits paranoid ideation toward staff, repeatedly mentioning how the staff is "trying to keep me here", "trying to get money from me", "put money in your pockets", "trying to build a case to keep me here so you can get money"  Patient endorsed alright mood, seconds later saying his mood was "great"  He reports having baseline or "high" energy levels  He denies feeling hopeless, helpless, worthless, or guilty  He endorses sleeping at baseline without difficulty, for a total of 6-7 hours every night    He expresses enjoying his hobbies which include biking, swimming, running, and jogging  Patient endorses adequate appetite for without recent weight changes, as per his baseline  He denies changes in concentration, attention, or memory  Patient denies psychomotor agitation or slowing  Patient denies symptoms of jonas such as distractibility, impulsivity, grandiosity, flight of ideas, or euphoria  Patient denies auditory or visual hallucinations  Patient denies delusions such as thought control, thought insertion, or ideas of reference  He endorses worrying about his mother because she is dying, " is in the hospital", and "has COVID"  He does not elaborate on symptoms associated with his feelings of worry  Patient denies having panic attacks  He denies having history of seizures, trauma or abuse, eating disorders, or OCD  He denies access firearms or weapons  He denies experiencing flashbacks, hypervigilance, avoidance, or nightmares  Before the psychiatric evaluation was completed, the patient abruptly ended the interview when he was educated about the involuntary commitment process and upcoming [facility] mental health court hearing  Patient stated that the team was "trying to build a case" against him and that his insurance is "being wasted"  He once again mentions how the hospital staff is "trying to get my money" in order to "put money in your pockets"  Patient continued to demonstrate paranoid ideation, saying he was "being held against my will"  He appeared agitated and angry as he proceeded to exit the room "       Please refer to the attestation by Dr Ema May on 7/28/2022    "Patient was seen and evaluated alongside resident physician  Patient is a 58-year-old male with a history of major depressive disorder and polysubstance abuse who presents on a 302 involuntary mental health commitment for suicidal ideation and homicidal ideation    As per record review, patient came into the ED requesting a psychiatric evaluation and reported that he got into a verbal altercation with his girlfriend earlier as he reported that he caught her cheating on him  As per records, girlfriend threatened to leave patient, which caused him to begin contemplating suicide  As per ED physician report, patient reported planning to slit his throat or wrist   He reported vague HI towards his girlfriend and reported hallucinations of voices telling him to harm himself  While in the ED on initial psychiatric consult on 07/26/2022, patient demanded to be discharged from the hospital and was noted to be psychomotor agitated with irritable and labile mood  When informed that he was accepted to a facility, he stated that he would not stay in the hospital and became increasingly accusatory and became increasingly aggressive, which required security and staff intervention  As per nursing report, patient shoved bedside table into wall and was placed into 4 point restraints and given medication for agitation  As per staff this morning, patient became increasing agitated when staff asked him to get off the phone due to group starting  He became verbally aggressive towards staff and began cursing at staff  He would not follow redirection and security was called  Patient was escorted to his room and given Zyprexa 10 mg p o  For agitation  During the interview today, patient states that he got into an argument with his girlfriend and states that he initially came in with suicidal thoughts and thoughts of harming his girlfriend and states that he is all better now    He was unable to discuss any details with what helped him to feel better  He reports his biggest stressor is his mother, whom he states has COVID and whom he states is afraid will "die," since she is currently in the hospital   He states that he came to the hospital in order to get help so that he would not do anything to myself    He states that he is improved and no longer having suicidal thoughts and states that his insurance is being [place] for him since he is currently in the hospital and also states that staying in the hospital longer has been causing him to act out  He describes his mood over the last weeks as all right   He denies any feelings of hopelessness or helplessness  He has been getting 6-7 hours of sleep and denies any difficulty falling or staying asleep  He states that he enjoys riding his bike and going swimming  He notes doing well with his energy and concentration and notes doing well overall in terms of his appetite  Patient was noted to be guarded throughout the interview and made little to no eye contact  He was scant at times in terms of his answers to questions  He was also noted to have an increased rate of speech when expressing his frustrations with being in the hospital   He denies SI/HI/AVH/delusions and denies any plan or intent to harm self or others  He denies any manic or hypomanic symptoms including decreased need for sleep, expansive or elated mood, rapid or pressured speech, or worsening impulsivity or irritability  He denies any symptoms of trauma or abuse  She denies any OCD symptoms  He denies any access to firearms  He denies any eating disorders  He denies flashbacks, nightmares, hypervigilance, or avoidance  Prior to completion of full assessment, patient abruptly ended the interview when being educated about the involuntary commitment process and 303 hearing  He made statements that a case was being made against him and that his insurance is being [place] and became difficult to follow prior to leaving the interview room  He made paranoid statements that he was being held against his will  Patient was noted to be paranoid during the interview and perseverative regarding discharge in needing to be discharged  Hospital Course: The patient was admitted to the inpatient psychiatric unit and started on behavioral health checks every 15 minutes per unit protocol   Upon admission, the patient was evaluated by the medical service for medical clearance and further management of medical issues as needed  At the start of hospitalization, he was exhibiting symptoms of depression vs mixed mood episode and poor behavioral control  During hospitalization, Caden Ortiz was encourage to participate in group therapy, milieu therapy, and occupational therapy  Patient was initially admitted on 302 involuntary commitment, then subsequently 303 commitment  Upon evaluation, he was restarted on aripiprazole and escitalopram to address symptoms of mood disturbance  Possible side effects were discussed with the patient prior to initiation, and he verbalized understanding  Medications were appropriately titrated  The patient adhered to his medication regimen and denied any acute adverse effects not otherwise mentioned  His symptoms began to improve, and his affect brightened throughout  the course of psychiatric management  He reported improvements in sleep, appetite, and complete cessation of SI and HI  He was seen in [facility] interacting appropriately with peers and participating in group therapy  Gino Merritt did not demonstrate dangerous behavior to self or peers during his inpatient stay  As he began to improve, the treatment team agreed he was safe for discharge with plan to continue outpatient treatment  At the time of discharge, Gino Merritt reports feeling "great " Today patient reports having slept 8 hours and enjoyed attending group therapy  Doing well with energy level and appetite  Reports planning to engage in outdoor activity as a means of stress relief and affirms plans to continue to take medication and follow-up with outpatient psychotherapist  He denied suicidal and homicidal ideations at the time of discharge, as well as auditory and visual hallucinations  Applicable follow up and safety plan was reviewed with the patient prior to discharge      Risk of Harm to Self:    The following ratings are based on review of the hospital stay progress, assessment at the time of the interview, observation over the last 4 day and review of records   Demographic risk factors include: male, age: over 48 or older, lives alone   Historical Risk Factors include: chronic psychiatric problems, substance use   Current Specific Risk Factors include: recent inpatient psychiatric admission - being discharged today, recent suicidal ideation, mental illness diagnosis, poor impulse control, lack of support, substance use   Protective Factors: no current suicidal ideation, stable mood, ability to make plans for the future, no current suicidal plan or intent, outpatient psychiatric follow up established, connected to community, having a desire to be alive, personal beliefs about the meaning and value of life, supportive friends   Weapons/Firearms:  denies access to firearms   The following steps have been taken to ensure weapons are properly secured: not applicable   Based on today's assessment, [employer] [place] presents the following risk of harm to self: minimal    Risk of Harm to Others:   The following ratings are based on review of the hospital stay progress, assessment at the time of the interview, observation over the last 4 days and review of records   Demographic Risk Factors include: male   Historical Risk Factors include: history of aggressive behavior, drug abuse   Current Specific Risk Factors include: recent episode of mood instability, recent substance use, recent aggressive behavior, recent thoughts to harm others   Protective Factors: no current homicidal ideation, stable mood, willing to continue psychiatric treatment, outpatient follow up established, supportive friends, connection to community, Mandaeism beliefs   Weapons/Firearms:  denies access to firearms    The following steps have been taken to ensure weapons are properly secured: not applicable   Based on today's assessment, [employer] [place] presents the following risk of harm to others: minimal     Discharge Medications:    Psychiatric medications include:     Aripiprazole 10 mg QD for mood adjunct   Escitalopram 10 mg QD for mood/anxiety    Other home medications for medical conditions were continued throughout hospitalization, if applicable  See AVS for more details  Follow ups:     SANTIAGO on 8/15/2022 at 12:00 PM    Behavioral Health Medications: all current active meds have been reviewed  Labs/Imaging:   I have personally reviewed all pertinent laboratory/tests results      Mental Status at time of Discharge:   Appearance:  age appropriate, dressed appropriately, improved grooming, looks younger than stated age  sitting comfortably in chair, adequate hygiene and grooming, cooperative with interview, good eye contact, hyperactive and fidgety   Behavior:  cooperative   Speech:  normal volume, clear, coherent and hypertalkative   Mood:  "great"   Affect:  Less constricted, more reactive, bright   Language Within normal limits   Thought Process:  organized, logical, goal directed, normal rate of thoughts   Thought Content:  No verbalized delusions   Perceptual Disturbances: Denies auditory or visual hallucinations and Does not appear to be responding to internal stimuli   Risk Potential: Denies suicidal or homicidal ideation, intent, or plan   Sensorium:  person, place, time and current situation   Cognition:  Grossly intact   Consciousness:  alert and awake   Attention: attention span and concentration were age appropriate   Insight:  fair   Judgment: fair   Intellect appears to be of average intelligence   Gait/Station: normal gait/station   Motor Activity: no abnormal movements     Discharge Diagnosis:   Patient Active Problem List   Diagnosis    Major depressive disorder, recurrent, severe with psychotic features ([facility] )    Medical clearance for psychiatric admission    Cocaine abuse, continuous ([facility] )    Cannabis abuse, continuous    Alcohol abuse, episodic    Abnormal EKG    Unspecified mood (affective) disorder (HCC)       Discharge Medications:  See list above, as well as the after visit summary containing reconciled discharge medications provided to patient and family  Discharge instructions/Information to patient and family:   See after visit summary for information provided to patient and family  Provisions for Follow-Up Care:  See after visit summary for information related to follow-up care and any pertinent home health orders  This note has been constructed using a voice recognition system  There may be translation, syntax,  or grammatical errors  If you have any questions, please contact the dictating provider      Mana De Jesus MD  Psychiatry Resident, PGY-1

## 2022-08-01 NOTE — DISCHARGE INSTR - APPOINTMENTS
Magnolia Bone or Daniela, our Mike and Susana, will be calling you after your discharge, on the phone number that you provided  They will be available as an additional support, if needed  If you wish to speak with one of them, you may contact 3Eamirah G[facility] at [phone] or Malena Cash at [phone]

## 2022-08-01 NOTE — NURSING NOTE
Patient thought Tylenol would be enough to help him sleep but was not effective  Patient awake and having a hard time falling asleep  Continues to pace the halls  Patient came again to the nurses station to request anything  that might help him sleep  Changed his mind and made another attempt to fall asleep without any sleep aid

## 2022-08-01 NOTE — DISCHARGE INSTR - OTHER ORDERS
CRISIS INFORMATION  If you are experiencing a mental health emergency, you may call the [address] [facility] 24 hours a day, 7 days per week at [phone]  In [facility], call [phone]  When you need someone to listen, the Adora Harada is available for 16 hours a day, 7 days a week, from the time of 7-10am and 2pm-2am   It is not available from the hours of 2am-6am and 10am-2pm  A representative can be reached at [phone]  HOW TO GET SUBSTANCE ABUSE HELP:  If you or someone you know has a drug or alcohol problem, there is help:  [address]: [address]: [phone]  An assessment is the first step  In addition to those listed there are other programs available in the area but assessment is best to determine an appropriate level of care  If you [address], an assessment can be scheduled at one of these providers:  [facility]  [address] , [place], [address] [facility]  [address]  [address], [address] [place]  [address]  D&A Intake Unit  [address] [facility] [address] , [address]st Floor, [place], [address]  [phone]  [address], [address] [facility],[address]Th Floor, [place], [address] [place] [address] [place] [address] [facility]  [address] , [place], [address] [place]  [address]  [address]  [facility]  [phone]   NET (\[facility] [place] [facility]\"")  [address]  [address] [facility], [address]  [address] [facility]  [facility], [address] [facility]   [address] , [place], [address] [facility]  [facility]  [address] [facility] , [place], [address] [facility]  [address] [place] [address] , [address], [place], 10 [facility]

## 2022-08-01 NOTE — PROGRESS NOTES
CR[facility] Group Note     08/01/22 1101   Activity/Group Checklist   Group Life Skills  (Understanding the Ripple Effect)   Attendance Attended   Attendance Duration (min) 0-15  (arrived at the end of group)   Interactions Interacted appropriately   Affect/Mood Appropriate;Calm   Goals Achieved Able to listen to others; Able to engage in interactions; Able to recieve feedback; Able to give feedback to another

## 2022-08-01 NOTE — BH TRANSITION RECORD
Contact Information: If you have any questions, concerns, pended studies, tests and/or procedures, or emergencies regarding your inpatient behavioral health visit  Please contact [facility] behavioral health unit 3B [phone]  and ask to speak to a , nurse or physician  A contact is available 24 hours/ 7 days a week at this number  Summary of Procedures Performed During your Stay:  Below is a list of major procedures performed during your hospital stay and a summary of results:  - No major procedures performed  Pending Studies (From admission, onward)    None        Please follow up on the above pending studies with your PCP and/or referring provider

## 2022-08-01 NOTE — NURSING NOTE
Patient is calm and cooperative on the unit  Denies SI, HI, SIB, auditory and visual hallucinations  Med and meal compliant  Patient is looking forward to discharge today  Q7 safety checks to continue

## 2022-08-01 NOTE — NURSING NOTE
Patient having a hard time falling asleep and request a sleep aid but did not have any on his medication list  Sticky note left for MD as per patient requested  Tylenol 650 mg po prn given for moderate generalized pain  He scored his pain to be a 5 out of 10 on the scale

## 2022-08-01 NOTE — PLAN OF CARE
Pt to D/C today  Pt denies SI/HI/AVH  Pt oriented x3  Pt to d/c to his home and will walk upon discharge  Pt to follow up with SANTIAGO on 8/15 at 12pm  Scripts sent to preferred pharmacy       Discharge Address: [address] [place] [address], [facility], [facility], [address] [place] [address]  Phone:  [phone]

## 2022-08-01 NOTE — NURSING NOTE
Patient awoke and completed all morning routines  AVS printed and reviewed with patient  Patient verbalized understanding of discharge instructions and agreed to be compliant with their medication regimen  When discussing smoking cessation, patient stated that they are not ready to quit smoking but they are aware of the resources available to them if they choose to do so  Patient discharged to home at 1304  Patient was discharged with all of their belongings

## 2022-08-01 NOTE — PROGRESS NOTES
08/01/22 1106   Team Meeting   Meeting Type Daily Rounds   Team Members Present   Team Members Present Physician;Nurse;   Physician Team Member Dr Grant Babin Team Member [facility] Management Team Member Lisa   Patient/Family Present   Patient Present No   Patient's Family Present No   Pt is medication compliant  Pt is denying all psych symptoms and remains in behavioral control  Discharge today

## 2022-08-01 NOTE — PROGRESS NOTES
Met with Sharla Uriostegui completing his relapse prevention  He states the reason for his admission was feelings of self harm and he does not feel that way now  His warning signs are his ex girlfriend and he needs to stay away from her; he was unable to identify any other triggers or symptoms  His coping skills are: thinking positive, focusing on his goals, take a walk in the park and meditation  His support people are his therapist, counselor and Roger Lima  Patient is anxious for dischaege

## 2022-08-04 NOTE — ED CARE HANDOFF
Emergency Department Sign Out Note        Sign out and transfer of care from Dr Mariama Garvey  See Separate Emergency Department note  The patient, Tutu Friend, was evaluated by the previous provider for Psych  Workup Completed:  See previous notes    ED Course / Workup Pending (followup):                                    ED Course as of 08/04/22 1737   Tue Jul 26, 2022   0659 Homeless, bipoloar, depressions, command halucinations w/ SI  201 completed  1512 Patient is becoming agitated, yelling at security and psychiatry is at the bedside  Security attempting verbal redirection  1523 Patient continuing to yell, refusing to cooperate  Patient his knocking equipment on the floor, becoming a threat to himself and others  Will initiate chemical and physical restraints  Procedures  MDM        Disposition  Final diagnoses:   Depression with suicidal ideation   Auditory hallucinations     Time reflects when diagnosis was documented in both MDM as applicable and the Disposition within this note     Time User Action Codes Description Comment    7/25/2022  4:55 PM Sheyla Mays Add [M23  Duaine Andree Depression with suicidal ideation     7/25/2022  4:55 PM Sheyla Mays Add [R44 0] Auditory hallucinations     7/27/2022 12:06 PM Diane Cabello Add [Z00 8] Medical clearance for psychiatric admission     8/1/2022 10:41 AM Hannah Morales Add [F33 2] Severe episode of recurrent major depressive disorder, without psychotic features ([facility] [address] )     8/1/2022 10:47 AM Jayson LEYVA Add [F39] Unspecified mood (affective) disorder ([facility] [address] )     8/1/2022 10:47 AM Severo Hurry Add [E55 9] Vitamin D deficiency       ED Disposition     ED Disposition   Transfer to [facility]    Condition   --    Date/Time   Mon Jul 25, 2022  4:57 PM    Comment   Tutu Friend should be transferred out to inpatient U and has been medically cleared             MD Documentation    Catie Yuan Most Recent Value   Sending MD Dr Marci Olmedo up With Specialties Details Why Contact Info     [employer] Organization  Go on 8/15/2022 You are scheduled for an IN PERSON intake appointment at 12pm  [address] [facility]  [place], [address] [place] [address]  [phone]  [phone]        Discharge Medication List as of 8/1/2022 11:33 AM      CONTINUE these medications which have CHANGED    Details   ARIPiprazole (ABILIFY) 10 mg tablet Take 1 tablet (10 mg total) by mouth daily, Starting Mon 8/1/2022, Normal      cholecalciferol (VITAMIN D3) 1,000 units tablet Take 1 tablet (1,000 Units total) by mouth daily, Starting Mon 8/1/2022, Until Wed 8/31/2022, Normal      escitalopram (LEXAPRO) 10 mg tablet Take 1 tablet (10 mg total) by mouth daily, Starting Mon 8/1/2022, Until Wed 8/31/2022, Normal         STOP taking these medications       lisinopril (ZESTRIL) 5 mg tablet Comments:   Reason for Stopping:         metoprolol succinate (TOPROL-XL) 25 mg 24 hr tablet Comments:   Reason for Stopping:             Outpatient Discharge Orders   Discharge Diet     Discharge Diet     Activity as tolerated     Activity as tolerated          ED Provider  Electronically Signed by     Reymundo Win DO  08/04/22 8639

## 2022-11-11 ENCOUNTER — HOSPITAL ENCOUNTER (EMERGENCY)
Facility: HOSPITAL | Age: 57
Discharge: HOME | End: 2022-11-11
Attending: EMERGENCY MEDICINE

## 2022-11-11 VITALS
HEART RATE: 58 BPM | SYSTOLIC BLOOD PRESSURE: 103 MMHG | DIASTOLIC BLOOD PRESSURE: 60 MMHG | OXYGEN SATURATION: 98 % | WEIGHT: 158 LBS | TEMPERATURE: 96.6 F | BODY MASS INDEX: 24.75 KG/M2 | RESPIRATION RATE: 16 BRPM

## 2022-11-11 DIAGNOSIS — F19.10 SUBSTANCE ABUSE (HCC): Primary | ICD-10-CM

## 2022-11-12 NOTE — ED PROVIDER NOTES
History  Chief Complaint   Patient presents with   • Overdose - Accidental     Patient is brought in by ems  Patient took a hit off of a friend, he thought it was weed but it must have been lased with something since patient was in and out of it and had pinpoint pupils  Patient arrived in ED alert and oriented and wanting to eat his sandwich  Patient is a 58-year-old male who presents via [facility] after not being able to stand while smoking what he thought was marijuana  Actually was K2  Has used K2 in the past   But not regularly  No complaints at this time  Trying to eat a sandwich upon my arrival to the bed  No ha, numbness, weakness  Prior to Admission Medications   Prescriptions Last Dose Informant Patient Reported? Taking? ARIPiprazole (ABILIFY) 10 mg tablet   No No   Sig: Take 1 tablet (10 mg total) by mouth daily   cholecalciferol (VITAMIN D3) 1,000 units tablet   No No   Sig: Take 1 tablet (1,000 Units total) by mouth daily   escitalopram (LEXAPRO) 10 mg tablet   No No   Sig: Take 1 tablet (10 mg total) by mouth daily      Facility-Administered Medications: None       Past Medical History:   Diagnosis Date   • Abnormal EKG    • Depression    • Substance use        Past Surgical History:   Procedure Laterality Date   • NO PAST SURGERIES         History reviewed  No pertinent family history  I have reviewed and agree with the history as documented      E-Cigarette/Vaping   • E-Cigarette Use Never User      E-Cigarette/Vaping Substances   • Nicotine No    • THC No    • CBD No    • Flavoring No    • Other No    • Unknown No      Social History     Tobacco Use   • Smoking status: Current Every Day Smoker     Packs/day: 2 00   • Smokeless tobacco: Never Used   Vaping Use   • Vaping Use: Never used   Substance Use Topics   • Alcohol use: Yes     Comment: 40oz of beer a week every now and then"   • Drug use: Yes     Frequency: 1 0 times per week     Types: Marijuana     Comment: last night Review of Systems   Constitutional: Negative  HENT: Negative  Eyes: Negative  Respiratory: Negative  Cardiovascular: Negative  Gastrointestinal: Negative  Endocrine: Negative  Genitourinary: Negative  Musculoskeletal: Negative  Skin: Negative  Allergic/Immunologic: Negative  Neurological: Negative  Hematological: Negative  Psychiatric/Behavioral: Negative  All other systems reviewed and are negative  Physical Exam  Physical Exam  Vitals and nursing note reviewed  Constitutional:       Appearance: Normal appearance  He is normal weight  HENT:      Head: Normocephalic and atraumatic  Nose: Nose normal       Mouth/Throat:      Mouth: Mucous membranes are moist       Pharynx: Oropharynx is clear  Cardiovascular:      Rate and Rhythm: Normal rate and regular rhythm  Pulses: Normal pulses  Heart sounds: Normal heart sounds  Pulmonary:      Effort: Pulmonary effort is normal       Breath sounds: Normal breath sounds  Abdominal:      General: Bowel sounds are normal       Palpations: Abdomen is soft  Musculoskeletal:         General: Normal range of motion  Cervical back: Normal range of motion  Skin:     General: Skin is warm and dry  Capillary Refill: Capillary refill takes less than 2 seconds  Neurological:      General: No focal deficit present  Mental Status: He is alert and oriented to person, place, and time  Cranial Nerves: No cranial nerve deficit  Motor: No weakness        Gait: Gait normal    Psychiatric:         Mood and Affect: Mood normal          Behavior: Behavior normal          Vital Signs  ED Triage Vitals [11/11/22 1840]   Temperature Pulse Respirations Blood Pressure SpO2   (!) 96 6 °F (35 9 °C) 58 16 103/60 98 %      Temp Source Heart Rate Source Patient Position - Orthostatic VS BP Location FiO2 (%)   Tympanic Monitor Sitting Left arm --      Pain Score       --           Vitals:    11/11/22 1840   BP: 103/60   Pulse: 58   Patient Position - Orthostatic VS: Sitting         Visual Acuity      ED Medications  Medications - No data to display    Diagnostic Studies  Results Reviewed     None                 No orders to display              Procedures  Procedures         ED Course                                             MDM    Disposition  Final diagnoses:   Substance abuse ([address] )     Time reflects when diagnosis was documented in both MDM as applicable and the Disposition within this note     Time User Action Codes Description Comment    11/11/2022  6:43 PM Asuncion Barajas Add [F19 10] Substance abuse [facility])       ED Disposition     ED Disposition   Discharge    Condition   Stable    Date/Time   Fri Nov 11, 2022  6:43 PM    [address] discharge to home/self care  Follow-up Information     Follow up With Specialties Details Why Contact Info Additional [address]   [address], [address] [facility] [address]  [address] [place] [address], [address], [address] [place], [place], [address] [place] [address]          Discharge Medication List as of 11/11/2022  6:45 PM      CONTINUE these medications which have NOT CHANGED    Details   ARIPiprazole (ABILIFY) 10 mg tablet Take 1 tablet (10 mg total) by mouth daily, Starting Mon 8/1/2022, Normal      cholecalciferol (VITAMIN D3) 1,000 units tablet Take 1 tablet (1,000 Units total) by mouth daily, Starting Mon 8/1/2022, Until Wed 8/31/2022, Normal      escitalopram (LEXAPRO) 10 mg tablet Take 1 tablet (10 mg total) by mouth daily, Starting Mon 8/1/2022, Until Wed 8/31/2022, Normal             No discharge procedures on file      PDMP Review       Value Time User    PDMP Reviewed  Yes 8/1/2022  7:40 AM Marylee Hawk, MD          ED Provider  Electronically Signed by           Trent Aguirre MD  11/11/22 [address] [facility] [address] [place]

## 2022-11-28 ENCOUNTER — HOSPITAL ENCOUNTER (EMERGENCY)
Facility: HOSPITAL | Age: 57
Discharge: NURSING, REHAB OR LONG-TERM CARE | End: 2022-11-29
Attending: EMERGENCY MEDICINE

## 2022-11-28 DIAGNOSIS — R45.850 HOMICIDAL IDEATION: ICD-10-CM

## 2022-11-28 DIAGNOSIS — R44.0 AUDITORY HALLUCINATIONS: ICD-10-CM

## 2022-11-28 DIAGNOSIS — R45.851 SUICIDAL IDEATION: Primary | ICD-10-CM

## 2022-11-28 LAB
ALBUMIN SERPL BCP-MCNC: 3.4 G/DL (ref 3.5–5)
ALP SERPL-CCNC: 93 U/L (ref 46–116)
ALT SERPL W P-5'-P-CCNC: 44 U/L (ref 12–78)
AMPHETAMINES SERPL QL SCN: NEGATIVE
ANION GAP SERPL CALCULATED.3IONS-SCNC: 8 MMOL/L (ref 4–13)
AST SERPL W P-5'-P-CCNC: 39 U/L (ref 5–45)
ATRIAL RATE: 56 BPM
BACTERIA UR QL AUTO: ABNORMAL /HPF
BARBITURATES UR QL: NEGATIVE
BASOPHILS # BLD AUTO: 0.07 THOUSANDS/ÂΜL (ref 0–0.1)
BASOPHILS NFR BLD AUTO: 1 % (ref 0–1)
BENZODIAZ UR QL: NEGATIVE
BILIRUB SERPL-MCNC: 0.53 MG/DL (ref 0.2–1)
BILIRUB UR QL STRIP: NEGATIVE
BUN SERPL-MCNC: 11 MG/DL (ref 5–25)
CALCIUM ALBUM COR SERPL-MCNC: 9.2 MG/DL (ref 8.3–10.1)
CALCIUM SERPL-MCNC: 8.7 MG/DL (ref 8.3–10.1)
CHLORIDE SERPL-SCNC: 103 MMOL/L (ref 96–108)
CLARITY UR: CLEAR
CO2 SERPL-SCNC: 27 MMOL/L (ref 21–32)
COCAINE UR QL: POSITIVE
COLOR UR: YELLOW
CREAT SERPL-MCNC: 1.07 MG/DL (ref 0.6–1.3)
EOSINOPHIL # BLD AUTO: 0.38 THOUSAND/ÂΜL (ref 0–0.61)
EOSINOPHIL NFR BLD AUTO: 5 % (ref 0–6)
ERYTHROCYTE [DISTWIDTH] IN BLOOD BY AUTOMATED COUNT: 13.9 % (ref 11.6–15.1)
ETHANOL EXG-MCNC: 0 MG/DL
FLUAV RNA RESP QL NAA+PROBE: NEGATIVE
FLUBV RNA RESP QL NAA+PROBE: NEGATIVE
GFR SERPL CREATININE-BSD FRML MDRD: 76 ML/MIN/1.73SQ M
GLUCOSE SERPL-MCNC: 105 MG/DL (ref 65–140)
GLUCOSE UR STRIP-MCNC: NEGATIVE MG/DL
HCT VFR BLD AUTO: 42 % (ref 36.5–49.3)
HGB BLD-MCNC: 14.5 G/DL (ref 12–17)
HGB UR QL STRIP.AUTO: ABNORMAL
IMM GRANULOCYTES # BLD AUTO: 0.03 THOUSAND/UL (ref 0–0.2)
IMM GRANULOCYTES NFR BLD AUTO: 0 % (ref 0–2)
KETONES UR STRIP-MCNC: NEGATIVE MG/DL
LEUKOCYTE ESTERASE UR QL STRIP: NEGATIVE
LYMPHOCYTES # BLD AUTO: 1.38 THOUSANDS/ÂΜL (ref 0.6–4.47)
LYMPHOCYTES NFR BLD AUTO: 18 % (ref 14–44)
MCH RBC QN AUTO: 26.4 PG (ref 26.8–34.3)
MCHC RBC AUTO-ENTMCNC: 34.5 G/DL (ref 31.4–37.4)
MCV RBC AUTO: 76 FL (ref 82–98)
METHADONE UR QL: NEGATIVE
MONOCYTES # BLD AUTO: 0.75 THOUSAND/ÂΜL (ref 0.17–1.22)
MONOCYTES NFR BLD AUTO: 10 % (ref 4–12)
NEUTROPHILS # BLD AUTO: 5.11 THOUSANDS/ÂΜL (ref 1.85–7.62)
NEUTS SEG NFR BLD AUTO: 66 % (ref 43–75)
NITRITE UR QL STRIP: NEGATIVE
NON-SQ EPI CELLS URNS QL MICRO: ABNORMAL /HPF
NRBC BLD AUTO-RTO: 0 /100 WBCS
OPIATES UR QL SCN: NEGATIVE
OXYCODONE+OXYMORPHONE UR QL SCN: NEGATIVE
P AXIS: 49 DEGREES
PCP UR QL: NEGATIVE
PH UR STRIP.AUTO: 6 [PH] (ref 4.5–8)
PLATELET # BLD AUTO: 228 THOUSANDS/UL (ref 149–390)
PMV BLD AUTO: 10 FL (ref 8.9–12.7)
POTASSIUM SERPL-SCNC: 3.9 MMOL/L (ref 3.5–5.3)
PR INTERVAL: 154 MS
PROT SERPL-MCNC: 7.5 G/DL (ref 6.4–8.4)
PROT UR STRIP-MCNC: NEGATIVE MG/DL
QRS AXIS: 104 DEGREES
QRSD INTERVAL: 90 MS
QT INTERVAL: 424 MS
QTC INTERVAL: 409 MS
RBC # BLD AUTO: 5.5 MILLION/UL (ref 3.88–5.62)
RBC #/AREA URNS AUTO: ABNORMAL /HPF
RSV RNA RESP QL NAA+PROBE: NEGATIVE
SARS-COV-2 RNA RESP QL NAA+PROBE: NEGATIVE
SODIUM SERPL-SCNC: 138 MMOL/L (ref 135–147)
SP GR UR STRIP.AUTO: 1.01 (ref 1–1.03)
T WAVE AXIS: -76 DEGREES
THC UR QL: POSITIVE
TSH SERPL DL<=0.05 MIU/L-ACNC: 0.57 UIU/ML (ref 0.45–4.5)
UROBILINOGEN UR QL STRIP.AUTO: 0.2 E.U./DL
VENTRICULAR RATE: 56 BPM
WBC # BLD AUTO: 7.72 THOUSAND/UL (ref 4.31–10.16)
WBC #/AREA URNS AUTO: ABNORMAL /HPF

## 2022-11-28 NOTE — ED NOTES
Patient states he has been non compliant with medication since his script  22       Nayan John RN  22 105

## 2022-11-28 NOTE — ED PROVIDER NOTES
History  Chief Complaint   Patient presents with   • Suicidal     Patient SI with a plan to jump off [address]  [place] and didn't realize the fencing had changed  Patient with command [address] [facility] telling him to kill himself and take out anyone in his way     This is a 26-year-old male with past medical history depression, substance abuse, alcohol use who presents today with suicidal ideation with plan to jump off the [facility]  Patient reports he actually went there today and was planning to attempt to jump off however there is a new fence there he was unable to  States that he brought himself in on his own to get help  States that he has been struggling with command auditory hallucinations telling him to kill himself and to kill others  Reports they have been ongoing for the past few years  Denies any visual hallucinations  Patient states that he is unsure when he last used cocaine  States that he drink some beer this morning  Reports that he does have a history of suicide ideations and previous attempts  When asked about a times he states that he has tried cutting himself and cutting his neck however the knife was "too dull"  Reports that he has been admitted in the past for psychiatric issues  Reports that he is living with a friend currently  Reports he has not been taking his medication  After chart review appears that patient was seen at [facility] on 11/01 for suicidal ideation and possible attempt  Was placed at [facility] at the time  Was also seen at [facility] on 11/11 due to accidental ingestion of K2  Has extensive history suicidal ideations and behavioral health placements  Prior to Admission Medications   Prescriptions Last Dose Informant Patient Reported? Taking?    ARIPiprazole (ABILIFY) 10 mg tablet Not Taking  No No   Sig: Take 1 tablet (10 mg total) by mouth daily   Patient not taking: Reported on 11/28/2022   cholecalciferol (VITAMIN D3) 1,000 units tablet   No No   Sig: Take 1 tablet (1,000 Units total) by mouth daily   escitalopram (LEXAPRO) 10 mg tablet   No No   Sig: Take 1 tablet (10 mg total) by mouth daily      Facility-Administered Medications: None       Past Medical History:   Diagnosis Date   • Abnormal EKG    • Depression    • Substance use        Past Surgical History:   Procedure Laterality Date   • NO PAST SURGERIES         History reviewed  No pertinent family history  I have reviewed and agree with the history as documented  E-Cigarette/Vaping   • E-Cigarette Use Never User      E-Cigarette/Vaping Substances   • Nicotine No    • THC No    • CBD No    • Flavoring No    • Other No    • Unknown No      Social History     Tobacco Use   • Smoking status: Every Day     Packs/day: 2 00     Types: Cigarettes   • Smokeless tobacco: Never   Vaping Use   • Vaping Use: Never used   Substance Use Topics   • Alcohol use: Yes     Comment: 40oz of beer a week every now and then"   • Drug use: Yes     Frequency: 1 0 times per week     Types: Marijuana, Cocaine       Review of Systems   Psychiatric/Behavioral: Positive for hallucinations and suicidal ideas  All other systems reviewed and are negative  Physical Exam  Physical Exam  Vitals and nursing note reviewed  Constitutional:       General: He is not in acute distress  Appearance: Normal appearance  He is well-developed  He is not ill-appearing  HENT:      Head: Normocephalic and atraumatic  Eyes:      Conjunctiva/sclera: Conjunctivae normal    Cardiovascular:      Rate and Rhythm: Normal rate and regular rhythm  Heart sounds: No murmur heard  Pulmonary:      Effort: Pulmonary effort is normal  No respiratory distress  Breath sounds: Normal breath sounds  Abdominal:      Palpations: Abdomen is soft  Tenderness: There is no abdominal tenderness  Musculoskeletal:         General: No swelling  Cervical back: Normal range of motion and neck supple     Skin: General: Skin is warm and dry  Capillary Refill: Capillary refill takes less than 2 seconds  Neurological:      Mental Status: He is alert  Psychiatric:         Attention and Perception: Attention and perception normal          Mood and Affect: Mood and affect normal          Speech: Speech normal          Behavior: Behavior normal  Behavior is cooperative  Thought Content: Thought content includes homicidal and suicidal ideation  Thought content includes suicidal plan  Thought content does not include homicidal plan  Vital Signs  ED Triage Vitals   Temperature Pulse Respirations Blood Pressure SpO2   11/28/22 1000 11/28/22 1000 11/28/22 1000 11/28/22 1000 11/28/22 1000   98 6 °F (37 °C) 74 16 134/83 97 %      Temp Source Heart Rate Source Patient Position - Orthostatic VS BP Location FiO2 (%)   11/28/22 1000 11/28/22 1000 11/28/22 1200 11/28/22 1200 --   Oral Monitor Lying Right arm       Pain Score       --                  Vitals:    11/28/22 1000 11/28/22 1200   BP: 134/83 119/80   Pulse: 74 (!) 16   Patient Position - Orthostatic VS:  Lying         Visual Acuity      ED Medications  Medications - No data to display    Diagnostic Studies  Results Reviewed     Procedure Component Value Units Date/Time    Urine Microscopic [[id number]]  (Abnormal) Collected: 11/28/22 1053    Lab Status: Final result Specimen: Urine, Clean Catch Updated: 11/28/22 1229     RBC, UA 4-10 /hpf      WBC, UA 30-50 /hpf      Epithelial Cells Occasional /hpf      Bacteria, UA Occasional /hpf     Urine culture [[id number]] Collected: 11/28/22 1053    Lab Status:  In process Specimen: Urine, Clean Catch Updated: 11/28/22 1229    FLU/RSV/COVID - if FLU/RSV clinically relevant [[id number]]  (Normal) Collected: 11/28/22 1051    Lab Status: Final result Specimen: Nares from Nasopharyngeal Swab Updated: 11/28/22 1206     SARS-CoV-2 Negative     INFLUENZA A PCR Negative     INFLUENZA B PCR Negative     RSV PCR Negative Narrative:      FOR PEDIATRIC PATIENTS - copy/paste COVID Guidelines URL to browser: [email or web address]    SARS-CoV-2 assay is a Nucleic Acid Amplification assay intended for the  qualitative detection of nucleic acid from SARS-CoV-2 in nasopharyngeal  swabs  Results are for the presumptive identification of SARS-CoV-2 RNA  Positive results are indicative of infection with SARS-CoV-2, the virus  causing COVID-19, but do not rule out bacterial infection or co-infection  with other viruses  Laboratories within the [place] and its  territories are required to report all positive results to the appropriate  public health authorities  Negative results do not preclude SARS-CoV-2  infection and should not be used as the sole basis for treatment or other  patient management decisions  Negative results must be combined with  clinical observations, patient history, and epidemiological information  This test has not been FDA cleared or approved  This test has been authorized by FDA under an Emergency Use Authorization  (EUA)  This test is only authorized for the duration of time the  declaration that circumstances exist justifying the authorization of the  emergency use of an in vitro diagnostic tests for detection of SARS-CoV-2  virus and/or diagnosis of COVID-19 infection under section 564(b)(1) of  the Act, 21 U  S C  405DIN-8(T)(7), unless the authorization is terminated  or revoked sooner  The test has been validated but independent review by FDA  and CLIA is pending  Test performed using [employer] GeneXpert: This RT-PCR assay targets N2,  a region unique to SARS-CoV-2  A conserved region in the E-gene was chosen  for pan-Sarbecovirus detection which includes SARS-CoV-2  According to CMS-2020-01-R, this platform meets the definition of high-throughput technology      TSH [[id number]]  (Normal) Collected: 11/28/22 1110    Lab Status: Final result Specimen: Blood from Arm, Right Updated: 11/28/22 1150     TSH 3RD GENERATON 0 572 uIU/mL     Narrative:      Patients undergoing fluorescein dye angiography may retain small amounts of fluorescein in the body for 48-72 hours post procedure  Samples containing fluorescein can produce falsely depressed TSH values  If the patient had this procedure,a specimen should be resubmitted post fluorescein clearance  Rapid drug screen, urine [[id number]]  (Abnormal) Collected: 11/28/22 1051    Lab Status: Final result Specimen: Urine, Clean Catch Updated: 11/28/22 1144     Amph/Meth UR Negative     Barbiturate Ur Negative     Benzodiazepine Urine Negative     Cocaine Urine Positive     Methadone Urine Negative     Opiate Urine Negative     PCP Ur Negative     THC Urine Positive     Oxycodone Urine Negative    Narrative:      Presumptive report  If requested, specimen will be sent to reference lab for confirmation  FOR MEDICAL PURPOSES ONLY  IF CONFIRMATION NEEDED PLEASE CONTACT THE LAB WITHIN 5 DAYS      Drug Screen Cutoff Levels:  AMPHETAMINE/METHAMPHETAMINES  1000 ng/mL  BARBITURATES     200 ng/mL  BENZODIAZEPINES     200 ng/mL  COCAINE      300 ng/mL  METHADONE      300 ng/mL  OPIATES      300 ng/mL  PHENCYCLIDINE     25 ng/mL  THC       50 ng/mL  OXYCODONE      100 ng/mL    Comprehensive metabolic panel [[id number]]  (Abnormal) Collected: 11/28/22 1110    Lab Status: Final result Specimen: Blood from Arm, Right Updated: 11/28/22 1141     Sodium 138 mmol/L      Potassium 3 9 mmol/L      Chloride 103 mmol/L      CO2 27 mmol/L      ANION GAP 8 mmol/L      BUN 11 mg/dL      Creatinine 1 07 mg/dL      Glucose 105 mg/dL      Calcium 8 7 mg/dL      Corrected Calcium 9 2 mg/dL      AST 39 U/L      ALT 44 U/L      Alkaline Phosphatase 93 U/L      Total Protein 7 5 g/dL      Albumin 3 4 g/dL      Total Bilirubin 0 53 mg/dL      eGFR 76 ml/min/1 73sq m     Narrative:      [place] guidelines for Chronic Kidney Disease (CKD):   •  Stage 1 with normal or high GFR (GFR > 90 mL/min/1 73 square meters)  •  Stage 2 Mild CKD (GFR = 60-89 mL/min/1 73 square meters)  •  Stage 3A Moderate CKD (GFR = 45-59 mL/min/1 73 square meters)  •  Stage 3B Moderate CKD (GFR = 30-44 mL/min/1 73 square meters)  •  Stage 4 Severe CKD (GFR = 15-29 mL/min/1 73 square meters)  •  Stage 5 End Stage CKD (GFR <15 mL/min/1 73 square meters)  Note: GFR calculation is accurate only with a steady state creatinine    CBC and differential [[id number]]  (Abnormal) Collected: 11/28/22 1110    Lab Status: Final result Specimen: Blood from Arm, Right Updated: 11/28/22 1120     WBC 7 72 Thousand/uL      RBC 5 50 Million/uL      Hemoglobin 14 5 g/dL      Hematocrit 42 0 %      MCV 76 fL      MCH 26 4 pg      MCHC 34 5 g/dL      RDW 13 9 %      MPV 10 0 fL      Platelets 315 Thousands/uL      nRBC 0 /100 WBCs      Neutrophils Relative 66 %      Immat GRANS % 0 %      Lymphocytes Relative 18 %      Monocytes Relative 10 %      Eosinophils Relative 5 %      Basophils Relative 1 %      Neutrophils Absolute 5 11 Thousands/µL      Immature Grans Absolute 0 03 Thousand/uL      Lymphocytes Absolute 1 38 Thousands/µL      Monocytes Absolute 0 75 Thousand/µL      Eosinophils Absolute 0 38 Thousand/µL      Basophils Absolute 0 07 Thousands/µL     Urine Macroscopic, POC [[id number]]  (Abnormal) Collected: 11/28/22 1053    Lab Status: Final result Specimen: Urine Updated: 11/28/22 1055     Color, UA Yellow     Clarity, UA Clear     pH, UA 6 0     Leukocytes, UA Negative     Nitrite, UA Negative     Protein, UA Negative mg/dl      Glucose, UA Negative mg/dl      Ketones, UA Negative mg/dl      Urobilinogen, UA 0 2 E U /dl      Bilirubin, UA Negative     Occult Blood, UA Trace     Specific [place], UA 1 010    Narrative:      CLINITEK RESULT    POCT alcohol breath test [[id number]]  (Normal) Resulted: 11/28/22 1052    Lab Status: Final result Updated: 11/28/22 1052 EXTBreath Alcohol 0 00                 No orders to display              Procedures  ECG 12 Lead Documentation Only    Date/Time: 11/28/2022 11:20 AM  Performed by: Vidal Ferro PA-C  Authorized by: Vidal Ferro PA-C     Indications / Diagnosis:  [address] screening  ECG reviewed by me, the ED Provider: yes    Patient location:  ED  Previous ECG:     Previous ECG:  Compared to current  Rate:     ECG rate:  56    ECG rate assessment: normal    Rhythm:     Rhythm: sinus rhythm    Ectopy:     Ectopy: none    QRS:     QRS axis:  Normal  Conduction:     Conduction: normal    ST segments:     ST segments:  Non-specific  T waves:     T waves: non-specific    Comments:      No change from previous ECG             ED Course  ED Course as of 11/28/22 1518   Mon Nov 28, 2022   1052 Will obtain [address] screening labs    1229 ECG 12 lead  Sinus bradycardia  Rightward axis  Left ventricular hypertrophy with repolarization abnormality   1514 S/O to [facility]: SI with plan to jump off bridge  Went there today with plans to do so  Will be offered 201 and pt is willing to sign  Psych consult if pt would like to leave         MDM  Number of Diagnoses or Management Options  Diagnosis management comments: This is a 58-year-old male with past medical history depression, substance abuse, alcohol use who presents today with suicidal ideation with plan to jump off the [place]  Went there today but was unable to due to a fence  Ultimately came here after talking with a friend  Admits to [facility], no [facility]  Medically cleared to be seen by psych  201 offered and pt willing to sign  Sign out to Shayna Barnes PA-C: SI with plan, being offered 201          Amount and/or Complexity of Data Reviewed  Clinical lab tests: ordered and reviewed  Decide to obtain previous medical records or to obtain history from someone other than the patient: yes  Review and summarize past medical records: yes    Patient Progress  Patient progress: stable      Disposition  Final diagnoses:   Suicidal ideation   Homicidal ideation   Auditory hallucinations     Time reflects when diagnosis was documented in both MDM as applicable and the Disposition within this note     Time User Action Codes Description Comment    11/28/2022  3:18 PM Lupillo Finch Add [E25 353] Suicidal ideation     11/28/2022  3:18 PM Yari Choudhary Homicidal ideation     11/28/2022  3:18 PM Lupillo Finch Add [R44 0] Auditory hallucinations       ED Disposition     None      MD Yudith Woodruff Most Recent Value   Sending MD Dr Mary Jones    None         Patient's Medications   Discharge Prescriptions    No medications on file       No discharge procedures on file      PDMP Review       Value Time User    PDMP Reviewed  Yes 8/1/2022  7:40 AM Nicolette Kelley MD          ED Provider  Electronically Signed by           Kylah Livingston PA-C  11/28/22 0746

## 2022-11-28 NOTE — ED CARE HANDOFF
Emergency Department Sign Out Note        Sign out and transfer of care from [facility]  See Separate Emergency Department note  The patient, Saqib Montes, was evaluated by the previous provider for suicidal ideations with plan to jump off bridge  Workup Completed:  Labs Reviewed   RAPID DRUG SCREEN, URINE - Abnormal       Result Value Ref Range Status    Amph/Meth UR Negative  Negative Final    Barbiturate Ur Negative  Negative Final    Benzodiazepine Urine Negative  Negative Final    Cocaine Urine Positive (*) Negative Final    Methadone Urine Negative  Negative Final    Opiate Urine Negative  Negative Final    PCP Ur Negative  Negative Final    THC Urine Positive (*) Negative Final    Oxycodone Urine Negative  Negative Final    Narrative:     Presumptive report  If requested, specimen will be sent to reference lab for confirmation  FOR MEDICAL PURPOSES ONLY  IF CONFIRMATION NEEDED PLEASE CONTACT THE LAB WITHIN 5 DAYS      Drug Screen Cutoff Levels:  AMPHETAMINE/METHAMPHETAMINES  1000 ng/mL  BARBITURATES     200 ng/mL  BENZODIAZEPINES     200 ng/mL  COCAINE      300 ng/mL  METHADONE      300 ng/mL  OPIATES      300 ng/mL  PHENCYCLIDINE     25 ng/mL  THC       50 ng/mL  OXYCODONE      100 ng/mL   CBC AND DIFFERENTIAL - Abnormal    WBC 7 72  4 31 - 10 16 Thousand/uL Final    RBC 5 50  3 88 - 5 62 Million/uL Final    Hemoglobin 14 5  12 0 - 17 0 g/dL Final    Hematocrit 42 0  36 5 - 49 3 % Final    MCV 76 (*) 82 - 98 fL Final    MCH 26 4 (*) 26 8 - 34 3 pg Final    MCHC 34 5  31 4 - 37 4 g/dL Final    RDW 13 9  11 6 - 15 1 % Final    MPV 10 0  8 9 - 12 7 fL Final    Platelets 048  266 - 390 Thousands/uL Final    nRBC 0  /100 WBCs Final    Neutrophils Relative 66  43 - 75 % Final    Immat GRANS % 0  0 - 2 % Final    Lymphocytes Relative 18  14 - 44 % Final    Monocytes Relative 10  4 - 12 % Final    Eosinophils Relative 5  0 - 6 % Final    Basophils Relative 1  0 - 1 % Final    Neutrophils Absolute 5 11  1 85 - 7 62 Thousands/µL Final    Immature Grans Absolute 0 03  0 00 - 0 20 Thousand/uL Final    Lymphocytes Absolute 1 38  0 60 - 4 47 Thousands/µL Final    Monocytes Absolute 0 75  0 17 - 1 22 Thousand/µL Final    Eosinophils Absolute 0 38  0 00 - 0 61 Thousand/µL Final    Basophils Absolute 0 07  0 00 - 0 10 Thousands/µL Final   COMPREHENSIVE METABOLIC PANEL - Abnormal    Sodium 138  135 - 147 mmol/L Final    Potassium 3 9  3 5 - 5 3 mmol/L Final    Chloride 103  96 - 108 mmol/L Final    CO2 27  21 - 32 mmol/L Final    ANION GAP 8  4 - 13 mmol/L Final    BUN 11  5 - 25 mg/dL Final    Creatinine 1 07  0 60 - 1 30 mg/dL Final    Comment: Standardized to IDMS reference method    Glucose 105  65 - 140 mg/dL Final    Comment: If the patient is fasting, the ADA then defines impaired fasting glucose as > 100 mg/dL and diabetes as > or equal to 123 mg/dL  Specimen collection should occur prior to Sulfasalazine administration due to the potential for falsely depressed results  Specimen collection should occur prior to Sulfapyridine administration due to the potential for falsely elevated results  Calcium 8 7  8 3 - 10 1 mg/dL Final    Corrected Calcium 9 2  8 3 - 10 1 mg/dL Final    AST 39  5 - 45 U/L Final    Comment: Specimen collection should occur prior to Sulfasalazine administration due to the potential for falsely depressed results  ALT 44  12 - 78 U/L Final    Comment: Specimen collection should occur prior to Sulfasalazine administration due to the potential for falsely depressed results  Alkaline Phosphatase 93  46 - 116 U/L Final    Total Protein 7 5  6 4 - 8 4 g/dL Final    Albumin 3 4 (*) 3 5 - 5 0 g/dL Final    Total Bilirubin 0 53  0 20 - 1 00 mg/dL Final    Comment: Use of this assay is not recommended for patients undergoing treatment with eltrombopag due to the potential for falsely elevated results      eGFR 76  ml/min/1 73sq m Final    Narrative:     National Kidney Disease Foundation guidelines for Chronic Kidney Disease (CKD):   •  Stage 1 with normal or high GFR (GFR > 90 mL/min/1 73 square meters)  •  Stage 2 Mild CKD (GFR = 60-89 mL/min/1 73 square meters)  •  Stage 3A Moderate CKD (GFR = 45-59 mL/min/1 73 square meters)  •  Stage 3B Moderate CKD (GFR = 30-44 mL/min/1 73 square meters)  •  Stage 4 Severe CKD (GFR = 15-29 mL/min/1 73 square meters)  •  Stage 5 End Stage CKD (GFR <15 mL/min/1 73 square meters)  Note: GFR calculation is accurate only with a steady state creatinine   URINE MICROSCOPIC - Abnormal    RBC, UA 4-10 (*) None Seen, 2-4 /hpf Final    WBC, UA 30-50 (*) None Seen, 2-4, 5-60 /hpf Final    Epithelial Cells Occasional  None Seen, Occasional /hpf Final    Bacteria, UA Occasional  None Seen, Occasional /hpf Final   URINE MACROSCOPIC, POC - Abnormal    Color, UA Yellow   Final    Clarity, UA Clear   Final    pH, UA 6 0  4 5 - 8 0 Final    Leukocytes, UA Negative  Negative Final    Nitrite, UA Negative  Negative Final    Protein, UA Negative  Negative mg/dl Final    Glucose, UA Negative  Negative mg/dl Final    Ketones, UA Negative  Negative mg/dl Final    Urobilinogen, UA 0 2  0 2, 1 0 E U /dl E U /dl Final    Bilirubin, UA Negative  Negative Final    Occult Blood, UA Trace (*) Negative Final    Specific [place], UA 1 010  1 003 - 1 030 Final    Narrative:     CLINITEK RESULT   COVID19, INFLUENZA A/B, RSV PCR, [facility] - Normal    SARS-CoV-2 Negative  Negative Final    Comment:      INFLUENZA A PCR Negative  Negative Final    Comment:      INFLUENZA B PCR Negative  Negative Final    Comment:      RSV PCR Negative  Negative Final    Comment:      Narrative:     FOR PEDIATRIC PATIENTS - copy/paste COVID Guidelines URL to browser: [email or web address] org/  ashx    SARS-CoV-2 assay is a Nucleic Acid Amplification assay intended for the  qualitative detection of nucleic acid from SARS-CoV-2 in nasopharyngeal  swabs   Results are for the presumptive identification of SARS-CoV-2 RNA  Positive results are indicative of infection with SARS-CoV-2, the virus  causing COVID-19, but do not rule out bacterial infection or co-infection  with other viruses  Laboratories within the [place] and its  territories are required to report all positive results to the appropriate  public health authorities  Negative results do not preclude SARS-CoV-2  infection and should not be used as the sole basis for treatment or other  patient management decisions  Negative results must be combined with  clinical observations, patient history, and epidemiological information  This test has not been FDA cleared or approved  This test has been authorized by FDA under an Emergency Use Authorization  (EUA)  This test is only authorized for the duration of time the  declaration that circumstances exist justifying the authorization of the  emergency use of an in vitro diagnostic tests for detection of SARS-CoV-2  virus and/or diagnosis of COVID-19 infection under section 564(b)(1) of  the Act, 21 U  S C  116QQT-6(T)(8), unless the authorization is terminated  or revoked sooner  The test has been validated but independent review by FDA  and CLIA is pending  Test performed using [employer] GeneXpert: This RT-PCR assay targets N2,  a region unique to SARS-CoV-2  A conserved region in the E-gene was chosen  for pan-Sarbecovirus detection which includes SARS-CoV-2  According to CMS-2020-01-R, this platform meets the definition of high-throughput technology  TSH, 3RD GENERATION - Normal    TSH 3RD GENERATON 0 572  0 450 - 4 500 uIU/mL Final    Comment: Adult TSH (3rd generation) reference range follows the recommended guidelines of the American Thyroid Association, January, 2020  Narrative:     Patients undergoing fluorescein dye angiography may retain small amounts of fluorescein in the body for 48-72 hours post procedure   Samples containing fluorescein can produce falsely depressed TSH values  If the patient had this procedure,a specimen should be resubmitted post fluorescein clearance  POCT ALCOHOL BREATH TEST - Normal    EXTBreath Alcohol 0 00   Final         ED Course / Workup Pending (followup):  -Awaiting crisis consult, plan to offer 201 at time of my sign on                                  ED Course as of 12/01/22 1210   Tue Nov 29, 2022   0107 [facility]     Procedures  MDM  Number of Diagnoses or Management Options  Auditory hallucinations  Homicidal ideation  Suicidal ideation  Diagnosis management comments: 201 signed by patient  Stable throughout time of my care  Continue to await placement at time of my sign off  Amount and/or Complexity of Data Reviewed  Clinical lab tests: reviewed    Patient Progress  Patient progress: stable          Disposition  Final diagnoses:   Suicidal ideation   Homicidal ideation   Auditory hallucinations     Time reflects when diagnosis was documented in both MDM as applicable and the Disposition within this note     Time User Action Codes Description Comment    11/28/2022  3:18 PM Rodney Rodríguez Add [S25 590] Suicidal ideation     11/28/2022  3:18 PM Lisa Abts Homicidal ideation     11/28/2022  3:18 PM Rodney Rodríguez Add [R44 0] Auditory hallucinations       ED Disposition     ED Disposition   Transfer to [address] [place] [address]   --    Date/Time   Tue Nov 29, 2022  2:21 AM    Comment   Manasa Baptiste should be transferred out to [facility] and has been medically cleared             MD Documentation    Mariella Fay Most Recent Value   Patient Condition The patient has been stabilized such that within reasonable medical probability, no material deterioration of the patient condition or the condition of the unborn child(flash) is likely to result from the transfer   Reason for Transfer Level of Care needed not available at this facility   Benefits of Transfer Specialized equipment and/or services available at the receiving facility (Include comment)________________________   Risks of Transfer Potential for delay in receiving treatment, Potential deterioration of medical condition, Loss of IV, Increased discomfort during transfer, Possible worsening of condition or death during transfer   Accepting Physician Dr Rodolfo Martini Name, Tauna Carry    (Name & Tel number) Roundtrip   Transported by [employer] and Unit #) Obie Corporal   Sending MD Dr Brown Dural   Provider Certification The patient is stable for psychiatric transfer because they are medically stable, and is protected from harming him/herself or others during transport, General risk, such as traffic hazards, adverse weather conditions, rough terrain or turbulence, possible failure of equipment (including vehicle or aircraft), or consequences of actions of persons outside the control of the transport personnel, Unanticipated needs of medical equipment and personnel during transport, The possibility of a transport vehicle being unavailable      RN Documentation    [address] Name, Tauna Carry    (Name & Tel number) Roundtrip   Transported by [employer] and Unit #) [facility]      Follow-up Information    None       Discharge Medication List as of 11/29/2022 11:54 AM      CONTINUE these medications which have NOT CHANGED    Details   ARIPiprazole (ABILIFY) 10 mg tablet Take 1 tablet (10 mg total) by mouth daily, Starting Mon 8/1/2022, Normal      cholecalciferol (VITAMIN D3) 1,000 units tablet Take 1 tablet (1,000 Units total) by mouth daily, Starting Mon 8/1/2022, Until Wed 8/31/2022, Normal           No discharge procedures on file         ED Provider  Electronically Signed by     Dc Ng PA-C  12/01/22 7911

## 2022-11-28 NOTE — ED NOTES
201 Signed- Rights explained- Bed search explained- Faxed to [facility]- Original on chart      Sandrita Yeh  Crisis Intervention Specialist II  11/28/22
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
## 2022-11-29 VITALS
BODY MASS INDEX: 22.69 KG/M2 | RESPIRATION RATE: 18 BRPM | SYSTOLIC BLOOD PRESSURE: 133 MMHG | OXYGEN SATURATION: 100 % | DIASTOLIC BLOOD PRESSURE: 82 MMHG | HEART RATE: 62 BPM | WEIGHT: 144.84 LBS | TEMPERATURE: 98.6 F

## 2022-11-29 LAB — BACTERIA UR CULT: NORMAL

## 2022-11-29 NOTE — ED NOTES
RN went in to have EMTALA signed; pt is adamant about not going to [facility]  RN informed pt that crisis and provider would be made aware         John Miranda RN  11/29/22 6090

## 2022-11-29 NOTE — ED NOTES
RN assumed care of pt at this time  Pt in bed with no outwards signs of distress    Pt has equal and even chest rises noted and BH 1:1 performed by Maxine Smith RN  11/29/22 6770
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
## 2022-11-29 NOTE — ED NOTES
Patient is accepted at [facility]  Patient is accepted by Paula [facility] is arranged with [facility]  Transportation is scheduled for 1120am Yes

## 2022-11-29 NOTE — EMTALA/ACUTE CARE TRANSFER
[facility] [address]  [address] [id number]  Dept: [phone]      EMTALA TRANSFER CONSENT    NAME Monty Otoole                                         1965                              MRN [id number]    I have been informed of my rights regarding examination, treatment, and transfer   by Dr Davide Milner MD    Benefits: Specialized equipment and/or services available at the receiving facility (Include comment)________________________    Risks: Potential for delay in receiving treatment, Potential deterioration of medical condition, Loss of IV, Increased discomfort during transfer, Possible worsening of condition or death during transfer      Consent for Transfer:  I acknowledge that my medical condition has been evaluated and explained to me by the emergency department physician or other qualified medical person and/or my attending physician, who has recommended that I be transferred to the service of  Accepting Physician: Dr Ignacia Campa at [facility] Name, [address] : Orysia Meckel  The above potential benefits of such transfer, the potential risks associated with such transfer, and the probable risks of not being transferred have been explained to me, and I fully understand them  The doctor has explained that, in my case, the benefits of transfer outweigh the risks  I agree to be transferred  I authorize the performance of emergency medical procedures and treatments upon me in both transit and upon arrival at the receiving facility  Additionally, I authorize the release of any and all medical records to the receiving facility and request they be transported with me, if possible  I understand that the safest mode of transportation during a medical emergency is an ambulance and that the Hospital advocates the use of this mode of transport   Risks of traveling to the receiving facility by car, including absence of medical control, life sustaining equipment, such as oxygen, and medical personnel has been explained to me and I fully understand them  (MARICEL CORRECT BOX BELOW)  [  ]  I consent to the stated transfer and to be transported by ambulance/helicopter  [  ]  I consent to the stated transfer, but refuse transportation by ambulance and accept full responsibility for my transportation by car  I understand the risks of non-ambulance transfers and I exonerate the Hospital and its staff from any deterioration in my condition that results from this refusal     X___________________________________________    DATE  22  TIME________  Signature of patient or legally responsible individual signing on patient behalf           RELATIONSHIP TO PATIENT_________________________          Provider Certification    NAME Jeremy Jaffe                                         1965                              MRN [id number]    A medical screening exam was performed on the above named patient  Based on the examination:    Condition Necessitating Transfer The primary encounter diagnosis was Suicidal ideation  Diagnoses of Homicidal ideation and Auditory hallucinations were also pertinent to this visit      Patient Condition: The patient has been stabilized such that within reasonable medical probability, no material deterioration of the patient condition or the condition of the unborn child(flash) is likely to result from the transfer    Reason for Transfer: Level of Care needed not available at this facility    Transfer Requirements: [facility]   · Space available and qualified personnel available for treatment as acknowledged by Brittnee  · Agreed to accept transfer and to provide appropriate medical treatment as acknowledged by       Dr Diana Pollock  · Appropriate medical records of the examination and treatment of the patient are provided at the time of transfer   [address] _______  · Transfer will be performed by qualified personnel from [facility]  and appropriate transfer equipment as required, including the use of necessary and appropriate life support measures  Provider Certification: I have examined the patient and explained the following risks and benefits of being transferred/refusing transfer to the patient/family:  The patient is stable for psychiatric transfer because they are medically stable, and is protected from harming him/herself or others during transport, General risk, such as traffic hazards, adverse weather conditions, rough terrain or turbulence, possible failure of equipment (including vehicle or aircraft), or consequences of actions of persons outside the control of the transport personnel, Unanticipated needs of medical equipment and personnel during transport, The possibility of a transport vehicle being unavailable      Based on these reasonable risks and benefits to the patient and/or the unborn child(flash), and based upon the information available at the time of the patient’s examination, I certify that the medical benefits reasonably to be expected from the provision of appropriate medical treatments at another medical facility outweigh the increasing risks, if any, to the individual’s medical condition, and in the case of labor to the unborn child, from effecting the transfer      X____________________________________________ DATE 11/29/22        TIME_______      ORIGINAL - SEND TO MEDICAL RECORDS   COPY - SEND WITH PATIENT DURING TRANSFER

## 2022-11-29 NOTE — EMTALA/ACUTE CARE TRANSFER
[facility]  [address] [address]  Dept: [phone]      EMTALA TRANSFER CONSENT    NAME Jada MAZARIEGOS 1965                              MRN [id number]    I have been informed of my rights regarding examination, treatment, and transfer   by Dr Christine carr  providers found    Benefits: Specialized equipment and/or services available at the receiving facility (Include comment)________________________    Risks: Potential for delay in receiving treatment, Potential deterioration of medical condition, Loss of IV, Increased discomfort during transfer, Possible worsening of condition or death during transfer      Consent for Transfer:  I acknowledge that my medical condition has been evaluated and explained to me by the emergency department physician or other qualified medical person and/or my attending physician, who has recommended that I be transferred to the service of  Accepting Physician: Dr Mando Alvarez at [facility] Name, [address] : To [place]  The above potential benefits of such transfer, the potential risks associated with such transfer, and the probable risks of not being transferred have been explained to me, and I fully understand them  The doctor has explained that, in my case, the benefits of transfer outweigh the risks  I agree to be transferred  I authorize the performance of emergency medical procedures and treatments upon me in both transit and upon arrival at the receiving facility  Additionally, I authorize the release of any and all medical records to the receiving facility and request they be transported with me, if possible  I understand that the safest mode of transportation during a medical emergency is an ambulance and that the Hospital advocates the use of this mode of transport   Risks of traveling to the receiving facility by car, including absence of medical control, life sustaining equipment, such as oxygen, and medical personnel has been explained to me and I fully understand them  (MARICEL CORRECT BOX BELOW)  [  ]  I consent to the stated transfer and to be transported by ambulance/helicopter  [  ]  I consent to the stated transfer, but refuse transportation by ambulance and accept full responsibility for my transportation by car  I understand the risks of non-ambulance transfers and I exonerate the Hospital and its staff from any deterioration in my condition that results from this refusal     X___________________________________________    DATE  22  TIME________  Signature of patient or legally responsible individual signing on patient behalf           RELATIONSHIP TO PATIENT_________________________          Provider Certification    NAME Varinder Jaffe                                         1965                              MRN [id number]    A medical screening exam was performed on the above named patient  Based on the examination:    Condition Necessitating Transfer The primary encounter diagnosis was Suicidal ideation  Diagnoses of Homicidal ideation and Auditory hallucinations were also pertinent to this visit      Patient Condition: The patient has been stabilized such that within reasonable medical probability, no material deterioration of the patient condition or the condition of the unborn child(flash) is likely to result from the transfer    Reason for Transfer: Level of Care needed not available at this facility    Transfer Requirements: [address]   · Space available and qualified personnel available for treatment as acknowledged by Brittnee  · Agreed to accept transfer and to provide appropriate medical treatment as acknowledged by       Dr Holly Jean  · Appropriate medical records of the examination and treatment of the patient are provided at the time of transfer   [address] _______  · Transfer will be performed by qualified personnel from [facility]  and appropriate transfer equipment as required, including the use of necessary and appropriate life support measures  Provider Certification: I have examined the patient and explained the following risks and benefits of being transferred/refusing transfer to the patient/family:  The patient is stable for psychiatric transfer because they are medically stable, and is protected from harming him/herself or others during transport, General risk, such as traffic hazards, adverse weather conditions, rough terrain or turbulence, possible failure of equipment (including vehicle or aircraft), or consequences of actions of persons outside the control of the transport personnel, Unanticipated needs of medical equipment and personnel during transport, The possibility of a transport vehicle being unavailable      Based on these reasonable risks and benefits to the patient and/or the unborn child(flash), and based upon the information available at the time of the patient’s examination, I certify that the medical benefits reasonably to be expected from the provision of appropriate medical treatments at another medical facility outweigh the increasing risks, if any, to the individual’s medical condition, and in the case of labor to the unborn child, from effecting the transfer      X____________________________________________ DATE 11/29/22        TIME_______      ORIGINAL - SEND TO MEDICAL RECORDS   COPY - SEND WITH PATIENT DURING TRANSFER

## 2022-11-29 NOTE — ED NOTES
Patient accepted to [facility] by Dr Palma Running for arrival at/after 12:00 pm today  Requested transport via [employer]  Awaiting pickup time  Crisis portion of EMTALA completed

## 2022-11-29 NOTE — ED NOTES
Spoke with Lawrence in admissions at [facility]  They have a potential bed for this patient and are willing to review  Referral faxed at this time

## 2022-11-29 NOTE — ED NOTES
Insurance Authorization for Admission:  Phone Call Placed to [employer]  Phone Number [phone]  Spoke to Marquis Jasmine  3 Days Approved  Level of Care AIP    Authorization #Facility to call upon admission    EVS (Eligibility Verification System) Called- 9   Automated System Indicates Active with [facility]  Crisis Intervention Specialist II  11/28/22

## 2022-12-24 ENCOUNTER — HOSPITAL ENCOUNTER (EMERGENCY)
Facility: HOSPITAL | Age: 57
Discharge: HOME | End: 2022-12-25
Attending: STUDENT IN AN ORGANIZED HEALTH CARE EDUCATION/TRAINING PROGRAM

## 2022-12-24 VITALS
OXYGEN SATURATION: 98 % | SYSTOLIC BLOOD PRESSURE: 142 MMHG | WEIGHT: 158.73 LBS | HEART RATE: 69 BPM | RESPIRATION RATE: 20 BRPM | BODY MASS INDEX: 24.86 KG/M2 | TEMPERATURE: 97.2 F | DIASTOLIC BLOOD PRESSURE: 95 MMHG

## 2022-12-24 DIAGNOSIS — F14.10 COCAINE ABUSE, CONTINUOUS (HCC): Chronic | ICD-10-CM

## 2022-12-24 DIAGNOSIS — R45.851 SUICIDAL IDEATION: Primary | ICD-10-CM

## 2022-12-24 LAB
ALBUMIN SERPL BCP-MCNC: 4.3 G/DL (ref 3.5–5)
ALP SERPL-CCNC: 99 U/L (ref 43–122)
ALT SERPL W P-5'-P-CCNC: 60 U/L
AMPHETAMINES SERPL QL SCN: NEGATIVE
ANION GAP SERPL CALCULATED.3IONS-SCNC: 5 MMOL/L (ref 5–14)
AST SERPL W P-5'-P-CCNC: 55 U/L (ref 17–59)
ATRIAL RATE: 78 BPM
BACTERIA UR QL AUTO: ABNORMAL /HPF
BARBITURATES UR QL: NEGATIVE
BASOPHILS # BLD AUTO: 0.06 THOUSANDS/ÂΜL (ref 0–0.1)
BASOPHILS NFR BLD AUTO: 1 % (ref 0–1)
BENZODIAZ UR QL: NEGATIVE
BILIRUB SERPL-MCNC: 0.5 MG/DL (ref 0.2–1)
BILIRUB UR QL STRIP: NEGATIVE
BUN SERPL-MCNC: 17 MG/DL (ref 5–25)
CALCIUM SERPL-MCNC: 9.4 MG/DL (ref 8.4–10.2)
CHLORIDE SERPL-SCNC: 104 MMOL/L (ref 96–108)
CLARITY UR: CLEAR
CO2 SERPL-SCNC: 28 MMOL/L (ref 21–32)
COCAINE UR QL: POSITIVE
COLOR UR: ABNORMAL
CREAT SERPL-MCNC: 1.05 MG/DL (ref 0.7–1.5)
EOSINOPHIL # BLD AUTO: 0.23 THOUSAND/ÂΜL (ref 0–0.61)
EOSINOPHIL NFR BLD AUTO: 3 % (ref 0–6)
ERYTHROCYTE [DISTWIDTH] IN BLOOD BY AUTOMATED COUNT: 14.8 % (ref 11.6–15.1)
ETHANOL SERPL-MCNC: <10 MG/DL (ref 0–10)
GFR SERPL CREATININE-BSD FRML MDRD: 77 ML/MIN/1.73SQ M
GLUCOSE SERPL-MCNC: 102 MG/DL (ref 70–99)
GLUCOSE UR STRIP-MCNC: NEGATIVE MG/DL
HCT VFR BLD AUTO: 46.8 % (ref 36.5–49.3)
HGB BLD-MCNC: 15 G/DL (ref 12–17)
HGB UR QL STRIP.AUTO: NEGATIVE
HYALINE CASTS #/AREA URNS LPF: ABNORMAL /LPF
IMM GRANULOCYTES # BLD AUTO: 0.02 THOUSAND/UL (ref 0–0.2)
IMM GRANULOCYTES NFR BLD AUTO: 0 % (ref 0–2)
KETONES UR STRIP-MCNC: NEGATIVE MG/DL
LEUKOCYTE ESTERASE UR QL STRIP: NEGATIVE
LYMPHOCYTES # BLD AUTO: 1.66 THOUSANDS/ÂΜL (ref 0.6–4.47)
LYMPHOCYTES NFR BLD AUTO: 24 % (ref 14–44)
MCH RBC QN AUTO: 25.7 PG (ref 26.8–34.3)
MCHC RBC AUTO-ENTMCNC: 32.1 G/DL (ref 31.4–37.4)
MCV RBC AUTO: 80 FL (ref 82–98)
METHADONE UR QL: NEGATIVE
MONOCYTES # BLD AUTO: 0.71 THOUSAND/ÂΜL (ref 0.17–1.22)
MONOCYTES NFR BLD AUTO: 10 % (ref 4–12)
MUCOUS THREADS UR QL AUTO: ABNORMAL
NEUTROPHILS # BLD AUTO: 4.37 THOUSANDS/ÂΜL (ref 1.85–7.62)
NEUTS SEG NFR BLD AUTO: 62 % (ref 43–75)
NITRITE UR QL STRIP: NEGATIVE
NON-SQ EPI CELLS URNS QL MICRO: ABNORMAL /HPF
NRBC BLD AUTO-RTO: 0 /100 WBCS
OPIATES UR QL SCN: NEGATIVE
OXYCODONE+OXYMORPHONE UR QL SCN: NEGATIVE
P AXIS: 52 DEGREES
PCP UR QL: NEGATIVE
PH UR STRIP.AUTO: 5 [PH]
PLATELET # BLD AUTO: 203 THOUSANDS/UL (ref 149–390)
PMV BLD AUTO: 9.6 FL (ref 8.9–12.7)
POTASSIUM SERPL-SCNC: 3.9 MMOL/L (ref 3.5–5.3)
PR INTERVAL: 160 MS
PROT SERPL-MCNC: 8.4 G/DL (ref 6.4–8.4)
PROT UR STRIP-MCNC: ABNORMAL MG/DL
QRS AXIS: 10 DEGREES
QRSD INTERVAL: 100 MS
QT INTERVAL: 378 MS
QTC INTERVAL: 430 MS
RBC # BLD AUTO: 5.84 MILLION/UL (ref 3.88–5.62)
RBC #/AREA URNS AUTO: ABNORMAL /HPF
SODIUM SERPL-SCNC: 137 MMOL/L (ref 135–147)
SP GR UR STRIP.AUTO: 1.02 (ref 1–1.04)
T WAVE AXIS: 262 DEGREES
THC UR QL: POSITIVE
UROBILINOGEN UA: NEGATIVE MG/DL
VENTRICULAR RATE: 78 BPM
WBC # BLD AUTO: 7.05 THOUSAND/UL (ref 4.31–10.16)
WBC #/AREA URNS AUTO: ABNORMAL /HPF

## 2022-12-25 NOTE — DISCHARGE INSTRUCTIONS
You should follow-up with [facility] Jessica Ballard [phone]) and [facility] [place] and Elyse) to help  therapy needs and level of depression he is experiencing from grief  It is recommended you contact Intake or have Elyse help you take [facility] - see below    [facility]'s Partial Hospitalization Program is a short-term, community-based treatment for adults 18 years and older who do not require inpatient hospitalization, but are in need of more intensive treatment than standard outpatient therapy and psychiatric appointments  The Adult Partial Hospitalization Program provides mental health support and treatment in a comfortable and relaxed setting  Participants engage in a therapeutic environment, with professional staff and a community of peers to work on their specific treatment goals  The program is offered Monday through Friday, from 9 am to 3 pm and provides five group therapy sessions each day, as well as individual therapy, care coordination, and psychiatry services       Rom Montiel, [address] [place] [address]     Phone [phone]

## 2022-12-25 NOTE — ED PROVIDER NOTES
History  Chief Complaint   Patient presents with   • Psychiatric Evaluation     Pt c/o SI  States that mother recently passed away and girlfriend broke up with him  Pt states that he walked by ER and stopped for help after he tried to get to dez to jump off of  Pt has been having command hallucinations telling him to hurt himself and other people  Denies any VH  The patient is a 54-year-old male with a past medical history of substance abuse and depression, who presents for psychiatric evaluation  The patient states he has been feeling increasingly suicidal over the last couple of days as his mother  4 days ago and he has no other family left  He was also recently dumped by his girlfriend  Tonight the patient was attempting to jump off a "dez" onto a bed of rocks  He did not go through with it because the hill to get to the dez was too icy so he couldn't climb down  He then began experiencing auditory hallucinations that told him, "If you can't hurt yourself you should hurt someone else"  He then started thinking about walking out in front of traffic, but came here instead  The patient admits to drinking a few beers and smoking both marijuana and a cigarette laced with cocaine 3 to 4 hours ago  He is requesting to speak with a psychiatrist and has no physical complaints at this time  Denies VH or HI  He does admit to SI with a plan in the past and states he tried to jump off the [address]  Prior to Admission Medications   Prescriptions Last Dose Informant Patient Reported? Taking?    ARIPiprazole (ABILIFY) 10 mg tablet   No No   Sig: Take 1 tablet (10 mg total) by mouth daily   Patient not taking: Reported on 2022   cholecalciferol (VITAMIN D3) 1,000 units tablet   No No   Sig: Take 1 tablet (1,000 Units total) by mouth daily      Facility-Administered Medications: None       Past Medical History:   Diagnosis Date   • Abnormal EKG    • Depression    • Substance use        Past Surgical History:   Procedure Laterality Date   • NO PAST SURGERIES         History reviewed  No pertinent family history  I have reviewed and agree with the history as documented  E-Cigarette/Vaping   • E-Cigarette Use Never User      E-Cigarette/Vaping Substances   • Nicotine No    • THC No    • CBD No    • Flavoring No    • Other No    • Unknown No      Social History     Tobacco Use   • Smoking status: Every Day     Packs/day: 2 00     Types: Cigarettes   • Smokeless tobacco: Never   Vaping Use   • Vaping Use: Never used   Substance Use Topics   • Alcohol use: Yes     Comment: occasional   • Drug use: Yes     Types: Marijuana, Cocaine       Review of Systems   Constitutional: Negative for chills and fever  HENT: Negative for ear pain and sore throat  Eyes: Negative for pain and visual disturbance  Respiratory: Negative for cough and shortness of breath  Cardiovascular: Negative for chest pain and palpitations  Gastrointestinal: Negative for abdominal pain, diarrhea, nausea and vomiting  Genitourinary: Negative for dysuria and hematuria  Musculoskeletal: Negative for arthralgias and back pain  Skin: Negative for color change and rash  Neurological: Negative for seizures and syncope  Psychiatric/Behavioral: Positive for dysphoric mood, hallucinations and suicidal ideas  Negative for agitation, behavioral problems and self-injury  All other systems reviewed and are negative  Physical Exam  Physical Exam  Vitals and nursing note reviewed  Constitutional:       General: He is awake  Appearance: He is well-developed and normal weight  He is not toxic-appearing or diaphoretic  HENT:      Head: Normocephalic and atraumatic  Right Ear: External ear normal       Left Ear: External ear normal       Nose: Nose normal       Mouth/Throat:      Lips: Pink  No lesions  Mouth: Mucous membranes are moist    Eyes:      General: Lids are normal  Gaze aligned appropriately  Conjunctiva/sclera: Conjunctivae normal       Pupils: Pupils are equal, round, and reactive to light  Cardiovascular:      Rate and Rhythm: Regular rhythm  Tachycardia present  Heart sounds: S1 normal and S2 normal  No murmur heard  No friction rub  No gallop  Pulmonary:      Effort: Pulmonary effort is normal  No respiratory distress  Breath sounds: Normal breath sounds and air entry  No wheezing, rhonchi or rales  Abdominal:      General: Abdomen is flat  Palpations: Abdomen is soft  Tenderness: There is no abdominal tenderness  There is no guarding or rebound  Musculoskeletal:      Cervical back: Normal, full passive range of motion without pain and neck supple  No rigidity or crepitus  No spinous process tenderness or muscular tenderness  Thoracic back: Normal  No spasms, tenderness or bony tenderness  Lumbar back: Normal  No spasms, tenderness or bony tenderness  Comments: Patient moving all four extremities without difficulty  Skin:     General: Skin is warm and dry  Capillary Refill: Capillary refill takes less than 2 seconds  Coloration: Skin is not cyanotic, jaundiced or pale  Findings: No erythema, lesion, petechiae, rash or wound  Neurological:      Mental Status: He is alert and oriented to person, place, and time  Psychiatric:         Attention and Perception: Attention normal          Speech: Speech normal          Behavior: Behavior is cooperative  Thought Content: Thought content includes suicidal ideation  Thought content includes suicidal plan         Vital Signs  ED Triage Vitals [12/24/22 2015]   Temperature Pulse Respirations Blood Pressure SpO2   (!) 97 2 °F (36 2 °C) (!) 114 15 (!) 191/115 99 %      Temp Source Heart Rate Source Patient Position - Orthostatic VS BP Location FiO2 (%)   Tympanic Monitor Sitting Left arm --      Pain Score       No Pain           Vitals:    12/24/22 2015 12/24/22 2115   BP: (!) 191/115 146/100   Pulse: (!) 114 80   Patient Position - Orthostatic VS: Sitting Lying       ED Medications  Medications - No data to display    Diagnostic Studies  Results Reviewed     Procedure Component Value Units Date/Time    Urine Microscopic [[id number]]  (Abnormal) Collected: 12/24/22 2201    Lab Status: Final result Specimen: Urine Updated: 12/24/22 2237     RBC, UA None Seen /hpf      WBC, UA 0-1 /hpf      Epithelial Cells Occasional /hpf      Bacteria, UA Occasional /hpf      Hyaline Casts, UA 0-1 /lpf      MUCUS THREADS Occasional    Rapid drug screen, urine [[id number]]  (Abnormal) Collected: 12/24/22 2201    Lab Status: Final result Specimen: Urine, Clean Catch Updated: 12/24/22 2228     Amph/Meth UR Negative     Barbiturate Ur Negative     Benzodiazepine Urine Negative     Cocaine Urine Positive     Methadone Urine Negative     Opiate Urine Negative     PCP Ur Negative     THC Urine Positive     Oxycodone Urine Negative    Narrative:      Presumptive report  If requested, specimen will be sent to reference lab for confirmation  FOR MEDICAL PURPOSES ONLY  IF CONFIRMATION NEEDED PLEASE CONTACT THE LAB WITHIN 5 DAYS      Drug Screen Cutoff Levels:  AMPHETAMINE/METHAMPHETAMINES  1000 ng/mL  BARBITURATES     200 ng/mL  BENZODIAZEPINES     200 ng/mL  COCAINE      300 ng/mL  METHADONE      300 ng/mL  OPIATES      300 ng/mL  PHENCYCLIDINE     25 ng/mL  THC       50 ng/mL  OXYCODONE      100 ng/mL    UA w Reflex to Microscopic w Reflex to Culture [[id number]]  (Abnormal) Collected: 12/24/22 2201    Lab Status: Final result Specimen: Urine Updated: 12/24/22 2219     Color, UA Mary     Clarity, UA Clear     Specific [place], UA 1 020     pH, UA 5 0     Leukocytes, UA Negative     Nitrite, UA Negative     Protein, UA 15 (Trace) mg/dl      Glucose, UA Negative mg/dl      Ketones, UA Negative mg/dl      Bilirubin, UA Negative     Occult Blood, UA Negative     UROBILINOGEN UA Negative mg/dL     Comprehensive metabolic panel [[id number]]  (Abnormal) Collected: 12/24/22 2114    Lab Status: Final result Specimen: Blood from Arm, Left Updated: 12/24/22 2133     Sodium 137 mmol/L      Potassium 3 9 mmol/L      Chloride 104 mmol/L      CO2 28 mmol/L      ANION GAP 5 mmol/L      BUN 17 mg/dL      Creatinine 1 05 mg/dL      Glucose 102 mg/dL      Calcium 9 4 mg/dL      AST 55 U/L      ALT 60 U/L      Alkaline Phosphatase 99 U/L      Total Protein 8 4 g/dL      Albumin 4 3 g/dL      Total Bilirubin 0 50 mg/dL      eGFR 77 ml/min/1 73sq m     Narrative:      [facility] guidelines for Chronic Kidney Disease (CKD):   •  Stage 1 with normal or high GFR (GFR > 90 mL/min/1 73 square meters)  •  Stage 2 Mild CKD (GFR = 60-89 mL/min/1 73 square meters)  •  Stage 3A Moderate CKD (GFR = 45-59 mL/min/1 73 square meters)  •  Stage 3B Moderate CKD (GFR = 30-44 mL/min/1 73 square meters)  •  Stage 4 Severe CKD (GFR = 15-29 mL/min/1 73 square meters)  •  Stage 5 End Stage CKD (GFR <15 mL/min/1 73 square meters)  Note: GFR calculation is accurate only with a steady state creatinine    Ethanol [[id number]]  (Normal) Collected: 12/24/22 2114    Lab Status: Final result Specimen: Blood from Arm, Left Updated: 12/24/22 2132     Ethanol Lvl <10 mg/dL     CBC and differential [[id number]]  (Abnormal) Collected: 12/24/22 2114    Lab Status: Final result Specimen: Blood from Arm, Left Updated: 12/24/22 2123     WBC 7 05 Thousand/uL      RBC 5 84 Million/uL      Hemoglobin 15 0 g/dL      Hematocrit 46 8 %      MCV 80 fL      MCH 25 7 pg      MCHC 32 1 g/dL      RDW 14 8 %      MPV 9 6 fL      Platelets 037 Thousands/uL      nRBC 0 /100 WBCs      Neutrophils Relative 62 %      Immat GRANS % 0 %      Lymphocytes Relative 24 %      Monocytes Relative 10 %      Eosinophils Relative 3 %      Basophils Relative 1 %      Neutrophils Absolute 4 37 Thousands/µL      Immature Grans Absolute 0 02 Thousand/uL      Lymphocytes Absolute 1 66 Thousands/µL Monocytes Absolute 0 71 Thousand/µL      Eosinophils Absolute 0 23 Thousand/µL      Basophils Absolute 0 06 Thousands/µL                  No orders to display            Procedures  ECG 12 Lead Documentation Only    Date/Time: 12/24/2022 10:17 PM  Performed by: Ivon Jimenez PA-C  Authorized by: Ivon Jimenez PA-C     ECG reviewed by me, the ED Provider: yes    Patient location:  ED  Previous ECG:     Comparison to cardiac monitor: No    Interpretation:     Interpretation: abnormal    Rate:     ECG rate:  78    ECG rate assessment: normal    Rhythm:     Rhythm: sinus rhythm    Ectopy:     Ectopy: none    QRS:     QRS axis:  Normal    QRS intervals:  Normal  Conduction:     Conduction: normal    ST segments:     ST segments:  Normal  T waves:     T waves: non-specific    Comments:      MN interval: 160ms  QRS duration: 100ms  QT/QTc: 378/430ms           ED Course         MDM  Number of Diagnoses or Management Options  Cocaine abuse, continuous ([facility] [address] ): established and worsening  Suicidal ideation: new and requires workup  Diagnosis management comments: Patient presents with suicidal ideation with a plan and auditory hallucinations  He has no physical complaints  Labs and UA unremarkable  Blood ethanol level is <10 and UDS was positive for cocaine and marijuana  EKG with nonspecific T wave changes, but unchanged from prior EKG  Patient was medically cleared and a referral for crisis was placed  After crisis consult, it was determine the patient was not appropriate for inpatient treatment, but would benefit from partial treatment  He will be following up with the [address] [place] [address] on Monday  Patient is in agreement with the treatment plan and all his questions were answered by crisis  Patient was provided transportation to the local [facility] shelter         Amount and/or Complexity of Data Reviewed  Clinical lab tests: ordered and reviewed  Review and summarize past medical records: yes  Independent visualization of images, tracings, or specimens: yes    Risk of Complications, Morbidity, and/or Mortality  Presenting problems: high  Diagnostic procedures: moderate  Management options: low        Disposition  Final diagnoses:   Suicidal ideation   Cocaine abuse, continuous ([facility] [address] )     Time reflects when diagnosis was documented in both MDM as applicable and the Disposition within this note     Time User Action Codes Description Comment    12/24/2022 10:34 PM Andria Obrien Add [R45 851] Suicidal ideation     12/24/2022 10:35 PM Andria Obrien Add [F14 10] Cocaine abuse, continuous [facility])       ED Disposition     ED Disposition   Transfer to [address] [place] [address]   --    Date/Time   Sat Dec 24, 2022 10:34 PM    Comment   Aroldo Lob should be elevated by crisis and has been medically cleared  Follow-up Information    None         Patient's Medications   Discharge Prescriptions    No medications on file       No discharge procedures on file      PDMP Review       Value Time User    PDMP Reviewed  Yes 8/1/2022  7:40 AM Maryam Simpson MD          ED Provider  Electronically Signed by           Camryn Lake PA-C  12/25/22 8364

## 2022-12-25 NOTE — ED NOTES
Pt vitals taken and given sandwich,chips,pretzels,p b  crackers,cola @ bedside  Tolerating well  Pt has no other needs @ this time  All safety precautions remain in place including 1:1 @ bedside       Apoorva Sage  12/24/22 3845

## 2023-01-19 ENCOUNTER — HOSPITAL ENCOUNTER (EMERGENCY)
Facility: HOSPITAL | Age: 58
Discharge: HOME | End: 2023-01-19
Attending: EMERGENCY MEDICINE

## 2023-01-19 ENCOUNTER — APPOINTMENT (EMERGENCY)
Dept: CT IMAGING | Facility: HOSPITAL | Age: 58
End: 2023-01-19

## 2023-01-19 ENCOUNTER — APPOINTMENT (EMERGENCY)
Dept: RADIOLOGY | Facility: HOSPITAL | Age: 58
End: 2023-01-19

## 2023-01-19 VITALS
TEMPERATURE: 98.1 F | DIASTOLIC BLOOD PRESSURE: 95 MMHG | HEART RATE: 87 BPM | WEIGHT: 162 LBS | BODY MASS INDEX: 25.37 KG/M2 | RESPIRATION RATE: 18 BRPM | OXYGEN SATURATION: 97 % | SYSTOLIC BLOOD PRESSURE: 172 MMHG

## 2023-01-19 VITALS
OXYGEN SATURATION: 99 % | DIASTOLIC BLOOD PRESSURE: 85 MMHG | HEART RATE: 54 BPM | SYSTOLIC BLOOD PRESSURE: 143 MMHG | TEMPERATURE: 97.7 F | RESPIRATION RATE: 18 BRPM

## 2023-01-19 DIAGNOSIS — M54.50 LEFT LOW BACK PAIN: Primary | ICD-10-CM

## 2023-01-19 DIAGNOSIS — Z76.5 MALINGERING: ICD-10-CM

## 2023-01-19 DIAGNOSIS — M79.18 MYALGIA, MULTIPLE SITES: Primary | ICD-10-CM

## 2023-01-19 DIAGNOSIS — R94.31 ABNORMAL EKG: ICD-10-CM

## 2023-01-19 DIAGNOSIS — B34.9 VIRAL SYNDROME: ICD-10-CM

## 2023-01-19 DIAGNOSIS — K59.00 CONSTIPATION: ICD-10-CM

## 2023-01-19 LAB
ALBUMIN SERPL BCP-MCNC: 3.8 G/DL (ref 3.5–5)
ALP SERPL-CCNC: 86 U/L (ref 34–104)
ALT SERPL W P-5'-P-CCNC: 43 U/L (ref 7–52)
ANION GAP SERPL CALCULATED.3IONS-SCNC: 6 MMOL/L (ref 4–13)
AST SERPL W P-5'-P-CCNC: 39 U/L (ref 13–39)
ATRIAL RATE: 57 BPM
BASOPHILS # BLD AUTO: 0.05 THOUSANDS/ÂΜL (ref 0–0.1)
BASOPHILS NFR BLD AUTO: 1 % (ref 0–1)
BILIRUB SERPL-MCNC: 0.49 MG/DL (ref 0.2–1)
BILIRUB UR QL STRIP: NEGATIVE
BUN SERPL-MCNC: 15 MG/DL (ref 5–25)
CALCIUM SERPL-MCNC: 8.9 MG/DL (ref 8.4–10.2)
CARDIAC TROPONIN I PNL SERPL HS: <2 NG/L
CHLORIDE SERPL-SCNC: 105 MMOL/L (ref 96–108)
CLARITY UR: CLEAR
CO2 SERPL-SCNC: 25 MMOL/L (ref 21–32)
COLOR UR: YELLOW
CREAT SERPL-MCNC: 1.03 MG/DL (ref 0.6–1.3)
D DIMER PPP FEU-MCNC: 0.27 UG/ML FEU
EOSINOPHIL # BLD AUTO: 0.19 THOUSAND/ÂΜL (ref 0–0.61)
EOSINOPHIL NFR BLD AUTO: 3 % (ref 0–6)
ERYTHROCYTE [DISTWIDTH] IN BLOOD BY AUTOMATED COUNT: 14.9 % (ref 11.6–15.1)
FLUAV RNA RESP QL NAA+PROBE: NEGATIVE
FLUBV RNA RESP QL NAA+PROBE: NEGATIVE
GFR SERPL CREATININE-BSD FRML MDRD: 79 ML/MIN/1.73SQ M
GLUCOSE SERPL-MCNC: 98 MG/DL (ref 65–140)
GLUCOSE UR STRIP-MCNC: NEGATIVE MG/DL
HCT VFR BLD AUTO: 40.7 % (ref 36.5–49.3)
HGB BLD-MCNC: 13.7 G/DL (ref 12–17)
HGB UR QL STRIP.AUTO: NEGATIVE
IMM GRANULOCYTES # BLD AUTO: 0.02 THOUSAND/UL (ref 0–0.2)
IMM GRANULOCYTES NFR BLD AUTO: 0 % (ref 0–2)
KETONES UR STRIP-MCNC: NEGATIVE MG/DL
LEUKOCYTE ESTERASE UR QL STRIP: NEGATIVE
LIPASE SERPL-CCNC: 27 U/L (ref 11–82)
LYMPHOCYTES # BLD AUTO: 1.7 THOUSANDS/ÂΜL (ref 0.6–4.47)
LYMPHOCYTES NFR BLD AUTO: 25 % (ref 14–44)
MCH RBC QN AUTO: 25.7 PG (ref 26.8–34.3)
MCHC RBC AUTO-ENTMCNC: 33.7 G/DL (ref 31.4–37.4)
MCV RBC AUTO: 76 FL (ref 82–98)
MONOCYTES # BLD AUTO: 0.72 THOUSAND/ÂΜL (ref 0.17–1.22)
MONOCYTES NFR BLD AUTO: 11 % (ref 4–12)
NEUTROPHILS # BLD AUTO: 4.17 THOUSANDS/ÂΜL (ref 1.85–7.62)
NEUTS SEG NFR BLD AUTO: 60 % (ref 43–75)
NITRITE UR QL STRIP: NEGATIVE
NRBC BLD AUTO-RTO: 0 /100 WBCS
P AXIS: 58 DEGREES
PH UR STRIP.AUTO: 5.5 [PH] (ref 4.5–8)
PLATELET # BLD AUTO: 200 THOUSANDS/UL (ref 149–390)
PMV BLD AUTO: 9.6 FL (ref 8.9–12.7)
POTASSIUM SERPL-SCNC: 3.8 MMOL/L (ref 3.5–5.3)
PR INTERVAL: 158 MS
PROT SERPL-MCNC: 7.1 G/DL (ref 6.4–8.4)
PROT UR STRIP-MCNC: NEGATIVE MG/DL
QRS AXIS: 106 DEGREES
QRSD INTERVAL: 88 MS
QT INTERVAL: 422 MS
QTC INTERVAL: 410 MS
RBC # BLD AUTO: 5.33 MILLION/UL (ref 3.88–5.62)
RSV RNA RESP QL NAA+PROBE: NEGATIVE
SARS-COV-2 RNA RESP QL NAA+PROBE: NEGATIVE
SODIUM SERPL-SCNC: 136 MMOL/L (ref 135–147)
SP GR UR STRIP.AUTO: 1.01 (ref 1–1.03)
T WAVE AXIS: -60 DEGREES
UROBILINOGEN UR QL STRIP.AUTO: 0.2 E.U./DL
VENTRICULAR RATE: 57 BPM
WBC # BLD AUTO: 6.85 THOUSAND/UL (ref 4.31–10.16)

## 2023-01-19 RX ORDER — KETOROLAC TROMETHAMINE 30 MG/ML
15 INJECTION, SOLUTION INTRAMUSCULAR; INTRAVENOUS ONCE
Status: COMPLETED | OUTPATIENT
Start: 2023-01-19 | End: 2023-01-19

## 2023-01-19 RX ORDER — LIDOCAINE 50 MG/G
1 PATCH TOPICAL ONCE
Status: DISCONTINUED | OUTPATIENT
Start: 2023-01-19 | End: 2023-01-19 | Stop reason: HOSPADM

## 2023-01-19 RX ORDER — ACETAMINOPHEN 325 MG/1
975 TABLET ORAL ONCE
Status: COMPLETED | OUTPATIENT
Start: 2023-01-19 | End: 2023-01-19

## 2023-01-19 RX ORDER — AMOXICILLIN 250 MG
1 CAPSULE ORAL DAILY
Qty: 20 TABLET | Refills: 0 | Status: SHIPPED | OUTPATIENT
Start: 2023-01-19

## 2023-01-19 RX ORDER — KETOROLAC TROMETHAMINE 30 MG/ML
15 INJECTION, SOLUTION INTRAMUSCULAR; INTRAVENOUS ONCE
Status: DISCONTINUED | OUTPATIENT
Start: 2023-01-19 | End: 2023-01-19

## 2023-01-19 RX ADMIN — SODIUM CHLORIDE 1000 ML: 0.9 INJECTION, SOLUTION INTRAVENOUS at 08:01

## 2023-01-19 RX ADMIN — KETOROLAC TROMETHAMINE 15 MG: 30 INJECTION, SOLUTION INTRAMUSCULAR; INTRAVENOUS at 08:00

## 2023-01-19 RX ADMIN — IOHEXOL 100 ML: 350 INJECTION, SOLUTION INTRAVENOUS at 09:18

## 2023-01-19 RX ADMIN — ACETAMINOPHEN 975 MG: 325 TABLET, FILM COATED ORAL at 06:00

## 2023-01-19 RX ADMIN — LIDOCAINE 1 PATCH: 50 PATCH CUTANEOUS at 08:00

## 2023-01-19 RX ADMIN — KETOROLAC TROMETHAMINE 15 MG: 30 INJECTION, SOLUTION INTRAMUSCULAR; INTRAVENOUS at 03:55

## 2023-01-19 NOTE — ED PROVIDER NOTES
History  Chief Complaint   Patient presents with   • Generalized Body Aches     Patient with multiple complaints - states his whole body hurts, he has nausea, states he vomited 3x coming over here  No diarrhea  Also states that he fell twice coming here from the [facility]  The patient is a 79-year-old male with a past medical history of substance abuse and depression, who presents for evaluation of cold-like symptoms  He reports myalgias, headache, lightheadedness, dry cough, change in voice, and 2 episodes of nonbloody, nonbilious vomiting beginning last night  The patient states he has been staying at [employer] [facility] and several of the people have been sick  Denies rhinorrhea, cough, ear pain, sore throat, abdominal pain, urinary symptoms, or F/C  The patient also states he fell twice on his walk to the hospital due to his myalgias and lightheadedness  Denies head strike or loss of consciousness  Prior to Admission Medications   Prescriptions Last Dose Informant Patient Reported? Taking? ARIPiprazole (ABILIFY) 10 mg tablet Not Taking  No No   Sig: Take 1 tablet (10 mg total) by mouth daily   Patient not taking: Reported on 11/28/2022   cholecalciferol (VITAMIN D3) 1,000 units tablet   No No   Sig: Take 1 tablet (1,000 Units total) by mouth daily      Facility-Administered Medications: None       Past Medical History:   Diagnosis Date   • Abnormal EKG    • Depression    • Substance use        Past Surgical History:   Procedure Laterality Date   • NO PAST SURGERIES         History reviewed  No pertinent family history  I have reviewed and agree with the history as documented      E-Cigarette/Vaping   • E-Cigarette Use Never User      E-Cigarette/Vaping Substances   • Nicotine No    • THC No    • CBD No    • Flavoring No    • Other No    • Unknown No      Social History     Tobacco Use   • Smoking status: Every Day     Packs/day: 2 00     Types: Cigarettes   • Smokeless tobacco: Never   Vaping Use   • Vaping Use: Never used   Substance Use Topics   • Alcohol use: Yes     Comment: occasional   • Drug use: Yes     Types: Marijuana, Cocaine     Comment: does not remember when he used last       Review of Systems   Constitutional: Negative for appetite change, chills and fever  HENT: Positive for voice change  Negative for congestion, ear pain, rhinorrhea, sneezing and sore throat  Eyes: Negative for pain and visual disturbance  Respiratory: Positive for cough  Negative for shortness of breath  Cardiovascular: Negative for chest pain and palpitations  Gastrointestinal: Positive for nausea and vomiting  Negative for abdominal pain, constipation and diarrhea  Genitourinary: Negative for dysuria and hematuria  Musculoskeletal: Positive for myalgias  Negative for arthralgias and back pain  Skin: Negative for color change and rash  Neurological: Positive for light-headedness and headaches  Negative for dizziness, seizures and syncope  All other systems reviewed and are negative  Physical Exam  Physical Exam  Vitals and nursing note reviewed  Constitutional:       General: He is awake  Appearance: He is well-developed and overweight  He is not toxic-appearing or diaphoretic  HENT:      Head: Normocephalic and atraumatic  Right Ear: External ear normal       Left Ear: External ear normal       Nose: Nose normal  No congestion or rhinorrhea  Mouth/Throat:      Lips: Pink  No lesions  Eyes:      General: Lids are normal  Gaze aligned appropriately  No visual field deficit  Conjunctiva/sclera: Conjunctivae normal       Pupils: Pupils are equal, round, and reactive to light  Cardiovascular:      Rate and Rhythm: Normal rate and regular rhythm  Heart sounds: S1 normal and S2 normal  No murmur heard  No friction rub  No gallop  Pulmonary:      Effort: Pulmonary effort is normal  No respiratory distress  Breath sounds: Normal breath sounds and air entry   No decreased breath sounds, wheezing, rhonchi or rales  Abdominal:      General: Abdomen is flat  Palpations: Abdomen is soft  Tenderness: There is no abdominal tenderness  There is no guarding or rebound  Musculoskeletal:      Cervical back: Normal, full passive range of motion without pain and neck supple  No spinous process tenderness or muscular tenderness  Thoracic back: Normal       Lumbar back: Normal    Skin:     General: Skin is warm and dry  Capillary Refill: Capillary refill takes less than 2 seconds  Coloration: Skin is not cyanotic, jaundiced or pale  Findings: No erythema, lesion, petechiae, rash or wound  Neurological:      General: No focal deficit present  Mental Status: He is alert and oriented to person, place, and time  GCS: GCS eye subscore is 4  GCS verbal subscore is 5  GCS motor subscore is 6  Cranial Nerves: No dysarthria or facial asymmetry  Sensory: Sensation is intact  Motor: Motor function is intact  Gait: Gait is intact  Psychiatric:         Attention and Perception: Attention normal          Mood and Affect: Mood normal          Speech: Speech normal          Behavior: Behavior normal  Behavior is cooperative           Vital Signs  ED Triage Vitals   Temperature Pulse Respirations Blood Pressure SpO2   01/19/23 0324 01/19/23 0324 01/19/23 0324 01/19/23 0324 01/19/23 0324   98 1 °F (36 7 °C) 87 18 (!) 172/95 97 %      Temp Source Heart Rate Source Patient Position - Orthostatic VS BP Location FiO2 (%)   01/19/23 0324 01/19/23 0324 01/19/23 0324 01/19/23 0324 --   Oral Monitor Sitting Left arm       Pain Score       01/19/23 0355       5           Vitals:    01/19/23 0324   BP: (!) 172/95   Pulse: 87   Patient Position - Orthostatic VS: Sitting       ED Medications  Medications   ketorolac (TORADOL) injection 15 mg (15 mg Intramuscular Given 1/19/23 0355)       Diagnostic Studies  Results Reviewed     Procedure Component Value Units Date/Time    FLU/RSV/COVID - if FLU/RSV clinically relevant [[id number]]  (Normal) Collected: 01/19/23 0350    Lab Status: Final result Specimen: Nares from Nose Updated: 01/19/23 0443     SARS-CoV-2 Negative     INFLUENZA A PCR Negative     INFLUENZA B PCR Negative     RSV PCR Negative    Narrative:      FOR PEDIATRIC PATIENTS - copy/paste COVID Guidelines URL to browser: [email or web address]  [employer]    SARS-CoV-2 assay is a Nucleic Acid Amplification assay intended for the  qualitative detection of nucleic acid from SARS-CoV-2 in nasopharyngeal  swabs  Results are for the presumptive identification of SARS-CoV-2 RNA  Positive results are indicative of infection with SARS-CoV-2, the virus  causing COVID-19, but do not rule out bacterial infection or co-infection  with other viruses  Laboratories within the [place] and its  territories are required to report all positive results to the appropriate  public health authorities  Negative results do not preclude SARS-CoV-2  infection and should not be used as the sole basis for treatment or other  patient management decisions  Negative results must be combined with  clinical observations, patient history, and epidemiological information  This test has not been FDA cleared or approved  This test has been authorized by FDA under an Emergency Use Authorization  (EUA)  This test is only authorized for the duration of time the  declaration that circumstances exist justifying the authorization of the  emergency use of an in vitro diagnostic tests for detection of SARS-CoV-2  virus and/or diagnosis of COVID-19 infection under section 564(b)(1) of  the Act, 21 U  S C  760PKV-1(E)(8), unless the authorization is terminated  or revoked sooner  The test has been validated but independent review by FDA  and CLIA is pending  Test performed using [employer]:  This RT-PCR assay targets N2,  a region unique to SARS-CoV-2  A conserved region in the E-gene was chosen  for pan-Sarbecovirus detection which includes SARS-CoV-2  According to CMS-2020-01-R, this platform meets the definition of high-throughput technology  No orders to display              Procedures  Procedures         ED Course  ED Course as of 01/19/23 0449   u Jan 19, 2023   0443 SARS-COV-2: Negative   0443 INFLU A PCR: Negative   0443 INFLU B PCR: Negative   0443 RSV PCR: Negative       SBIRT 22yo+    Flowsheet Row Most Recent Value   SBIRT (23 yo +)    In order to provide better care to our patients, we are screening all of our patients for alcohol and drug use  Would it be okay to ask you these screening questions? No Filed at: 01/19/2023 0350            MDM    Disposition  Final diagnoses:   None     ED Disposition     None      Follow-up Information    None         Patient's Medications   Discharge Prescriptions    No medications on file       No discharge procedures on file      PDMP Review       Value Time User    PDMP Reviewed  Yes 8/1/2022  7:40 AM Tea Mendez MD          ED Provider  Electronically Signed by his lower back  Differential diagnosis includes but is not limited to viral illness, bronchitis, bacterial tonsillitis, otitis media, or otitis externa  Covid, flu, and RSV all negative, but suspect this is still viral as he has no evidence of a bacterial infection on exam  The myalgias are likely secondary to his viral syndrome and he has no red flag symptoms to suspect his back pain is due to cauda equina  Patient reported relief with the toradol to both the primary nurse and later to myself, after he was awoken from sleep  Offered the patient tylenol and a lidocaine patch in addition to the toradol, however the patient stated he "needed to rest and would take the tylenol later before he left"  It was explained to the patient he had been treated and was medically cleared for discharge  Patient stated that he could not leave because the medication wasn't an immediate fix and he could not walk secondary to his pain  Myself and several other staff members witnessed the patient ambulate into the department with a steady gait prior to him being evaluated and treated  Patient is exhibiting malingering behavior at this time and was asked to leave the department  Malingering: self-limited or minor problem  Myalgia, multiple sites: acute illness or injury  Viral syndrome: acute illness or injury  Amount and/or Complexity of Data Reviewed  Labs:  Decision-making details documented in ED Course  Risk  OTC drugs  Prescription drug management            Disposition  Final diagnoses:   Myalgia, multiple sites   Viral syndrome   Malingering     Time reflects when diagnosis was documented in both MDM as applicable and the Disposition within this note     Time User Action Codes Description Comment    1/19/2023  5:56 AM Andria Obrien [M79 [id number]] Myalgia, multiple sites     1/19/2023  5:56 AM Andria Obrien [B34 9] Viral syndrome     1/19/2023  6:20 AM Andria Obrien [Z76 [id number]] [facility]       ED Disposition     ED Disposition   Discharge    Condition   Stable    Date/Time   Thu Jan 19, 2023  5:57 AM    Comment   Elisa Zavalapepper discharge to home/self care  Follow-up Information    None         Discharge Medication List as of 1/19/2023  5:57 AM      CONTINUE these medications which have NOT CHANGED    Details   ARIPiprazole (ABILIFY) 10 mg tablet Take 1 tablet (10 mg total) by mouth daily, Starting Mon 8/1/2022, Normal      cholecalciferol (VITAMIN D3) 1,000 units tablet Take 1 tablet (1,000 Units total) by mouth daily, Starting Mon 8/1/2022, Until Wed 8/31/2022, Normal             No discharge procedures on file      PDMP Review       Value Time User    PDMP Reviewed  Yes 8/1/2022  7:40 AM Karuna Duarte MD          ED Provider  Electronically Signed by           Jonny Pool PA-C  02/05/23 0004

## 2023-01-19 NOTE — ED PROVIDER NOTES
History  Chief Complaint   Patient presents with   • Back Pain     Patient reports lower back pain beginning 6 hours ago  Increased pain with movement  Denies injury or radiation of pain  Denies pain with urination  Seen at [facility] this morning for the same  60 yo M presenting with 6 hours of L flank/back pain, N/V, lightheadedness and feeling generally unwell  Pt concerned it was something he ate  Pain is to mid/low L back/flank, no radiation around to abdomen/down leg  Associated with N/V  More frequent stooling, but denies diarrhea  Reports feeling lightheaded, states he fell walking here and landed on R hip/side because of feeling lightheaded  No LOC  No head strike or injuries    Seen at [facility] ED 4 hours ago, COVID/Flu/RSV negative- given Tylenol/Toradol  No SHx  No history of renal stones             Per independent review of chart/records:  6/28/22 ECHO: EF 81%, systolic function severely reduced, severe global hypokinesis with regional variation, grade 1 diastolic dysfunction    Prior to Admission Medications   Prescriptions Last Dose Informant Patient Reported? Taking? ARIPiprazole (ABILIFY) 10 mg tablet   No No   Sig: Take 1 tablet (10 mg total) by mouth daily   Patient not taking: Reported on 11/28/2022   cholecalciferol (VITAMIN D3) 1,000 units tablet   No No   Sig: Take 1 tablet (1,000 Units total) by mouth daily      Facility-Administered Medications: None       Past Medical History:   Diagnosis Date   • Abnormal EKG    • Depression    • Substance use        Past Surgical History:   Procedure Laterality Date   • NO PAST SURGERIES         History reviewed  No pertinent family history  I have reviewed and agree with the history as documented      E-Cigarette/Vaping   • E-Cigarette Use Never User      E-Cigarette/Vaping Substances   • Nicotine No    • THC No    • CBD No    • Flavoring No    • Other No    • Unknown No      Social History     Tobacco Use   • Smoking status: Every Day     Packs/day: 3 00 Types: Cigarettes   • Smokeless tobacco: Never   Vaping Use   • Vaping Use: Never used   Substance Use Topics   • Alcohol use: Yes     Comment: occasional   • Drug use: Yes     Types: Marijuana, Cocaine     Comment: does not remember when he used last       Review of Systems   Constitutional: Negative for chills, fever and unexpected weight change  HENT: Negative for ear pain, rhinorrhea and sore throat  Eyes: Negative for pain and visual disturbance  Respiratory: Negative for cough and shortness of breath  Cardiovascular: Negative for chest pain and leg swelling  Gastrointestinal: Positive for nausea and vomiting  Negative for abdominal pain, constipation and diarrhea  Endocrine: Negative for polydipsia, polyphagia and polyuria  Genitourinary: Positive for flank pain  Negative for dysuria, frequency, hematuria and urgency  Musculoskeletal: Negative for back pain, myalgias and neck pain  Skin: Negative for color change and rash  Allergic/Immunologic: Negative for environmental allergies and immunocompromised state  Neurological: Negative for dizziness, weakness, light-headedness, numbness and headaches  Hematological: Negative for adenopathy  Does not bruise/bleed easily  Psychiatric/Behavioral: Negative for agitation and confusion  All other systems reviewed and are negative  Physical Exam  Physical Exam  Vitals and nursing note reviewed  Constitutional:       General: He is not in acute distress  Appearance: He is well-developed  HENT:      Head: Normocephalic and atraumatic  Comments: No external signs of trauma     Nose: Nose normal    Eyes:      Conjunctiva/sclera: Conjunctivae normal    Cardiovascular:      Rate and Rhythm: Normal rate and regular rhythm  Heart sounds: Normal heart sounds  Pulmonary:      Effort: Pulmonary effort is normal  No respiratory distress  Breath sounds: Normal breath sounds  No stridor  No wheezing or rales     Chest: Chest wall: No tenderness  Abdominal:      General: There is no distension  Palpations: Abdomen is soft  Tenderness: There is no abdominal tenderness  There is no guarding or rebound  Comments: Abdomen soft/NT  Back: ttp over L flank/mid and low back, no skin changes/rash   Musculoskeletal:         General: No swelling, tenderness, deformity or signs of injury  Cervical back: Normal range of motion and neck supple  Skin:     General: Skin is warm and dry  Findings: No rash  Neurological:      General: No focal deficit present  Mental Status: He is alert and oriented to person, place, and time  Sensory: No sensory deficit  Motor: No weakness or abnormal muscle tone  Coordination: Coordination normal       Gait: Gait normal    Psychiatric:         Thought Content:  Thought content normal          Judgment: Judgment normal          Vital Signs  ED Triage Vitals   Temperature Pulse Respirations Blood Pressure SpO2   01/19/23 0732 01/19/23 0732 01/19/23 0732 01/19/23 0732 01/19/23 0732   97 7 °F (36 5 °C) 71 18 128/85 97 %      Temp Source Heart Rate Source Patient Position - Orthostatic VS BP Location FiO2 (%)   01/19/23 0732 01/19/23 0732 01/19/23 0732 01/19/23 0732 --   Oral Monitor Lying Right arm       Pain Score       01/19/23 0800       8           Vitals:    01/19/23 0732 01/19/23 1000   BP: 128/85 143/85   Pulse: 71 (!) 54   Patient Position - Orthostatic VS: Lying Lying         Visual Acuity      ED Medications  Medications   sodium chloride 0 9 % bolus 1,000 mL (0 mL Intravenous Stopped 1/19/23 1059)   ketorolac (TORADOL) injection 15 mg (15 mg Intravenous Given 1/19/23 0800)   iohexol (OMNIPAQUE) 350 MG/ML injection (SINGLE-DOSE) 100 mL (100 mL Intravenous Given 1/19/23 0918)       Diagnostic Studies  Results Reviewed     Procedure Component Value Units Date/Time    Urine Macroscopic, POC [[id number]] Collected: 01/19/23 1059    Lab Status: Final result Specimen: Urine Updated: 01/19/23 1100     Color, UA Yellow     Clarity, UA Clear     pH, UA 5 5     Leukocytes, UA Negative     Nitrite, UA Negative     Protein, UA Negative mg/dl      Glucose, UA Negative mg/dl      Ketones, UA Negative mg/dl      Urobilinogen, UA 0 2 E U /dl      Bilirubin, UA Negative     Occult Blood, UA Negative     Specific Gravity, UA 1 010    Narrative:      CLINITEK RESULT    HS Troponin 0hr (reflex protocol) [[id number]]  (Normal) Collected: 01/19/23 0823    Lab Status: Final result Specimen: Blood from Arm, Left Updated: 01/19/23 0921     hs TnI 0hr <2 ng/L     D-dimer, quantitative [[id number]]  (Normal) Collected: 01/19/23 0820    Lab Status: Final result Specimen: Blood from Arm, Left Updated: 01/19/23 0843     D-Dimer, Quant 0 27 ug/ml FEU     Narrative: In the evaluation for possible pulmonary embolism, in the appropriate (Well's Score of 4 or less) patient, the age adjusted d-dimer cutoff for this patient can be calculated as:    Age x 0 01 (in ug/mL) for Age-adjusted D-dimer exclusion threshold for a patient over 50 years      Comprehensive metabolic panel [[id number]] Collected: 01/19/23 0800    Lab Status: Final result Specimen: Blood from Arm, Left Updated: 01/19/23 0829     Sodium 136 mmol/L      Potassium 3 8 mmol/L      Chloride 105 mmol/L      CO2 25 mmol/L      ANION GAP 6 mmol/L      BUN 15 mg/dL      Creatinine 1 03 mg/dL      Glucose 98 mg/dL      Calcium 8 9 mg/dL      AST 39 U/L      ALT 43 U/L      Alkaline Phosphatase 86 U/L      Total Protein 7 1 g/dL      Albumin 3 8 g/dL      Total Bilirubin 0 49 mg/dL      eGFR 79 ml/min/1 73sq m     Narrative:      [facility] guidelines for Chronic Kidney Disease (CKD):   •  Stage 1 with normal or high GFR (GFR > 90 mL/min/1 73 square meters)  •  Stage 2 Mild CKD (GFR = 60-89 mL/min/1 73 square meters)  •  Stage 3A Moderate CKD (GFR = 45-59 mL/min/1 73 square meters)  •  Stage 3B Moderate CKD (GFR = 30-44 mL/min/1 73 square meters)  •  Stage 4 Severe CKD (GFR = 15-29 mL/min/1 73 square meters)  •  Stage 5 End Stage CKD (GFR <15 mL/min/1 73 square meters)  Note: GFR calculation is accurate only with a steady state creatinine    Lipase [[id number]]  (Normal) Collected: 01/19/23 0800    Lab Status: Final result Specimen: Blood from Arm, Left Updated: 01/19/23 0829     Lipase 27 u/L     CBC and differential [[id number]]  (Abnormal) Collected: 01/19/23 0800    Lab Status: Final result Specimen: Blood from Arm, Left Updated: 01/19/23 0814     WBC 6 85 Thousand/uL      RBC 5 33 Million/uL      Hemoglobin 13 7 g/dL      Hematocrit 40 7 %      MCV 76 fL      MCH 25 7 pg      MCHC 33 7 g/dL      RDW 14 9 %      MPV 9 6 fL      Platelets 335 Thousands/uL      nRBC 0 /100 WBCs      Neutrophils Relative 60 %      Immat GRANS % 0 %      Lymphocytes Relative 25 %      Monocytes Relative 11 %      Eosinophils Relative 3 %      Basophils Relative 1 %      Neutrophils Absolute 4 17 Thousands/µL      Immature Grans Absolute 0 02 Thousand/uL      Lymphocytes Absolute 1 70 Thousands/µL      Monocytes Absolute 0 72 Thousand/µL      Eosinophils Absolute 0 19 Thousand/µL      Basophils Absolute 0 05 Thousands/µL                  CT abdomen pelvis w contrast   ED Interpretation by Daisha Suggs DO (01/19 1012)   FINDINGS:     ABDOMEN     LOWER CHEST:  No clinically significant abnormality identified in the visualized lower chest      LIVER/BILIARY TREE:  Unremarkable      GALLBLADDER:  No calcified gallstones  No pericholecystic inflammatory change      SPLEEN:  Unremarkable      PANCREAS:  Unremarkable      ADRENAL GLANDS:  Unremarkable      KIDNEYS/URETERS:  No hydronephrosis  Subcentimeter hypodensity within the inferior pole the left kidney is too small to characterize      STOMACH AND BOWEL:  No evidence of small bowel obstruction  There is moderate colonic stool burden    There are a few scattered colonic diverticula without evidence of acute inflammatory changes      APPENDIX:  No findings to suggest appendicitis      ABDOMINOPELVIC CAVITY:  No ascites  No pneumoperitoneum  No lymphadenopathy      VESSELS:  Unremarkable for patient's age      PELVIS     REPRODUCTIVE ORGANS:  Unremarkable for patient's age      URINARY BLADDER:  Unremarkable      ABDOMINAL WALL/INGUINAL REGIONS:  Unr   emarkable      OSSEOUS STRUCTURES:  No acute fracture or destructive osseous lesion  Sclerotic focus within the right hemisacrum demonstrates internal attenuation most compatible with an enostosis  Degenerative changes are noted within the spine      IMPRESSION:  1  No CT evidence of acute process within the abdomen or pelvis  2   Moderate colonic stool burden can be seen in the setting of constipation  Final Result by Lewis Hua DO (01/19 1004)   1  No CT evidence of acute process within the abdomen or pelvis  2   Moderate colonic stool burden can be seen in the setting of constipation  Workstation performed: [id number]         XR chest 2 views   ED Interpretation by Halle Bethea DO (01/19 1052)   FINDINGS:     Cardiomediastinal silhouette appears unremarkable      The lungs are clear  No pneumothorax or pleural effusion      Osseous structures appear within normal limits for patient age      IMPRESSION:     No acute cardiopulmonary disease  Final Result by Neda Grider MD (01/19 1038)      No acute cardiopulmonary disease                    Workstation performed: [id number]                    Procedures  Procedures         ED Course  ED Course as of 01/19/23 1552   u Jan 19, 2023   0812 EKG independently interpreted by myself: sinus bradycardia @ 57 bpm, RAD, qtc 410, st depressions and twi II, III, avF, v4-v6   0847 D-Dimer, Quant: 0 27   0913 CXR independently interpreted by myself; lungs clear, no effusion/PTX/infiltrate   0932 hs TnI 0hr: <2   1015 Reviewed all results with pt, no current complaints                                             Medical Decision Making  60 yo M presenting with L flank pain/N/V  ED workup unrevealing, possibly MSK back pain  Constipation seen on CT, started on medications  EKG is abnormal and pt does not seem to be following with cardiology, I discussed the importance of close cardiology follow up and return precautions    Abnormal EKG: chronic illness or injury  Constipation: acute illness or injury  Left low back pain: acute illness or injury  Amount and/or Complexity of Data Reviewed  External Data Reviewed: labs, radiology, ECG and notes  Labs: ordered  Decision-making details documented in ED Course  Radiology: ordered and independent interpretation performed  Decision-making details documented in ED Course  ECG/medicine tests: ordered and independent interpretation performed  Decision-making details documented in ED Course  Risk  OTC drugs  Prescription drug management  Disposition  Final diagnoses:   Left low back pain   Constipation   Abnormal EKG     Time reflects when diagnosis was documented in both MDM as applicable and the Disposition within this note     Time User Action Codes Description Comment    1/19/2023 10:16 AM Farooq Daigle A Add [M54 50] Left low back pain     1/19/2023 10:16 AM Farooq Daigle A Add [K59 00] Constipation     1/19/2023 10:16 AM Connor Tillman Add [R94 31] Abnormal EKG       ED Disposition     ED Disposition   Discharge    Condition   Stable    Date/Time   Thu Jan 19, 2023 10:16 AM    Comment   Mando Jaffe discharge to home/self care                 Follow-up Information     Follow up With Specialties Details Why Contact Info Additional [address]   [address], [address] [facility] [address]  [address] [facility] [address], [address], [address] [place], [place], [address]    [facility] [address] Cardiology   [address] [facility] [address]  [address] [facility], [place], [place], [address] [phone]          Discharge Medication List as of 1/19/2023 11:02 AM      START taking these medications    Details   senna-docusate sodium (SENOKOT S) 8 6-50 mg per tablet Take 1 tablet by mouth daily, Starting Thu 1/19/2023, Print         CONTINUE these medications which have NOT CHANGED    Details   ARIPiprazole (ABILIFY) 10 mg tablet Take 1 tablet (10 mg total) by mouth daily, Starting Mon 8/1/2022, Normal      cholecalciferol (VITAMIN D3) 1,000 units tablet Take 1 tablet (1,000 Units total) by mouth daily, Starting Mon 8/1/2022, Until Wed 8/31/2022, Normal             No discharge procedures on file      PDMP Review       Value Time User    PDMP Reviewed  Yes 8/1/2022  7:40 AM Elaine Gardiner MD          ED Provider  Electronically Signed by           [employer], DO  01/19/23 0345

## 2023-01-19 NOTE — DISCHARGE INSTRUCTIONS
For back pain-   Ibuprofen 400-600 mg every 6 hours (take with food)  Tylenol 650-1000 mg every 4-6 hours  Lidoderm patches- on for 12 hours/off for 12 hours    For constipation- take Senokot as prescribed    Follow up with family doctor and cardiology    Return to the ER if you have any chest pain, difficulty breathing, lightheadedness, passing out, etc

## 2023-01-19 NOTE — ED NOTES
Provider went in to reevaluate patient  No episodes of emesis during time in ED  Patient upon initial assessment denied urinary symptoms, back focused concerns, stating his pain was generalized body aches  Patient was offered lidocaine patch by provider  Patient declined  Went into patient's room to provide tylenol  Patient was sleeping  Stated "can I just get it for a little later " Explained to patient that he has been discharged and medication could not be given to patient to go  Patient became agitated  Stated " I want another doctor, I want to sleep this off and you're just throwing me on the street " Patient was reassessed for pain multiple times during visit where he declined having pain and was observed sleeping  Patient refusing to leave room  Patient stating "[facility] don't want to help me, get me a transfer to another campus " Patient was offered [facility]  Patient declined        Lamonte Souza RN  01/19/23 0612       Lamonte Souza RN  01/19/23 9435       Lamonte Souza RN  01/19/23 9081

## 2023-02-12 ENCOUNTER — HOSPITAL ENCOUNTER (EMERGENCY)
Facility: HOSPITAL | Age: 58
Discharge: HOME | End: 2023-02-12
Attending: EMERGENCY MEDICINE

## 2023-02-12 VITALS
DIASTOLIC BLOOD PRESSURE: 66 MMHG | RESPIRATION RATE: 16 BRPM | TEMPERATURE: 98.6 F | SYSTOLIC BLOOD PRESSURE: 102 MMHG | OXYGEN SATURATION: 98 % | HEART RATE: 63 BPM

## 2023-02-12 DIAGNOSIS — M54.9 BACK PAIN: Primary | ICD-10-CM

## 2023-02-12 LAB
BACTERIA UR QL AUTO: ABNORMAL /HPF
BILIRUB UR QL STRIP: ABNORMAL
CLARITY UR: CLEAR
COLOR UR: YELLOW
GLUCOSE UR STRIP-MCNC: NEGATIVE MG/DL
HGB UR QL STRIP.AUTO: NEGATIVE
HYALINE CASTS #/AREA URNS LPF: ABNORMAL /LPF
KETONES UR STRIP-MCNC: ABNORMAL MG/DL
LEUKOCYTE ESTERASE UR QL STRIP: NEGATIVE
MUCOUS THREADS UR QL AUTO: ABNORMAL
NITRITE UR QL STRIP: NEGATIVE
NON-SQ EPI CELLS URNS QL MICRO: ABNORMAL /HPF
PH UR STRIP.AUTO: 5 [PH] (ref 4.5–8)
PROT UR STRIP-MCNC: ABNORMAL MG/DL
RBC #/AREA URNS AUTO: ABNORMAL /HPF
SP GR UR STRIP.AUTO: >=1.03 (ref 1–1.03)
UROBILINOGEN UR QL STRIP.AUTO: 1 E.U./DL
WBC #/AREA URNS AUTO: ABNORMAL /HPF

## 2023-02-12 RX ORDER — METHOCARBAMOL 500 MG/1
500 TABLET, FILM COATED ORAL ONCE
Status: COMPLETED | OUTPATIENT
Start: 2023-02-12 | End: 2023-02-12

## 2023-02-12 RX ORDER — NAPROXEN 500 MG/1
500 TABLET ORAL 2 TIMES DAILY WITH MEALS
Qty: 20 TABLET | Refills: 0 | Status: SHIPPED | OUTPATIENT
Start: 2023-02-12

## 2023-02-12 RX ORDER — LIDOCAINE 50 MG/G
1 PATCH TOPICAL ONCE
Status: DISCONTINUED | OUTPATIENT
Start: 2023-02-12 | End: 2023-02-13 | Stop reason: HOSPADM

## 2023-02-12 RX ORDER — KETOROLAC TROMETHAMINE 30 MG/ML
30 INJECTION, SOLUTION INTRAMUSCULAR; INTRAVENOUS ONCE
Status: COMPLETED | OUTPATIENT
Start: 2023-02-12 | End: 2023-02-12

## 2023-02-12 RX ADMIN — METHOCARBAMOL 500 MG: 500 TABLET ORAL at 20:36

## 2023-02-12 RX ADMIN — LIDOCAINE 1 PATCH: 50 PATCH TOPICAL at 20:35

## 2023-02-12 RX ADMIN — KETOROLAC TROMETHAMINE 30 MG: 30 INJECTION, SOLUTION INTRAMUSCULAR; INTRAVENOUS at 20:36

## 2023-02-13 NOTE — ED PROVIDER NOTES
History  Chief Complaint   Patient presents with   • Back Pain     Pt reports lower back pain and burning with urination  Pt reports feeling urinary urgency but unable to go  History provided by:  Patient   used: No    Back Pain  Location:  Lumbar spine  Quality:  Aching  Radiates to:  Does not radiate  Pain severity:  Moderate  Pain is:  Same all the time  Onset quality:  Gradual  Timing:  Intermittent  Progression:  Waxing and waning  Chronicity:  New  Context: not falling and not recent illness    Relieved by:  Nothing  Worsened by:  Nothing  Ineffective treatments:  None tried  Associated symptoms: no abdominal pain, no bladder incontinence, no bowel incontinence, no chest pain, no dysuria, no fever, no headaches and no weakness    Risk factors comment:  Hx of back pain      Prior to Admission Medications   Prescriptions Last Dose Informant Patient Reported? Taking? ARIPiprazole (ABILIFY) 10 mg tablet   No No   Sig: Take 1 tablet (10 mg total) by mouth daily   Patient not taking: Reported on 11/28/2022   cholecalciferol (VITAMIN D3) 1,000 units tablet   No No   Sig: Take 1 tablet (1,000 Units total) by mouth daily   senna-docusate sodium (SENOKOT S) 8 6-50 mg per tablet   No No   Sig: Take 1 tablet by mouth daily      Facility-Administered Medications: None       Past Medical History:   Diagnosis Date   • Abnormal EKG    • Depression    • Substance use        Past Surgical History:   Procedure Laterality Date   • NO PAST SURGERIES         History reviewed  No pertinent family history  I have reviewed and agree with the history as documented      E-Cigarette/Vaping   • E-Cigarette Use Never User      E-Cigarette/Vaping Substances   • Nicotine No    • THC No    • CBD No    • Flavoring No    • Other No    • Unknown No      Social History     Tobacco Use   • Smoking status: Every Day     Packs/day: 2 00     Types: Cigarettes   • Smokeless tobacco: Never   Vaping Use   • Vaping Use: Never used   Substance Use Topics   • Alcohol use: Yes     Comment: occasional   • Drug use: Yes     Types: Marijuana, Cocaine     Comment: does not remember when he used last       Review of Systems   Constitutional: Negative for chills and fever  HENT: Negative for facial swelling, sore throat and trouble swallowing  Eyes: Negative for pain and visual disturbance  Respiratory: Negative for cough and shortness of breath  Cardiovascular: Negative for chest pain and leg swelling  Gastrointestinal: Negative for abdominal pain, bowel incontinence, diarrhea, nausea and vomiting  Genitourinary: Negative for bladder incontinence, dysuria and flank pain  Musculoskeletal: Positive for back pain  Negative for neck pain and neck stiffness  Skin: Negative for pallor and rash  Allergic/Immunologic: Negative for environmental allergies and immunocompromised state  Neurological: Negative for dizziness, weakness and headaches  Hematological: Negative for adenopathy  Does not bruise/bleed easily  Psychiatric/Behavioral: Negative for agitation and behavioral problems  All other systems reviewed and are negative  Physical Exam  Physical Exam  Vitals and nursing note reviewed  Constitutional:       General: He is not in acute distress  Appearance: He is well-developed  HENT:      Head: Normocephalic and atraumatic  Eyes:      Extraocular Movements: Extraocular movements intact  Cardiovascular:      Rate and Rhythm: Normal rate and regular rhythm  Pulmonary:      Effort: Pulmonary effort is normal  No respiratory distress  Abdominal:      Palpations: Abdomen is soft  Tenderness: There is no abdominal tenderness  There is no guarding or rebound  Musculoskeletal:         General: Normal range of motion  Cervical back: Normal range of motion and neck supple        Comments: No Lumbar spine tenderness, deformity, motor/sensory exam intact in bilateral lower extremities   Skin: General: Skin is warm and dry  Neurological:      General: No focal deficit present  Mental Status: He is alert and oriented to person, place, and time     Psychiatric:         Mood and Affect: Mood normal          Behavior: Behavior normal          Vital Signs  ED Triage Vitals   Temperature Pulse Respirations Blood Pressure SpO2   02/12/23 1848 02/12/23 1848 02/12/23 1848 02/12/23 1848 02/12/23 1848   98 6 °F (37 °C) 63 16 102/66 98 %      Temp Source Heart Rate Source Patient Position - Orthostatic VS BP Location FiO2 (%)   02/12/23 1848 02/12/23 1848 02/12/23 1848 02/12/23 1848 --   Oral Monitor Lying Right arm       Pain Score       02/12/23 2036       8           Vitals:    02/12/23 1848   BP: 102/66   Pulse: 63   Patient Position - Orthostatic VS: Lying         Visual Acuity      ED Medications  Medications   lidocaine (LIDODERM) 5 % patch 1 patch (1 patch Topical Medication Applied 2/12/23 2035)   ketorolac (TORADOL) injection 30 mg (30 mg Intramuscular Given 2/12/23 2036)   methocarbamol (ROBAXIN) tablet 500 mg (500 mg Oral Given 2/12/23 2036)       Diagnostic Studies  Results Reviewed     Procedure Component Value Units Date/Time    Urine Microscopic [[id number]]  (Abnormal) Collected: 02/12/23 2213    Lab Status: Final result Specimen: Urine, Clean Catch Updated: 02/12/23 2247     RBC, UA None Seen /hpf      WBC, UA 0-1 /hpf      Epithelial Cells None Seen /hpf      Bacteria, UA Occasional /hpf      Hyaline Casts, UA 10-20 /lpf      MUCUS THREADS Occasional    Urine Macroscopic, POC [[id number]]  (Abnormal) Collected: 02/12/23 2213    Lab Status: Final result Specimen: Urine Updated: 02/12/23 2214     Color, UA Yellow     Clarity, UA Clear     pH, UA 5 0     Leukocytes, UA Negative     Nitrite, UA Negative     Protein, UA Trace mg/dl      Glucose, UA Negative mg/dl      Ketones, UA 15 (1+) mg/dl      Urobilinogen, UA 1 0 E U /dl      Bilirubin, UA Small     Occult Blood, UA Negative     Specific Gravity, UA >=1 030    Narrative:      CLINITEK RESULT                 No orders to display              Procedures  Procedures         ED Course  ED Course as of 02/12/23 2324   Sun Feb 12, 2023   2157 Bladder scan 23 ml    2226 Urine Macroscopic, POC(!)   2226 Leukocytes, UA: Negative   2226 Nitrite, UA: Negative  UA noted  Medical Decision Making  Patient is a 79-year-old male, history of chronic back pain, substance abuse, depression, comes in with complaints of lumbar back pain, radiating to posterior legs, no fever, no urinary or bowel incontinence, no chest pain or dyspnea  On exam, patient is conscious, alert, vital signs stable, afebrile, no acute distress, no midline lumbar spine tenderness, motor/sensory exam intact in bilateral lower extremities  Impression: Acute on chronic back pain, we will give Toradol, Robaxin, lidocaine patch  Back pain: acute illness or injury      Disposition  Final diagnoses:   Back pain     Time reflects when diagnosis was documented in both MDM as applicable and the Disposition within this note     Time User Action Codes Description Comment    2/12/2023  8:36 PM Dante Smart Add [M54 9] Back pain       ED Disposition     ED Disposition   Discharge    Condition   Stable    Date/Time   Sun Feb 12, 2023 10:29 PM    [address] discharge to home/self care                 Follow-up Information     Follow up With Specialties Details Why Contact Info Additional Information    [facility] Physical Therapy Schedule an appointment as soon as possible for a visit   [phone]          Patient's Medications   Discharge Prescriptions    NAPROXEN (NAPROSYN) 500 MG TABLET    Take 1 tablet (500 mg total) by mouth 2 (two) times a day with meals       Start Date: 2/12/2023 End Date: --       Order Dose: 500 mg       Quantity: 20 tablet    Refills: 0       No discharge procedures on file     PDMP Review       Value Time User    PDMP Reviewed  Yes 8/1/2022  7:40 AM Clover Kulkarni MD          ED Provider  Electronically Signed by           Kiana Garcia MD  02/12/23 5641

## 2023-02-13 NOTE — ED NOTES
Pt found in waiting room passed out, urinating on self  Pt arousable to sternal rubbing  Pt states he " had a few drinks today and smoked marijuana"  Pt denies any other drug use at this time  Pt changed into gown, hooked up to cardiac monitoring and now resting comfortably in stretcher  Will continue to monitor        Ale Plants  02/12/23 1940

## 2023-03-20 ENCOUNTER — HOSPITAL ENCOUNTER (EMERGENCY)
Facility: HOSPITAL | Age: 58
End: 2023-03-21
Attending: EMERGENCY MEDICINE | Admitting: EMERGENCY MEDICINE

## 2023-03-20 DIAGNOSIS — F19.10 SUBSTANCE ABUSE (HCC): ICD-10-CM

## 2023-03-20 DIAGNOSIS — F29 PSYCHOSIS (HCC): ICD-10-CM

## 2023-03-20 DIAGNOSIS — R45.851 DEPRESSION WITH SUICIDAL IDEATION: Primary | ICD-10-CM

## 2023-03-20 DIAGNOSIS — F32.A DEPRESSION WITH SUICIDAL IDEATION: Primary | ICD-10-CM

## 2023-03-20 LAB
ALBUMIN SERPL BCP-MCNC: 4.3 G/DL (ref 3.5–5)
ALP SERPL-CCNC: 84 U/L (ref 34–104)
ALT SERPL W P-5'-P-CCNC: 55 U/L (ref 7–52)
AMPHETAMINES SERPL QL SCN: NEGATIVE
ANION GAP SERPL CALCULATED.3IONS-SCNC: 7 MMOL/L (ref 4–13)
AST SERPL W P-5'-P-CCNC: 42 U/L (ref 13–39)
ATRIAL RATE: 66 BPM
BARBITURATES UR QL: NEGATIVE
BASOPHILS # BLD AUTO: 0.07 THOUSANDS/ÂΜL (ref 0–0.1)
BASOPHILS NFR BLD AUTO: 1 % (ref 0–1)
BENZODIAZ UR QL: NEGATIVE
BILIRUB SERPL-MCNC: 0.39 MG/DL (ref 0.2–1)
BUN SERPL-MCNC: 11 MG/DL (ref 5–25)
CALCIUM SERPL-MCNC: 9.7 MG/DL (ref 8.4–10.2)
CHLORIDE SERPL-SCNC: 103 MMOL/L (ref 96–108)
CO2 SERPL-SCNC: 30 MMOL/L (ref 21–32)
COCAINE UR QL: POSITIVE
CREAT SERPL-MCNC: 1.21 MG/DL (ref 0.6–1.3)
EOSINOPHIL # BLD AUTO: 0.14 THOUSAND/ÂΜL (ref 0–0.61)
EOSINOPHIL NFR BLD AUTO: 2 % (ref 0–6)
ERYTHROCYTE [DISTWIDTH] IN BLOOD BY AUTOMATED COUNT: 14 % (ref 11.6–15.1)
ETHANOL EXG-MCNC: 0 MG/DL
FLUAV RNA RESP QL NAA+PROBE: NEGATIVE
FLUBV RNA RESP QL NAA+PROBE: NEGATIVE
GFR SERPL CREATININE-BSD FRML MDRD: 65 ML/MIN/1.73SQ M
GLUCOSE SERPL-MCNC: 100 MG/DL (ref 65–140)
HCT VFR BLD AUTO: 45.9 % (ref 36.5–49.3)
HGB BLD-MCNC: 14.9 G/DL (ref 12–17)
IMM GRANULOCYTES # BLD AUTO: 0.04 THOUSAND/UL (ref 0–0.2)
IMM GRANULOCYTES NFR BLD AUTO: 1 % (ref 0–2)
LYMPHOCYTES # BLD AUTO: 1.85 THOUSANDS/ÂΜL (ref 0.6–4.47)
LYMPHOCYTES NFR BLD AUTO: 22 % (ref 14–44)
MCH RBC QN AUTO: 26 PG (ref 26.8–34.3)
MCHC RBC AUTO-ENTMCNC: 32.5 G/DL (ref 31.4–37.4)
MCV RBC AUTO: 80 FL (ref 82–98)
METHADONE UR QL: NEGATIVE
MONOCYTES # BLD AUTO: 0.75 THOUSAND/ÂΜL (ref 0.17–1.22)
MONOCYTES NFR BLD AUTO: 9 % (ref 4–12)
NEUTROPHILS # BLD AUTO: 5.59 THOUSANDS/ÂΜL (ref 1.85–7.62)
NEUTS SEG NFR BLD AUTO: 65 % (ref 43–75)
NRBC BLD AUTO-RTO: 0 /100 WBCS
OPIATES UR QL SCN: NEGATIVE
OXYCODONE+OXYMORPHONE UR QL SCN: NEGATIVE
P AXIS: 34 DEGREES
PCP UR QL: NEGATIVE
PLATELET # BLD AUTO: 239 THOUSANDS/UL (ref 149–390)
PMV BLD AUTO: 10.1 FL (ref 8.9–12.7)
POTASSIUM SERPL-SCNC: 3.9 MMOL/L (ref 3.5–5.3)
PR INTERVAL: 158 MS
PROT SERPL-MCNC: 8 G/DL (ref 6.4–8.4)
QRS AXIS: 93 DEGREES
QRSD INTERVAL: 90 MS
QT INTERVAL: 398 MS
QTC INTERVAL: 417 MS
RBC # BLD AUTO: 5.74 MILLION/UL (ref 3.88–5.62)
RSV RNA RESP QL NAA+PROBE: NEGATIVE
SARS-COV-2 RNA RESP QL NAA+PROBE: NEGATIVE
SODIUM SERPL-SCNC: 140 MMOL/L (ref 135–147)
T WAVE AXIS: -69 DEGREES
THC UR QL: POSITIVE
TSH SERPL DL<=0.05 MIU/L-ACNC: 0.55 UIU/ML (ref 0.45–4.5)
VENTRICULAR RATE: 66 BPM
WBC # BLD AUTO: 8.44 THOUSAND/UL (ref 4.31–10.16)

## 2023-03-20 RX ORDER — MAGNESIUM HYDROXIDE/ALUMINUM HYDROXICE/SIMETHICONE 120; 1200; 1200 MG/30ML; MG/30ML; MG/30ML
30 SUSPENSION ORAL EVERY 4 HOURS PRN
Status: DISCONTINUED | OUTPATIENT
Start: 2023-03-20 | End: 2023-03-21 | Stop reason: HOSPADM

## 2023-03-20 RX ORDER — LORAZEPAM 1 MG/1
2 TABLET ORAL EVERY 6 HOURS PRN
Status: DISCONTINUED | OUTPATIENT
Start: 2023-03-20 | End: 2023-03-21 | Stop reason: HOSPADM

## 2023-03-20 RX ORDER — NICOTINE 21 MG/24HR
21 PATCH, TRANSDERMAL 24 HOURS TRANSDERMAL DAILY
Status: DISCONTINUED | OUTPATIENT
Start: 2023-03-21 | End: 2023-03-21 | Stop reason: HOSPADM

## 2023-03-20 RX ORDER — LANOLIN ALCOHOL/MO/W.PET/CERES
3 CREAM (GRAM) TOPICAL
Status: DISCONTINUED | OUTPATIENT
Start: 2023-03-20 | End: 2023-03-21 | Stop reason: HOSPADM

## 2023-03-20 RX ORDER — IBUPROFEN 400 MG/1
400 TABLET ORAL EVERY 8 HOURS PRN
Status: DISCONTINUED | OUTPATIENT
Start: 2023-03-20 | End: 2023-03-21

## 2023-03-20 RX ORDER — HALOPERIDOL 5 MG/1
5 TABLET ORAL EVERY 6 HOURS PRN
Status: DISCONTINUED | OUTPATIENT
Start: 2023-03-20 | End: 2023-03-21 | Stop reason: HOSPADM

## 2023-03-20 RX ORDER — ACETAMINOPHEN 325 MG/1
650 TABLET ORAL EVERY 8 HOURS PRN
Status: DISCONTINUED | OUTPATIENT
Start: 2023-03-20 | End: 2023-03-21

## 2023-03-20 RX ORDER — DIPHENHYDRAMINE HCL 25 MG
25 TABLET ORAL EVERY 6 HOURS PRN
Status: DISCONTINUED | OUTPATIENT
Start: 2023-03-20 | End: 2023-03-21 | Stop reason: HOSPADM

## 2023-03-20 RX ADMIN — Medication 3 MG: at 20:34

## 2023-03-20 RX ADMIN — LORAZEPAM 2 MG: 1 TABLET ORAL at 20:34

## 2023-03-20 NOTE — ED NOTES
Patient provided with psychiatric scrubs to change into at this time  Given up to provide urine sample        Lizeth Acosta RN  03/20/23 0899

## 2023-03-20 NOTE — ED NOTES
Dr Venus Juares and crisis worker Joan Lopez at bedside for patient evaluation      Roz Ruvalcaba, RN  03/20/23 1945

## 2023-03-20 NOTE — ED PROVIDER NOTES
History  Chief Complaint   Patient presents with   • Psychiatric Evaluation     Pt reports h/o depression worsened by the brothers recent death  +SI/AH/HI  Reports "I tried to overdose on cocaine this morning but it didn't work"  Also reported thoughts of wanting to jump in front of the car  Reports hearing voices telling him to hurt himself/others  Patient is a 62-year-old male seen by myself and crisis at the same time  Patient walked in  He states that he recently lost his brother, several months ago he lost his sister and mother  He states that she has been going through difficult times  He has not been taking his medications as prescribed  He is and has been several weeks since he saw his therapist and/or psychiatrist or taking his medications  He has had suicide attempts in the past where he tried to OD  He states today that he would try to OD if he had money to get more pills or cocaine  He did take cocaine but states it was not enough to kill him  He also thought about jumping in front of a car  He also reports that the voices in his head are worse and telling him to kill other people  History provided by:  Patient   used: No    Psychiatric Evaluation  Presenting symptoms: depression, hallucinations, suicidal thoughts and suicidal threats    Degree of incapacity (severity):   Moderate  Onset quality:  Gradual  Timing:  Constant  Progression:  Worsening  Chronicity:  Recurrent  Context: alcohol use, drug abuse, noncompliance and stressful life event    Context: not medication and not recent medication change    Treatment compliance:  None of the time  Relieved by:  None tried  Worsened by:  Nothing  Ineffective treatments:  None tried  Associated symptoms: anxiety, appetite change, decreased need for sleep, feelings of worthlessness, irritability and poor judgment    Associated symptoms: no abdominal pain and no chest pain    Risk factors: hx of mental illness and hx of suicide attempts        Prior to Admission Medications   Prescriptions Last Dose Informant Patient Reported? Taking? ARIPiprazole (ABILIFY) 10 mg tablet Not Taking  No No   Sig: Take 1 tablet (10 mg total) by mouth daily   Patient not taking: Reported on 11/28/2022      Facility-Administered Medications: None       Past Medical History:   Diagnosis Date   • Abnormal EKG    • Depression    • Substance use        Past Surgical History:   Procedure Laterality Date   • NO PAST SURGERIES         History reviewed  No pertinent family history  I have reviewed and agree with the history as documented  E-Cigarette/Vaping   • E-Cigarette Use Never User      E-Cigarette/Vaping Substances   • Nicotine No    • THC No    • CBD No    • Flavoring No    • Other No    • Unknown No      Social History     Tobacco Use   • Smoking status: Every Day     Packs/day: 1 00     Types: Cigarettes   • Smokeless tobacco: Never   Vaping Use   • Vaping Use: Never used   Substance Use Topics   • Alcohol use: Yes     Alcohol/week: 21 0 standard drinks     Types: 21 Cans of beer per week   • Drug use: Yes     Types: Marijuana, Cocaine     Comment: last use morning of 3/20/23       Review of Systems   Constitutional: Positive for appetite change and irritability  Negative for chills and fever  HENT: Negative  Negative for ear pain and sore throat  Eyes: Negative for pain and visual disturbance  Respiratory: Negative  Negative for cough and shortness of breath  Cardiovascular: Negative  Negative for chest pain and palpitations  Gastrointestinal: Negative  Negative for abdominal pain and vomiting  Genitourinary: Negative  Negative for dysuria and hematuria  Musculoskeletal: Negative  Negative for arthralgias and back pain  Skin: Negative  Negative for color change and rash  Neurological: Negative  Negative for seizures and syncope  Hematological: Negative      Psychiatric/Behavioral: Positive for hallucinations and suicidal ideas  The patient is nervous/anxious  All other systems reviewed and are negative  Physical Exam  Physical Exam  Vitals and nursing note reviewed  Constitutional:       General: He is not in acute distress  Appearance: He is well-developed  Comments: Patient appears older than stated age  Disheveled  HENT:      Head: Normocephalic and atraumatic  Comments: Patient maintaining airway and secretions  No stridor   No brawniness under tongue  Mouth/Throat:      Mouth: Mucous membranes are moist    Eyes:      Extraocular Movements: Extraocular movements intact  Conjunctiva/sclera: Conjunctivae normal       Pupils: Pupils are equal, round, and reactive to light  Cardiovascular:      Rate and Rhythm: Normal rate and regular rhythm  Heart sounds: No murmur heard  Pulmonary:      Effort: Pulmonary effort is normal  No respiratory distress  Breath sounds: Normal breath sounds  Abdominal:      Palpations: Abdomen is soft  Tenderness: There is no abdominal tenderness  Musculoskeletal:         General: No swelling  Cervical back: Neck supple  Skin:     General: Skin is warm and dry  Capillary Refill: Capillary refill takes less than 2 seconds  Neurological:      General: No focal deficit present  Mental Status: He is alert and oriented to person, place, and time  GCS: GCS eye subscore is 4  GCS verbal subscore is 5  GCS motor subscore is 6  Cranial Nerves: Cranial nerves 2-12 are intact  Sensory: Sensation is intact  Motor: Motor function is intact  Coordination: Coordination is intact  Gait: Gait is intact  Comments: No slurred speech  No Facial asymmetry  No ataxia   Psychiatric:         Attention and Perception: He perceives auditory hallucinations  Mood and Affect: Mood is anxious  Affect is flat  Speech: Speech is tangential          Behavior: Behavior is hyperactive           Thought Content: Thought content includes suicidal ideation  Thought content includes suicidal plan  Thought content does not include homicidal plan  Judgment: Judgment is impulsive  Vital Signs  ED Triage Vitals [03/20/23 1928]   Temperature Pulse Respirations Blood Pressure SpO2   98 4 °F (36 9 °C) 83 17 153/88 98 %      Temp Source Heart Rate Source Patient Position - Orthostatic VS BP Location FiO2 (%)   Oral Monitor Sitting Left arm --      Pain Score       No Pain           Vitals:    03/20/23 1928 03/20/23 2000 03/21/23 0000   BP: 153/88 144/78 133/68   Pulse: 83 78 84   Patient Position - Orthostatic VS: Sitting Lying Lying         Visual Acuity      ED Medications  Medications   acetaminophen (TYLENOL) tablet 650 mg (has no administration in time range)   LORazepam (ATIVAN) tablet 2 mg (2 mg Oral Given 3/20/23 2034)   haloperidol (HALDOL) tablet 5 mg (has no administration in time range)   ibuprofen (MOTRIN) tablet 400 mg (has no administration in time range)   nicotine (NICODERM CQ) 21 mg/24 hr TD 24 hr patch 21 mg (has no administration in time range)   melatonin tablet 3 mg (3 mg Oral Given 3/20/23 2034)   aluminum-magnesium hydroxide-simethicone (MYLANTA) oral suspension 30 mL (has no administration in time range)   diphenhydrAMINE (BENADRYL) tablet 25 mg (has no administration in time range)       Diagnostic Studies  Results Reviewed     Procedure Component Value Units Date/Time    FLU/RSV/COVID - if FLU/RSV clinically relevant [[id number]]  (Normal) Collected: 03/20/23 2004    Lab Status: Final result Specimen: Nares from Nose Updated: 03/20/23 2116     SARS-CoV-2 Negative     INFLUENZA A PCR Negative     INFLUENZA B PCR Negative     RSV PCR Negative    Narrative:      FOR PEDIATRIC PATIENTS - copy/paste COVID Guidelines URL to browser: [email or web address]    SARS-CoV-2 assay is a Nucleic Acid Amplification assay intended for the  qualitative detection of nucleic acid from SARS-CoV-2 in nasopharyngeal  swabs  Results are for the presumptive identification of SARS-CoV-2 RNA  Positive results are indicative of infection with SARS-CoV-2, the virus  causing COVID-19, but do not rule out bacterial infection or co-infection  with other viruses  Laboratories within the [place] and its  territories are required to report all positive results to the appropriate  public health authorities  Negative results do not preclude SARS-CoV-2  infection and should not be used as the sole basis for treatment or other  patient management decisions  Negative results must be combined with  clinical observations, patient history, and epidemiological information  This test has not been FDA cleared or approved  This test has been authorized by FDA under an Emergency Use Authorization  (EUA)  This test is only authorized for the duration of time the  declaration that circumstances exist justifying the authorization of the  emergency use of an in vitro diagnostic tests for detection of SARS-CoV-2  virus and/or diagnosis of COVID-19 infection under section 564(b)(1) of  the Act, 21 U  S C  166QDS-3(R)(0), unless the authorization is terminated  or revoked sooner  The test has been validated but independent review by FDA  and CLIA is pending  Test performed using [employer] Gene[employer]: This RT-PCR assay targets N2,  a region unique to SARS-CoV-2  A conserved region in the E-gene was chosen  for pan-Sarbecovirus detection which includes SARS-CoV-2  According to CMS-2020-01-R, this platform meets the definition of high-throughput technology      TSH [[id number]]  (Normal) Collected: 03/20/23 2004    Lab Status: Final result Specimen: Blood from Arm, Right Updated: 03/20/23 2105     TSH [address] [place] [address] 0 550 uIU/mL     Narrative:      Patients undergoing fluorescein dye angiography may retain small amounts of fluorescein in the body for 48-72 hours post procedure  Samples containing fluorescein can produce falsely depressed TSH values  If the patient had this procedure,a specimen should be resubmitted post fluorescein clearance  Rapid drug screen, urine [[id number]]  (Abnormal) Collected: 03/20/23 2005    Lab Status: Final result Specimen: Urine, Clean Catch Updated: 03/20/23 2058     Amph/Meth UR Negative     Barbiturate Ur Negative     Benzodiazepine Urine Negative     Cocaine Urine Positive     Methadone Urine Negative     Opiate Urine Negative     PCP Ur Negative     THC Urine Positive     Oxycodone Urine Negative    Narrative:      Presumptive report  If requested, specimen will be sent to reference lab for confirmation  FOR MEDICAL PURPOSES ONLY  IF CONFIRMATION NEEDED PLEASE CONTACT THE LAB WITHIN 5 DAYS      Drug Screen Cutoff Levels:  AMPHETAMINE/METHAMPHETAMINES  1000 ng/mL  BARBITURATES     200 ng/mL  BENZODIAZEPINES     200 ng/mL  COCAINE      300 ng/mL  METHADONE      300 ng/mL  OPIATES      300 ng/mL  PHENCYCLIDINE     25 ng/mL  THC       50 ng/mL  OXYCODONE      100 ng/mL    Comprehensive metabolic panel [[id number]]  (Abnormal) Collected: 03/20/23 2004    Lab Status: Final result Specimen: Blood from Arm, Right Updated: 03/20/23 2055     Sodium 140 mmol/L      Potassium 3 9 mmol/L      Chloride 103 mmol/L      CO2 30 mmol/L      ANION GAP 7 mmol/L      BUN 11 mg/dL      Creatinine 1 21 mg/dL      Glucose 100 mg/dL      Calcium 9 7 mg/dL      AST 42 U/L      ALT 55 U/L      Alkaline Phosphatase 84 U/L      Total Protein 8 0 g/dL      Albumin 4 3 g/dL      Total Bilirubin 0 39 mg/dL      eGFR 65 ml/min/1 73sq m     Narrative:      [facility] guidelines for Chronic Kidney Disease (CKD):   •  Stage 1 with normal or high GFR (GFR > 90 mL/min/1 73 square meters)  •  Stage 2 Mild CKD (GFR = 60-89 mL/min/1 73 square meters)  •  Stage 3A Moderate CKD (GFR = 45-59 mL/min/1 73 square meters)  •  Stage 3B Moderate CKD (GFR = 30-44 mL/min/1 73 square meters)  •  Stage 4 Severe CKD (GFR = 15-29 mL/min/1 73 square meters)  •  Stage 5 End Stage CKD (GFR <15 mL/min/1 73 square meters)  Note: GFR calculation is accurate only with a steady state creatinine    CBC and differential [[id number]]  (Abnormal) Collected: 03/20/23 2004    Lab Status: Final result Specimen: Blood from Arm, Right Updated: 03/20/23 2034     WBC 8 44 Thousand/uL      RBC 5 74 Million/uL      Hemoglobin 14 9 g/dL      Hematocrit 45 9 %      MCV 80 fL      MCH 26 0 pg      MCHC 32 5 g/dL      RDW 14 0 %      MPV 10 1 fL      Platelets 610 Thousands/uL      nRBC 0 /100 WBCs      Neutrophils Relative 65 %      Immat GRANS % 1 %      Lymphocytes Relative 22 %      Monocytes Relative 9 %      Eosinophils Relative 2 %      Basophils Relative 1 %      Neutrophils Absolute 5 59 Thousands/µL      Immature Grans Absolute 0 04 Thousand/uL      Lymphocytes Absolute 1 85 Thousands/µL      Monocytes Absolute 0 75 Thousand/µL      Eosinophils Absolute 0 14 Thousand/µL      Basophils Absolute 0 07 Thousands/µL     POCT alcohol breath test [[id number]]  (Normal) Resulted: 03/20/23 1956    Lab Status: Final result Updated: 03/20/23 1956     EXTBreath Alcohol 0 000                 No orders to display              Procedures  Procedures         ED Course  ED Course as of 03/21/23 0051   Mon Mar 20, 2023   233 Is a 58-year-old male in today for increasing SI, depression, hallucinations with statements that tell him to hurt himself or others  On exam he appears older than stated age but he is calm and cooperative    We will start medical work-up while ending admission for 201    Disclosure: Voice to text software was used in the preparation of this document and could have resulted in translational errors       Occasional wrong word or "sound a like" substitutions may have occurred due to the inherent limitations of voice recognition software   Read the chart carefully and recognize, using context, where substitutions have occurred         2045 Patient signed 201  Breath alcohol negative  CBC and EKG within normal limits  2131 Patient's labs are stable and no evidence of endorgan damage  Mild transaminitis however no signs of acute liver failure  He is positive for cocaine and marijuana  He is medically cleared for inpatient crisis evaluation admission   2318 Patient resting in bed  No events   Tue Mar 21, 2023   0039 No events  Resting calmly  Bed search  0050 Will sign out to night team                               SBIRT 20yo+    Flowsheet Row Most Recent Value   SBIRT (23 yo +)    In order to provide better care to our patients, we are screening all of our patients for alcohol and drug use  Would it be okay to ask you these screening questions? Unable to answer at this time Filed at: 03/20/2023 1947                    Medical Decision Making      EKG INTERPRETATION @ 2023  RHYTHM: Normal sinus rhythm at 66 bpm  AXIS: Right axis deviation  INTERVALS: AK interval measured at 158 ms  QRS COMPLEX: QRS measured at 90 ms  ST SEGMENT: Nonspecific ST segment changes  Noted T wave inversion in 3, aVF as well as V4 to V6  LVH  QT INTERVAL: QTc measured at 417 ms  COMPARED WITH PRIOR compared to old EKG on January 19, 2023 no acute change  Interpretation by Estela Pradhan DO      Amount and/or Complexity of Data Reviewed  Labs: ordered  Risk  OTC drugs  Prescription drug management  Disposition  Final diagnoses:   Depression with suicidal ideation   Psychosis ([facility] 75 )   Substance abuse ([facility] 75 )     Time reflects when diagnosis was documented in both MDM as applicable and the Disposition within this note     Time User Action Codes Description Comment    3/20/2023  9:32 PM Vernon Cannon Add Veran Goldberg A,  R45 [id number]] Depression with suicidal ideation     3/20/2023  9:32 PM Vernon Cannon Add [F29] Psychosis ([facility] )     3/20/2023  9:32 PM Sven Cochran Add [F19 10] Substance abuse [facility])       ED Disposition     ED Disposition   Transfer to [facility]    Condition   --    Date/Time   Mon Mar 20, 2023  9:32 PM    Comment   [place] should be transferred out to D and has been medically cleared  MD Yudith Corral Most Recent Value   Sending MD Dr Verena Amor    None         Patient's Medications   Discharge Prescriptions    No medications on file       No discharge procedures on file      PDMP Review       Value Time User    PDMP Reviewed  Yes 8/1/2022  7:40 AM Corin Lopez MD          ED Provider  Electronically Signed by           Yadira Holman DO  03/21/23 1570

## 2023-03-20 NOTE — ED NOTES
Patient had been referred to [facility] Team on 11/28/22- No current [address] in patient's chart      Mary Ann Cormier  Crisis Intervention Specialist II  03/20/23

## 2023-03-20 NOTE — ED NOTES
Patient presents to the Emergency Department via self referral reporting suicidal ideation, homicidal ideation and auditory hallucinations  Patient is calm and cooperative at this time, and states is willing to speak with this [facility]  Patient reports he has been going through a lot lately, and presents the '[place]' card from his brother's memorial service last week  Patient reports his brother was suicidal and struggled with depression, but does not identify if his brother took his own life  Patient reports his brother was his last remaining familial support, and since he passed away he has been increasingly depressed  Patient reports that due to the depression he stopped taking his medications, and did not go to his most recent psychiatry or therapy appointments  Patient believes his psychiatrist and therapist were through the  [employer]  Patient reports he has been having increasing suicidal thoughts, and today intended to overdose on cocaine but was unable to purchase enough to kill himself  Patient reports he also tried to get pills from friends, but they refused to give them to him  Patient reports as a last resort he was going to try to jump in front of a car, but states he doesn't want to die painfully  Patient reports homicidal ideation, but denies target or plan, stating the thoughts were to just attack someone  Patient reports auditory, command hallucinations that manifest as voices that tell him to kill himself  Patient reports a history of suicidal ideation and suicide attempts, and reports he has been inpatient several times  Patient reports cocaine usage when he can afford it, reports drinking 3-4 cans of beer per day, and that he smokes cigarettes every once in a while but not regularly  Patient reports he has not been sleeping well due to nightmares that have been occurring recently, and reports a decreased appetite as well  Patient denies visual/tactile hallucinations   Patient reports he is willing to sign himself in for inpatient treatment at this time      Fortino Dorsey  Crisis Intervention Specialist II  03/20/23

## 2023-03-21 VITALS
DIASTOLIC BLOOD PRESSURE: 79 MMHG | HEART RATE: 66 BPM | WEIGHT: 151.24 LBS | SYSTOLIC BLOOD PRESSURE: 139 MMHG | OXYGEN SATURATION: 98 % | RESPIRATION RATE: 18 BRPM | TEMPERATURE: 98.4 F | BODY MASS INDEX: 23.69 KG/M2

## 2023-03-21 RX ORDER — ACETAMINOPHEN 325 MG/1
650 TABLET ORAL EVERY 8 HOURS PRN
Status: DISCONTINUED | OUTPATIENT
Start: 2023-03-21 | End: 2023-03-21 | Stop reason: HOSPADM

## 2023-03-21 RX ORDER — IBUPROFEN 400 MG/1
400 TABLET ORAL EVERY 8 HOURS PRN
Status: DISCONTINUED | OUTPATIENT
Start: 2023-03-21 | End: 2023-03-21 | Stop reason: HOSPADM

## 2023-03-21 NOTE — ED NOTES
Insurance Authorization for Admission:  Phone Call Placed to [employer]  Phone Number [phone]  Spoke to Alton Matthews  3 Days Approved  Level of Care AIP-201  Review on TBD  Authorization #Accepting facility to call upon admission      EVS (Eligibility Verification System) Called- 6   Automated System Indicates Active with [place]  Crisis Intervention Specialist II  03/20/23

## 2023-03-21 NOTE — ED NOTES
201 Signed- Rights explained, bed search explained- Faxed to [facility], Original on patient chart  Patient asked 3x if there were any facilities he did not want to be referred to- Patient has not identified any hospitals he does not wish to be sent to  It should be noted that per a chart review the patient reacted very negatively when informed he had been accepted at [facility] in the past, and it might be best that he not be referred there      Elida Anderson  Crisis Intervention Specialist II  03/20/23

## 2023-03-21 NOTE — EMTALA/ACUTE CARE TRANSFER
[facility] [address]  [address] [id number]  Dept: [phone]      EMTALA TRANSFER CONSENT    NAME Jones Molina                                         1965                              MRN [id number]    I have been informed of my rights regarding examination, treatment, and transfer   by Dr Williams Soria MD    Benefits: Specialized equipment and/or services available at the receiving facility (Include comment)________________________    Risks: Potential for delay in receiving treatment      Consent for Transfer:  I acknowledge that my medical condition has been evaluated and explained to me by the emergency department physician or other qualified medical person and/or my attending physician, who has recommended that I be transferred to the service of  Accepting Physician: Dr Eladia Aviles at [address] [place] [address] Name, [address] : Samuel Kennedy  The above potential benefits of such transfer, the potential risks associated with such transfer, and the probable risks of not being transferred have been explained to me, and I fully understand them  The doctor has explained that, in my case, the benefits of transfer outweigh the risks  I agree to be transferred  I authorize the performance of emergency medical procedures and treatments upon me in both transit and upon arrival at the receiving facility  Additionally, I authorize the release of any and all medical records to the receiving facility and request they be transported with me, if possible  I understand that the safest mode of transportation during a medical emergency is an ambulance and that the Hospital advocates the use of this mode of transport  Risks of traveling to the receiving facility by car, including absence of medical control, life sustaining equipment, such as oxygen, and medical personnel has been explained to me and I fully understand them      ([address])  [  ]  I consent to the stated transfer and to be transported by ambulance/helicopter  [  ]  I consent to the stated transfer, but refuse transportation by ambulance and accept full responsibility for my transportation by car  I understand the risks of non-ambulance transfers and I exonerate the Hospital and its staff from any deterioration in my condition that results from this refusal     X___________________________________________    DATE  23  TIME________  Signature of patient or legally responsible individual signing on patient behalf           RELATIONSHIP TO PATIENT_________________________          Provider Certification    NAME Vicky Jaffe                                         1965                              MRN [id number]    A medical screening exam was performed on the above named patient  Based on the examination:    Condition Necessitating Transfer The primary encounter diagnosis was Depression with suicidal ideation  Diagnoses of Psychosis ([facility] ) and Substance abuse ([facility] ) were also pertinent to this visit      Patient Condition: The patient has been stabilized such that within reasonable medical probability, no material deterioration of the patient condition or the condition of the unborn child(flash) is likely to result from the transfer    Reason for Transfer: Level of Care needed not available at this facility    Transfer Requirements: [address]   · Space available and qualified personnel available for treatment as acknowledged by Fausto Mcdaniel  · Agreed to accept transfer and to provide appropriate medical treatment as acknowledged by       Dr Vishal Vincent  · Appropriate medical records of the examination and treatment of the patient are provided at the time of transfer   [address] _______  · Transfer will be performed by qualified personnel from [address] [place] [address]  and appropriate transfer equipment as required, including the use of necessary and appropriate life support measures  Provider Certification: I have examined the patient and explained the following risks and benefits of being transferred/refusing transfer to the patient/family:  General risk, such as traffic hazards, adverse weather conditions, rough terrain or turbulence, possible failure of equipment (including vehicle or aircraft), or consequences of actions of persons outside the control of the transport personnel, Unanticipated needs of medical equipment and personnel during transport, Risk of worsening condition, The possibility of a transport vehicle being unavailable, The patient is stable for psychiatric transfer because they are medically stable, and is protected from harming him/herself or others during transport      Based on these reasonable risks and benefits to the patient and/or the unborn child(flash), and based upon the information available at the time of the patient’s examination, I certify that the medical benefits reasonably to be expected from the provision of appropriate medical treatments at another medical facility outweigh the increasing risks, if any, to the individual’s medical condition, and in the case of labor to the unborn child, from effecting the transfer      X____________________________________________ DATE 03/21/23        TIME_______      ORIGINAL - SEND TO MEDICAL RECORDS   COPY - SEND WITH PATIENT DURING TRANSFER

## 2023-03-21 NOTE — ED NOTES
Up and ambulating to the bathroom  Breakfast tray given  1:1 in place   Calm and cooperative     [facility]  03/21/23 4039

## 2023-03-21 NOTE — ED NOTES
Patient provided with snacks and juice at this time  Resting comfortably in hallway  Calm and cooperative  Belongings inventoried by ed technician at this time  Vitals stable  1:1 at bedside for safety  Patient continues to endorse SI with multiple plans/attempts, nonspecific HI, and AH/VH  Will continue to closely monitor patient        Vida Boateng RN  03/20/23 2020

## 2023-03-21 NOTE — ED NOTES
Patient resting comfortably, breathing unlabored on RA, reconnected to cardiac monitor, VSS, neuro intact, no signs of acute distress, no requests at this time, plan of care explained, side rails raised, instructed to provide urine sample, comfort and safety maintained. Received a call earlier this shift from Amado Rivero at [place]  She had been conducting a bed search of all contracted facilities, and reported that only [facility] and [facility] had available beds tonight  Patient denied at [place]  EARLENE has been calling [facility] each hour of this shift with no one answering  Bed search to continue

## 2023-03-21 NOTE — ED NOTES
Patient is accepted at [facility]  Patient is accepted by Eva Burg[facility]  per  [address] is arranged with [facility]  Transportation is scheduled for TBD

## 2023-03-21 NOTE — ED NOTES
Received a call from Jonah Gonzalez at [place]  She stated they denied this patient due to their concern that he likely needs dual treatment, and because they have no high acuity bed in his age group

## 2023-03-21 NOTE — ED NOTES
Clinical faxed to [facility] for review at this time      Ann Torres  Crisis Intervention Specialist II  03/20/23

## 2023-03-21 NOTE — ED NOTES
Bed Search to Following Facilities:    [facility]: Spoke with Florian Rosenthal, no beds available  [facility] Jeter: Spoke with [facility], currently reviewing for their last available bed  [facility]: Spoke with Stacey Gross[facility], no beds available  [facility]: Spoke with Kim, beds available; chart to be faxed for review  [facility]: No answer  [place]: Spoke with Rashid, no beds available  [facility]: No beds available       America Sarmiento, HERI  03/20/23    2100

## 2023-05-03 ENCOUNTER — HOSPITAL ENCOUNTER (EMERGENCY)
Facility: HOSPITAL | Age: 58
End: 2023-05-04
Attending: EMERGENCY MEDICINE

## 2023-05-03 DIAGNOSIS — R45.850 HOMICIDAL BEHAVIOR: ICD-10-CM

## 2023-05-03 DIAGNOSIS — R45.851 DEPRESSION WITH SUICIDAL IDEATION: Primary | ICD-10-CM

## 2023-05-03 DIAGNOSIS — F32.A DEPRESSION WITH SUICIDAL IDEATION: Primary | ICD-10-CM

## 2023-05-03 LAB
ALBUMIN SERPL BCP-MCNC: 4 G/DL (ref 3.5–5)
ALP SERPL-CCNC: 77 U/L (ref 34–104)
ALT SERPL W P-5'-P-CCNC: 47 U/L (ref 7–52)
ANION GAP SERPL CALCULATED.3IONS-SCNC: 9 MMOL/L (ref 4–13)
AST SERPL W P-5'-P-CCNC: 46 U/L (ref 13–39)
ATRIAL RATE: 85 BPM
BASOPHILS # BLD AUTO: 0.04 THOUSANDS/ÂΜL (ref 0–0.1)
BASOPHILS NFR BLD AUTO: 1 % (ref 0–1)
BILIRUB SERPL-MCNC: 0.5 MG/DL (ref 0.2–1)
BUN SERPL-MCNC: 16 MG/DL (ref 5–25)
CALCIUM SERPL-MCNC: 8.9 MG/DL (ref 8.4–10.2)
CHLORIDE SERPL-SCNC: 103 MMOL/L (ref 96–108)
CO2 SERPL-SCNC: 22 MMOL/L (ref 21–32)
CREAT SERPL-MCNC: 1.11 MG/DL (ref 0.6–1.3)
EOSINOPHIL # BLD AUTO: 0.25 THOUSAND/ÂΜL (ref 0–0.61)
EOSINOPHIL NFR BLD AUTO: 3 % (ref 0–6)
ERYTHROCYTE [DISTWIDTH] IN BLOOD BY AUTOMATED COUNT: 13.7 % (ref 11.6–15.1)
ETHANOL EXG-MCNC: 0 MG/DL
GFR SERPL CREATININE-BSD FRML MDRD: 72 ML/MIN/1.73SQ M
GLUCOSE SERPL-MCNC: 109 MG/DL (ref 65–140)
HCT VFR BLD AUTO: 43.5 % (ref 36.5–49.3)
HGB BLD-MCNC: 14.3 G/DL (ref 12–17)
IMM GRANULOCYTES # BLD AUTO: 0.01 THOUSAND/UL (ref 0–0.2)
IMM GRANULOCYTES NFR BLD AUTO: 0 % (ref 0–2)
LYMPHOCYTES # BLD AUTO: 1.43 THOUSANDS/ÂΜL (ref 0.6–4.47)
LYMPHOCYTES NFR BLD AUTO: 18 % (ref 14–44)
MCH RBC QN AUTO: 25.7 PG (ref 26.8–34.3)
MCHC RBC AUTO-ENTMCNC: 32.9 G/DL (ref 31.4–37.4)
MCV RBC AUTO: 78 FL (ref 82–98)
MONOCYTES # BLD AUTO: 0.77 THOUSAND/ÂΜL (ref 0.17–1.22)
MONOCYTES NFR BLD AUTO: 10 % (ref 4–12)
NEUTROPHILS # BLD AUTO: 5.49 THOUSANDS/ÂΜL (ref 1.85–7.62)
NEUTS SEG NFR BLD AUTO: 68 % (ref 43–75)
NRBC BLD AUTO-RTO: 0 /100 WBCS
P AXIS: 45 DEGREES
PLATELET # BLD AUTO: 180 THOUSANDS/UL (ref 149–390)
PMV BLD AUTO: 9.9 FL (ref 8.9–12.7)
POTASSIUM SERPL-SCNC: 3.5 MMOL/L (ref 3.5–5.3)
PR INTERVAL: 148 MS
PROT SERPL-MCNC: 7.5 G/DL (ref 6.4–8.4)
QRS AXIS: 63 DEGREES
QRSD INTERVAL: 84 MS
QT INTERVAL: 368 MS
QTC INTERVAL: 437 MS
RBC # BLD AUTO: 5.57 MILLION/UL (ref 3.88–5.62)
SODIUM SERPL-SCNC: 134 MMOL/L (ref 135–147)
T WAVE AXIS: -87 DEGREES
TSH SERPL DL<=0.05 MIU/L-ACNC: 0.61 UIU/ML (ref 0.45–4.5)
VENTRICULAR RATE: 85 BPM
WBC # BLD AUTO: 7.99 THOUSAND/UL (ref 4.31–10.16)

## 2023-05-03 RX ORDER — ACETAMINOPHEN 325 MG/1
650 TABLET ORAL EVERY 8 HOURS PRN
Status: DISCONTINUED | OUTPATIENT
Start: 2023-05-03 | End: 2023-05-04

## 2023-05-03 RX ORDER — LANOLIN ALCOHOL/MO/W.PET/CERES
3 CREAM (GRAM) TOPICAL
Status: DISCONTINUED | OUTPATIENT
Start: 2023-05-03 | End: 2023-05-04 | Stop reason: HOSPADM

## 2023-05-03 RX ORDER — LORAZEPAM 1 MG/1
2 TABLET ORAL EVERY 6 HOURS PRN
Status: DISCONTINUED | OUTPATIENT
Start: 2023-05-03 | End: 2023-05-04 | Stop reason: HOSPADM

## 2023-05-03 RX ORDER — DIPHENHYDRAMINE HCL 25 MG
25 TABLET ORAL EVERY 6 HOURS PRN
Status: DISCONTINUED | OUTPATIENT
Start: 2023-05-03 | End: 2023-05-04 | Stop reason: HOSPADM

## 2023-05-03 RX ORDER — MAGNESIUM HYDROXIDE/ALUMINUM HYDROXICE/SIMETHICONE 120; 1200; 1200 MG/30ML; MG/30ML; MG/30ML
30 SUSPENSION ORAL EVERY 4 HOURS PRN
Status: DISCONTINUED | OUTPATIENT
Start: 2023-05-03 | End: 2023-05-04 | Stop reason: HOSPADM

## 2023-05-03 RX ORDER — NICOTINE 21 MG/24HR
21 PATCH, TRANSDERMAL 24 HOURS TRANSDERMAL DAILY
Status: DISCONTINUED | OUTPATIENT
Start: 2023-05-04 | End: 2023-05-04 | Stop reason: HOSPADM

## 2023-05-03 RX ORDER — IBUPROFEN 400 MG/1
400 TABLET ORAL EVERY 8 HOURS PRN
Status: DISCONTINUED | OUTPATIENT
Start: 2023-05-03 | End: 2023-05-04 | Stop reason: HOSPADM

## 2023-05-03 NOTE — ED NOTES
Patient given another soda  Patient reminded of the need for a urine sample        Rebecca Damon RN  05/03/23 5151

## 2023-05-03 NOTE — ED NOTES
Insurance Authorization for Admission:  Phone Call Placed to [employer]  Phone Number [phone]  Spoke to [address]  4 Days Approved  Level of Care AIP- 201  Review on TBD  Authorization #Accepting facility to call upon admission      EVS (Eligibility Verification System) Called- 2   Automated System Indicates Active with [facility]  Crisis Intervention Specialist II  05/03/23

## 2023-05-03 NOTE — ED NOTES
Patient unable to urinate at this time, aware of the need for a urine sample  Patient also unable to preform breathalyzer, states he can't blow out that much air  Patient educated on how to use breathalyzer, will attempt again        Matheus Granados RN  05/03/23 3238

## 2023-05-03 NOTE — ED NOTES
Patient presents to the Emergency Department via self referral reporting suicidal ideation, homicidal ideation and auditory command hallucinations  Patient is calm and cooperative at present and agrees to speak with this writer  Patient has a labile affect, loud at times with pressured speech  Patient is oriented x4  Patient reports he came here for help after his girlfriend of 25 years left him for another man  Patient reports following that he began to feel suicidal with a plan to slit his own throat with a sharp knife  Patient reports feeling homicidal toward the man who stole his girlfriend, and said he would also like to slit his throat but does not report who this is  Patient reports hearing auditory, command hallucinations that tell him to kill this man  Patient states he stopped taking his psychiatric medications a few days ago, and does not follow up with any psychiatrists or therapists  Patient reports a history of suicidal ideation and suicide attempts in his past  Patient reports he took cocaine yesterday in an attempt to calm him down, but it didn't work  Patient denies any other drug use and tobacco use  Patient reports he sometimes drinks one can of beer per day  Patient denies visual/tactile hallucinations  Patient is currently unemployed  Patient denies major disturbances to his sleep patterns or appetite  Patient is requesting inpatient treatment at this time      Genesis Pearl  Crisis Intervention Specialist II  05/03/23

## 2023-05-03 NOTE — ED NOTES
201 Signed- Rights explained, bed search explained- Faxed to [facility]- Original on chart  Patient does not wish to be referred to [facility]      Sarah Craig  Crisis Intervention Specialist II  05/03/23

## 2023-05-03 NOTE — ED PROVIDER NOTES
"History  Chief Complaint   Patient presents with    Suicidal     \"I'm gonna kill somebody else  Anybody else  And then i'm gonna kill myself  By any means necessary  \" States stopped taking psych meds 3 months ago  Last SA approx 7 months ago     Patient is a 18-year-old gentleman by myself and crisis at the same time  Patient has a longstanding history of depression anxiety and noncompliance  Patient states that most recently he learned that his girlfriend had left him from another man and he wants to kill the man and himself  He states that he has attempted to kill himself before by attempting to jump off a bridge  However this time he wants a large Knife to cut the gentleman's throat as well as his throat  He is asking for a large knife on my exam   He states he has been noncompliant with his medications and cannot tell me last time he took medications  He has not seen a psychiatrist or therapist in several years  Was recently seen by myself in March increased depression because of the loss of a family member  Patient does state that he did take cocaine Friend to think that that would help with his anxiety      History provided by:  Patient and medical records  Psychiatric Evaluation  Presenting symptoms: depression, homicidal ideas, suicidal thoughts and suicidal threats    Degree of incapacity (severity):   Moderate  Onset quality:  Gradual  Timing:  Constant  Progression:  Worsening  Chronicity:  Recurrent  Context: alcohol use, drug abuse, noncompliance and stressful life event    Context: not medication    Treatment compliance:  None of the time  Relieved by:  None tried  Worsened by:  Nothing  Ineffective treatments:  None tried  Associated symptoms: feelings of worthlessness    Associated symptoms: no abdominal pain and no chest pain    Risk factors: hx of mental illness, hx of suicide attempts and recent psychiatric admission             Prior to Admission Medications   Prescriptions Last Dose " Informant Patient Reported? Taking? ARIPiprazole (ABILIFY) 10 mg tablet Not Taking  No No   Sig: Take 1 tablet (10 mg total) by mouth daily   Patient not taking: Reported on 11/28/2022      Facility-Administered Medications: None       Past Medical History:   Diagnosis Date    Abnormal EKG     Depression     Substance use        Past Surgical History:   Procedure Laterality Date    NO PAST SURGERIES         History reviewed  No pertinent family history  I have reviewed and agree with the history as documented  E-Cigarette/Vaping    E-Cigarette Use Never User      E-Cigarette/Vaping Substances    Nicotine No     THC No     CBD No     Flavoring No     Other No     Unknown No      Social History     Tobacco Use    Smoking status: Every Day     Packs/day: 1 00     Types: Cigarettes    Smokeless tobacco: Never   Vaping Use    Vaping Use: Never used   Substance Use Topics    Alcohol use: Yes     Alcohol/week: 21 0 standard drinks     Types: 21 Cans of beer per week    Drug use: Yes     Types: Marijuana, Cocaine     Comment: last use morning of 3/20/23       Review of Systems   Constitutional: Negative  Negative for chills and fever  HENT: Negative for ear pain and sore throat  Eyes: Negative  Negative for pain and visual disturbance  Respiratory: Negative for cough and shortness of breath  Cardiovascular: Negative for chest pain and palpitations  Gastrointestinal: Negative for abdominal pain and vomiting  Genitourinary: Negative for dysuria and hematuria  Musculoskeletal: Negative for arthralgias and back pain  Skin: Negative for color change and rash  Neurological: Negative for seizures and syncope  Psychiatric/Behavioral: Positive for homicidal ideas and suicidal ideas  All other systems reviewed and are negative  Physical Exam  Physical Exam  Vitals and nursing note reviewed  Constitutional:       General: He is not in acute distress       Appearance: He is well-developed  HENT:      Head: Normocephalic and atraumatic  Comments: Patient maintaining airway and secretions  No stridor   No brawniness under tongue  Eyes:      Extraocular Movements: Extraocular movements intact  Conjunctiva/sclera: Conjunctivae normal    Cardiovascular:      Rate and Rhythm: Normal rate and regular rhythm  Heart sounds: No murmur heard  Pulmonary:      Effort: Pulmonary effort is normal  No respiratory distress  Breath sounds: Normal breath sounds  Abdominal:      Palpations: Abdomen is soft  Tenderness: There is no abdominal tenderness  Musculoskeletal:         General: No swelling  Cervical back: Neck supple  Skin:     General: Skin is warm and dry  Capillary Refill: Capillary refill takes less than 2 seconds  Neurological:      General: No focal deficit present  Mental Status: He is alert and oriented to person, place, and time  GCS: GCS eye subscore is 4  GCS verbal subscore is 5  GCS motor subscore is 6  Cranial Nerves: Cranial nerves 2-12 are intact  Sensory: Sensation is intact  Motor: Motor function is intact  Coordination: Coordination is intact  Gait: Gait is intact  Psychiatric:         Mood and Affect: Mood normal  Affect is flat and angry  Thought Content: Thought content includes homicidal ideation  Thought content includes homicidal plan           Judgment: Judgment normal          Vital Signs  ED Triage Vitals [05/03/23 1824]   Temperature Pulse Respirations Blood Pressure SpO2   98 1 °F (36 7 °C) 82 18 133/84 99 %      Temp Source Heart Rate Source Patient Position - Orthostatic VS BP Location FiO2 (%)   Oral Monitor Sitting Right arm --      Pain Score       No Pain           Vitals:    05/03/23 1824 05/03/23 2000 05/04/23 0000   BP: 133/84 128/89 119/76   Pulse: 82 81 75   Patient Position - Orthostatic VS: Sitting Lying Lying         Visual Acuity      ED Medications  Medications   acetaminophen (TYLENOL) tablet 650 mg (has no administration in time range)   melatonin tablet 3 mg (has no administration in time range)   ibuprofen (MOTRIN) tablet 400 mg (has no administration in time range)   LORazepam (ATIVAN) tablet 2 mg (has no administration in time range)   diphenhydrAMINE (BENADRYL) tablet 25 mg (has no administration in time range)   aluminum-magnesium hydroxide-simethicone (MYLANTA) oral suspension 30 mL (has no administration in time range)   nicotine (NICODERM CQ) 21 mg/24 hr TD 24 hr patch 21 mg (has no administration in time range)       Diagnostic Studies  Results Reviewed     Procedure Component Value Units Date/Time    POCT alcohol breath test [[id number]]  (Normal) Resulted: 05/03/23 1954    Lab Status: Final result Updated: 05/03/23 1954     EXTBreath Alcohol 0 00    TSH [[id number]]  (Normal) Collected: 05/03/23 1854    Lab Status: Final result Specimen: Blood from Arm, Right Updated: 05/03/23 1936     TSH 3RD GENERATON 0 607 uIU/mL     Comprehensive metabolic panel [[id number]]  (Abnormal) Collected: 05/03/23 1854    Lab Status: Final result Specimen: Blood from Arm, Right Updated: 05/03/23 1922     Sodium 134 mmol/L      Potassium 3 5 mmol/L      Chloride 103 mmol/L      CO2 22 mmol/L      ANION GAP 9 mmol/L      BUN 16 mg/dL      Creatinine 1 11 mg/dL      Glucose 109 mg/dL      Calcium 8 9 mg/dL      AST 46 U/L      ALT 47 U/L      Alkaline Phosphatase 77 U/L      Total Protein 7 5 g/dL      Albumin 4 0 g/dL      Total Bilirubin 0 50 mg/dL      eGFR 72 ml/min/1 73sq m     Narrative:      Noemy guidelines for Chronic Kidney Disease (CKD):     Stage 1 with normal or high GFR (GFR > 90 mL/min/1 73 square meters)    Stage 2 Mild CKD (GFR = 60-89 mL/min/1 73 square meters)    Stage 3A Moderate CKD (GFR = 45-59 mL/min/1 73 square meters)    Stage 3B Moderate CKD (GFR = 30-44 mL/min/1 73 square meters)    Stage 4 Severe CKD "(GFR = 15-29 mL/min/1 73 square meters)    Stage 5 End Stage CKD (GFR <15 mL/min/1 73 square meters)  Note: GFR calculation is accurate only with a steady state creatinine    CBC and differential [[id number]]  (Abnormal) Collected: 05/03/23 1854    Lab Status: Final result Specimen: Blood from Arm, Right Updated: 05/03/23 1903     WBC 7 99 Thousand/uL      RBC 5 57 Million/uL      Hemoglobin 14 3 g/dL      Hematocrit 43 5 %      MCV 78 fL      MCH 25 7 pg      MCHC 32 9 g/dL      RDW 13 7 %      MPV 9 9 fL      Platelets 982 Thousands/uL      nRBC 0 /100 WBCs      Neutrophils Relative 68 %      Immat GRANS % 0 %      Lymphocytes Relative 18 %      Monocytes Relative 10 %      Eosinophils Relative 3 %      Basophils Relative 1 %      Neutrophils Absolute 5 49 Thousands/µL      Immature Grans Absolute 0 01 Thousand/uL      Lymphocytes Absolute 1 43 Thousands/µL      Monocytes Absolute 0 77 Thousand/µL      Eosinophils Absolute 0 25 Thousand/µL      Basophils Absolute 0 04 Thousands/µL     Rapid drug screen, urine [[id number]]     Lab Status: No result Specimen: Urine                  No orders to display              Procedures  Procedures         ED Course  ED Course as of 05/04/23 0122   Wed May 03, 2023   1848 Patient is a 58-year-old gentleman coming in today with SI and HI with specific plans for both  Given age will complete medical work-up for placement    Placed on a one-to-one as well    Disclosure: Voice to text software was used in the preparation of this document and could have resulted in translational errors       Occasional wrong word or \"sound a like\" substitutions may have occurred due to the inherent limitations of voice recognition software   Read the chart carefully and recognize, using context, where substitutions have occurred        I have independently reviewed external records are available to me to the level of detail possible within the time constraints of my patient care responsibilities in " the ED        1856 Patient signed [address] reviewed  1923 CBC and differential(!)   1923 Comprehensive metabolic panel(!)   6309 Breath alcohol is 0  Labs otherwise stable pending urine otherwise medically clear for psychiatric evaluation and inpatient admission   2140 Pending urine  Otherwise patient resting in bed   Thu May 04, 2023   0010 Pending otherwise MEDICALLY cleared  0122 Patient signed out to [facility]      EKG INTERPRETATION @ 1844  RHYTHM: Normal sinus rhythm at 85 bpm  AXIS: Normal axis  INTERVALS: OR interval measured 148 ms  QRS COMPLEX: QRS measured 84 ms  ST SEGMENT: Specific ST segment changes  Diffuse artifact  Diffuse T wave inversion in 2, 3, aVF as well as V4 V5 V6  LVH  QT INTERVAL: QTc measured at 437 ms  COMPARED WITH PRIOR no acute change form 3/23  Interpretation by Felisha Sherwood DO      Depression with suicidal ideation: chronic illness or injury  Homicidal behavior: chronic illness or injury  Amount and/or Complexity of Data Reviewed  Labs: ordered  Risk  OTC drugs  Prescription drug management  Decision regarding hospitalization  Disposition  Final diagnoses:   Depression with suicidal ideation   Homicidal behavior     Time reflects when diagnosis was documented in both MDM as applicable and the Disposition within this note     Time User Action Codes Description Comment    5/3/2023  6:47 PM Bendock, Dorotha Show Add Photius Jadiel  A,  R48 221] Depression with suicidal ideation     5/3/2023  6:47 PM Bendock, Dorotha Show Add [O24 037] Homicidal behavior       ED Disposition     ED Disposition   Transfer to [address] [place] [address]   --    Date/Time   Wed May 3, 2023  6:47 PM    Comment   Macario Junior should be transferred out to [facility] and has been medically cleared             MD Yudith Rueda Most Recent Value   Sending MD Dr Beltran Kwok Patient's Medications   Discharge Prescriptions    No medications on file       No discharge procedures on file      PDMP Review       Value Time User    PDMP Reviewed  Yes 8/1/2022  7:40 AM Hetal Cui MD          ED Provider  Electronically Signed by           Kurt Ordonez DO  05/04/23 [address] [place] [address],   05/04/23 0866

## 2023-05-04 VITALS
DIASTOLIC BLOOD PRESSURE: 68 MMHG | HEART RATE: 68 BPM | TEMPERATURE: 98.1 F | SYSTOLIC BLOOD PRESSURE: 111 MMHG | OXYGEN SATURATION: 99 % | BODY MASS INDEX: 23.82 KG/M2 | RESPIRATION RATE: 16 BRPM | WEIGHT: 152.12 LBS

## 2023-05-04 LAB
AMPHETAMINES SERPL QL SCN: POSITIVE
BARBITURATES UR QL: NEGATIVE
BENZODIAZ UR QL: NEGATIVE
COCAINE UR QL: POSITIVE
METHADONE UR QL: NEGATIVE
OPIATES UR QL SCN: NEGATIVE
OXYCODONE+OXYMORPHONE UR QL SCN: NEGATIVE
PCP UR QL: NEGATIVE
SARS-COV-2 AG UPPER RESP QL IA: NORMAL
THC UR QL: POSITIVE

## 2023-05-04 RX ORDER — ACETAMINOPHEN 325 MG/1
650 TABLET ORAL EVERY 8 HOURS PRN
Status: DISCONTINUED | OUTPATIENT
Start: 2023-05-04 | End: 2023-05-04 | Stop reason: HOSPADM

## 2023-05-04 NOTE — ED NOTES
Assumed care of pt at this time  Pt sleeping in bed with no outward signs of distress  In person 1:1 continued        Elmer Henderson RN  05/03/23 7980

## 2023-05-04 NOTE — ED NOTES
"RN checking on pt due to him coughing very loudly  Pt's room noted to smell like urine and have sticky floors surrounding pt's bed  RN asked pt if he had be urinating in the room  Pt stated \"oh that's just the floor! \" RN reminded pt that we still need a urine sample from him and asked if he was able to provide one at this time since he has been drinking fluids all night (per previous RN)  Pt stated that he did not have to urinate at this time and was agreeable to trying in the morning  Pt's floor cleaned with disinfecting wipe as smell was seeping into the secure holding area        Andrade Bloom RN  05/04/23 4059    "

## 2023-05-04 NOTE — EMTALA/ACUTE CARE TRANSFER
[facility]  [address] [id number]  Dept: [phone]      EMTALA TRANSFER CONSENT    NAME Mabel Woodruff                                         1965                              MRN [id number]    I have been informed of my rights regarding examination, treatment, and transfer   by Dr Manohar Lam DO    Benefits: Specialized equipment and/or services available at the receiving facility (Include comment)________________________    Risks: Potential for delay in receiving treatment, Increased discomfort during transfer      Consent for Transfer:  I acknowledge that my medical condition has been evaluated and explained to me by the emergency department physician or other qualified medical person and/or my attending physician, who has recommended that I be transferred to the service of  Accepting Physician: Dr Bustamante Fails at  [address], [address] : [place]  The above potential benefits of such transfer, the potential risks associated with such transfer, and the probable risks of not being transferred have been explained to me, and I fully understand them  The doctor has explained that, in my case, the benefits of transfer outweigh the risks  I agree to be transferred  I authorize the performance of emergency medical procedures and treatments upon me in both transit and upon arrival at the receiving facility  Additionally, I authorize the release of any and all medical records to the receiving facility and request they be transported with me, if possible  I understand that the safest mode of transportation during a medical emergency is an ambulance and that the Hospital advocates the use of this mode of transport   Risks of traveling to the receiving facility by car, including absence of medical control, life sustaining equipment, such as oxygen, and medical personnel has been explained to me and I fully understand them     ([facility])  [  ]  I consent to the stated transfer and to be transported by ambulance/helicopter  [  ]  I consent to the stated transfer, but refuse transportation by ambulance and accept full responsibility for my transportation by car  I understand the risks of non-ambulance transfers and I exonerate the Hospital and its staff from any deterioration in my condition that results from this refusal     X___________________________________________    DATE  23  TIME________  Signature of patient or legally responsible individual signing on patient behalf           RELATIONSHIP TO PATIENT_________________________          Provider Certification    NAME Cindy Zavalapepper                                         1965                              MRN [id number]    A medical screening exam was performed on the above named patient  Based on the examination:    Condition Necessitating Transfer The primary encounter diagnosis was Depression with suicidal ideation  A diagnosis of Homicidal behavior was also pertinent to this visit      Patient Condition: The patient has been stabilized such that within reasonable medical probability, no material deterioration of the patient condition or the condition of the unborn child(flash) is likely to result from the transfer    Reason for Transfer: Level of Care needed not available at this facility    Transfer Requirements: [facility]   · Space available and qualified personnel available for treatment as acknowledged by [facility]  · Agreed to accept transfer and to provide appropriate medical treatment as acknowledged by       Dr Chantal Covarrubias  · Appropriate medical records of the examination and treatment of the patient are provided at the time of transfer   [address] _______  · Transfer will be performed by qualified personnel from [address] [place] [address]  and appropriate transfer equipment as required, including the use of necessary and appropriate life support measures  Provider Certification: I have examined the patient and explained the following risks and benefits of being transferred/refusing transfer to the patient/family:  General risk, such as traffic hazards, adverse weather conditions, rough terrain or turbulence, possible failure of equipment (including vehicle or aircraft), or consequences of actions of persons outside the control of the transport personnel      Based on these reasonable risks and benefits to the patient and/or the unborn child(flash), and based upon the information available at the time of the patients examination, I certify that the medical benefits reasonably to be expected from the provision of appropriate medical treatments at another medical facility outweigh the increasing risks, if any, to the individuals medical condition, and in the case of labor to the unborn child, from effecting the transfer      X____________________________________________ DATE 05/04/23        TIME_______      ORIGINAL - SEND TO MEDICAL RECORDS   COPY - SEND WITH PATIENT DURING TRANSFER

## 2023-05-04 NOTE — ED NOTES
Patient is accepted at [facility]  Patient is accepted by Dr Colby Mcgrath per [address] [facility] is arranged with [facility]  Transportation is scheduled for TBD

## 2023-05-04 NOTE — ED NOTES
VS and BH assessment deferred due to pt sleeping  Pt alseep in bed with no outward signs of distress  In person 1:1 continued       Dov Wilson, DONNIE  05/04/23 3838

## 2023-05-04 NOTE — ED NOTES
Patient under review at [facility]    Patient does not want to be referred to [facility]

## 2023-05-04 NOTE — ED NOTES
Patient given more water  Patient reminded of the need for a urine sample to get bed placement        Sahara Baca RN  05/03/23 9648

## 2023-05-05 LAB
FLUAV RNA RESP QL NAA+PROBE: NEGATIVE
FLUBV RNA RESP QL NAA+PROBE: NEGATIVE
SARS-COV-2 RNA RESP QL NAA+PROBE: NEGATIVE

## 2023-07-02 PROCEDURE — 99285 EMERGENCY DEPT VISIT HI MDM: CPT

## 2023-07-03 ENCOUNTER — HOSPITAL ENCOUNTER (EMERGENCY)
Facility: HOSPITAL | Age: 58
End: 2023-07-03
Attending: EMERGENCY MEDICINE
Payer: COMMERCIAL

## 2023-07-03 VITALS
WEIGHT: 137.79 LBS | RESPIRATION RATE: 18 BRPM | HEART RATE: 64 BPM | DIASTOLIC BLOOD PRESSURE: 73 MMHG | BODY MASS INDEX: 21.58 KG/M2 | SYSTOLIC BLOOD PRESSURE: 127 MMHG | OXYGEN SATURATION: 100 % | TEMPERATURE: 98.1 F

## 2023-07-03 DIAGNOSIS — R45.851 DEPRESSION WITH SUICIDAL IDEATION: Primary | ICD-10-CM

## 2023-07-03 DIAGNOSIS — F19.10 SUBSTANCE ABUSE (HCC): ICD-10-CM

## 2023-07-03 DIAGNOSIS — F32.A DEPRESSION WITH SUICIDAL IDEATION: Primary | ICD-10-CM

## 2023-07-03 LAB
ALBUMIN SERPL BCP-MCNC: 4 G/DL (ref 3.5–5)
ALP SERPL-CCNC: 67 U/L (ref 34–104)
ALT SERPL W P-5'-P-CCNC: 28 U/L (ref 7–52)
AMPHETAMINES SERPL QL SCN: NEGATIVE
ANION GAP SERPL CALCULATED.3IONS-SCNC: 8 MMOL/L
AST SERPL W P-5'-P-CCNC: 28 U/L (ref 13–39)
ATRIAL RATE: 71 BPM
BARBITURATES UR QL: NEGATIVE
BASOPHILS # BLD AUTO: 0.05 THOUSANDS/ÂΜL (ref 0–0.1)
BASOPHILS NFR BLD AUTO: 1 % (ref 0–1)
BENZODIAZ UR QL: NEGATIVE
BILIRUB SERPL-MCNC: 0.38 MG/DL (ref 0.2–1)
BUN SERPL-MCNC: 10 MG/DL (ref 5–25)
CALCIUM SERPL-MCNC: 9 MG/DL (ref 8.4–10.2)
CHLORIDE SERPL-SCNC: 106 MMOL/L (ref 96–108)
CO2 SERPL-SCNC: 25 MMOL/L (ref 21–32)
COCAINE UR QL: POSITIVE
CREAT SERPL-MCNC: 1.19 MG/DL (ref 0.6–1.3)
EOSINOPHIL # BLD AUTO: 0.21 THOUSAND/ÂΜL (ref 0–0.61)
EOSINOPHIL NFR BLD AUTO: 3 % (ref 0–6)
ERYTHROCYTE [DISTWIDTH] IN BLOOD BY AUTOMATED COUNT: 14 % (ref 11.6–15.1)
ETHANOL SERPL-MCNC: <10 MG/DL
GFR SERPL CREATININE-BSD FRML MDRD: 66 ML/MIN/1.73SQ M
GLUCOSE SERPL-MCNC: 95 MG/DL (ref 65–140)
HCT VFR BLD AUTO: 45.3 % (ref 36.5–49.3)
HGB BLD-MCNC: 14.3 G/DL (ref 12–17)
IMM GRANULOCYTES # BLD AUTO: 0.02 THOUSAND/UL (ref 0–0.2)
IMM GRANULOCYTES NFR BLD AUTO: 0 % (ref 0–2)
LYMPHOCYTES # BLD AUTO: 1.95 THOUSANDS/ÂΜL (ref 0.6–4.47)
LYMPHOCYTES NFR BLD AUTO: 27 % (ref 14–44)
MCH RBC QN AUTO: 25 PG (ref 26.8–34.3)
MCHC RBC AUTO-ENTMCNC: 31.6 G/DL (ref 31.4–37.4)
MCV RBC AUTO: 79 FL (ref 82–98)
METHADONE UR QL: NEGATIVE
MONOCYTES # BLD AUTO: 0.76 THOUSAND/ÂΜL (ref 0.17–1.22)
MONOCYTES NFR BLD AUTO: 11 % (ref 4–12)
NEUTROPHILS # BLD AUTO: 4.28 THOUSANDS/ÂΜL (ref 1.85–7.62)
NEUTS SEG NFR BLD AUTO: 58 % (ref 43–75)
NRBC BLD AUTO-RTO: 0 /100 WBCS
OPIATES UR QL SCN: NEGATIVE
OXYCODONE+OXYMORPHONE UR QL SCN: NEGATIVE
P AXIS: 37 DEGREES
PCP UR QL: NEGATIVE
PLATELET # BLD AUTO: 215 THOUSANDS/UL (ref 149–390)
PMV BLD AUTO: 9.7 FL (ref 8.9–12.7)
POTASSIUM SERPL-SCNC: 3.6 MMOL/L (ref 3.5–5.3)
PR INTERVAL: 156 MS
PROT SERPL-MCNC: 7.3 G/DL (ref 6.4–8.4)
QRS AXIS: 108 DEGREES
QRSD INTERVAL: 100 MS
QT INTERVAL: 394 MS
QTC INTERVAL: 428 MS
RBC # BLD AUTO: 5.72 MILLION/UL (ref 3.88–5.62)
SODIUM SERPL-SCNC: 139 MMOL/L (ref 135–147)
T WAVE AXIS: -60 DEGREES
THC UR QL: POSITIVE
TSH SERPL DL<=0.05 MIU/L-ACNC: 0.52 UIU/ML (ref 0.45–4.5)
VENTRICULAR RATE: 71 BPM
WBC # BLD AUTO: 7.27 THOUSAND/UL (ref 4.31–10.16)

## 2023-07-03 PROCEDURE — 85025 COMPLETE CBC W/AUTO DIFF WBC: CPT | Performed by: EMERGENCY MEDICINE

## 2023-07-03 PROCEDURE — 93005 ELECTROCARDIOGRAM TRACING: CPT

## 2023-07-03 PROCEDURE — 82077 ASSAY SPEC XCP UR&BREATH IA: CPT | Performed by: EMERGENCY MEDICINE

## 2023-07-03 PROCEDURE — 84443 ASSAY THYROID STIM HORMONE: CPT | Performed by: EMERGENCY MEDICINE

## 2023-07-03 PROCEDURE — 93010 ELECTROCARDIOGRAM REPORT: CPT | Performed by: STUDENT IN AN ORGANIZED HEALTH CARE EDUCATION/TRAINING PROGRAM

## 2023-07-03 PROCEDURE — 80053 COMPREHEN METABOLIC PANEL: CPT | Performed by: EMERGENCY MEDICINE

## 2023-07-03 PROCEDURE — 36415 COLL VENOUS BLD VENIPUNCTURE: CPT | Performed by: EMERGENCY MEDICINE

## 2023-07-03 PROCEDURE — 80307 DRUG TEST PRSMV CHEM ANLYZR: CPT | Performed by: EMERGENCY MEDICINE

## 2023-07-03 RX ORDER — ESCITALOPRAM OXALATE 10 MG/1
1 TABLET ORAL EVERY MORNING
COMMUNITY

## 2023-07-03 RX ORDER — METOPROLOL SUCCINATE 25 MG/1
25 TABLET, EXTENDED RELEASE ORAL
COMMUNITY

## 2023-07-03 RX ORDER — LISINOPRIL 5 MG/1
5 TABLET ORAL EVERY MORNING
COMMUNITY

## 2023-07-03 NOTE — ED NOTES
via [place] PROM[place]:    Name: Kulwinder Gil   Recipient ID: [id number]   YOB: 1965   Gender: Male           Eligibility Summary:    Type Name   Medicaid Category: MHX  Program Status: 00  Service Program: [facility] Only - Non-[facility]   Managed Care [address] FOR [facility]   Medicaid Category: MG  Program Status: 80  Service Program: [facility]

## 2023-07-03 NOTE — ED NOTES
Pt changed into paper scrubs a this time. Belongings checked and placed at nurse's station.      Lawson Fonseca RN  07/03/23 7604

## 2023-07-03 NOTE — EMTALA/ACUTE CARE TRANSFER
[address] [place] [id number]  Dept: [phone]      EMTALA TRANSFER CONSENT    NAME Mohamud Dick                                         1965                              MRN [id number]    I have been informed of my rights regarding examination, treatment, and transfer   by Dr. Rehana Martini: Continuity of care    Risks: Potential for delay in receiving treatment, Increased discomfort during transfer      Consent for Transfer:  I acknowledge that my medical condition has been evaluated and explained to me by the emergency department physician or other qualified medical person and/or my attending physician, who has recommended that I be transferred to the service of  Accepting Physician: Dr Annabel Howe at [address] [place] [address] [address] Name, [address] [facility] [address] : Lacie Natarajan. The above potential benefits of such transfer, the potential risks associated with such transfer, and the probable risks of not being transferred have been explained to me, and I fully understand them. The doctor has explained that, in my case, the benefits of transfer outweigh the risks. I agree to be transferred. I authorize the performance of emergency medical procedures and treatments upon me in both transit and upon arrival at the receiving facility. Additionally, I authorize the release of any and all medical records to the receiving facility and request they be transported with me, if possible. I understand that the safest mode of transportation during a medical emergency is an ambulance and that the Hospital advocates the use of this mode of transport. Risks of traveling to the receiving facility by car, including absence of medical control, life sustaining equipment, such as oxygen, and medical personnel has been explained to me and I fully understand them.     ([address])  [  ]  I consent to the stated transfer and to be transported by ambulance/helicopter. [  ]  I consent to the stated transfer, but refuse transportation by ambulance and accept full responsibility for my transportation by car. I understand the risks of non-ambulance transfers and I exonerate the Hospital and its staff from any deterioration in my condition that results from this refusal.    X___________________________________________    DATE  23  TIME________  Signature of patient or legally responsible individual signing on patient behalf           RELATIONSHIP TO PATIENT_________________________          Provider Certification    NAME Emilee Jaffe                                         1965                              MRN [id number]    A medical screening exam was performed on the above named patient. Based on the examination:    Condition Necessitating Transfer The primary encounter diagnosis was Depression with suicidal ideation. A diagnosis of Substance abuse ([address]) was also pertinent to this visit. Patient Condition: The patient has been stabilized such that within reasonable medical probability, no material deterioration of the patient condition or the condition of the unborn child(flash) is likely to result from the transfer    Reason for Transfer: Level of Care needed not available at this facility    Transfer Requirements: [address]   · [facility] available and qualified personnel available for treatment as acknowledged by markos  · Agreed to accept transfer and to provide appropriate medical treatment as acknowledged by       Dr Brittany Hedrick  · Appropriate medical records of the examination and treatment of the patient are provided at the time of transfer   [address] _______  · Transfer will be performed by qualified personnel from [facility]  and appropriate transfer equipment as required, including the use of necessary and appropriate life support measures.     Provider Certification: I have examined the patient and explained the following risks and benefits of being transferred/refusing transfer to the patient/family:  General risk, such as traffic hazards, adverse weather conditions, rough terrain or turbulence, possible failure of equipment (including vehicle or aircraft), or consequences of actions of persons outside the control of the transport personnel      Based on these reasonable risks and benefits to the patient and/or the unborn child(flash), and based upon the information available at the time of the patient’s examination, I certify that the medical benefits reasonably to be expected from the provision of appropriate medical treatments at another medical facility outweigh the increasing risks, if any, to the individual’s medical condition, and in the case of labor to the unborn child, from effecting the transfer.     X____________________________________________ DATE 07/03/23        TIME_______      ORIGINAL - SEND TO MEDICAL RECORDS   COPY - SEND WITH PATIENT DURING TRANSFER

## 2023-07-03 NOTE — ED NOTES
201 signed by both patient and ED provider. Patient rights as well as bed search process explained, patient verbalizes understanding and states he's familiar with the process. He remains adamant that he will not return to [facility] under any circumstances. Original 201 placed on patient chart, copy at crisis desk. 201 commitment faxed/emailed to  intake at this time however patient could benefit from a dual program which would likely have to be provided OON.

## 2023-07-03 NOTE — ED NOTES
Insurance Authorization:   Phone call placed to [facility]  Phone number: [phone]     Spoke to: Sarai Coronado approved-  4  Level of care: Inpatient treatment  Review on  7/6/23  Authorization # Facility to call on arrival      EVS (Eligibility Verification System) called [phone]/EQMCAGV  Automated system indicates: [facility]

## 2023-07-03 NOTE — ED NOTES
Patient is accepted at [address]  Patient is accepted by   per  [address] is arranged with cts  Transportation is scheduled for tbd

## 2023-07-03 NOTE — ED NOTES
Assumed care of patient at this time. Patient is resting comfortably with no signs of distress. 1:1 at bedside.      Dana Wilson  07/03/23 5839

## 2023-07-03 NOTE — ED PROVIDER NOTES
History  Chief Complaint   Patient presents with   • Psychiatric Evaluation      for passed 4 hours has been having auditory hallucinations to harm others, and then take his own life.  has a plan to jump in front of a truck,  was going to do so and a bystander stopped him and gave him directions to the hospital.  increase in depression with multiple deaths in family. States stopped taking psych meds. 60year-old male presents admitting to depression, SI, HI, and auditory hallucinations tonight telling him to kill himself and others. Patient admits to a history of depression and substance abuse of alcohol and cocaine. He claims he was prescribed medications for depression and was doing well on the for months so he stopped taking them. He presents tonight after a bystander witnessed him trying to walk in front of a truck and advised him to walk to the hospital for help. Denies injuries. Willing to get help. History provided by:  Patient  Psychiatric Evaluation  Presenting symptoms: depression, hallucinations, homicidal ideas, suicidal thoughts and suicide attempt    Presenting symptoms: no agitation and no paranoid behavior    Progression:  Worsening  Context: alcohol use, drug abuse and noncompliance    Worsened by:  Alcohol and drugs  Associated symptoms: no abdominal pain, no chest pain and no headaches        Prior to Admission Medications   Prescriptions Last Dose Informant Patient Reported? Taking?    ARIPiprazole (ABILIFY) 10 mg tablet More than a month  No No   Sig: Take 1 tablet (10 mg total) by mouth daily   Patient not taking: Reported on 11/28/2022   escitalopram (LEXAPRO) 10 mg tablet More than a month  Yes No   Sig: Take 1 tablet by mouth every morning   lisinopril (ZESTRIL) 5 mg tablet More than a month  Yes No   Sig: Take 5 mg by mouth every morning   metoprolol succinate (TOPROL-XL) 25 mg 24 hr tablet More than a month  Yes No   Sig: Take 25 mg by mouth Facility-Administered Medications: None       Past Medical History:   Diagnosis Date   • Abnormal EKG    • Depression    • Substance use        Past Surgical History:   Procedure Laterality Date   • NO PAST SURGERIES         History reviewed. No pertinent family history. I have reviewed and agree with the history as documented. E-Cigarette/Vaping   • E-Cigarette Use Never User      E-Cigarette/Vaping Substances   • Nicotine No    • THC No    • CBD No    • Flavoring No    • Other No    • Unknown No      Social History     Tobacco Use   • Smoking status: Former     Packs/day: 1.00     Types: Cigarettes   • Smokeless tobacco: Never   Vaping Use   • Vaping Use: Never used   Substance Use Topics   • Alcohol use: Yes     Alcohol/week: 21.0 standard drinks of alcohol     Types: 21 Cans of beer per week   • Drug use: Yes     Types: Marijuana, Cocaine     Comment: last use morning of 3/20/23       Review of Systems   Constitutional: Negative for fever. HENT: Negative for trouble swallowing. Eyes: Negative for pain and redness. Respiratory: Negative for cough and shortness of breath. Cardiovascular: Negative for chest pain. Gastrointestinal: Negative for abdominal pain, nausea and vomiting. Musculoskeletal: Negative for arthralgias and myalgias. Skin: Negative for wound. Allergic/Immunologic: Negative for immunocompromised state. Neurological: Negative for headaches. Psychiatric/Behavioral: Positive for hallucinations, homicidal ideas and suicidal ideas. Negative for agitation and paranoia. All other systems reviewed and are negative. Physical Exam  Physical Exam  Vitals and nursing note reviewed. Constitutional:       General: He is not in acute distress. Appearance: Normal appearance. He is well-developed. He is not diaphoretic. HENT:      Head: Normocephalic and atraumatic. Eyes:      General: No scleral icterus. Right eye: No discharge. Left eye: No discharge. Conjunctiva/sclera: Conjunctivae normal.      Pupils: Pupils are equal, round, and reactive to light. Cardiovascular:      Rate and Rhythm: Normal rate. Pulmonary:      Effort: Pulmonary effort is normal. No respiratory distress. Breath sounds: No wheezing. Musculoskeletal:         General: No tenderness or deformity. Skin:     General: Skin is warm and dry. Findings: No erythema or lesion. Neurological:      General: No focal deficit present. Mental Status: He is alert and oriented to person, place, and time. Psychiatric:         Attention and Perception: He is attentive. He perceives auditory hallucinations. Mood and Affect: Mood is depressed. Affect is flat. Speech: Speech normal.         Behavior: Behavior normal. Behavior is cooperative. Thought Content: Thought content is not paranoid or delusional. Thought content includes homicidal and suicidal ideation. Thought content includes suicidal plan. Thought content does not include homicidal plan. Cognition and Memory: Memory is not impaired.          Vital Signs  ED Triage Vitals [07/03/23 0001]   Temperature Pulse Respirations Blood Pressure SpO2   98.1 °F (36.7 °C) 89 18 (!) 160/116 100 %      Temp Source Heart Rate Source Patient Position - Orthostatic VS BP Location FiO2 (%)   Oral Monitor Sitting Right arm --      Pain Score       No Pain           Vitals:    07/03/23 0001   BP: (!) 160/116   Pulse: 89   Patient Position - Orthostatic VS: Sitting         Visual Acuity      ED Medications  Medications - No data to display    Diagnostic Studies  Results Reviewed     Procedure Component Value Units Date/Time    TSH [[id number]]  (Normal) Collected: 07/03/23 0101    Lab Status: Final result Specimen: Blood from Arm, Right Updated: 07/03/23 0139     TSH 3RD GENERATON 0.516 uIU/mL     Ethanol [[id number]]  (Normal) Collected: 07/03/23 0101    Lab Status: Final result Specimen: Blood from Arm, Right Updated: 07/03/23 0123     Ethanol Lvl <10 mg/dL     Comprehensive metabolic panel [[id number]] Collected: 07/03/23 0101    Lab Status: Final result Specimen: Blood from Arm, Right Updated: 07/03/23 0123     Sodium 139 mmol/L      Potassium 3.6 mmol/L      Chloride 106 mmol/L      CO2 25 mmol/L      ANION GAP 8 mmol/L      BUN 10 mg/dL      Creatinine 1.19 mg/dL      Glucose 95 mg/dL      Calcium 9.0 mg/dL      AST 28 U/L      ALT 28 U/L      Alkaline Phosphatase 67 U/L      Total Protein 7.3 g/dL      Albumin 4.0 g/dL      Total Bilirubin 0.38 mg/dL      eGFR 66 ml/min/1.73sq m     Narrative:      [place] guidelines for Chronic Kidney Disease (CKD):   •  Stage 1 with normal or high GFR (GFR > 90 mL/min/1.73 square meters)  •  Stage 2 Mild CKD (GFR = 60-89 mL/min/1.73 square meters)  •  Stage 3A Moderate CKD (GFR = 45-59 mL/min/1.73 square meters)  •  Stage 3B Moderate CKD (GFR = 30-44 mL/min/1.73 square meters)  •  Stage 4 Severe CKD (GFR = 15-29 mL/min/1.73 square meters)  •  Stage 5 End Stage CKD (GFR <15 mL/min/1.73 square meters)  Note: GFR calculation is accurate only with a steady state creatinine    CBC and differential [[id number]]  (Abnormal) Collected: 07/03/23 0101    Lab Status: Final result Specimen: Blood from Arm, Right Updated: 07/03/23 0108     WBC 7.27 Thousand/uL      RBC 5.72 Million/uL      Hemoglobin 14.3 g/dL      Hematocrit 45.3 %      MCV 79 fL      MCH 25.0 pg      MCHC 31.6 g/dL      RDW 14.0 %      MPV 9.7 fL      Platelets 130 Thousands/uL      nRBC 0 /100 WBCs      Neutrophils Relative 58 %      Immat GRANS % 0 %      Lymphocytes Relative 27 %      Monocytes Relative 11 %      Eosinophils Relative 3 %      Basophils Relative 1 %      Neutrophils Absolute 4.28 Thousands/µL      Immature Grans Absolute 0.02 Thousand/uL      Lymphocytes Absolute 1.95 Thousands/µL      Monocytes Absolute 0.76 Thousand/µL      Eosinophils Absolute 0.21 Thousand/µL      Basophils Absolute 0.05 Thousands/µL     Rapid drug screen, urine [[id number]]  (Abnormal) Collected: 07/03/23 0038    Lab Status: Final result Specimen: Urine, Clean Catch Updated: 07/03/23 0101     Amph/Meth UR Negative     Barbiturate Ur Negative     Benzodiazepine Urine Negative     Cocaine Urine Positive     Methadone Urine Negative     Opiate Urine Negative     PCP Ur Negative     THC Urine Positive     Oxycodone Urine Negative    Narrative:      Presumptive report. If requested, specimen will be sent to reference lab for confirmation. FOR MEDICAL PURPOSES ONLY. IF CONFIRMATION NEEDED PLEASE CONTACT THE LAB WITHIN 5 DAYS. Drug Screen Cutoff Levels:  AMPHETAMINE/METHAMPHETAMINES  1000 ng/mL  BARBITURATES     200 ng/mL  BENZODIAZEPINES     200 ng/mL  COCAINE      300 ng/mL  METHADONE      300 ng/mL  OPIATES      300 ng/mL  PHENCYCLIDINE     25 ng/mL  THC       50 ng/mL  OXYCODONE      100 ng/mL                 No orders to display              Procedures  ECG 12 Lead Documentation Only    Date/Time: 7/3/2023 12:54 AM    Performed by: Karen Solorio DO  Authorized by: Karen Solorio DO    Indications / Diagnosis:  Psychiatric clearance  ECG reviewed by me, the ED Provider: yes    Patient location:  ED  Previous ECG:     Previous ECG:  Compared to current    Similarity:  No change  Interpretation:     Interpretation: non-specific    Rate:     ECG rate:  71    ECG rate assessment: normal    Rhythm:     Rhythm: sinus rhythm    Ectopy:     Ectopy: none    QRS:     QRS intervals:  Normal  Conduction:     Conduction: normal    ST segments:     ST segments:  Non-specific  T waves:     T waves: inverted      Inverted:  V4, V5, V6, II, III and aVF             ED Course                                             Medical Decision Making  Patient presents admitting to depression, SI, HI, and auditory hallucinations tonight telling him to kill himself and others.   PMH significant for multiple psychiatric admissions for suicidal/homicidal ideations and depression with noncompliance to medications. Patient also admits to a history of depression and substance abuse of alcohol and cocaine. He claims he was prescribed medications for depression and was doing well on the for months so he stopped taking them. Patient admits to drinking a 6-pack of beer and cocaine use prior to attempting to walk into traffic, when bystander stopped him and advised him to walk to the hospital for help. Patient was compliant to questions and cooperative with only request of some food. Patient is willing to sign himself in for treatment. We will obtain normal psych labs and work-up for his age, have crisis evaluate and place the patient on a one-to-one. No medications needed at this time. EKG is nonspecific and at his baseline. No acute lab abnormalities. Patient medically cleared for psychiatric admission. Patient was evaluated by crisis and did sign a 201. Placement pending. Amount and/or Complexity of Data Reviewed  Labs: ordered. Risk  Decision regarding hospitalization. Disposition  Final diagnoses:   Depression with suicidal ideation   Substance abuse ([address])     Time reflects when diagnosis was documented in both MDM as applicable and the Disposition within this note     Time User Action Codes Description Comment    7/3/2023 12:46 AM Smeriglio, Larisa Camera Add Peg.Damaso. A] Depression     7/3/2023 12:46 AM Smeriglio, Larisa Camera Add Jaquelinees. A,  R45.851] Depression with suicidal ideation     7/3/2023 12:46 AM Saqib Mascorro [Z22. A,  R45.851] Depression with suicidal ideation     7/3/2023 12:46 AM JesuseriglioAaron [U21. A] Depression     7/3/2023 12:46 AM Smeriglio, Larisa Camera Add [F19.10] Substance abuse [facility])       ED Disposition     ED Disposition   Transfer to Behavioral Health    Condition   --    Date/Time   Mon Jul 3, 2023 12:46 AM    Comment   Emely Graham should be transferred out to a psychiatric facility and has been medically cleared. Follow-up Information    None         Patient's Medications   Discharge Prescriptions    No medications on file       No discharge procedures on file.     PDMP Review       Value Time User    PDMP Reviewed  Yes 8/1/2022  7:40 AM Susana Pierson MD          ED Provider  Electronically Signed by           Liset Vera DO  07/03/23 0680

## 2023-07-03 NOTE — ED NOTES
Bed search:    [facility]: willing to review, clinical faxed  [facility]: currently only female dual, encourages [facility] to call again in AM for potential d/c beds  [place]: no beds  [facility]: only adolescent beds at this time  [place]: no answer at this time

## 2023-07-11 ENCOUNTER — TELEPHONE (OUTPATIENT)
Dept: PSYCHIATRY | Facility: CLINIC | Age: 58
End: 2023-07-11

## 2023-07-11 NOTE — TELEPHONE ENCOUNTER
Patient has been added to the Medication Management and Talk Therapy wait list without a referral.    Insurance: [id number] Type:    Commercial []   Medicaid [x]   [place] (if applicable)   Medicare []  [facility]  Provider Preference: no prov pref  Virtual: Yes [] No [x]

## 2023-10-09 ENCOUNTER — HOSPITAL ENCOUNTER (EMERGENCY)
Facility: HOSPITAL | Age: 58
Discharge: HOME | End: 2023-10-09
Attending: EMERGENCY MEDICINE
Payer: COMMERCIAL

## 2023-10-09 VITALS
TEMPERATURE: 97.8 F | RESPIRATION RATE: 16 BRPM | HEART RATE: 62 BPM | DIASTOLIC BLOOD PRESSURE: 61 MMHG | OXYGEN SATURATION: 100 % | SYSTOLIC BLOOD PRESSURE: 135 MMHG

## 2023-10-09 DIAGNOSIS — R44.3 HALLUCINATIONS: ICD-10-CM

## 2023-10-09 DIAGNOSIS — R45.851 SUICIDAL IDEATIONS: ICD-10-CM

## 2023-10-09 DIAGNOSIS — R45.850 HOMICIDAL IDEATION: ICD-10-CM

## 2023-10-09 DIAGNOSIS — F14.10 COCAINE ABUSE, CONTINUOUS (HCC): Chronic | ICD-10-CM

## 2023-10-09 DIAGNOSIS — F10.10 ALCOHOL ABUSE, EPISODIC: Primary | Chronic | ICD-10-CM

## 2023-10-09 LAB
ALBUMIN SERPL BCP-MCNC: 4 G/DL (ref 3.5–5)
ALP SERPL-CCNC: 84 U/L (ref 34–104)
ALT SERPL W P-5'-P-CCNC: 33 U/L (ref 7–52)
AMPHETAMINES SERPL QL SCN: NEGATIVE
ANION GAP SERPL CALCULATED.3IONS-SCNC: 11 MMOL/L
AST SERPL W P-5'-P-CCNC: 32 U/L (ref 13–39)
ATRIAL RATE: 56 BPM
BARBITURATES UR QL: NEGATIVE
BASOPHILS # BLD AUTO: 0.04 THOUSANDS/ÂΜL (ref 0–0.1)
BASOPHILS NFR BLD AUTO: 1 % (ref 0–1)
BENZODIAZ UR QL: NEGATIVE
BILIRUB SERPL-MCNC: 0.54 MG/DL (ref 0.2–1)
BILIRUB UR QL STRIP: NEGATIVE
BUN SERPL-MCNC: 10 MG/DL (ref 5–25)
CALCIUM SERPL-MCNC: 9 MG/DL (ref 8.4–10.2)
CHLORIDE SERPL-SCNC: 104 MMOL/L (ref 96–108)
CLARITY UR: CLEAR
CO2 SERPL-SCNC: 20 MMOL/L (ref 21–32)
COCAINE UR QL: POSITIVE
COLOR UR: YELLOW
CREAT SERPL-MCNC: 1.03 MG/DL (ref 0.6–1.3)
EOSINOPHIL # BLD AUTO: 0.22 THOUSAND/ÂΜL (ref 0–0.61)
EOSINOPHIL NFR BLD AUTO: 4 % (ref 0–6)
ERYTHROCYTE [DISTWIDTH] IN BLOOD BY AUTOMATED COUNT: 14.1 % (ref 11.6–15.1)
ETHANOL EXG-MCNC: 0.02 MG/DL
GFR SERPL CREATININE-BSD FRML MDRD: 79 ML/MIN/1.73SQ M
GLUCOSE SERPL-MCNC: 106 MG/DL (ref 65–140)
GLUCOSE UR STRIP-MCNC: NEGATIVE MG/DL
HCT VFR BLD AUTO: 45.6 % (ref 36.5–49.3)
HGB BLD-MCNC: 14.6 G/DL (ref 12–17)
HGB UR QL STRIP.AUTO: NEGATIVE
IMM GRANULOCYTES # BLD AUTO: 0.01 THOUSAND/UL (ref 0–0.2)
IMM GRANULOCYTES NFR BLD AUTO: 0 % (ref 0–2)
KETONES UR STRIP-MCNC: NEGATIVE MG/DL
LEUKOCYTE ESTERASE UR QL STRIP: NEGATIVE
LYMPHOCYTES # BLD AUTO: 1.44 THOUSANDS/ÂΜL (ref 0.6–4.47)
LYMPHOCYTES NFR BLD AUTO: 26 % (ref 14–44)
MCH RBC QN AUTO: 25 PG (ref 26.8–34.3)
MCHC RBC AUTO-ENTMCNC: 32 G/DL (ref 31.4–37.4)
MCV RBC AUTO: 78 FL (ref 82–98)
MONOCYTES # BLD AUTO: 0.54 THOUSAND/ÂΜL (ref 0.17–1.22)
MONOCYTES NFR BLD AUTO: 10 % (ref 4–12)
NEUTROPHILS # BLD AUTO: 3.2 THOUSANDS/ÂΜL (ref 1.85–7.62)
NEUTS SEG NFR BLD AUTO: 59 % (ref 43–75)
NITRITE UR QL STRIP: NEGATIVE
NRBC BLD AUTO-RTO: 0 /100 WBCS
OPIATES UR QL SCN: NEGATIVE
OXYCODONE+OXYMORPHONE UR QL SCN: NEGATIVE
P AXIS: 58 DEGREES
PCP UR QL: NEGATIVE
PH UR STRIP.AUTO: 5 [PH] (ref 4.5–8)
PLATELET # BLD AUTO: 214 THOUSANDS/UL (ref 149–390)
PMV BLD AUTO: 9.6 FL (ref 8.9–12.7)
POTASSIUM SERPL-SCNC: 3.5 MMOL/L (ref 3.5–5.3)
PR INTERVAL: 154 MS
PROT SERPL-MCNC: 7.5 G/DL (ref 6.4–8.4)
PROT UR STRIP-MCNC: NEGATIVE MG/DL
QRS AXIS: 87 DEGREES
QRSD INTERVAL: 88 MS
QT INTERVAL: 426 MS
QTC INTERVAL: 411 MS
RBC # BLD AUTO: 5.83 MILLION/UL (ref 3.88–5.62)
SODIUM SERPL-SCNC: 135 MMOL/L (ref 135–147)
SP GR UR STRIP.AUTO: 1.01 (ref 1–1.03)
T WAVE AXIS: 268 DEGREES
THC UR QL: POSITIVE
TSH SERPL DL<=0.05 MIU/L-ACNC: 0.48 UIU/ML (ref 0.45–4.5)
UROBILINOGEN UR QL STRIP.AUTO: 0.2 E.U./DL
VENTRICULAR RATE: 56 BPM
WBC # BLD AUTO: 5.45 THOUSAND/UL (ref 4.31–10.16)

## 2023-10-09 PROCEDURE — 81003 URINALYSIS AUTO W/O SCOPE: CPT

## 2023-10-09 PROCEDURE — 80307 DRUG TEST PRSMV CHEM ANLYZR: CPT

## 2023-10-09 PROCEDURE — 36415 COLL VENOUS BLD VENIPUNCTURE: CPT

## 2023-10-09 PROCEDURE — 93005 ELECTROCARDIOGRAM TRACING: CPT

## 2023-10-09 PROCEDURE — 85025 COMPLETE CBC W/AUTO DIFF WBC: CPT

## 2023-10-09 PROCEDURE — 99285 EMERGENCY DEPT VISIT HI MDM: CPT

## 2023-10-09 PROCEDURE — 82075 ASSAY OF BREATH ETHANOL: CPT

## 2023-10-09 PROCEDURE — 93010 ELECTROCARDIOGRAM REPORT: CPT | Performed by: STUDENT IN AN ORGANIZED HEALTH CARE EDUCATION/TRAINING PROGRAM

## 2023-10-09 PROCEDURE — 80053 COMPREHEN METABOLIC PANEL: CPT

## 2023-10-09 PROCEDURE — 84443 ASSAY THYROID STIM HORMONE: CPT

## 2023-10-09 PROCEDURE — 99284 EMERGENCY DEPT VISIT MOD MDM: CPT

## 2023-10-09 NOTE — EMTALA/ACUTE CARE TRANSFER
[address] [place] [address]  [id number] [address] [address]  Dept: [phone]      EMTALA TRANSFER CONSENT    NAME Cole Rodriguez                                         1965                              MRN [id number]    I have been informed of my rights regarding examination, treatment, and transfer by Sandra Bryant PA-C    Benefits: Specialized equipment and/or services available at the receiving facility (Include comment)________________________ (psych)    Risks: Potential for delay in receiving treatment, Potential deterioration of medical condition, Increased discomfort during transfer, Possible worsening of condition or death during transfer      Consent for Transfer:  I acknowledge that my medical condition has been evaluated and explained to me by the emergency department physician or other qualified medical person and/or my attending physician, who has recommended that I be transferred to the service of  Accepting Physician: Dr. June Moritz at [address] [place] [address] [address] Name, [address] : [address] The above potential benefits of such transfer, the potential risks associated with such transfer, and the probable risks of not being transferred have been explained to me, and I fully understand them. The doctor has explained that, in my case, the benefits of transfer outweigh the risks. I agree to be transferred. I authorize the performance of emergency medical procedures and treatments upon me in both transit and upon arrival at the receiving facility. Additionally, I authorize the release of any and all medical records to the receiving facility and request they be transported with me, if possible. I understand that the safest mode of transportation during a medical emergency is an ambulance and that the Hospital advocates the use of this mode of transport.  Risks of traveling to the receiving facility by car, including absence of medical control, life sustaining equipment, such as oxygen, and medical personnel has been explained to me and I fully understand them. (MARICEL CORRECT BOX BELOW)  [  ]  I consent to the stated transfer and to be transported by ambulance/helicopter. [  ]  I consent to the stated transfer, but refuse transportation by ambulance and accept full responsibility for my transportation by car. I understand the risks of non-ambulance transfers and I exonerate the Hospital and its staff from any deterioration in my condition that results from this refusal.    X___________________________________________    DATE  10/09/23  TIME________  Signature of patient or legally responsible individual signing on patient behalf           RELATIONSHIP TO PATIENT_________________________          Provider Certification    NAME Tammy Jaffe                                         1965                              MRN [id number]    A medical screening exam was performed on the above named patient. Based on the examination:    Condition Necessitating Transfer The primary encounter diagnosis was Alcohol abuse, episodic. A diagnosis of Cocaine abuse, continuous (HCC) was also pertinent to this visit.     Patient Condition: The patient has been stabilized such that within reasonable medical probability, no material deterioration of the patient condition or the condition of the unborn child(flash) is likely to result from the transfer    Reason for Transfer: Level of Care needed not available at this facility (psych)    Transfer Requirements: [facility], [facility]   · Space available and qualified personnel available for treatment as acknowledged by Chevy Bazzi [phone]  · Agreed to accept transfer and to provide appropriate medical treatment as acknowledged by       Dr. Alma Douglass  · Appropriate medical records of the examination and treatment of the patient are provided at the time of transfer   [facility] _______  · Transfer will be performed by qualified personnel from [address]  and appropriate transfer equipment as required, including the use of necessary and appropriate life support measures. Provider Certification: I have examined the patient and explained the following risks and benefits of being transferred/refusing transfer to the patient/family:  General risk, such as traffic hazards, adverse weather conditions, rough terrain or turbulence, possible failure of equipment (including vehicle or aircraft), or consequences of actions of persons outside the control of the transport personnel, The patient is stable for psychiatric transfer because they are medically stable, and is protected from harming him/herself or others during transport      Based on these reasonable risks and benefits to the patient and/or the unborn child(flash), and based upon the information available at the time of the patient’s examination, I certify that the medical benefits reasonably to be expected from the provision of appropriate medical treatments at another medical facility outweigh the increasing risks, if any, to the individual’s medical condition, and in the case of labor to the unborn child, from effecting the transfer.     X____________________________________________ DATE 10/09/23        TIME_______      ORIGINAL - SEND TO MEDICAL RECORDS   COPY - SEND WITH PATIENT DURING TRANSFER

## 2023-10-09 NOTE — ED NOTES
Patient aware of acceptance to [facility] and in agreement with treatment there. Roundtrip initiated at this time.     CTS P/U 1800    Kashif Sandoval  Crisis Intervention Specialist II  10/09/23

## 2023-10-09 NOTE — ED PROVIDER NOTES
History  Chief Complaint   Patient presents with   • Psychiatric Evaluation     SI with a plan to walk out in front of traffic. Has been having these thoughts for about 4 hours. Increased stress, significant other recently passed. HI towards noone specific. AH/VH "seeing a lot of people carrying knives and voices telling me to kill other people and myself.      60 YOM with PMH depression and substance abuse presents today with increasing thoughts of suicide. Admits to plan to walk out in front of traffic. Reports that thoughts have increased due to losing his significant other and best friend recently. Has also been having homicidal thoughts, not towards anyone specific however he states "I just want them to suffer like I am". Admits to hearing voices that are telling him to kill himself. Also admits to seeing people that are being stabbed. Admits to using cocaine last night and drinking some alcohol this morning. Admits to previous psychiatric hospitalizations. Prior to Admission Medications   Prescriptions Last Dose Informant Patient Reported? Taking? ARIPiprazole (ABILIFY) 10 mg tablet   No No   Sig: Take 1 tablet (10 mg total) by mouth daily   Patient not taking: Reported on 11/28/2022   escitalopram (LEXAPRO) 10 mg tablet   Yes No   Sig: Take 1 tablet by mouth every morning   lisinopril (ZESTRIL) 5 mg tablet   Yes No   Sig: Take 5 mg by mouth every morning   metoprolol succinate (TOPROL-XL) 25 mg 24 hr tablet   Yes No   Sig: Take 25 mg by mouth      Facility-Administered Medications: None       Past Medical History:   Diagnosis Date   • Abnormal EKG    • Depression    • Substance use        Past Surgical History:   Procedure Laterality Date   • NO PAST SURGERIES         History reviewed. No pertinent family history. I have reviewed and agree with the history as documented.     E-Cigarette/Vaping   • E-Cigarette Use Never User      E-Cigarette/Vaping Substances   • Nicotine No    • THC No    • CBD No    • Flavoring No    • Other No    • Unknown No      Social History     Tobacco Use   • Smoking status: Former     Packs/day: 1.00     Types: Cigarettes   • Smokeless tobacco: Never   Vaping Use   • Vaping Use: Never used   Substance Use Topics   • Alcohol use: Yes     Alcohol/week: 21.0 standard drinks of alcohol     Types: 21 Cans of beer per week   • Drug use: Yes     Types: Marijuana, Cocaine     Comment: last use morning of 3/20/23       Review of Systems   Psychiatric/Behavioral: Positive for hallucinations and suicidal ideas. All other systems reviewed and are negative. Physical Exam  Physical Exam  Vitals and nursing note reviewed. Constitutional:       General: He is not in acute distress. Appearance: Normal appearance. He is well-developed. He is not ill-appearing. HENT:      Head: Normocephalic and atraumatic. Eyes:      Conjunctiva/sclera: Conjunctivae normal.   Cardiovascular:      Rate and Rhythm: Normal rate. Pulmonary:      Effort: Pulmonary effort is normal.   Musculoskeletal:         General: Normal range of motion. Cervical back: Normal range of motion and neck supple. Skin:     General: Skin is warm and dry. Capillary Refill: Capillary refill takes less than 2 seconds. Neurological:      Mental Status: He is alert. Psychiatric:         Attention and Perception: Attention normal. He perceives auditory and visual hallucinations. Mood and Affect: Mood normal. Affect is tearful. Behavior: Behavior normal. Behavior is cooperative. Thought Content: Thought content includes homicidal and suicidal ideation. Thought content includes suicidal plan. Thought content does not include homicidal plan.          Vital Signs  ED Triage Vitals [10/09/23 1059]   Temperature Pulse Respirations Blood Pressure SpO2   97.8 °F (36.6 °C) 72 18 125/97 97 %      Temp src Heart Rate Source Patient Position - Orthostatic VS BP Location FiO2 (%)   -- -- -- -- -- Pain Score       --           Vitals:    10/09/23 1059   BP: 125/97   Pulse: 72         Visual Acuity      ED Medications  Medications - No data to display    Diagnostic Studies  Results Reviewed     Procedure Component Value Units Date/Time    Rapid drug screen, urine [[id number]]  (Abnormal) Collected: 10/09/23 1134    Lab Status: Final result Specimen: Urine, Clean Catch Updated: 10/09/23 1233     Amph/Meth UR Negative     Barbiturate Ur Negative     Benzodiazepine Urine Negative     Cocaine Urine Positive     Methadone Urine --     Opiate Urine Negative     PCP Ur Negative     THC Urine Positive     Oxycodone Urine Negative    Narrative:      Presumptive report. If requested, specimen will be sent to reference lab for confirmation. FOR MEDICAL PURPOSES ONLY. IF CONFIRMATION NEEDED PLEASE CONTACT THE LAB WITHIN 5 DAYS.     Drug Screen Cutoff Levels:  AMPHETAMINE/METHAMPHETAMINES  1000 ng/mL  BARBITURATES     200 ng/mL  BENZODIAZEPINES     200 ng/mL  COCAINE      300 ng/mL  METHADONE      300 ng/mL  OPIATES      300 ng/mL  PHENCYCLIDINE     25 ng/mL  THC       50 ng/mL  OXYCODONE      100 ng/mL    TSH [[id number]]  (Normal) Collected: 10/09/23 1134    Lab Status: Final result Specimen: Blood from Arm, Right Updated: 10/09/23 1210     TSH 3RD GENERATON 0.479 uIU/mL     Comprehensive metabolic panel [[id number]]  (Abnormal) Collected: 10/09/23 1134    Lab Status: Final result Specimen: Blood from Arm, Right Updated: 10/09/23 1153     Sodium 135 mmol/L      Potassium 3.5 mmol/L      Chloride 104 mmol/L      CO2 20 mmol/L      ANION GAP 11 mmol/L      BUN 10 mg/dL      Creatinine 1.03 mg/dL      Glucose 106 mg/dL      Calcium 9.0 mg/dL      AST 32 U/L      ALT 33 U/L      Alkaline Phosphatase 84 U/L      Total Protein 7.5 g/dL      Albumin 4.0 g/dL      Total Bilirubin 0.54 mg/dL      eGFR 79 ml/min/1.73sq m     Narrative:      [place] guidelines for Chronic Kidney Disease (CKD):   • Stage 1 with normal or high GFR (GFR > 90 mL/min/1.73 square meters)  •  Stage 2 Mild CKD (GFR = 60-89 mL/min/1.73 square meters)  •  Stage 3A Moderate CKD (GFR = 45-59 mL/min/1.73 square meters)  •  Stage 3B Moderate CKD (GFR = 30-44 mL/min/1.73 square meters)  •  Stage 4 Severe CKD (GFR = 15-29 mL/min/1.73 square meters)  •  Stage 5 End Stage CKD (GFR <15 mL/min/1.73 square meters)  Note: GFR calculation is accurate only with a steady state creatinine    CBC and differential [[id number]]  (Abnormal) Collected: 10/09/23 1134    Lab Status: Final result Specimen: Blood from Arm, Right Updated: 10/09/23 1140     WBC 5.45 Thousand/uL      RBC 5.83 Million/uL      Hemoglobin 14.6 g/dL      Hematocrit 45.6 %      MCV 78 fL      MCH 25.0 pg      MCHC 32.0 g/dL      RDW 14.1 %      MPV 9.6 fL      Platelets 635 Thousands/uL      nRBC 0 /100 WBCs      Neutrophils Relative 59 %      Immat GRANS % 0 %      Lymphocytes Relative 26 %      Monocytes Relative 10 %      Eosinophils Relative 4 %      Basophils Relative 1 %      Neutrophils Absolute 3.20 Thousands/µL      Immature Grans Absolute 0.01 Thousand/uL      Lymphocytes Absolute 1.44 Thousands/µL      Monocytes Absolute 0.54 Thousand/µL      Eosinophils Absolute 0.22 Thousand/µL      Basophils Absolute 0.04 Thousands/µL     Urine Macroscopic, POC [[id number]] Collected: 10/09/23 1135    Lab Status: Final result Specimen: Urine Updated: 10/09/23 1137     Color, UA Yellow     Clarity, UA Clear     pH, UA 5.0     Leukocytes, UA Negative     Nitrite, UA Negative     Protein, UA Negative mg/dl      Glucose, UA Negative mg/dl      Ketones, UA Negative mg/dl      Urobilinogen, UA 0.2 E.U./dl      Bilirubin, UA Negative     Occult Blood, UA Negative     Specific Gravity, UA 1.010    Narrative:      CLINITEK RESULT    POCT alcohol breath test [[id number]]  (Normal) Resulted: 10/09/23 1127    Lab Status: Final result Updated: 10/09/23 1127     EXTBreath Alcohol 0.022                 No orders to display              Procedures  Procedures         ED Course  ED Course as of 10/09/23 1427   Mon Oct 09, 2023   1228 201 signed                                              200 Kenney Munroe presents with increasing thoughts of suicide, homicide, and auditory/visual hallucinations. Pt was medically cleared and seen by crisis. 201 signed. Pt accepted at [facility]. Alcohol abuse, episodic: acute illness or injury  Cocaine abuse, continuous ([address]): acute illness or injury  Amount and/or Complexity of Data Reviewed  Labs: ordered. Risk  Decision regarding hospitalization. Disposition  Final diagnoses:   Alcohol abuse, episodic   Cocaine abuse, continuous ([address])   Suicidal ideations   Homicidal ideation   Hallucinations     Time reflects when diagnosis was documented in both MDM as applicable and the Disposition within this note     Time User Action Codes Description Comment    10/9/2023 11:02 AM Bolivar Ellis Add [F10.10] Alcohol abuse, episodic     10/9/2023 11:02 AM Hafsa Larkin Add [F14.10] Cocaine abuse, continuous ([address])     10/9/2023  2:27 PM Obie Feast Add [Q75.110] Suicidal ideations     10/9/2023  2:27 PM Obie Feast Add [R45.850] Homicidal ideation     10/9/2023  2:27 PM Obie Feast Add [R44.3] Hallucinations       ED Disposition     ED Disposition   Transfer to [address] [place] [address]    Condition   --    Date/Time   Mon Oct 9, 2023  2:17 PM    Comment   Chen Wheatley should be transferred out to [facility] and has been medically cleared.            MD Documentation    Colten Cornejo Most Recent Value   Patient Condition The patient has been stabilized such that within reasonable medical probability, no material deterioration of the patient condition or the condition of the unborn child(flash) is likely to result from the transfer   Reason for Transfer Level of Care needed not available at this facility  [psych]   Benefits of Transfer Specialized equipment and/or services available at the receiving facility (Include comment)________________________  [psych]   Risks of Transfer Potential for delay in receiving treatment, Potential deterioration of medical condition, Increased discomfort during transfer, Possible worsening of condition or death during transfer   Accepting Physician Dr. Shaan Thomas Name, [facility]    (Name & Tel number) Escobar Ramos [phone]   Transported by (Company and Unit #) [facility]   Sending MD Dr. Sushma Elaine   Provider Certification General risk, such as traffic hazards, adverse weather conditions, rough terrain or turbulence, possible failure of equipment (including vehicle or aircraft), or consequences of actions of persons outside the control of the transport personnel, The patient is stable for psychiatric transfer because they are medically stable, and is protected from harming him/herself or others during transport      RN Documentation    Flowsheet Row Most [facility] Name, [facility]    (Name & Tel number) Anikaaddis Snyder   Transported by [facility] and Unit #) [facility]      Follow-up Information    None         Patient's Medications   Discharge Prescriptions    No medications on file       No discharge procedures on file.     PDMP Review       Value Time User    PDMP Reviewed  Yes 8/1/2022  7:40 AM Toy Ramesh MD          ED Provider  Electronically Signed by           Chey Barrientos PA-C  10/09/23 5258

## 2023-10-09 NOTE — ED NOTES
CW aware of patient and plans to complete assessment via phone as patient does appear to be appropriate to do so.       Jono Arthur RN  10/09/23 7189

## 2023-10-09 NOTE — ED NOTES
Insurance Authorization for Admission:  Phone Call Placed to [employer]. Phone Number [phone]. Spoke to CONSTANCE Richards. 3 Days Approved. Level of Care AIP- 201. Review on TBD. Authorization #Accepting facility to call upon admission.     EVS (Eligibility Verification System) Called- [phone]  Automated System Indicates Active with [address]  Crisis Intervention Specialist II  10/09/23

## 2023-10-09 NOTE — ED NOTES
Patient is accepted at [facility]. Patient is accepted by Dr. Rodolfo Lima per Dante Allen. Transportation is arranged with [facility]. Transportation is scheduled for 1800. Patient may go to the floor at 31 75 62. Nurse report is to be called to [phone] prior to patient transfer.     Jenni Sanchez  Crisis Intervention Specialist II  10/09/23

## 2023-10-09 NOTE — ED NOTES
THAIS Virgen    Recipient ID: [id number]    [facility]  Information Contact  Telephone: [phone]    [address]  Telephone: [phone]

## 2023-10-09 NOTE — ED NOTES
Assumed care of patient at this time, patient noted to be sleeping, appears in no distress, 1:1 at bedside     Oscar Jones RN  10/09/23 5058

## 2023-10-09 NOTE — ED NOTES
Bed Search:    [facility]- Will review- Clinical faxed  [address] [place] [address] Dr Baez- No beds  [facility]- Will review- Clinical faxed  [facility]- [facility] beds  [facility]- Will review- Clinical faxed  [facility]- No answer- [facility] to follow up    [address]   II  10/09/23

## 2023-12-07 ENCOUNTER — HOSPITAL ENCOUNTER (EMERGENCY)
Facility: HOSPITAL | Age: 58
End: 2023-12-07
Attending: EMERGENCY MEDICINE
Payer: COMMERCIAL

## 2023-12-07 VITALS
HEART RATE: 54 BPM | DIASTOLIC BLOOD PRESSURE: 84 MMHG | RESPIRATION RATE: 18 BRPM | WEIGHT: 144.4 LBS | SYSTOLIC BLOOD PRESSURE: 133 MMHG | OXYGEN SATURATION: 100 % | TEMPERATURE: 97.9 F | BODY MASS INDEX: 22.62 KG/M2

## 2023-12-07 DIAGNOSIS — R45.851 SUICIDAL IDEATION: Primary | ICD-10-CM

## 2023-12-07 DIAGNOSIS — Z78.9 ALCOHOL USE: ICD-10-CM

## 2023-12-07 DIAGNOSIS — F14.90 COCAINE USE: ICD-10-CM

## 2023-12-07 LAB
ALBUMIN SERPL BCP-MCNC: 3.9 G/DL (ref 3.5–5)
ALP SERPL-CCNC: 74 U/L (ref 34–104)
ALT SERPL W P-5'-P-CCNC: 34 U/L (ref 7–52)
AMPHETAMINES SERPL QL SCN: NEGATIVE
ANION GAP SERPL CALCULATED.3IONS-SCNC: 4 MMOL/L
AST SERPL W P-5'-P-CCNC: 32 U/L (ref 13–39)
ATRIAL RATE: 67 BPM
BARBITURATES UR QL: NEGATIVE
BASOPHILS # BLD AUTO: 0.04 THOUSANDS/ÂΜL (ref 0–0.1)
BASOPHILS NFR BLD AUTO: 1 % (ref 0–1)
BENZODIAZ UR QL: NEGATIVE
BILIRUB SERPL-MCNC: 0.33 MG/DL (ref 0.2–1)
BILIRUB UR QL STRIP: NEGATIVE
BUN SERPL-MCNC: 12 MG/DL (ref 5–25)
CALCIUM SERPL-MCNC: 8.8 MG/DL (ref 8.4–10.2)
CHLORIDE SERPL-SCNC: 106 MMOL/L (ref 96–108)
CLARITY UR: CLEAR
CO2 SERPL-SCNC: 28 MMOL/L (ref 21–32)
COCAINE UR QL: POSITIVE
COLOR UR: YELLOW
CREAT SERPL-MCNC: 1.1 MG/DL (ref 0.6–1.3)
EOSINOPHIL # BLD AUTO: 0.28 THOUSAND/ÂΜL (ref 0–0.61)
EOSINOPHIL NFR BLD AUTO: 7 % (ref 0–6)
ERYTHROCYTE [DISTWIDTH] IN BLOOD BY AUTOMATED COUNT: 13.9 % (ref 11.6–15.1)
ETHANOL EXG-MCNC: 0 MG/DL
GFR SERPL CREATININE-BSD FRML MDRD: 73 ML/MIN/1.73SQ M
GLUCOSE SERPL-MCNC: 137 MG/DL (ref 65–140)
GLUCOSE UR STRIP-MCNC: NEGATIVE MG/DL
HCT VFR BLD AUTO: 43.5 % (ref 36.5–49.3)
HGB BLD-MCNC: 14.2 G/DL (ref 12–17)
HGB UR QL STRIP.AUTO: NEGATIVE
IMM GRANULOCYTES # BLD AUTO: 0.01 THOUSAND/UL (ref 0–0.2)
IMM GRANULOCYTES NFR BLD AUTO: 0 % (ref 0–2)
KETONES UR STRIP-MCNC: NEGATIVE MG/DL
LEUKOCYTE ESTERASE UR QL STRIP: NEGATIVE
LYMPHOCYTES # BLD AUTO: 1.37 THOUSANDS/ÂΜL (ref 0.6–4.47)
LYMPHOCYTES NFR BLD AUTO: 32 % (ref 14–44)
MCH RBC QN AUTO: 25.2 PG (ref 26.8–34.3)
MCHC RBC AUTO-ENTMCNC: 32.6 G/DL (ref 31.4–37.4)
MCV RBC AUTO: 77 FL (ref 82–98)
MONOCYTES # BLD AUTO: 0.44 THOUSAND/ÂΜL (ref 0.17–1.22)
MONOCYTES NFR BLD AUTO: 10 % (ref 4–12)
NEUTROPHILS # BLD AUTO: 2.18 THOUSANDS/ÂΜL (ref 1.85–7.62)
NEUTS SEG NFR BLD AUTO: 50 % (ref 43–75)
NITRITE UR QL STRIP: NEGATIVE
NRBC BLD AUTO-RTO: 0 /100 WBCS
OPIATES UR QL SCN: NEGATIVE
OXYCODONE+OXYMORPHONE UR QL SCN: NEGATIVE
P AXIS: 63 DEGREES
PCP UR QL: NEGATIVE
PH UR STRIP.AUTO: 7 [PH] (ref 4.5–8)
PLATELET # BLD AUTO: 183 THOUSANDS/UL (ref 149–390)
PMV BLD AUTO: 9.8 FL (ref 8.9–12.7)
POTASSIUM SERPL-SCNC: 4 MMOL/L (ref 3.5–5.3)
PR INTERVAL: 146 MS
PROT SERPL-MCNC: 7.1 G/DL (ref 6.4–8.4)
PROT UR STRIP-MCNC: NEGATIVE MG/DL
QRS AXIS: 90 DEGREES
QRSD INTERVAL: 92 MS
QT INTERVAL: 418 MS
QTC INTERVAL: 441 MS
RBC # BLD AUTO: 5.64 MILLION/UL (ref 3.88–5.62)
SODIUM SERPL-SCNC: 138 MMOL/L (ref 135–147)
SP GR UR STRIP.AUTO: 1.02 (ref 1–1.03)
T WAVE AXIS: -83 DEGREES
THC UR QL: POSITIVE
TSH SERPL DL<=0.05 MIU/L-ACNC: 0.61 UIU/ML (ref 0.45–4.5)
UROBILINOGEN UR QL STRIP.AUTO: 1 E.U./DL
VENTRICULAR RATE: 67 BPM
WBC # BLD AUTO: 4.32 THOUSAND/UL (ref 4.31–10.16)

## 2023-12-07 PROCEDURE — 80307 DRUG TEST PRSMV CHEM ANLYZR: CPT | Performed by: EMERGENCY MEDICINE

## 2023-12-07 PROCEDURE — 85025 COMPLETE CBC W/AUTO DIFF WBC: CPT | Performed by: EMERGENCY MEDICINE

## 2023-12-07 PROCEDURE — 81003 URINALYSIS AUTO W/O SCOPE: CPT

## 2023-12-07 PROCEDURE — 93005 ELECTROCARDIOGRAM TRACING: CPT

## 2023-12-07 PROCEDURE — 99285 EMERGENCY DEPT VISIT HI MDM: CPT

## 2023-12-07 PROCEDURE — 99285 EMERGENCY DEPT VISIT HI MDM: CPT | Performed by: EMERGENCY MEDICINE

## 2023-12-07 PROCEDURE — 82075 ASSAY OF BREATH ETHANOL: CPT | Performed by: EMERGENCY MEDICINE

## 2023-12-07 PROCEDURE — 84443 ASSAY THYROID STIM HORMONE: CPT | Performed by: EMERGENCY MEDICINE

## 2023-12-07 PROCEDURE — 80053 COMPREHEN METABOLIC PANEL: CPT | Performed by: EMERGENCY MEDICINE

## 2023-12-07 PROCEDURE — 36415 COLL VENOUS BLD VENIPUNCTURE: CPT | Performed by: EMERGENCY MEDICINE

## 2023-12-07 NOTE — ED NOTES
Patient is accepted at [facility].  Patient is accepted by Dr. Valeria Baeza per Olivia Day in Admissions. Transportation is arranged with [facility]. Transportation is scheduled for TBD. Patient may go to the floor at anytime.       Vessie Sacks  Crisis Intervention Specialist II  12/07/23

## 2023-12-07 NOTE — EMTALA/ACUTE CARE TRANSFER
[address] [place] [id number]  Dept: [phone]      EMTALA TRANSFER CONSENT    NAME Marjorie luz MAZARIEGOS 1965                              MRN [id number]    I have been informed of my rights regarding examination, treatment, and transfer   by Dr. Enrique Aj DO    Benefits: Specialized equipment and/or services available at the receiving facility (Include comment)________________________    Risks: Potential for delay in receiving treatment      Consent for Transfer:  I acknowledge that my medical condition has been evaluated and explained to me by the emergency department physician or other qualified medical person and/or my attending physician, who has recommended that I be transferred to the service of    at  . The above potential benefits of such transfer, the potential risks associated with such transfer, and the probable risks of not being transferred have been explained to me, and I fully understand them. The doctor has explained that, in my case, the benefits of transfer outweigh the risks. I agree to be transferred. I authorize the performance of emergency medical procedures and treatments upon me in both transit and upon arrival at the receiving facility. Additionally, I authorize the release of any and all medical records to the receiving facility and request they be transported with me, if possible. I understand that the safest mode of transportation during a medical emergency is an ambulance and that the Hospital advocates the use of this mode of transport. Risks of traveling to the receiving facility by car, including absence of medical control, life sustaining equipment, such as oxygen, and medical personnel has been explained to me and I fully understand them. (MARICEL CORRECT BOX BELOW)  [  ]  I consent to the stated transfer and to be transported by ambulance/helicopter.   [ ]  I consent to the stated transfer, but refuse transportation by ambulance and accept full responsibility for my transportation by car. I understand the risks of non-ambulance transfers and I exonerate the Hospital and its staff from any deterioration in my condition that results from this refusal.    X___________________________________________    DATE  23  TIME________  Signature of patient or legally responsible individual signing on patient behalf           RELATIONSHIP TO PATIENT_________________________          Provider Certification    NAME Rosi Jaffe                                         1965                              MRN [id number]    A medical screening exam was performed on the above named patient. Based on the examination:    Condition Necessitating Transfer The primary encounter diagnosis was Suicidal ideation. Diagnoses of Cocaine use and Alcohol use were also pertinent to this visit. Patient Condition: The patient has been stabilized such that within reasonable medical probability, no material deterioration of the patient condition or the condition of the unborn child(flash) is likely to result from the transfer    Reason for Transfer: Level of Care needed not available at this facility    Transfer Requirements: [address] available and qualified personnel available for treatment as acknowledged by    Agreed to accept transfer and to provide appropriate medical treatment as acknowledged by          Appropriate medical records of the examination and treatment of the patient are provided at the time of transfer   [address] _______  Transfer will be performed by qualified personnel from    and appropriate transfer equipment as required, including the use of necessary and appropriate life support measures.     Provider Certification: I have examined the patient and explained the following risks and benefits of being transferred/refusing transfer to the patient/family:  General risk, such as traffic hazards, adverse weather conditions, rough terrain or turbulence, possible failure of equipment (including vehicle or aircraft), or consequences of actions of persons outside the control of the transport personnel, The patient is stable for psychiatric transfer because they are medically stable, and is protected from harming him/herself or others during transport      Based on these reasonable risks and benefits to the patient and/or the unborn child(flash), and based upon the information available at the time of the patient’s examination, I certify that the medical benefits reasonably to be expected from the provision of appropriate medical treatments at another medical facility outweigh the increasing risks, if any, to the individual’s medical condition, and in the case of labor to the unborn child, from effecting the transfer.     X____________________________________________ DATE 12/07/23        TIME_______      ORIGINAL - SEND TO MEDICAL RECORDS   COPY - SEND WITH PATIENT DURING TRANSFER

## 2023-12-07 NOTE — ED NOTES
Patient presents to the emergency department with suicidal and homicidal thoughts. He says his girlfriend left him for his friend and since then he wants to hurt himself by jumping in front of a car and also wants to hurt the man who she left him for. He says he has been struggling since they are not together and hasnt been eating and has difficulty with sleeping. He says he has been struggling with cocaine and alcohol abuse and says he uses approx $40 daily on cocaine. He says he lost his ex girlfriend to suicide many years ago.  He is looking for help and wants to sign a 201

## 2023-12-07 NOTE — ED NOTES
Insurance Authorization:   Phone call placed to [facility]  Phone number: [phone]     Spoke to: Mary Coronado approved-4  Level of care: Inpatient treatment  Review on 12/10/23  Authorization # Facility to call on arrival      EVS (Eligibility Verification System) called [phone]  Automated system indicates: [facility]

## 2023-12-07 NOTE — EMTALA/ACUTE CARE TRANSFER
[address] [place], [place] [id number]  Dept: [phone]      EMTALA TRANSFER CONSENT    NAME Aron Lombard                                         1965                              MRN [id number]    I have been informed of my rights regarding examination, treatment, and transfer   by Dr. Severiano Ship, DO    Benefits: Specialized equipment and/or services available at the receiving facility (Include comment)________________________    Risks: Potential for delay in receiving treatment      Consent for Transfer:  I acknowledge that my medical condition has been evaluated and explained to me by the emergency department physician or other qualified medical person and/or my attending physician, who has recommended that I be transferred to the service of  Accepting Physician: Dr. Shy Powers at [address] [place] [address] [address] Name, [address] [place] [address] Street : [facility]. The above potential benefits of such transfer, the potential risks associated with such transfer, and the probable risks of not being transferred have been explained to me, and I fully understand them. The doctor has explained that, in my case, the benefits of transfer outweigh the risks. I agree to be transferred. I authorize the performance of emergency medical procedures and treatments upon me in both transit and upon arrival at the receiving facility. Additionally, I authorize the release of any and all medical records to the receiving facility and request they be transported with me, if possible. I understand that the safest mode of transportation during a medical emergency is an ambulance and that the Hospital advocates the use of this mode of transport. Risks of traveling to the receiving facility by car, including absence of medical control, life sustaining equipment, such as oxygen, and medical personnel has been explained to me and I fully understand them.     ([address] Quality  BELOW)  [  ]  I consent to the stated transfer and to be transported by ambulance/helicopter. [  ]  I consent to the stated transfer, but refuse transportation by ambulance and accept full responsibility for my transportation by car. I understand the risks of non-ambulance transfers and I exonerate the Hospital and its staff from any deterioration in my condition that results from this refusal.    X___________________________________________    DATE  23  TIME________  Signature of patient or legally responsible individual signing on patient behalf           RELATIONSHIP TO PATIENT_________________________          Provider Certification    NAME Leonidas Jaffe                                         1965                              MRN [id number]    A medical screening exam was performed on the above named patient. Based on the examination:    Condition Necessitating Transfer The primary encounter diagnosis was Suicidal ideation. Diagnoses of Cocaine use and Alcohol use were also pertinent to this visit. Patient Condition: The patient has been stabilized such that within reasonable medical probability, no material deterioration of the patient condition or the condition of the unborn child(flash) is likely to result from the transfer, No noted underlying medical condition requiring transfer to another facility.  Transfer is per preference and request of patient and/or family    Reason for Transfer: Level of Care needed not available at this facility    Transfer Requirements: [address] available and qualified personnel available for treatment as acknowledged by Kathy Gonzalez [phone]  Agreed to accept transfer and to provide appropriate medical treatment as acknowledged by       Dr. Bejarano Nine  Appropriate medical records of the examination and treatment of the patient are provided at the time of transfer   [address] _______  Transfer will be performed by qualified personnel from [address]  and appropriate transfer equipment as required, including the use of necessary and appropriate life support measures. Provider Certification: I have examined the patient and explained the following risks and benefits of being transferred/refusing transfer to the patient/family:  General risk, such as traffic hazards, adverse weather conditions, rough terrain or turbulence, possible failure of equipment (including vehicle or aircraft), or consequences of actions of persons outside the control of the transport personnel, The patient is stable for psychiatric transfer because they are medically stable, and is protected from harming him/herself or others during transport      Based on these reasonable risks and benefits to the patient and/or the unborn child(flash), and based upon the information available at the time of the patient’s examination, I certify that the medical benefits reasonably to be expected from the provision of appropriate medical treatments at another medical facility outweigh the increasing risks, if any, to the individual’s medical condition, and in the case of labor to the unborn child, from effecting the transfer.     X____________________________________________ DATE 12/07/23        TIME_______      ORIGINAL - SEND TO MEDICAL RECORDS   COPY - SEND WITH PATIENT DURING TRANSFER

## 2023-12-07 NOTE — ED NOTES
Roundtrip initiated.     [facility]  Crisis Intervention Specialist II  12/07/23

## 2023-12-07 NOTE — ED NOTES
Food was ordered at this time.       Rachel Ribeiro  12/07/23 [place]  12/07/23 9897

## 2023-12-07 NOTE — ED PROVIDER NOTES
History  Chief Complaint   Patient presents with    Suicidal     Pt having thoughts of suicide that started last night. Pt states he has a plan to step in front of a car. Pt states he is hearing command voices to do this but denies any VH/HI. Pt states his trigger was his girlfriend left him and that he has had a lot of family trauma recently. Pt states he has had inpatient and would like to sign himself in before he hurts himself.     60 y.o. M w/h/o depression, substance abuse p/w SI x last night. Pt reports feeling suicidal since GF broke up with him. Plans to step in front of a car. Reports command hallucinations telling him to go through with it. Pt also feeling homicidal toward the man the GF left him for. Also report pt has been using cocaine and drinking beer to deal with the situation. Last used cocaine and drank 3 beers last night. Also reports decreased appetite due to the situation. History provided by:  Patient   used: No    Suicidal  Presenting symptoms: hallucinations, homicidal ideas and suicidal thoughts    Associated symptoms: no abdominal pain and no chest pain        Prior to Admission Medications   Prescriptions Last Dose Informant Patient Reported? Taking?    ARIPiprazole (ABILIFY) 10 mg tablet Not Taking  No No   Sig: Take 1 tablet (10 mg total) by mouth daily   Patient not taking: Reported on 11/28/2022   escitalopram (LEXAPRO) 10 mg tablet Not Taking  Yes No   Sig: Take 1 tablet by mouth every morning   Patient not taking: Reported on 10/9/2023   lisinopril (ZESTRIL) 5 mg tablet Not Taking  Yes No   Sig: Take 5 mg by mouth every morning   Patient not taking: Reported on 10/9/2023   metoprolol succinate (TOPROL-XL) 25 mg 24 hr tablet Not Taking  Yes No   Sig: Take 25 mg by mouth   Patient not taking: Reported on 10/9/2023      Facility-Administered Medications: None       Past Medical History:   Diagnosis Date    Abnormal EKG     Depression     Substance use Past Surgical History:   Procedure Laterality Date    NO PAST SURGERIES         History reviewed. No pertinent family history. I have reviewed and agree with the history as documented. E-Cigarette/Vaping    E-Cigarette Use Never User      E-Cigarette/Vaping Substances    Nicotine No     THC No     CBD No     Flavoring No     Other No     Unknown No      Social History     Tobacco Use    Smoking status: Former     Packs/day: 1.00     Types: Cigarettes    Smokeless tobacco: Never   Vaping Use    Vaping Use: Never used   Substance Use Topics    Alcohol use: Yes     Alcohol/week: 3.0 standard drinks of alcohol     Types: 3 Cans of beer per week     Comment: 3 cans beer daily    Drug use: Yes     Types: Marijuana, Cocaine     Comment: last use cocaine was last night 12/6/23       Review of Systems   Cardiovascular:  Negative for chest pain. Gastrointestinal:  Negative for abdominal pain. Psychiatric/Behavioral:  Positive for hallucinations, homicidal ideas and suicidal ideas. Physical Exam  Physical Exam  Vitals and nursing note reviewed. Constitutional:       General: He is not in acute distress. Appearance: He is well-developed. He is not ill-appearing, toxic-appearing or diaphoretic. HENT:      Head: Normocephalic and atraumatic. Eyes:      General: No scleral icterus. Conjunctiva/sclera:      Right eye: Right conjunctiva is not injected. Left eye: Left conjunctiva is not injected. Neck:      Vascular: No JVD. Trachea: Trachea normal.   Cardiovascular:      Rate and Rhythm: Normal rate and regular rhythm. Pulses: Normal pulses. Heart sounds: Normal heart sounds. No murmur heard. No friction rub. Pulmonary:      Effort: Pulmonary effort is normal. No accessory muscle usage or respiratory distress. Breath sounds: Normal breath sounds. No stridor. No wheezing, rhonchi or rales. Chest:      Chest wall: No tenderness.    Abdominal:      General: There is no distension. Palpations: Abdomen is soft. Abdomen is not rigid. Tenderness: There is no abdominal tenderness. There is no guarding or rebound. Musculoskeletal:      Cervical back: Normal range of motion. Skin:     General: Skin is warm and dry. Coloration: Skin is not pale. Findings: No rash. Neurological:      Mental Status: He is alert. GCS: GCS eye subscore is 4. GCS verbal subscore is 5. GCS motor subscore is 6. Motor: No weakness (Grossly intact). Psychiatric:         Mood and Affect: Affect is not labile or tearful. Speech: Speech is not slurred or tangential.         Behavior: Behavior is cooperative. Thought Content: Thought content includes homicidal and suicidal ideation. Thought content includes suicidal plan. Vital Signs  ED Triage Vitals [12/07/23 1118]   Temperature Pulse Respirations Blood Pressure SpO2   98.3 °F (36.8 °C) 68 17 138/71 100 %      Temp Source Heart Rate Source Patient Position - Orthostatic VS BP Location FiO2 (%)   Oral Monitor Sitting Right arm --      Pain Score       No Pain           Vitals:    12/07/23 1118   BP: 138/71   Pulse: 68   Patient Position - Orthostatic VS: Sitting         Visual Acuity      ED Medications  Medications - No data to display    Diagnostic Studies  Results Reviewed       Procedure Component Value Units Date/Time    Rapid drug screen, urine [[id number]]  (Abnormal) Collected: 12/07/23 1351    Lab Status: Final result Specimen: Urine, Clean Catch Updated: 12/07/23 1437     Amph/Meth UR Negative     Barbiturate Ur Negative     Benzodiazepine Urine Negative     Cocaine Urine Positive     Methadone Urine --     Opiate Urine Negative     PCP Ur Negative     THC Urine Positive     Oxycodone Urine Negative    Narrative:      Presumptive report. If requested, specimen will be sent to reference lab for confirmation. FOR MEDICAL PURPOSES ONLY.    IF CONFIRMATION NEEDED PLEASE CONTACT THE LAB WITHIN 5 DAYS.    Drug Screen Cutoff Levels:  AMPHETAMINE/METHAMPHETAMINES  1000 ng/mL  BARBITURATES     200 ng/mL  BENZODIAZEPINES     200 ng/mL  COCAINE      300 ng/mL  METHADONE      300 ng/mL  OPIATES      300 ng/mL  PHENCYCLIDINE     25 ng/mL  THC       50 ng/mL  OXYCODONE      100 ng/mL    Urine Macroscopic, POC [[id number]] Collected: 12/07/23 1347    Lab Status: Final result Specimen: Urine Updated: 12/07/23 1348     Color, UA Yellow     Clarity, UA Clear     pH, UA 7.0     Leukocytes, UA Negative     Nitrite, UA Negative     Protein, UA Negative mg/dl      Glucose, UA Negative mg/dl      Ketones, UA Negative mg/dl      Urobilinogen, UA 1.0 E.U./dl      Bilirubin, UA Negative     Occult Blood, UA Negative     Specific Gravity, UA 1.020    Narrative:      CLINITEK RESULT    TSH, 3rd generation with Free T4 reflex [[id number]]  (Normal) Collected: 12/07/23 1155    Lab Status: Final result Specimen: Blood from Arm, Right Updated: 12/07/23 1231     TSH 3RD GENERATON 0.613 uIU/mL     Comprehensive metabolic panel [[id number]] Collected: 12/07/23 1155    Lab Status: Final result Specimen: Blood from Arm, Right Updated: 12/07/23 1217     Sodium 138 mmol/L      Potassium 4.0 mmol/L      Chloride 106 mmol/L      CO2 28 mmol/L      ANION GAP 4 mmol/L      BUN 12 mg/dL      Creatinine 1.10 mg/dL      Glucose 137 mg/dL      Calcium 8.8 mg/dL      AST 32 U/L      ALT 34 U/L      Alkaline Phosphatase 74 U/L      Total Protein 7.1 g/dL      Albumin 3.9 g/dL      Total Bilirubin 0.33 mg/dL      eGFR 73 ml/min/1.73sq m     Narrative:      [place] guidelines for Chronic Kidney Disease (CKD):     Stage 1 with normal or high GFR (GFR > 90 mL/min/1.73 square meters)    Stage 2 Mild CKD (GFR = 60-89 mL/min/1.73 square meters)    Stage 3A Moderate CKD (GFR = 45-59 mL/min/1.73 square meters)    Stage 3B Moderate CKD (GFR = 30-44 mL/min/1.73 square meters)    Stage 4 Severe CKD (GFR = 15-29 mL/min/1.73 square meters)    Stage 5 End Stage CKD (GFR <15 mL/min/1.73 square meters)  Note: GFR calculation is accurate only with a steady state creatinine    CBC and differential [[id number]]  (Abnormal) Collected: 12/07/23 1155    Lab Status: Final result Specimen: Blood from Arm, Right Updated: 12/07/23 1201     WBC 4.32 Thousand/uL      RBC 5.64 Million/uL      Hemoglobin 14.2 g/dL      Hematocrit 43.5 %      MCV 77 fL      MCH 25.2 pg      MCHC 32.6 g/dL      RDW 13.9 %      MPV 9.8 fL      Platelets 827 Thousands/uL      nRBC 0 /100 WBCs      Neutrophils Relative 50 %      Immat GRANS % 0 %      Lymphocytes Relative 32 %      Monocytes Relative 10 %      Eosinophils Relative 7 %      Basophils Relative 1 %      Neutrophils Absolute 2.18 Thousands/µL      Immature Grans Absolute 0.01 Thousand/uL      Lymphocytes Absolute 1.37 Thousands/µL      Monocytes Absolute 0.44 Thousand/µL      Eosinophils Absolute 0.28 Thousand/µL      Basophils Absolute 0.04 Thousands/µL     POCT alcohol breath test [[id number]]  (Normal) Resulted: 12/07/23 1138    Lab Status: Final result Updated: 12/07/23 1138     EXTBreath Alcohol 0.000                   No orders to display              Procedures  ECG 12 Lead Documentation Only    Date/Time: 12/7/2023 12:09 PM    Performed by: Janet Holm DO  Authorized by: Janet Holm DO    Indications / Diagnosis:  Psych eval  ECG reviewed by me, the ED Provider: yes    Patient location:  Bedside  Previous ECG:     Previous ECG:  Compared to current    Comparison ECG info:  10/9/23    Similarity:  No change  Rate:     ECG rate:  67    ECG rate assessment: normal    Rhythm:     Rhythm: sinus rhythm    Ectopy:     Ectopy: none    QRS:     QRS axis:  Normal    QRS intervals:  Normal  ST segments:     ST segments:  Normal           ED Course  ED Course as of 12/07/23 1556   Thu Dec 07, 2023   1138 EXTBreath Alcohol: 0.000  Negative   1223 Comprehensive metabolic panel  Normal   3228 WBC: 4.32  Normal 1223 Hemoglobin: 14.2  Normal   1234 TSH 3RD GENERATON: 0.613  Normal   1235 Pt medically stable for psych. Discussed case with crisis worker Ernesto Mccall. Pt willing to sign 201.   1343 Pt signed [facility] Making  Amount and/or Complexity of Data Reviewed  Labs: ordered. Decision-making details documented in ED Course. Risk  Decision regarding hospitalization. Disposition  Final diagnoses:   Suicidal ideation   Cocaine use   Alcohol use     Time reflects when diagnosis was documented in both MDM as applicable and the Disposition within this note       Time User Action Codes Description Comment    12/7/2023 11:53 AM Tiana David [R45.851] Suicidal ideation     12/7/2023 11:53 AM Tiana David [F14.90] Cocaine use     12/7/2023 11:53 AM Tiana David [Z78.9] Alcohol use           ED Disposition       ED Disposition   Transfer to [address] [place] [address]   --    Date/Time   Thu Dec 7, 2023 12:49 PM    Comment   Eli Dodson should be transferred out to [facility] and has been medically cleared.                MD Documentation      Ambika Kennedy Most Recent Value   Patient Condition The patient has been stabilized such that within reasonable medical probability, no material deterioration of the patient condition or the condition of the unborn child(flash) is likely to result from the transfer   Reason for Transfer Level of Care needed not available at this facility   Benefits of Transfer Specialized equipment and/or services available at the receiving facility (Include comment)________________________   Risks of Transfer Potential for delay in receiving treatment   Sending MD Dr Beatrice Maldoando   Provider Certification General risk, such as traffic hazards, adverse weather conditions, rough terrain or turbulence, possible failure of equipment (including vehicle or aircraft), or consequences of actions of persons outside the control of the transport personnel, The patient is stable for psychiatric transfer because they are medically stable, and is protected from harming him/herself or others during transport          Follow-up Information    None         Patient's Medications   Discharge Prescriptions    No medications on file       No discharge procedures on file.     PDMP Review         Value Time User    PDMP Reviewed  Yes 8/1/2022  7:40 AM Kendal Lewis MD            ED Provider  Electronically Signed by             [address] [place] [address]  12/07/23 6406

## 2023-12-12 ENCOUNTER — DOCUMENTATION (OUTPATIENT)
Dept: CASE MANAGEMENT | Facility: HOSPITAL | Age: 58
End: 2023-12-12

## 2023-12-12 NOTE — BEHAVIORAL HEALTH HIGH UTILIZER
Patient Name:Ananth Jaffe MRN:  [id number]         : 1965     Age: 58 y.o.    Sex: male   Utilization History:  (# of ED visits & IP admits; reasons)  Pt had 9 [facility] visits from 2022-2023. ED presentations were related to chronic polysubstance use (cocaine, THC, K2, meth, alcohol), unstable housing/homelessness, relationship issues (break-up with girlfriend, death of mother & brother), SI with no plan or with a plan to walk into traffic or in front of a vehicle, jump off a bridge, cut himself/cut his throat, overdose on pills or cocaine, HI towards ex-girlfriend, her boyfriend or people in general, AH.       Medical presentations were related to vomiting, back pain, generalized body pain, non-compliance with medications, chest pain.   Treatment Recommendations & Presentation:  Presentation in the ED: Pt typically presents himself or is accompanied by EMS/police to ED's. ED visits were related to chronic polysubstance use (cocaine, THC, K2, meth, alcohol), unstable housing/homelessness, relationship issues (break-up with girlfriend, death of mother & brother), SI with no plan or with a plan to walk into traffic or in front of a vehicle, jump off a bridge, cut himself/cut his throat, overdose on pills or cocaine, HI towards ex-girlfriend, her boyfriend or people in general, AH.       Medical presentations were related to vomiting, back pain, generalized body pain, non-compliance with medications, chest pain.       ED Recommendations: Following medical evaluation and treatment, allow pt some time for de-escalation and for Provider to establish acute risk versus pt's chronic behavioral disturbances. Pt can be directed to [facility] for inpt addictions treatment or dual treatment.   If pt is not interested in inpt addictions rehab he can utilize [facility] at [address] [facility] [phone], [facility] addictions and mental health providers at [address] [place] [address] [phone], [facility]  SHARE program at [address] [place] [address] in [place] [phone]. Pt should also call [facility] psychiatry office in [place] to see where he is on their call list to set up an appointment.   Pt should go to [facility] for his medical problems [phone] in [place] or [facility] clinics [phone] or [phone]. Pt should have [facility] ICM services and go to [facility] to talk about that at [address] [place] [address] in [place] and also talk to [facility] at that same location about his housing issues [phone].  Pt can also go to the [facility] in [place] during the day at [address] [facility] or the [facility] at [address] [facility].  Pt has utilized [facility] Nurses [phone] and [facility] in the past .    Home Medication Regimen: see most recent documentation in Epic.     Recent Medical Work Ups:  (include Psych testing or ECT)     ECG - 2023  Xrays - 2023  CT - 2023 Inpatient Recommendations: Collaborate with pt's community sources to develop a comprehensive discharge plan. Pt should have an ICM.        Outpatient recommendations: Pt should continue his community mental health and medical treatment. Pt should take his medication as prescribed.     Situation/Relevant Background Info: Pt is a 58 year old male with a diagnosis of Major Depressive Disorder and an extensive history of polysubstance abuse (cocaine, THC, K2, meth, alcohol) along with numerous behavioral health hospitalizations.     Pt appears to have unstable housing and homelessness issues. In the past, pt had reported he lived with a girlfriend but also stated in 2023 that they broke up and he wasn't living with her any longer. Pt had been residing at the [facility] at times.  Pt appears to be mostly non-compliant with any mental health care and psychiatric medications. Pt did not appear to follow his discharge plans when discharged from behavioral health units. Pt has come to  ED's and repeatedly stated that he was not taking his medications. Pt had used [facility] and [facility] in the past.  Pt appears to go from one behavioral health hospitalization to the next and seems to utilize the ED's as his main coping skill, mental health provider and PCP resource. Pt was on the [facility] waiting list for mental health providers as of 12/2023.       Diagnoses/Significant Problems (Medical & Psychiatric):    Major depressive disorder, recurrent, severe with psychotic features (HCC)         Cocaine abuse, continuous (HCC)    Cannabis abuse, continuous    Alcohol abuse, episodic    Abnormal EKG    Unspecified mood (affective) disorder (HCC)                Drivers of repeated utilization:  homelessness, chronic polysubstance abuse, non-compliance with community mental health care and medications, limited or no supports, limited to poor coping skills                                            Existing Community Resources & Supports:                 Unknown  [facility] Nurses [phone]  [facility] [phone] or [phone].    Patient Medical & Psychiatric Care Team:  None known at this time.  Pt was on the waiting list at [facility] as of 12/2023.    Care plan date: 12/12/23                   Author:  Ana Washington RN                 Date reviewed with patient:

## 2024-01-02 ENCOUNTER — HOSPITAL ENCOUNTER (EMERGENCY)
Facility: HOSPITAL | Age: 59
End: 2024-01-03
Attending: EMERGENCY MEDICINE
Payer: COMMERCIAL

## 2024-01-02 DIAGNOSIS — R45.851 SUICIDAL IDEATIONS: Primary | ICD-10-CM

## 2024-01-02 LAB
ALBUMIN SERPL BCP-MCNC: 4.3 G/DL (ref 3.5–5)
ALP SERPL-CCNC: 82 U/L (ref 34–104)
ALT SERPL W P-5'-P-CCNC: 32 U/L (ref 7–52)
AMPHETAMINES SERPL QL SCN: NEGATIVE
ANION GAP SERPL CALCULATED.3IONS-SCNC: 8 MMOL/L
AST SERPL W P-5'-P-CCNC: 34 U/L (ref 13–39)
ATRIAL RATE: 74 BPM
ATRIAL RATE: 77 BPM
BACTERIA UR QL AUTO: ABNORMAL /HPF
BARBITURATES UR QL: NEGATIVE
BASOPHILS # BLD AUTO: 0.05 THOUSANDS/ÂΜL (ref 0–0.1)
BASOPHILS NFR BLD AUTO: 1 % (ref 0–1)
BENZODIAZ UR QL: NEGATIVE
BILIRUB SERPL-MCNC: 0.31 MG/DL (ref 0.2–1)
BILIRUB UR QL STRIP: NEGATIVE
BUN SERPL-MCNC: 8 MG/DL (ref 5–25)
CALCIUM SERPL-MCNC: 9.4 MG/DL (ref 8.4–10.2)
CHLORIDE SERPL-SCNC: 107 MMOL/L (ref 96–108)
CLARITY UR: CLEAR
CO2 SERPL-SCNC: 27 MMOL/L (ref 21–32)
COCAINE UR QL: POSITIVE
COLOR UR: YELLOW
CREAT SERPL-MCNC: 1.09 MG/DL (ref 0.6–1.3)
EOSINOPHIL # BLD AUTO: 0.37 THOUSAND/ÂΜL (ref 0–0.61)
EOSINOPHIL NFR BLD AUTO: 6 % (ref 0–6)
ERYTHROCYTE [DISTWIDTH] IN BLOOD BY AUTOMATED COUNT: 14.2 % (ref 11.6–15.1)
ETHANOL EXG-MCNC: 0.03 MG/DL
GFR SERPL CREATININE-BSD FRML MDRD: 73 ML/MIN/1.73SQ M
GLUCOSE SERPL-MCNC: 73 MG/DL (ref 65–140)
GLUCOSE UR STRIP-MCNC: NEGATIVE MG/DL
HCT VFR BLD AUTO: 45.9 % (ref 36.5–49.3)
HGB BLD-MCNC: 15 G/DL (ref 12–17)
HGB UR QL STRIP.AUTO: NEGATIVE
HYALINE CASTS #/AREA URNS LPF: ABNORMAL /LPF
IMM GRANULOCYTES # BLD AUTO: 0.03 THOUSAND/UL (ref 0–0.2)
IMM GRANULOCYTES NFR BLD AUTO: 0 % (ref 0–2)
KETONES UR STRIP-MCNC: NEGATIVE MG/DL
LEUKOCYTE ESTERASE UR QL STRIP: NEGATIVE
LYMPHOCYTES # BLD AUTO: 1.96 THOUSANDS/ÂΜL (ref 0.6–4.47)
LYMPHOCYTES NFR BLD AUTO: 29 % (ref 14–44)
MCH RBC QN AUTO: 25.4 PG (ref 26.8–34.3)
MCHC RBC AUTO-ENTMCNC: 32.7 G/DL (ref 31.4–37.4)
MCV RBC AUTO: 78 FL (ref 82–98)
METHADONE UR QL: NEGATIVE
MONOCYTES # BLD AUTO: 0.77 THOUSAND/ÂΜL (ref 0.17–1.22)
MONOCYTES NFR BLD AUTO: 11 % (ref 4–12)
MUCOUS THREADS UR QL AUTO: ABNORMAL
NEUTROPHILS # BLD AUTO: 3.59 THOUSANDS/ÂΜL (ref 1.85–7.62)
NEUTS SEG NFR BLD AUTO: 53 % (ref 43–75)
NITRITE UR QL STRIP: NEGATIVE
NON-SQ EPI CELLS URNS QL MICRO: ABNORMAL /HPF
NRBC BLD AUTO-RTO: 0 /100 WBCS
OPIATES UR QL SCN: NEGATIVE
OXYCODONE+OXYMORPHONE UR QL SCN: NEGATIVE
P AXIS: 24 DEGREES
P AXIS: 35 DEGREES
PCP UR QL: NEGATIVE
PH UR STRIP.AUTO: 5.5 [PH]
PLATELET # BLD AUTO: 222 THOUSANDS/UL (ref 149–390)
PMV BLD AUTO: 9.7 FL (ref 8.9–12.7)
POTASSIUM SERPL-SCNC: 4.2 MMOL/L (ref 3.5–5.3)
PR INTERVAL: 144 MS
PR INTERVAL: 146 MS
PROT SERPL-MCNC: 7.9 G/DL (ref 6.4–8.4)
PROT UR STRIP-MCNC: ABNORMAL MG/DL
QRS AXIS: 100 DEGREES
QRS AXIS: 100 DEGREES
QRSD INTERVAL: 92 MS
QRSD INTERVAL: 94 MS
QT INTERVAL: 366 MS
QT INTERVAL: 374 MS
QTC INTERVAL: 414 MS
QTC INTERVAL: 415 MS
RBC # BLD AUTO: 5.9 MILLION/UL (ref 3.88–5.62)
RBC #/AREA URNS AUTO: ABNORMAL /HPF
SODIUM SERPL-SCNC: 142 MMOL/L (ref 135–147)
SP GR UR STRIP.AUTO: 1.02 (ref 1–1.03)
T WAVE AXIS: -64 DEGREES
T WAVE AXIS: -66 DEGREES
THC UR QL: POSITIVE
TSH SERPL DL<=0.05 MIU/L-ACNC: 0.38 UIU/ML (ref 0.45–4.5)
UROBILINOGEN UR STRIP-ACNC: 4 MG/DL
VENTRICULAR RATE: 74 BPM
VENTRICULAR RATE: 77 BPM
WBC # BLD AUTO: 6.77 THOUSAND/UL (ref 4.31–10.16)
WBC #/AREA URNS AUTO: ABNORMAL /HPF

## 2024-01-02 PROCEDURE — 93005 ELECTROCARDIOGRAM TRACING: CPT

## 2024-01-02 PROCEDURE — 99285 EMERGENCY DEPT VISIT HI MDM: CPT

## 2024-01-02 PROCEDURE — 80307 DRUG TEST PRSMV CHEM ANLYZR: CPT

## 2024-01-02 PROCEDURE — 85025 COMPLETE CBC W/AUTO DIFF WBC: CPT

## 2024-01-02 PROCEDURE — 82075 ASSAY OF BREATH ETHANOL: CPT

## 2024-01-02 PROCEDURE — 99285 EMERGENCY DEPT VISIT HI MDM: CPT | Performed by: EMERGENCY MEDICINE

## 2024-01-02 PROCEDURE — 81001 URINALYSIS AUTO W/SCOPE: CPT

## 2024-01-02 PROCEDURE — 84443 ASSAY THYROID STIM HORMONE: CPT

## 2024-01-02 PROCEDURE — 80053 COMPREHEN METABOLIC PANEL: CPT

## 2024-01-02 PROCEDURE — 36415 COLL VENOUS BLD VENIPUNCTURE: CPT

## 2024-01-02 NOTE — ED NOTES
Behavioral Health High Utilizer Plan Reviewed:    Patient Name:Ananth Jaffe MRN:  [id number]         : 1965     Age: 58 y.o.    Sex: male   Utilization History:  (# of ED visits & IP admits; reasons)  Pt had 9 [facility]-ED visits from 2022-2023. ED presentations were related to chronic polysubstance use (cocaine, THC, K2, meth, alcohol), unstable housing/homelessness, relationship issues (break-up with girlfriend, death of mother & brother), SI with no plan or with a plan to walk into traffic or in front of a vehicle, jump off a bridge, cut himself/cut his throat, overdose on pills or cocaine, HI towards ex-girlfriend, her boyfriend or people in general, AH.       Medical presentations were related to vomiting, back pain, generalized body pain, non-compliance with medications, chest pain.    Treatment Recommendations & Presentation:  Presentation in the ED: Pt typically presents himself or is accompanied by EMS/police to ED's. ED visits were related to chronic polysubstance use (cocaine, THC, K2, meth, alcohol), unstable housing/homelessness, relationship issues (break-up with girlfriend, death of mother & brother), SI with no plan or with a plan to walk into traffic or in front of a vehicle, jump off a bridge, cut himself/cut his throat, overdose on pills or cocaine, HI towards ex-girlfriend, her boyfriend or people in general, AH.       Medical presentations were related to vomiting, back pain, generalized body pain, non-compliance with medications, chest pain.        ED Recommendations: Following medical evaluation and treatment, allow pt some time for de-escalation and for Provider to establish acute risk versus pt's chronic behavioral disturbances. Pt can be directed to [facility] for inpt addictions treatment or dual treatment.   If pt is not interested in inpt addictions rehab he can utilize [facility] at [facility] in [place] [phone], [facility] addictions and mental health providers at  [address] [facility] in [place] [phone], the  [facility] program at [address] [facility] in [place] [phone]. Pt should also call [facility] psychiatry office in [place] to see where he is on their call list to set up an appointment.   Pt should go to [facility] for his medical problems [phone] in [place] or [facility] clinics [phone] or [phone]. Pt should have [facility] ICM services and go to [facility] to talk about that at [address] [place] [address] in [place] and also talk to [facility] at that same location about his housing issues [phone].  Pt can also go to the [place] [facility] during the day at [address] [facility] or the [facility] at [address] [facility].  Pt has utilized [facility] [phone] and [facility] in the past .     Home Medication Regimen: see most recent documentation in Epic.      Recent Medical Work Ups:  (include Psych testing or ECT)     ECG - 2023  Xrays - 2023  CT - 2023 Inpatient Recommendations: Collaborate with pt's community sources to develop a comprehensive discharge plan. Pt should have an ICM.           Outpatient recommendations: Pt should continue his community mental health and medical treatment. Pt should take his medication as prescribed.      Situation/Relevant Background Info: Pt is a 58 year old male with a diagnosis of Major Depressive Disorder and an extensive history of polysubstance abuse (cocaine, THC, K2, meth, alcohol) along with numerous behavioral health hospitalizations.     Pt appears to have unstable housing and homelessness issues. In the past, pt had reported he lived with a girlfriend but also stated in 2023 that they broke up and he wasn't living with her any longer. Pt had been residing at the [place] [facility] at times.  Pt appears to be mostly non-compliant with any mental health care and psychiatric medications. Pt did not appear to follow his discharge plans when  discharged from behavioral health units. Pt has come to ED's and repeatedly stated that he was not taking his medications. Pt had used [facility] and [facility] in the past.  Pt appears to go from one behavioral health hospitalization to the next and seems to utilize the ED's as his main coping skill, mental health provider and PCP resource. Pt was on the \[facility]\"" waiting list for mental health providers as of 12/2023.          Diagnoses/Significant Problems (Medical & Psychiatric):     Major depressive disorder, recurrent, severe with psychotic features (HCC)             Cocaine abuse, continuous (HCC)     Cannabis abuse, continuous     Alcohol abuse, episodic     Abnormal EKG     Unspecified mood (affective) disorder (HCC)                   Drivers of repeated utilization:  homelessness, chronic polysubstance abuse, non-compliance with community mental health care and medications, limited or no supports, limited to poor coping skills                                               Existing Community Resources & Supports:                 Unknown  [facility] [phone]  [facility] [phone] or [phone].     Patient Medical & Psychiatric Care Team:  None known at this time.  Pt was on the waiting list at \[facility]\"" as of 12/2023.

## 2024-01-02 NOTE — ED NOTES
Pt changed into paper scrubs at this time. Pt's belongings securely stored in locker. 1:1 ICU tech Cuong at bedside.     Vanessa Arnold RN  01/02/24 7891

## 2024-01-03 VITALS
RESPIRATION RATE: 14 BRPM | HEART RATE: 65 BPM | WEIGHT: 140.65 LBS | BODY MASS INDEX: 22.03 KG/M2 | SYSTOLIC BLOOD PRESSURE: 121 MMHG | OXYGEN SATURATION: 99 % | DIASTOLIC BLOOD PRESSURE: 76 MMHG | TEMPERATURE: 98.3 F

## 2024-01-03 LAB
FLUAV RNA RESP QL NAA+PROBE: NEGATIVE
FLUBV RNA RESP QL NAA+PROBE: NEGATIVE
RSV RNA RESP QL NAA+PROBE: NEGATIVE
SARS-COV-2 RNA RESP QL NAA+PROBE: NEGATIVE

## 2024-01-03 PROCEDURE — 0241U HB NFCT DS VIR RESP RNA 4 TRGT: CPT | Performed by: EMERGENCY MEDICINE

## 2024-01-03 RX ORDER — METOPROLOL SUCCINATE 25 MG/1
25 TABLET, EXTENDED RELEASE ORAL DAILY
Status: DISCONTINUED | OUTPATIENT
Start: 2024-01-03 | End: 2024-01-03 | Stop reason: HOSPADM

## 2024-01-03 RX ORDER — LISINOPRIL 5 MG/1
5 TABLET ORAL EVERY MORNING
Status: DISCONTINUED | OUTPATIENT
Start: 2024-01-03 | End: 2024-01-03 | Stop reason: HOSPADM

## 2024-01-03 RX ORDER — ARIPIPRAZOLE 10 MG/1
10 TABLET ORAL DAILY
Status: DISCONTINUED | OUTPATIENT
Start: 2024-01-03 | End: 2024-01-03 | Stop reason: HOSPADM

## 2024-01-03 RX ORDER — ESCITALOPRAM OXALATE 10 MG/1
10 TABLET ORAL EVERY MORNING
Status: DISCONTINUED | OUTPATIENT
Start: 2024-01-03 | End: 2024-01-03 | Stop reason: HOSPADM

## 2024-01-03 RX ADMIN — METOPROLOL SUCCINATE 25 MG: 25 TABLET, EXTENDED RELEASE ORAL at 11:11

## 2024-01-03 RX ADMIN — ESCITALOPRAM OXALATE 10 MG: 10 TABLET ORAL at 11:11

## 2024-01-03 RX ADMIN — ARIPIPRAZOLE 10 MG: 10 TABLET ORAL at 11:11

## 2024-01-03 RX ADMIN — LISINOPRIL 5 MG: 5 TABLET ORAL at 11:11

## 2024-01-03 NOTE — ED NOTES
Pt ambulatory to bathroom. Gait steady and respirations non labored.     Tiana Vaughan RN  01/03/24 0802
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
## 2024-01-03 NOTE — ED NOTES
RN assumed care of pt at this time. Pt awake and oriented, currently calm and cooperative; pt made aware of required urine sample. 1:1 at bedside no complaints or signs of distress at this time.      Sally Schwartz  01/02/24 7479

## 2024-01-03 NOTE — ED NOTES
Pt unable to urinate at this time. Will alert staff when able to provide specimen.     Vanessa Arnold RN  01/02/24 2031

## 2024-01-03 NOTE — EMTALA/ACUTE CARE TRANSFER
[facility] EMERGENCY DEPARTMENT  [address] [facility] [address]  Dept: [phone]      EMTALA TRANSFER CONSENT    NAME Ananth Jaffe                                         1965                              MRN [id number]    I have been informed of my rights regarding examination, treatment, and transfer   by Dr. Danica Long DO    Benefits: Specialized equipment and/or services available at the receiving facility (Include comment)________________________ (Inpatient psychiatric treatment)    Risks: Potential for delay in receiving treatment, Increased discomfort during transfer, Possible worsening of condition or death during transfer      Consent for Transfer:  I acknowledge that my medical condition has been evaluated and explained to me by the emergency department physician or other qualified medical person and/or my attending physician, who has recommended that I be transferred to the service of  Accepting Physician: Dr Aguilar at Accepting Facility Name, City & State : [place]. The above potential benefits of such transfer, the potential risks associated with such transfer, and the probable risks of not being transferred have been explained to me, and I fully understand them.  The doctor has explained that, in my case, the benefits of transfer outweigh the risks.  I agree to be transferred.    I authorize the performance of emergency medical procedures and treatments upon me in both transit and upon arrival at the receiving facility.  Additionally, I authorize the release of any and all medical records to the receiving facility and request they be transported with me, if possible.  I understand that the safest mode of transportation during a medical emergency is an ambulance and that the Hospital advocates the use of this mode of transport. Risks of traveling to the receiving facility by car, including absence of medical control, life sustaining equipment,  such as oxygen, and medical personnel has been explained to me and I fully understand them.    (MARICEL CORRECT BOX BELOW)  [X]  I consent to the stated transfer and to be transported by ambulance/helicopter.  [  ]  I consent to the stated transfer, but refuse transportation by ambulance and accept full responsibility for my transportation by car.  I understand the risks of non-ambulance transfers and I exonerate the Hospital and its staff from any deterioration in my condition that results from this refusal.    X___________________________________________    DATE  24  TIME________  Signature of patient or legally responsible individual signing on patient behalf           RELATIONSHIP TO PATIENT_________________________          Provider Certification    NAME Ananth Jaffe                                         1965                              MRN [id number]    A medical screening exam was performed on the above named patient.  Based on the examination:    Condition Necessitating Transfer The encounter diagnosis was Suicidal ideations.    Patient Condition: The patient has been stabilized such that within reasonable medical probability, no material deterioration of the patient condition or the condition of the unborn child(flash) is likely to result from the transfer    Reason for Transfer: Level of Care needed not available at this facility (Inpatient psychiatric treatment)    Transfer Requirements: [facility]   Space available and qualified personnel available for treatment as acknowledged by markos  Agreed to accept transfer and to provide appropriate medical treatment as acknowledged by       Dr Aguilar  Appropriate medical records of the examination and treatment of the patient are provided at the time of transfer   STAFF INITIAL WHEN COMPLETED _______  Transfer will be performed by qualified personnel from [facility]  and appropriate transfer equipment as required, including the use of  necessary and appropriate life support measures.    Provider Certification: I have examined the patient and explained the following risks and benefits of being transferred/refusing transfer to the patient/family:  General risk, such as traffic hazards, adverse weather conditions, rough terrain or turbulence, possible failure of equipment (including vehicle or aircraft), or consequences of actions of persons outside the control of the transport personnel, The patient is stable for psychiatric transfer because they are medically stable, and is protected from harming him/herself or others during transport      Based on these reasonable risks and benefits to the patient and/or the unborn child(flash), and based upon the information available at the time of the patient’s examination, I certify that the medical benefits reasonably to be expected from the provision of appropriate medical treatments at another medical facility outweigh the increasing risks, if any, to the individual’s medical condition, and in the case of labor to the unborn child, from effecting the transfer.    X____________________________________________ DATE 01/03/24        TIME_______      ORIGINAL - SEND TO MEDICAL RECORDS   COPY - SEND WITH PATIENT DURING TRANSFER

## 2024-01-03 NOTE — ED NOTES
Vital Signs deferred at this time pt currently asleep. No signs of distress noted, respiration non-labored and equal rise and fall of chest also noted by DONNIE.     Sally Schwartz  01/03/24 0418

## 2024-01-03 NOTE — ED NOTES
Nilay Lion at  Intake- No bed availability at this time.    Joel oWods  Crisis Intervention Specialist II  01/02/24

## 2024-01-03 NOTE — ED NOTES
Bed Search Results:    [facility]-no answer    [facility]-no answer    [facility]- no answer, left msg    [facility]-no answer, left msg    Faxed referral to Intake for am review

## 2024-01-03 NOTE — ED NOTES
Patient is accepted at [facility]  Patient is accepted by Dr. Jeff Kennedy    Transportation is arranged with [facility]  Transportation is scheduled for TBD

## 2024-01-03 NOTE — ED NOTES
201 Signed- Rights explained, bed search explained- Faxed to  Intake- Original on patient chart.    Patient does not wish to be referred to [facility] for treatment, otherwise is open to an expanded bed search.    Joel Woods  Crisis Intervention Specialist II  01/02/24

## 2024-01-03 NOTE — ED NOTES
Pt sleeping at this time. Will ask patient about ordered meds once he's awake.     Tiana Vaughan RN  01/03/24 2196
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
## 2024-01-03 NOTE — ED NOTES
VS deferred at this time, pt currently sleeping. Respirations equal and non-labored, no distress noted by RN. 1:1 remains at bedside.     Sally Schwartz  01/03/24 0011

## 2024-01-03 NOTE — ED NOTES
Insurance Authorization for Admission:  Phone Call Placed to [facility].  Phone Number [phone].  Spoke to Mike B.  4 Days Approved.  Level of Care AIP- 201.  Review on TBD.  Authorization #Accepting facility to call upon admission.    EVS (Eligibility Verification System) Called- [phone]  Automated System Indicates Active with [facility]    Joel Woods  Crisis Intervention Specialist II  01/02/24

## 2024-01-03 NOTE — ED CARE HANDOFF
Emergency Department Sign Out Note        Sign out and transfer of care from Dr. Roldan. See Separate Emergency Department note.     The patient, Ananth Jaffe, was evaluated by the previous provider for SI/HI.    Workup Completed:  Labs Reviewed   CBC AND DIFFERENTIAL - Abnormal       Result Value Ref Range Status    WBC 6.77  4.31 - 10.16 Thousand/uL Final    RBC 5.90 (*) 3.88 - 5.62 Million/uL Final    Hemoglobin 15.0  12.0 - 17.0 g/dL Final    Hematocrit 45.9  36.5 - 49.3 % Final    MCV 78 (*) 82 - 98 fL Final    MCH 25.4 (*) 26.8 - 34.3 pg Final    MCHC 32.7  31.4 - 37.4 g/dL Final    RDW 14.2  11.6 - 15.1 % Final    MPV 9.7  8.9 - 12.7 fL Final    Platelets 222  149 - 390 Thousands/uL Final    nRBC 0  /100 WBCs Final    Neutrophils Relative 53  43 - 75 % Final    Immat GRANS % 0  0 - 2 % Final    Lymphocytes Relative 29  14 - 44 % Final    Monocytes Relative 11  4 - 12 % Final    Eosinophils Relative 6  0 - 6 % Final    Basophils Relative 1  0 - 1 % Final    Neutrophils Absolute 3.59  1.85 - 7.62 Thousands/µL Final    Immature Grans Absolute 0.03  0.00 - 0.20 Thousand/uL Final    Lymphocytes Absolute 1.96  0.60 - 4.47 Thousands/µL Final    Monocytes Absolute 0.77  0.17 - 1.22 Thousand/µL Final    Eosinophils Absolute 0.37  0.00 - 0.61 Thousand/µL Final    Basophils Absolute 0.05  0.00 - 0.10 Thousands/µL Final   TSH, 3RD GENERATION - Abnormal    TSH 3RD GENERATON 0.375 (*) 0.450 - 4.500 uIU/mL Final    Comment: Adult TSH (3rd generation) reference range follows the recommended guidelines of the American Thyroid Association, January, 2020.   RAPID DRUG SCREEN, URINE - Abnormal    Amph/Meth UR Negative  Negative Final    Barbiturate Ur Negative  Negative Final    Benzodiazepine Urine Negative  Negative Final    Cocaine Urine Positive (*) Negative Final    Methadone Urine Negative  Negative Final    Opiate Urine Negative  Negative Final    PCP Ur Negative  Negative Final    THC Urine Positive (*)  Negative Final    Oxycodone Urine Negative  Negative Final    Narrative:     Presumptive report. If requested, specimen will be sent to reference lab for confirmation.  FOR MEDICAL PURPOSES ONLY.   IF CONFIRMATION NEEDED PLEASE CONTACT THE LAB WITHIN 5 DAYS.    Drug Screen Cutoff Levels:  AMPHETAMINE/METHAMPHETAMINES  1000 ng/mL  BARBITURATES     200 ng/mL  BENZODIAZEPINES     200 ng/mL  COCAINE      300 ng/mL  METHADONE      300 ng/mL  OPIATES      300 ng/mL  PHENCYCLIDINE     25 ng/mL  THC       50 ng/mL  OXYCODONE      100 ng/mL   UA W REFLEX TO MICROSCOPIC WITH REFLEX TO CULTURE - Abnormal    Color, UA Yellow   Final    Clarity, UA Clear   Final    Specific Gravity, UA 1.025  1.003 - 1.030 Final    pH, UA 5.5  4.5, 5.0, 5.5, 6.0, 6.5, 7.0, 7.5, 8.0 Final    Leukocytes, UA Negative  Negative Final    Nitrite, UA Negative  Negative Final    Protein, UA Trace (*) Negative mg/dl Final    Glucose, UA Negative  Negative mg/dl Final    Ketones, UA Negative  Negative mg/dl Final    Urobilinogen, UA 4.0 (*) <2.0 mg/dl mg/dl Final    Bilirubin, UA Negative  Negative Final    Occult Blood, UA Negative  Negative Final   URINE MICROSCOPIC - Abnormal    RBC, UA None Seen  None Seen, 1-2 /hpf Final    WBC, UA 1-2  None Seen, 1-2 /hpf Final    Epithelial Cells Occasional  None Seen, Occasional /hpf Final    Bacteria, UA None Seen  None Seen, Occasional /hpf Final    MUCUS THREADS Occasional (*) None Seen Final    Hyaline Casts, UA 0-3 (*) None Seen /lpf Final   COVID19, INFLUENZA A/B, RSV PCR, [facility] - Normal    SARS-CoV-2 Negative  Negative Final    Comment:      INFLUENZA A PCR Negative  Negative Final    Comment:      INFLUENZA B PCR Negative  Negative Final    Comment:      RSV PCR Negative  Negative Final    Comment:      Narrative:     FOR PEDIATRIC PATIENTS - copy/paste COVID Guidelines URL to browser: [email or web address]    SARS-CoV-2 assay is a Nucleic Acid  Amplification assay intended for the  qualitative detection of nucleic acid from SARS-CoV-2 in nasopharyngeal  swabs. Results are for the presumptive identification of SARS-CoV-2 RNA.    Positive results are indicative of infection with SARS-CoV-2, the virus  causing COVID-19, but do not rule out bacterial infection or co-infection  with other viruses. Laboratories within the [place] and its  territories are required to report all positive results to the appropriate  public health authorities. Negative results do not preclude SARS-CoV-2  infection and should not be used as the sole basis for treatment or other  patient management decisions. Negative results must be combined with  clinical observations, patient history, and epidemiological information.  This test has not been FDA cleared or approved.    This test has been authorized by FDA under an Emergency Use Authorization  (EUA). This test is only authorized for the duration of time the  declaration that circumstances exist justifying the authorization of the  emergency use of an in vitro diagnostic tests for detection of SARS-CoV-2  virus and/or diagnosis of COVID-19 infection under section 564(b)(1) of  the Act, 21 U.S.C. 360bbb-3(b)(1), unless the authorization is terminated  or revoked sooner. The test has been validated but independent review by FDA  and CLIA is pending.    Test performed using [employer] GeneXpert: This RT-PCR assay targets N2,  a region unique to SARS-CoV-2. A conserved region in the E-gene was chosen  for pan-Sarbecovirus detection which includes SARS-CoV-2.    According to CMS-2020-01-R, this platform meets the definition of high-throughput technology.   POCT ALCOHOL BREATH TEST - Normal    EXTBreath Alcohol 0.032   Final   COMPREHENSIVE METABOLIC PANEL    Sodium 142  135 - 147 mmol/L Final    Potassium 4.2  3.5 - 5.3 mmol/L Final    Chloride 107  96 - 108 mmol/L Final    CO2 27  21 - 32 mmol/L Final    ANION GAP 8  mmol/L Final     BUN 8  5 - 25 mg/dL Final    Creatinine 1.09  0.60 - 1.30 mg/dL Final    Comment: Standardized to IDMS reference method    Glucose 73  65 - 140 mg/dL Final    Comment: If the patient is fasting, the ADA then defines impaired fasting glucose as > 100 mg/dL and diabetes as > or equal to 123 mg/dL.    Calcium 9.4  8.4 - 10.2 mg/dL Final    AST 34  13 - 39 U/L Final    ALT 32  7 - 52 U/L Final    Comment: Specimen collection should occur prior to Sulfasalazine administration due to the potential for falsely depressed results.     Alkaline Phosphatase 82  34 - 104 U/L Final    Total Protein 7.9  6.4 - 8.4 g/dL Final    Albumin 4.3  3.5 - 5.0 g/dL Final    Total Bilirubin 0.31  0.20 - 1.00 mg/dL Final    Comment: Use of this assay is not recommended for patients undergoing treatment with eltrombopag due to the potential for falsely elevated results.  N-acetyl-p-benzoquinone imine (metabolite of Acetaminophen) will generate erroneously low results in samples for patients that have taken an overdose of Acetaminophen.    eGFR 73  ml/min/1.73sq m Final    Narrative:     National Kidney Disease Foundation guidelines for Chronic Kidney Disease (CKD):     Stage 1 with normal or high GFR (GFR > 90 mL/min/1.73 square meters)    Stage 2 Mild CKD (GFR = 60-89 mL/min/1.73 square meters)    Stage 3A Moderate CKD (GFR = 45-59 mL/min/1.73 square meters)    Stage 3B Moderate CKD (GFR = 30-44 mL/min/1.73 square meters)    Stage 4 Severe CKD (GFR = 15-29 mL/min/1.73 square meters)    Stage 5 End Stage CKD (GFR <15 mL/min/1.73 square meters)  Note: GFR calculation is accurate only with a steady state creatinine     No orders to display         ED Course / Workup Pending (followup):    ED Course as of 01/03/24 1247   Wed Jan 03, 2024   0647 Assumed care from Dr. Roldan    SI/HI, 201 signed  No overnight issues     Procedures  Medical Decision Making  Amount and/or Complexity of Data Reviewed  Labs: ordered.    Risk  Prescription drug  management.  Decision regarding hospitalization.            Disposition  Final diagnoses:   Suicidal ideations     Time reflects when diagnosis was documented in both MDM as applicable and the Disposition within this note       Time User Action Codes Description Comment    1/3/2024  7:32 AM Danica Long Add [R45.851] Suicidal ideations           ED Disposition       ED Disposition   Transfer to Behavioral Health Condition   --    Date/Time   Wed Manjit 3, 2024  7:32 AM    Comment   Ananth Jaffe should be transferred out to inpatient psychiatric unit and has been medically cleared.               MD Documentation      Flowsheet Row Most Recent Value   Patient Condition The patient has been stabilized such that within reasonable medical probability, no material deterioration of the patient condition or the condition of the unborn child(flash) is likely to result from the transfer   Reason for Transfer Level of Care needed not available at this facility  [Inpatient psychiatric treatment]   Benefits of Transfer Specialized equipment and/or services available at the receiving facility (Include comment)________________________  [Inpatient psychiatric treatment]   Risks of Transfer Potential for delay in receiving treatment, Increased discomfort during transfer, Possible worsening of condition or death during transfer   Accepting Physician Dr Aguilar   Accepting Facility Name, [facility]    (Name & Tel number) roundtrip   Transported by (Company and Unit #) cts   Sending MD dr long   Provider Certification General risk, such as traffic hazards, adverse weather conditions, rough terrain or turbulence, possible failure of equipment (including vehicle or aircraft), or consequences of actions of persons outside the control of the transport personnel, The patient is stable for psychiatric transfer because they are medically stable, and is protected from harming him/herself or others  during transport          RN Documentation      Flowsheet Row Most Recent Value   Accepting Facility Name, City & State  [place]    (Name & Tel number) roundtrip   Transported by (Company and Unit #) cts          Follow-up Information    None       Discharge Medication List as of 1/3/2024 12:31 PM        CONTINUE these medications which have NOT CHANGED    Details   ARIPiprazole (ABILIFY) 10 mg tablet Take 1 tablet (10 mg total) by mouth daily, Starting Mon 8/1/2022, Normal      escitalopram (LEXAPRO) 10 mg tablet Take 1 tablet by mouth every morning, Historical Med      lisinopril (ZESTRIL) 5 mg tablet Take 5 mg by mouth every morning, Historical Med      metoprolol succinate (TOPROL-XL) 25 mg 24 hr tablet Take 25 mg by mouth, Historical Med           No discharge procedures on file.       ED Provider  Electronically Signed by     Danica Long DO  01/03/24 3112

## 2024-02-14 ENCOUNTER — DOCUMENTATION (OUTPATIENT)
Dept: CASE MANAGEMENT | Facility: HOSPITAL | Age: 59
End: 2024-02-14

## 2024-02-14 NOTE — BEHAVIORAL HEALTH HIGH UTILIZER
Patient Name:Ananth Jaffe MRN:  [id number]         : 1965     Age: 58 y.o.    Sex: male   Utilization History:  (# of ED visits & IP admits; reasons)  Pt had 9 [facility]-ED visits from 2023-2024. ED presentations were related to chronic polysubstance use (cocaine, THC, K2, meth, alcohol), unstable housing/homelessness, relationship issues (break-up with girlfriend, death of mother & brother), SI with no plan or with a plan to walk into traffic or in front of a vehicle, jump off a bridge, cut himself/cut his throat, overdose on pills or cocaine, HI towards ex-girlfriend to strangle her, her boyfriend or people in general, AH.       Medical presentations were related to vomiting, back pain, generalized body pain, non-compliance with medications, chest pain.    Treatment Recommendations & Presentation:  Presentation in the ED: Pt typically presents himself or is accompanied by EMS/police to ED's. ED visits were related to chronic polysubstance use (cocaine, THC, K2, meth, alcohol), unstable housing/homelessness, relationship issues (break-up with girlfriend, death of mother & brother), SI with no plan or with a plan to walk into traffic or in front of a vehicle, jump off a bridge, cut himself/cut his throat, overdose on pills or cocaine, HI towards ex-girlfriend to strangle her, her boyfriend or people in general, AH.       Medical presentations were related to vomiting, back pain, generalized body pain, non-compliance with medications, chest pain.          ED Recommendations: Following medical evaluation and treatment, allow pt some time for de-escalation and for Provider to establish acute risk versus pt's chronic behavioral disturbances. Pt can be directed to [facility] for inpt addictions treatment or dual treatment.   If pt is not interested in inpt addictions rehab he can utilize [facility] at [address] [phone], [facility] addictions and mental health providers at [address] [place] [address] in  [place] [phone], the  [facility] program at [address] [facility] in [place] [phone]. Pt should also call [facility] psychiatry office in [place] to see where he is on their call list to set up an appointment.   [facility] to [facility] problems [phone] at [address] [facility] in [place] or [facility] clinics at [address] [facility] [phone] or [phone]. Pt should have [facility] ICM services and go to [facility] to talk about that at [address] [facility] in [place] and also talk to [facility] at that same location about his housing issues [phone].  Pt can also go to the [place] [facility] in [place] during the day at [address] or the [facility] at [address].  Pt has utilized [facility] [phone] and [facility] in the past .    Home Medication Regimen: see most recent documentation in Epic.      Recent Medical Work Ups:  (include Psych testing or ECT)     ECG - 2023  Xrays - 2023  CT - 2023 Inpatient Recommendations: Collaborate with pt's community sources to develop a comprehensive discharge plan. Pt should have an ICM.         Outpatient recommendations: Pt should continue his community mental health and medical treatment. Pt should take his medication as prescribed.      Situation/Relevant Background Info:  Pt is a 58 year old male with a diagnosis of Major Depressive Disorder and an extensive history of polysubstance abuse (cocaine, THC, K2, meth, alcohol) along with numerous behavioral health hospitalizations.     Pt appears to have unstable housing and homelessness issues. In the past, pt had reported he lived with a girlfriend but also stated in 2023 that they broke up and he wasn't living with her any longer. Pt had been residing at the [place] [facility] at times.  Pt appears to be mostly non-compliant with any mental health care and psychiatric medications. Pt did not appear to follow his  discharge plans when discharged from behavioral health units. Pt has come to ED's and repeatedly stated that he was not taking his medications. Pt had used [facility] and [facility] in the past.  Pt appears to go from one behavioral health hospitalization to the next and seems to utilize the ED's as his main coping skill, mental health provider and PCP resource. Pt was on the [facility] waiting list for mental health providers as of 12/2023.             Diagnoses/Significant Problems (Medical & Psychiatric):    Major depressive disorder, recurrent, severe with psychotic features (HCC)             Cocaine abuse, continuous (HCC)     Cannabis abuse, continuous     Alcohol abuse, episodic     Abnormal EKG     Unspecified mood (affective) disorder (HCC)             Drivers of repeated utilization:  homelessness, chronic polysubstance abuse, non-compliance with community mental health care and medications, limited or no supports, limited to poor coping skills                                             Existing Community Resources & Supports:       Unknown  [facility] [phone]  [facility] [phone] or [phone].                Patient Medical & Psychiatric Care Team:  None known at this time.  Pt was on the waiting list at [facility] as of 12/2023.    Care plan date: 02/14/24                   Author:  Ana Washington RN                 Date reviewed with patient:

## 2024-03-07 ENCOUNTER — HOSPITAL ENCOUNTER (EMERGENCY)
Facility: HOSPITAL | Age: 59
Discharge: HOME | End: 2024-03-07
Attending: EMERGENCY MEDICINE
Payer: COMMERCIAL

## 2024-03-07 VITALS
BODY MASS INDEX: 23.79 KG/M2 | TEMPERATURE: 97.6 F | WEIGHT: 151.9 LBS | OXYGEN SATURATION: 93 % | DIASTOLIC BLOOD PRESSURE: 54 MMHG | SYSTOLIC BLOOD PRESSURE: 110 MMHG | RESPIRATION RATE: 18 BRPM | HEART RATE: 69 BPM

## 2024-03-07 DIAGNOSIS — F19.10 SUBSTANCE ABUSE (HCC): Primary | ICD-10-CM

## 2024-03-07 PROCEDURE — 99282 EMERGENCY DEPT VISIT SF MDM: CPT | Performed by: EMERGENCY MEDICINE

## 2024-03-07 PROCEDURE — 99284 EMERGENCY DEPT VISIT MOD MDM: CPT

## 2024-03-07 NOTE — ED PROVIDER NOTES
History  Chief Complaint   Patient presents with    Overdose - Accidental     Pt brought in by EMS. Pt states he was drinking and using K2. By stander called 911.      59-year-old gentleman presents after being found lying on the sidewalk.  Patient admits to drinking and smoking K2.  He offers no acute complaints.      Overdose - Accidental  Ingested substance:  Illicit drugs and alcohol  Suspected agents: K2.  Incident location: street.  Context: recreational    Associated symptoms: altered mental status        Prior to Admission Medications   Prescriptions Last Dose Informant Patient Reported? Taking?   ARIPiprazole (ABILIFY) 10 mg tablet   No No   Sig: Take 1 tablet (10 mg total) by mouth daily   Patient not taking: Reported on 11/28/2022   escitalopram (LEXAPRO) 10 mg tablet   Yes No   Sig: Take 1 tablet by mouth every morning   Patient not taking: Reported on 10/9/2023   lisinopril (ZESTRIL) 5 mg tablet   Yes No   Sig: Take 5 mg by mouth every morning   Patient not taking: Reported on 10/9/2023   metoprolol succinate (TOPROL-XL) 25 mg 24 hr tablet   Yes No   Sig: Take 25 mg by mouth   Patient not taking: Reported on 10/9/2023      Facility-Administered Medications: None       Past Medical History:   Diagnosis Date    Abnormal EKG     Depression     Substance use        Past Surgical History:   Procedure Laterality Date    NO PAST SURGERIES         History reviewed. No pertinent family history.  I have reviewed and agree with the history as documented.    E-Cigarette/Vaping    E-Cigarette Use Never User      E-Cigarette/Vaping Substances    Nicotine No     THC No     CBD No     Flavoring No     Other No     Unknown No      Social History     Tobacco Use    Smoking status: Former     Current packs/day: 1.00     Types: Cigarettes    Smokeless tobacco: Never   Vaping Use    Vaping status: Never Used   Substance Use Topics    Alcohol use: Yes     Alcohol/week: 3.0 standard drinks of alcohol     Types: 3 Cans of beer  per week     Comment: 3 cans beer daily    Drug use: Yes     Types: Marijuana, Cocaine     Comment: last use cocaine was last night 12/6/23       Review of Systems   All other systems reviewed and are negative.      Physical Exam  Physical Exam  Vitals and nursing note reviewed.   Constitutional:       General: He is not in acute distress.     Appearance: Normal appearance. He is well-developed. He is not ill-appearing, toxic-appearing or diaphoretic.   HENT:      Head: Normocephalic and atraumatic.      Right Ear: External ear normal.      Left Ear: External ear normal.      Nose: Nose normal.      Mouth/Throat:      Mouth: Mucous membranes are moist.      Pharynx: Oropharynx is clear.   Eyes:      Conjunctiva/sclera: Conjunctivae normal.      Pupils: Pupils are equal, round, and reactive to light.   Cardiovascular:      Rate and Rhythm: Normal rate and regular rhythm.      Heart sounds: Normal heart sounds.   Pulmonary:      Effort: Pulmonary effort is normal. No respiratory distress.      Breath sounds: Normal breath sounds.   Abdominal:      General: Bowel sounds are normal. There is no distension.      Palpations: Abdomen is soft.      Tenderness: There is no abdominal tenderness. There is no guarding.   Musculoskeletal:         General: Normal range of motion.      Cervical back: Neck supple. No rigidity.      Right lower leg: No edema.      Left lower leg: No edema.   Skin:     General: Skin is warm and dry.      Capillary Refill: Capillary refill takes less than 2 seconds.   Neurological:      General: No focal deficit present.      Mental Status: He is alert and oriented to person, place, and time.   Psychiatric:         Mood and Affect: Mood normal.         Speech: Speech is delayed and slurred.         Behavior: Behavior normal. Behavior is cooperative.         Thought Content: Thought content normal.         Vital Signs  ED Triage Vitals [03/07/24 0036]   Temperature Pulse Respirations Blood Pressure  SpO2   97.6 °F (36.4 °C) 69 18 110/54 93 %      Temp Source Heart Rate Source Patient Position - Orthostatic VS BP Location FiO2 (%)   Tympanic Monitor Lying Left arm --      Pain Score       --           Vitals:    03/07/24 0036   BP: 110/54   Pulse: 69   Patient Position - Orthostatic VS: Lying         Visual Acuity      ED Medications  Medications - No data to display    Diagnostic Studies  Results Reviewed       None                   No orders to display              Procedures  Procedures         ED Course                                             Medical Decision Making  59-year-old gentleman presents after drinking some alcohol and smoking K2.  He was found altered lying on the sidewalk.  He offers no complaints on arrival.  Patient was observed for period of time and awoke with again no complaints.  He was clinically sober with a steady gait and tolerated p.o. challenge without difficulty.  Will discharge home.  He has been advised against use of illicit substances.             Disposition  Final diagnoses:   Substance abuse (HCC)     Time reflects when diagnosis was documented in both MDM as applicable and the Disposition within this note       Time User Action Codes Description Comment    3/7/2024  5:17 AM Phuc Saez Add [F19.10] Substance abuse (HCC)           ED Disposition       ED Disposition   Discharge    Condition   Stable    Date/Time   Thu Mar 7, 2024  5:17 AM    Comment   Ananth Jaffe discharge to home/self care.                   Follow-up Information    None         Patient's Medications   Discharge Prescriptions    No medications on file       No discharge procedures on file.    PDMP Review         Value Time User    PDMP Reviewed  Yes 8/1/2022  7:40 AM Piyush Corrales MD            ED Provider  Electronically Signed by             Phuc Saez DO  03/07/24 0518

## 2024-03-07 NOTE — ED NOTES
Pt requesting food given the same. Pt provided with sandwich, pretzels, p.b. crackers and sprite.     Apoorva Sage  03/07/24 0122

## 2024-04-09 ENCOUNTER — DOCUMENTATION (OUTPATIENT)
Dept: CASE MANAGEMENT | Facility: HOSPITAL | Age: 59
End: 2024-04-09

## 2024-04-09 NOTE — BEHAVIORAL HEALTH HIGH UTILIZER
Patient Name:Ananth Jaffe MRN:  [id number]         : 1965     Age: 59 y.o.    Sex: male   Utilization History:  (# of ED visits & IP admits; reasons)  Pt had 7 [facility]-ED visits from 3/2023-3/2024. ED presentations were related to chronic polysubstance use (cocaine, THC, K2, meth, alcohol), unstable housing/homelessness, relationship issues (break-up with girlfriend, death of mother & brother), SI with no plan or with a plan to walk into traffic or in front of a vehicle, jump off a bridge, cut himself/cut his throat, overdose on pills or cocaine, HI towards ex-girlfriend to strangle her, her boyfriend or people in general, AH, laying on the streets following substance use.       Medical presentations were related to vomiting, back pain, generalized body pain, non-compliance with medications, chest pain.      Treatment Recommendations & Presentation:  Presentation in the ED: Pt typically presents himself or is accompanied by EMS/police to ED's. ED visits were related to chronic polysubstance use (cocaine, THC, K2, meth, alcohol), unstable housing/homelessness, relationship issues (break-up with girlfriend, death of mother & brother), SI with no plan or with a plan to walk into traffic or in front of a vehicle, jump off a bridge, cut himself/cut his throat, overdose on pills or cocaine, HI towards ex-girlfriend to strangle her, her boyfriend or people in general, AH, laying on the streets following substance use.       Medical presentations were related to vomiting, back pain, generalized body pain, non-compliance with medications, chest pain.          ED Recommendations: Following medical evaluation and treatment, allow pt some time for de-escalation and for Provider to establish acute risk versus pt's chronic behavioral disturbances. Pt can be directed to [facility] for inpt addictions treatment or dual treatment.   If pt is not interested in inpt addictions rehab he can utilize [facility] at [address] [place] [address]  in [place] [phone], [facility] addictions and mental health providers at [address] [facility] in [place] [phone], the  [facility] program at [address] [facility] in [place] [phone]. [facility] also [facility] in [place] to see where he is on their call list to set up an appointment.   Pt should go to [facility] for his medical problems [phone] at [address] [facility] in [place] or [facility] Medicine clinics at [address] [facility] [phone] or [phone]. Pt should have [facility] ICM services and go to [facility] to talk about that at [address] [facility] in [place] and also talk to [facility] at that same location about his housing issues [phone].  Pt can also go to the [facility] in [place] during the day at [address] [facility] or the [facility] at [address] [facility].  Pt has utilized [facility] Nurses [phone] and [facility] Medicine in the past .       Home Medication Regimen: see most recent documentation in Epic.         Recent Medical Work Ups:  (include Psych testing or ECT)     ECG - 2023  Xrays - 2023  CT - 2023 Inpatient Recommendations: Collaborate with pt's community sources to develop a comprehensive discharge plan. Pt should have an ICM.         Labs - 2023, 2024   Outpatient recommendations: Pt should continue his community mental health and medical treatment. Pt should take his medication as prescribed.         Situation/Relevant Background Info: Pt is a 59 year old male with a diagnosis of Major Depressive Disorder and an extensive history of polysubstance abuse (cocaine, THC, K2, meth, alcohol) along with numerous behavioral health hospitalizations.     Pt appears to have unstable housing and homelessness issues. In the past, pt had reported he lived with a girlfriend but also stated in 2023 that they broke up and he wasn't living with her any longer. Pt had been residing at the [place] [facility] at times.  Pt  appears to be mostly non-compliant with any mental health care and psychiatric medications. Pt did not appear to follow his discharge plans when discharged from behavioral health units. Pt has come to ED's and repeatedly stated that he was not taking his medications. Pt had used [facility] and [facility] in the past.  Pt appears to go from one behavioral health hospitalization to the next and seems to utilize the ED's as his main coping skill, mental health provider and PCP resource. Pt was on the [facility] waiting list for mental health providers as of 12/2023.              Diagnoses/Significant Problems (Medical & Psychiatric):    Major depressive disorder, recurrent, severe with psychotic features (HCC)             Cocaine abuse, continuous (HCC)     Cannabis abuse, continuous     Alcohol abuse, episodic     Abnormal EKG     Unspecified mood (affective) disorder (HCC)                      Drivers of repeated utilization:   homelessness, chronic polysubstance abuse, non-compliance with community mental health care and medications, limited or no supports, limited to poor coping skills                                               Existing Community Resources & Supports:                  Unknown  [facility] Nurses [phone]  [facility] Medicine [phone] or [phone].                 Patient Medical & Psychiatric Care Team:  None known at this time.  Pt was on the waiting list at [facility] as of 12/2023.    Care plan date: 04/09/24                   Author:  Ana Washington RN                 Date reviewed with patient:

## 2024-07-30 ENCOUNTER — HOSPITAL ENCOUNTER (EMERGENCY)
Facility: HOSPITAL | Age: 59
Discharge: HOME | End: 2024-07-31
Attending: EMERGENCY MEDICINE
Payer: COMMERCIAL

## 2024-07-30 DIAGNOSIS — R45.851 SUICIDAL IDEATIONS: ICD-10-CM

## 2024-07-30 DIAGNOSIS — R45.850 HOMICIDAL IDEATIONS: ICD-10-CM

## 2024-07-30 DIAGNOSIS — F32.A DEPRESSION: Primary | ICD-10-CM

## 2024-07-30 LAB
ALBUMIN SERPL BCG-MCNC: 3.8 G/DL (ref 3.5–5)
ALP SERPL-CCNC: 78 U/L (ref 34–104)
ALT SERPL W P-5'-P-CCNC: 33 U/L (ref 7–52)
ANION GAP SERPL CALCULATED.3IONS-SCNC: 6 MMOL/L (ref 4–13)
AST SERPL W P-5'-P-CCNC: 37 U/L (ref 13–39)
BASOPHILS # BLD AUTO: 0.05 THOUSANDS/ÂΜL (ref 0–0.1)
BASOPHILS NFR BLD AUTO: 1 % (ref 0–1)
BILIRUB SERPL-MCNC: 0.3 MG/DL (ref 0.2–1)
BUN SERPL-MCNC: 12 MG/DL (ref 5–25)
CALCIUM SERPL-MCNC: 8.9 MG/DL (ref 8.4–10.2)
CHLORIDE SERPL-SCNC: 104 MMOL/L (ref 96–108)
CO2 SERPL-SCNC: 26 MMOL/L (ref 21–32)
CREAT SERPL-MCNC: 1.19 MG/DL (ref 0.6–1.3)
EOSINOPHIL # BLD AUTO: 0.29 THOUSAND/ÂΜL (ref 0–0.61)
EOSINOPHIL NFR BLD AUTO: 4 % (ref 0–6)
ERYTHROCYTE [DISTWIDTH] IN BLOOD BY AUTOMATED COUNT: 14.3 % (ref 11.6–15.1)
ETHANOL EXG-MCNC: 0 MG/DL
GFR SERPL CREATININE-BSD FRML MDRD: 65 ML/MIN/1.73SQ M
GLUCOSE SERPL-MCNC: 88 MG/DL (ref 65–140)
HCT VFR BLD AUTO: 40.4 % (ref 36.5–49.3)
HGB BLD-MCNC: 13.1 G/DL (ref 12–17)
IMM GRANULOCYTES # BLD AUTO: 0.04 THOUSAND/UL (ref 0–0.2)
IMM GRANULOCYTES NFR BLD AUTO: 1 % (ref 0–2)
LYMPHOCYTES # BLD AUTO: 1.87 THOUSANDS/ÂΜL (ref 0.6–4.47)
LYMPHOCYTES NFR BLD AUTO: 24 % (ref 14–44)
MCH RBC QN AUTO: 25.3 PG (ref 26.8–34.3)
MCHC RBC AUTO-ENTMCNC: 32.4 G/DL (ref 31.4–37.4)
MCV RBC AUTO: 78 FL (ref 82–98)
MONOCYTES # BLD AUTO: 0.89 THOUSAND/ÂΜL (ref 0.17–1.22)
MONOCYTES NFR BLD AUTO: 11 % (ref 4–12)
NEUTROPHILS # BLD AUTO: 4.72 THOUSANDS/ÂΜL (ref 1.85–7.62)
NEUTS SEG NFR BLD AUTO: 59 % (ref 43–75)
NRBC BLD AUTO-RTO: 0 /100 WBCS
PLATELET # BLD AUTO: 219 THOUSANDS/UL (ref 149–390)
PMV BLD AUTO: 9.6 FL (ref 8.9–12.7)
POTASSIUM SERPL-SCNC: 3.6 MMOL/L (ref 3.5–5.3)
PROT SERPL-MCNC: 7.1 G/DL (ref 6.4–8.4)
RBC # BLD AUTO: 5.17 MILLION/UL (ref 3.88–5.62)
SODIUM SERPL-SCNC: 136 MMOL/L (ref 135–147)
T4 FREE SERPL-MCNC: 0.76 NG/DL (ref 0.61–1.12)
TSH SERPL DL<=0.05 MIU/L-ACNC: 0.35 UIU/ML (ref 0.45–4.5)
WBC # BLD AUTO: 7.86 THOUSAND/UL (ref 4.31–10.16)

## 2024-07-30 PROCEDURE — 80053 COMPREHEN METABOLIC PANEL: CPT | Performed by: PHYSICIAN ASSISTANT

## 2024-07-30 PROCEDURE — 85025 COMPLETE CBC W/AUTO DIFF WBC: CPT | Performed by: PHYSICIAN ASSISTANT

## 2024-07-30 PROCEDURE — 84443 ASSAY THYROID STIM HORMONE: CPT | Performed by: PHYSICIAN ASSISTANT

## 2024-07-30 PROCEDURE — 93005 ELECTROCARDIOGRAM TRACING: CPT

## 2024-07-30 PROCEDURE — G0427 INPT/ED TELECONSULT70: HCPCS | Performed by: STUDENT IN AN ORGANIZED HEALTH CARE EDUCATION/TRAINING PROGRAM

## 2024-07-30 PROCEDURE — 84439 ASSAY OF FREE THYROXINE: CPT | Performed by: PHYSICIAN ASSISTANT

## 2024-07-30 PROCEDURE — 99285 EMERGENCY DEPT VISIT HI MDM: CPT | Performed by: PHYSICIAN ASSISTANT

## 2024-07-30 PROCEDURE — 82075 ASSAY OF BREATH ETHANOL: CPT | Performed by: PHYSICIAN ASSISTANT

## 2024-07-30 PROCEDURE — 99285 EMERGENCY DEPT VISIT HI MDM: CPT

## 2024-07-30 PROCEDURE — 36415 COLL VENOUS BLD VENIPUNCTURE: CPT | Performed by: PHYSICIAN ASSISTANT

## 2024-07-30 RX ORDER — QUETIAPINE FUMARATE 25 MG/1
50 TABLET, FILM COATED ORAL ONCE
Status: COMPLETED | OUTPATIENT
Start: 2024-07-30 | End: 2024-07-30

## 2024-07-30 RX ADMIN — QUETIAPINE FUMARATE 50 MG: 25 TABLET ORAL at 22:02

## 2024-07-30 NOTE — ED NOTES
Behavioral Health High Utilizer Plan Reviewed:    Summary: Behavioral Health High Utilizer Careplan     Patient Name:Ananth Jaffe MRN:  [id number]         : 1965     Age: 59 y.o.    Sex: male   Utilization History:  (# of ED visits & IP admits; reasons)  Pt had 7 [facility]-ED visits from 3/2023-3/2024. ED presentations were related to chronic polysubstance use (cocaine, THC, K2, meth, alcohol), unstable housing/homelessness, relationship issues (break-up with girlfriend, death of mother & brother), SI with no plan or with a plan to walk into traffic or in front of a vehicle, jump off a bridge, cut himself/cut his throat, overdose on pills or cocaine, HI towards ex-girlfriend to strangle her, her boyfriend or people in general, AH, laying on the streets following substance use.       Medical presentations were related to vomiting, back pain, generalized body pain, non-compliance with medications, chest pain.       Treatment Recommendations & Presentation:  Presentation in the ED: Pt typically presents himself or is accompanied by EMS/police to ED's. ED visits were related to chronic polysubstance use (cocaine, THC, K2, meth, alcohol), unstable housing/homelessness, relationship issues (break-up with girlfriend, death of mother & brother), SI with no plan or with a plan to walk into traffic or in front of a vehicle, jump off a bridge, cut himself/cut his throat, overdose on pills or cocaine, HI towards ex-girlfriend to strangle her, her boyfriend or people in general, AH, laying on the streets following substance use.       Medical presentations were related to vomiting, back pain, generalized body pain, non-compliance with medications, chest pain.           ED Recommendations: Following medical evaluation and treatment, allow pt some time for de-escalation and for Provider to establish acute risk versus pt's chronic behavioral disturbances. Pt can be directed to [facility] for inpt addictions treatment or dual  treatment.   If pt is not interested in inpt addictions rehab he can utilize [facility] at [address] [facility] in [place] [phone], [facility] addictions and mental health providers at [address] [facility] [phone], the  [facility] program at [address] [facility] in [place] [phone]. Pt should also call [facility] in [place] to see where he is on their call list to set up an appointment.   Pt should go to [facility] for his medical problems [phone] at [address] [facility] or [facility] Medicine clinics at [address] [facility] [phone] or [phone]. Pt should have [facility] ICM services and go to [facility] to talk about that at [address] [facility] in [place] and also talk to [facility] at that same location about his housing issues [phone].  Pt can also go to the [facility] in [place] during the day at [address] [facility] or the [facility] at [address] [facility].  Pt has utilized [facility] Nurses [phone] and [facility] Medicine in the past .        Home Medication Regimen: see most recent documentation in Epic.          Recent Medical Work Ups:  (include Psych testing or ECT)     ECG - 2023  Xrays - 2023  CT - 2023 Inpatient Recommendations: Collaborate with pt's community sources to develop a comprehensive discharge plan. Pt should have an ICM.          Labs - 2023, 2024    Outpatient recommendations: Pt should continue his community mental health and medical treatment. Pt should take his medication as prescribed.          Situation/Relevant Background Info: Pt is a 59 year old male with a diagnosis of Major Depressive Disorder and an extensive history of polysubstance abuse (cocaine, THC, K2, meth, alcohol) along with numerous behavioral health hospitalizations.     Pt appears to have unstable housing and homelessness issues. In the past, pt had reported he lived with a girlfriend but also stated in 2023 that they broke  up and he wasn't living with her any longer. Pt had been residing at the [facility] at times.  Pt appears to be mostly non-compliant with any mental health care and psychiatric medications. Pt did not appear to follow his discharge plans when discharged from behavioral health units. Pt has come to ED's and repeatedly stated that he was not taking his medications. Pt had used [facility] and [facility] in the past.  Pt appears to go from one behavioral health hospitalization to the next and seems to utilize the ED's as his main coping skill, mental health provider and PCP resource. Pt was on the [facility] waiting list for mental health providers as of 12/2023.                 Diagnoses/Significant Problems (Medical & Psychiatric):     Major depressive disorder, recurrent, severe with psychotic features (HCC)             Cocaine abuse, continuous (HCC)     Cannabis abuse, continuous     Alcohol abuse, episodic     Abnormal EKG     Unspecified mood (affective) disorder (HCC)                         Drivers of repeated utilization:   homelessness, chronic polysubstance abuse, non-compliance with community mental health care and medications, limited or no supports, limited to poor coping skills                                                  Existing Community Resources & Supports:                  Unknown  [facility] Nurses [phone]  [facility] Medicine [phone] or [phone].                  Patient Medical & Psychiatric Care Team:  None known at this time.  Pt was on the waiting list at [facility] as of 12/2023.             Joel Woods  Crisis Intervention Specialist II  07/30/24

## 2024-07-30 NOTE — ED NOTES
"Patient presents to the Emergency Department via self referral reporting he took some pills in an attempt to harm himself. Patient is very familiar to this [facility], and has an active Behavioral Health High Utilizer Plan. Patient is irritable but cooperative, and agrees to speak with this writer. Patient is alert and oriented across all spheres, has a labile affect, is preoccupied with getting something to eat, speech is mostly logical and coherent, mood is depressed. Patient reports he has been going through a lot of depression lately. Patient reports he was working through his grief over losing his sister when his girlfriend left him for a friend of his. Patient reports when this happened he started \"drugging and boozing\" and hasn't stopped for the past three days. Patient reports using cocaine, marijuana and pills. Patient reports he took 4 or 5 pills he found outside in [place] and hoped he would die after taking them. Patient still endorses passive suicidal ideation. Patient also reports vague homicidal ideation toward the friend that took his girlfriend, won't identify who this person is. Patient denies auditory/visual/tactile hallucinations. Patient reports he has not been taking his psychotropic medications, and when asked if he was seeing a therapist or psychiatrist would only respond that he was \"doing good\". Patient is preoccupied with getting a sandwich to eat at this time, reporting his stomach hurts and he hasn't eaten in three days. Patient does not report on his recent sleep patterns.    [facility] reviewed with LORA Rodriguez. Patient will be given time to de-escalate,a michael [facility] psychiatry will be consulted to assist in determining if patient is at baseline or at acute risk and in need of emergent inpatient psychiatric treatment.    Joel Woods  Crisis Intervention Specialist II  07/30/24   "

## 2024-07-31 ENCOUNTER — TELEPHONE (OUTPATIENT)
Dept: PSYCHIATRY | Facility: CLINIC | Age: 59
End: 2024-07-31

## 2024-07-31 VITALS
OXYGEN SATURATION: 100 % | TEMPERATURE: 98.6 F | BODY MASS INDEX: 21.2 KG/M2 | WEIGHT: 135.36 LBS | DIASTOLIC BLOOD PRESSURE: 94 MMHG | SYSTOLIC BLOOD PRESSURE: 130 MMHG | RESPIRATION RATE: 16 BRPM | HEART RATE: 57 BPM

## 2024-07-31 LAB
ATRIAL RATE: 65 BPM
P AXIS: 71 DEGREES
PR INTERVAL: 156 MS
QRS AXIS: 99 DEGREES
QRSD INTERVAL: 90 MS
QT INTERVAL: 390 MS
QTC INTERVAL: 405 MS
T WAVE AXIS: -73 DEGREES
VENTRICULAR RATE: 65 BPM

## 2024-07-31 PROCEDURE — 93010 ELECTROCARDIOGRAM REPORT: CPT | Performed by: INTERNAL MEDICINE

## 2024-07-31 NOTE — ED NOTES
Assumed care of pt at this time. Pt resting in bed with eyes closed, respirations are non-labored and equal. 1:1 continued.      Natalie Varma RN  07/31/24 0778

## 2024-07-31 NOTE — ED CARE HANDOFF
"Emergency Department Sign Out Note        Sign out and transfer of care from Rosy Sanchez PA-C. See Separate Emergency Department note.     The patient, Ananth Jaffe, was evaluated by the previous provider for suicidal ideations.    Workup Completed:  Results Reviewed       Procedure Component Value Units Date/Time    T4, free [[id number]]  (Normal) Collected: 07/30/24 2025    Lab Status: Final result Specimen: Blood from Arm, Right Updated: 07/30/24 2249     Free T4 0.76 ng/dL     Narrative:        \"Therapeutic range for patients medicated with thyroid disorders: 0.61-1.24 ng/dL.\"    TSH, 3rd generation with Free T4 reflex [[id number]]  (Abnormal) Collected: 07/30/24 2025    Lab Status: Final result Specimen: Blood from Arm, Right Updated: 07/30/24 2113     TSH 3RD GENERATON 0.346 uIU/mL     Comprehensive metabolic panel [[id number]] Collected: 07/30/24 2025    Lab Status: Final result Specimen: Blood from Arm, Right Updated: 07/30/24 2056     Sodium 136 mmol/L      Potassium 3.6 mmol/L      Chloride 104 mmol/L      CO2 26 mmol/L      ANION GAP 6 mmol/L      BUN 12 mg/dL      Creatinine 1.19 mg/dL      Glucose 88 mg/dL      Calcium 8.9 mg/dL      AST 37 U/L      ALT 33 U/L      Alkaline Phosphatase 78 U/L      Total Protein 7.1 g/dL      Albumin 3.8 g/dL      Total Bilirubin 0.30 mg/dL      eGFR 65 ml/min/1.73sq m     Narrative:      National Kidney Disease Foundation guidelines for Chronic Kidney Disease (CKD):     Stage 1 with normal or high GFR (GFR > 90 mL/min/1.73 square meters)    Stage 2 Mild CKD (GFR = 60-89 mL/min/1.73 square meters)    Stage 3A Moderate CKD (GFR = 45-59 mL/min/1.73 square meters)    Stage 3B Moderate CKD (GFR = 30-44 mL/min/1.73 square meters)    Stage 4 Severe CKD (GFR = 15-29 mL/min/1.73 square meters)    Stage 5 End Stage CKD (GFR <15 mL/min/1.73 square meters)  Note: GFR calculation is accurate only with a steady state creatinine    CBC and differential [[id number]]  (Abnormal) " Collected: 07/30/24 2025    Lab Status: Final result Specimen: Blood from Arm, Right Updated: 07/30/24 2038     WBC 7.86 Thousand/uL      RBC 5.17 Million/uL      Hemoglobin 13.1 g/dL      Hematocrit 40.4 %      MCV 78 fL      MCH 25.3 pg      MCHC 32.4 g/dL      RDW 14.3 %      MPV 9.6 fL      Platelets 219 Thousands/uL      nRBC 0 /100 WBCs      Segmented % 59 %      Immature Grans % 1 %      Lymphocytes % 24 %      Monocytes % 11 %      Eosinophils Relative 4 %      Basophils Relative 1 %      Absolute Neutrophils 4.72 Thousands/µL      Absolute Immature Grans 0.04 Thousand/uL      Absolute Lymphocytes 1.87 Thousands/µL      Absolute Monocytes 0.89 Thousand/µL      Eosinophils Absolute 0.29 Thousand/µL      Basophils Absolute 0.05 Thousands/µL     UA w Reflex to Microscopic w Reflex to Culture [[id number]]     Lab Status: No result Specimen: Urine     POCT alcohol breath test [[id number]]  (Normal) Resulted: 07/30/24 1956    Lab Status: Final result Updated: 07/30/24 1956     [facility] Alcohol 0.00    Rapid drug screen, urine [[id number]]     Lab Status: No result Specimen: Urine               ED Course / Workup Pending (followup):          ED Course as of 07/31/24 0758   Wed Jul 31, 2024   0558 S/o from KAREN PAIldefonsoC: SI with plan, took pills a few days ago to kill himself. New psych consult today.    0724 Pt denies SI, HI, would like to go home.      Procedures  Medical Decision Making  I reviewed the psych consult from last night and they were unable to offer him a 201 because they felt like he was under the influence and they recommended re-eval this morning. Pt was re-evaluated by me and he denied SI, HI. Stating he wished to go home.     I have discussed the plan to discharge pt from ED. The patient was discharged in stable condition.  Patient ambulated off the department.  Extensive return to emergency department precautions were discussed.  Follow up with appropriate providers including primary care physician was  discussed.  Patient and/or their  primary decision maker expressed understanding.  Patient remained stable during entire emergency department stay.      Amount and/or Complexity of Data Reviewed  Labs: ordered.    Risk  Prescription drug management.            Disposition  Final diagnoses:   Depression   Suicidal ideations   Homicidal ideations     Time reflects when diagnosis was documented in both MDM as applicable and the Disposition within this note       Time User Action Codes Description Comment    7/30/2024  7:53 PM Milly Rodriguez Add [F32.A] Depression     7/30/2024 10:52 PM Milly Rodriguez Add [R45.851] Suicidal ideations     7/30/2024 10:52 PM Milly Rodriguez Add [R45.850] Homicidal ideations           ED Disposition       ED Disposition   Discharge    Condition   Stable    Date/Time   Wed Jul 31, 2024  7:25 AM    Comment   Ananth Jaffe discharge to home/self care.                   MD Documentation      Flowsheet Row Most Recent Value   Sending MD Dr. Piedad Guallpa          Follow-up Information    None       Patient's Medications   Discharge Prescriptions    No medications on file     No discharge procedures on file.       ED Provider  Electronically Signed by     Levy Choudhary PA-C  07/31/24 0759

## 2024-07-31 NOTE — TELEPHONE ENCOUNTER
Pt was evaluated by cathy last night and per RN the new consult is not needed as pt will be d/c today.

## 2024-07-31 NOTE — CONSULTS
TeleConsultation - Behavioral Health   Ananth Jaffe 59 y.o. male MRN: [id number]  Unit/Bed#: [place] [address] Encounter: [id number]        REQUIRED DOCUMENTATION:     1. This service was provided via Telemedicine.  2. Provider located off-site in private office  3. TeleMed provider: Randolph Keyes MD.  4. Identify all parties in room with patient during tele consult:  Patient  5.Patient was then informed that this was a Telemedicine visit and that the exam was being conducted confidentially over secure lines. My office door was closed. No one else was in the room.  Patient acknowledged consent and understanding of privacy and security of the Telemedicine visit, and gave us permission to have the assistant stay in the room in order to assist with the history and to conduct the exam.  I informed the patient that I have reviewed their record in Epic and presented the opportunity for them to ask any questions regarding the visit today.  The patient agreed to participate.       Assessment & Plan     Active Problems:  There are no active Hospital Problems.        Assessment  -Unspecified psychotic d/o, r/o Substance induced psychosis  -MDD  -polysubstance use (cocaine, THC, K2, meth, alcohol)    Recommendations & Treatment Plan:  -Restart Seroquel at 50mg qhs (first dose now) and re-evaluate tomorrow for final disposition after sleeping overnight. If patient continues to endorse SI and HI, may need inpatient psychiatric admission. Patient remains on psychiatric hold until re-evaluation due to currently expressing SI and HI.       Findings and recommendations communicated to primary team/staff.    Current Medications:       Risks / Benefits of Treatment:  Risks, benefits, and possible side effects of medications explained to patient and patient verbalizes understanding.      Inpatient consult to Psychiatry  Consult performed by: Randolph Keyes MD  Consult ordered by: Milly Rodriguez PA-C      Physician Requesting  "Consult: Piedad Guallpa*  Principal Problem:<principal problem not specified>    Reason for Consult:  Psych eval      History of Present Illness:  60yo M with hx of MDD and polysubstance abuse presented to ER by self referral where PES crisis eval was done and reported that patient stated he \"took some pills in an attempt to harm himself. Patient.. has an active Behavioral Health High Utilizer Plan. ..Patient reports he has been going through a lot of depression lately. Patient reports he was working through his grief over losing his sister when his girlfriend left him for a friend of his. Patient reports when this happened he started \"drugging and boozing\" and hasn't stopped for the past three days. Patient reports using cocaine, marijuana and pills. Patient reports he took 4 or 5 pills he found outside in [place] and hoped he would die after taking them. Patient still endorses passive suicidal ideation. Patient also reports vague homicidal ideation toward the friend that took his girlfriend, won't identify who this person is.\" Upon interview, patient states he is feeling very depressed, hopeless, and wants to kill himself and his girlfriend's new boyfriend. States that \"if I leave here tonight, somebody's going down.\" Patient states he has AH telling him he is \"not good enough.\" Patient reports he has not been taking his psychotropic medications. Patient states he hasn't slept or eaten in 3 days. He reports use of cocaine, alcohol, and marijuana to self medicate. No report of withdrawal symptoms at this time. Reports having SI and intent but vague plans, \"Maybe I'll tie something around my neck and push myself off the chair.\"      Psychiatric Review Of Systems:  Negative except as reported or endorsed in HPI    Historical Information     Past Psychiatric History:   Patient is on a  High Utilizer Plan - see note    Psychiatric Hospitalizations:   Multiple  Outpatient Treatment:   Patient currently " denies seeing a psychiatrist  Suicide Attempts:   Yes   Current Psychotropic regimen:none  Past Psychiatric medication trials: Abilify    Substance Abuse History:  chronic polysubstance use (cocaine, THC, K2, meth, alcohol)   Patient reports recently using cocaine  Denies opiates  Drinks 3-4 cans of beer or 1 pint liquor every day or every other day    Family Psychiatric History:    Per chart, History reviewed. No pertinent family history.      Social History:  Per chart  Social History     Socioeconomic History    Marital status: Single     Spouse name: None    Number of children: None    Years of education: None    Highest education level: None   Occupational History    None   Tobacco Use    Smoking status: Former     Current packs/day: 1.00     Types: Cigarettes    Smokeless tobacco: Never   Vaping Use    Vaping status: Never Used   Substance and Sexual Activity    Alcohol use: Yes     Alcohol/week: 3.0 standard drinks of alcohol     Types: 3 Cans of beer per week     Comment: 3 cans beer daily    Drug use: Yes     Types: Marijuana, Cocaine     Comment: last use cocaine was last night 12/6/23    Sexual activity: None   Other Topics Concern    None   Social History Narrative    None     Social Determinants of Health     Financial Resource Strain: Not on file   Food Insecurity: Not on file   Transportation Needs: Not on file   Physical Activity: Not on file   Stress: Not on file   Social Connections: Not on file   Intimate Partner Violence: Not on file   Housing Stability: Not on file       Past Medical History:   Diagnosis Date    Abnormal EKG     Depression     Substance use        Medical Review Of Systems:    Review of Systems    Meds/Allergies     all current active meds have been reviewed  No Known Allergies    Objective     Vital signs in last 24 hours:  Temp:  [98.6 °F (37 °C)] 98.6 °F (37 °C)  HR:  [81] 81  Resp:  [17] 17  BP: (120)/(68) 120/68    No intake or output data in the 24 hours ending 07/30/24  2109    Mental Status Evaluation:    Appearance:  disheveled   Behavior:  Irritable but cooperative   Speech:  normal pitch and normal volume   Mood:  depressed   Affect:  irritable   Thought Process:  goal directed, linear   Associations intact associations   Thought Content:  normal   Perceptual Disturbances: Auditory hallucinations without commands   Risk Potential: Suicidal Ideations with multiple vague plans  Homicidal Ideations without plan   Sensorium:  person, place, and situation   Cognition:  recent and remote memory grossly intact   Consciousness:  awake   Attention: attention span and concentration were age appropriate   Insight:  limited   Judgment: limited       Lab Results: I have personally reviewed all pertinent laboratory/tests results.     Most Recent Labs: @RESUFAST(WBC,RBC,HGB,HCT,PLT, RBC,RDW,NEUTROABS,SODIUM,K,CL,CO2,BUN,CREATININE,GLUC,GLUF,CALCIUM,AST,ALT,ALKPHOS,TP,ALB,TBILI,CHOLESTEROL,HDL,TRIG,LDLCALC,NONHDLC,VALPROICTOT,CARBAMAZEPIN,LITHIUM,AMMONIA,AFE6XCREIYLX,FREET4,T3FREE,EXTPREGUR,PREGSERUM,HCG,HCGQUANT,RPR,HGBA1C,EAG)@    Imaging Studies: No results found.  EKG/Pathology/Other Studies:   Lab Results   Component Value Date    VENTRATE 65 07/30/2024    ATRIALRATE 65 07/30/2024    PRINT 156 07/30/2024    QRSDINT 90 07/30/2024    QTINT 390 07/30/2024    QTCINT 405 07/30/2024    PAXIS 71 07/30/2024    QRSAXIS 99 07/30/2024    TWAVEAXIS -73 07/30/2024        Code Status: Prior  Advance Directive and Living Will:      Power of :    POLST:      Screenings:    1. Nutrition Screening  Nutrition Assessment (completed by Staff): Nutrition  Appetite: Poor (Patient reports hasn't eaten in 3 days because he has been drugging and boozing)  Nutrition Screen:     Nutrition Assessment:      2. Pain Screening  Pain Screening:    Not available on chart    3. Suicide Screening  ED Crisis Suicide Risk Assessment: Suicide Risk Assessment  Violence Risk to Self: Yes- Within the past 6  months  Description of self harming behaviors or thoughts:: Vague, no plan reported to [facility]; Did report taking 4-5 pills he found a few days ago and hoped to die, unsure what the pills were; History of SI  Protective Factors: The patient has hope for the future    C-SSRS Screening (Nursing Assessment - recent):    C-SSRS Screening (Nursing Assessment - since last contact):                                                             [place]-SUICIDE SEVERITY RATING SCALE (C-SSRS)                            1. In the last month have you wished you were dead or wished you could go to sleep and not wake up? Yes  2. In the last month, have you actually had thoughts about killing yourself?Yes  3. Have you been thinking about how you might do this? Yes  4. Have you had these thoughts and had some intention of acting on them? Yes  5. Have you started to work out or worked out the details of how to kill yourself? Did you intend to carry out this plan? Yes  6. Have you done anything, started to do anything, or prepared to do anything to end your life in the last 3 months? Yes  a) What about in your lifetime? Yes  6. While you were here in the hospital, gave you done anything, started to do anything, or prepared to do anything to end your life? No  Suicide Risk Level :  High  Level of observation: Start 1:1 level of observation for patient safety     Counseling / Coordination of Care:    Total floor / unit time spent today 50 minutes. Greater than 50% of total time was spent with the patient and / or family counseling and / or coordination of care. A description of the counseling / coordination of care: Direct Patient Care, Chart Review, and Documentation     Disclaimer: Portions of this note may have been generated by a front end voice activated word recognition program. There may be typographical grammar or word substitution errors generated by the program or my typing skills in this note that may have been escaped my  editorial review.

## 2024-07-31 NOTE — ED NOTES
Pt changed into paper scrubs at this time. Belongings placed in locker 21.     Natalie Varma RN  07/31/24 0711

## 2024-07-31 NOTE — ED NOTES
Patient had time to sleep and denies suicidal and homicidal ideations at this time.  He says he feels much better and will follow with his outpatient providers. He is asking when he can leave but wanted to order breakfast first.  Once he finishes eating he will discharge back home.

## 2024-07-31 NOTE — ED NOTES
Patient sleeping in bed, chest movement noted. Will defer VS at this time unless patient wakes up.     Vanessa Arnold RN  07/31/24 9502

## 2024-07-31 NOTE — ED PROVIDER NOTES
History  Chief Complaint   Patient presents with    Psychiatric Evaluation     Pt reports taking pills 2 days ago that he found on the ground to try to end his life. Pt reports he is unsure what pills he took. Pt reports still feeling suicidal.      59y.o male with PMH of depression and substance abuse presents to the ER for psychiatric evaluation. Patient reports SI. Patient states a few days ago, he took 4-5 unknown pills in an attempt to kill himself. He states he took this medication out of a bottle that he found on the street in [place]. Patient states he is depressed because his sister recently passed away and his girlfriend left him for his best friend. He reports HI towards his best friend for taking his girlfriend. He denies AH or VH. He does not see a psychiatrist or therapist. He reports he has not been taking his medications as prescribed. He has been drinking alcohol and using drugs specifically marijuana and cocaine. He reports abdominal pain that he attributes to being hungry. He denies fever, chills, URI symptoms, chest pain, dyspnea, N/V/D, weakness or paresthesias.      History provided by:  Patient   used: No        Prior to Admission Medications   Prescriptions Last Dose Informant Patient Reported? Taking?   ARIPiprazole (ABILIFY) 10 mg tablet Not Taking  No No   Sig: Take 1 tablet (10 mg total) by mouth daily   Patient not taking: Reported on 11/28/2022   escitalopram (LEXAPRO) 10 mg tablet Not Taking  Yes No   Sig: Take 1 tablet by mouth every morning   Patient not taking: Reported on 10/9/2023   lisinopril (ZESTRIL) 5 mg tablet Not Taking  Yes No   Sig: Take 5 mg by mouth every morning   Patient not taking: Reported on 10/9/2023   metoprolol succinate (TOPROL-XL) 25 mg 24 hr tablet Not Taking  Yes No   Sig: Take 25 mg by mouth   Patient not taking: Reported on 10/9/2023      Facility-Administered Medications: None       Past Medical History:   Diagnosis Date    Abnormal  EKG     Depression     Substance use        Past Surgical History:   Procedure Laterality Date    NO PAST SURGERIES         History reviewed. No pertinent family history.  I have reviewed and agree with the history as documented.    E-Cigarette/Vaping    E-Cigarette Use Never User      E-Cigarette/Vaping Substances    Nicotine No     THC No     CBD No     Flavoring No     Other No     Unknown No      Social History     Tobacco Use    Smoking status: Former     Current packs/day: 1.00     Types: Cigarettes    Smokeless tobacco: Never   Vaping Use    Vaping status: Never Used   Substance Use Topics    Alcohol use: Yes     Alcohol/week: 3.0 standard drinks of alcohol     Types: 3 Cans of beer per week     Comment: 3 cans beer daily    Drug use: Yes     Types: Marijuana, Cocaine     Comment: last use cocaine was last night 12/6/23       Review of Systems   Constitutional:  Negative for activity change, appetite change, chills and fever.   HENT:  Negative for congestion, drooling, ear discharge, ear pain, facial swelling, rhinorrhea and sore throat.    Eyes:  Negative for redness.   Respiratory:  Negative for cough and shortness of breath.    Cardiovascular:  Negative for chest pain.   Gastrointestinal:  Positive for abdominal pain (from being hungry per the patient). Negative for diarrhea, nausea and vomiting.   Musculoskeletal:  Negative for neck stiffness.   Skin:  Negative for rash.   Allergic/Immunologic: Negative for food allergies.   Neurological:  Negative for weakness and numbness.   Psychiatric/Behavioral:  Positive for suicidal ideas. Negative for hallucinations.        Physical Exam  Physical Exam  Vitals and nursing note reviewed.   Constitutional:       General: He is active. He is not in acute distress.     Appearance: He is not toxic-appearing.   HENT:      Head: Normocephalic and atraumatic.      Mouth/Throat:      Lips: Pink. No lesions.      Mouth: Mucous membranes are moist.   Eyes:       Conjunctiva/sclera: Conjunctivae normal.   Neck:      Trachea: No tracheal deviation.   Cardiovascular:      Rate and Rhythm: Normal rate and regular rhythm.      Heart sounds: Normal heart sounds, S1 normal and S2 normal. No murmur heard.     No friction rub. No gallop.   Pulmonary:      Effort: Pulmonary effort is normal. No respiratory distress.      Breath sounds: Normal breath sounds. No decreased breath sounds, wheezing, rhonchi or rales.   Chest:      Chest wall: No tenderness.   Abdominal:      General: Bowel sounds are normal. There is no distension.      Palpations: Abdomen is soft.      Tenderness: There is no abdominal tenderness. There is no guarding or rebound.   Musculoskeletal:      Cervical back: Normal range of motion and neck supple.   Skin:     General: Skin is warm and dry.      Findings: No rash.   Neurological:      Mental Status: He is alert.      GCS: GCS eye subscore is 4. GCS verbal subscore is 5. GCS motor subscore is 6.   Psychiatric:         Attention and Perception: He does not perceive auditory or visual hallucinations.         Mood and Affect: Mood is depressed. Affect is flat.         Speech: Speech normal.         Behavior: Behavior is cooperative.         Thought Content: Thought content includes homicidal and suicidal ideation. Thought content includes suicidal plan. Thought content does not include homicidal plan.         Vital Signs  ED Triage Vitals   Temperature Pulse Respirations Blood Pressure SpO2   07/30/24 1913 07/30/24 1913 07/30/24 1913 07/30/24 1913 07/30/24 1913   98.6 °F (37 °C) 81 17 120/68 98 %      Temp Source Heart Rate Source Patient Position - Orthostatic VS BP Location FiO2 (%)   07/30/24 1913 07/30/24 1913 07/30/24 1913 07/30/24 1913 --   Oral Monitor Sitting Right arm       Pain Score       07/30/24 2300       No Pain           Vitals:    07/30/24 1913 07/31/24 0030   BP: 120/68 114/70   Pulse: 81 (!) 54   Patient Position - Orthostatic VS: Sitting Lying  "        Visual Acuity      ED Medications  Medications   QUEtiapine (SEROquel) tablet 50 mg (50 mg Oral Given 7/30/24 2202)       Diagnostic Studies  Results Reviewed       Procedure Component Value Units Date/Time    T4, free [[id number]]  (Normal) Collected: 07/30/24 2025    Lab Status: Final result Specimen: Blood from Arm, Right Updated: 07/30/24 2249     Free T4 0.76 ng/dL     Narrative:        \"Therapeutic range for patients medicated with thyroid disorders: 0.61-1.24 ng/dL.\"    TSH, 3rd generation with Free T4 reflex [[id number]]  (Abnormal) Collected: 07/30/24 2025    Lab Status: Final result Specimen: Blood from Arm, Right Updated: 07/30/24 2113     TSH 3RD GENERATON 0.346 uIU/mL     Comprehensive metabolic panel [[id number]] Collected: 07/30/24 2025    Lab Status: Final result Specimen: Blood from Arm, Right Updated: 07/30/24 2056     Sodium 136 mmol/L      Potassium 3.6 mmol/L      Chloride 104 mmol/L      CO2 26 mmol/L      ANION GAP 6 mmol/L      BUN 12 mg/dL      Creatinine 1.19 mg/dL      Glucose 88 mg/dL      Calcium 8.9 mg/dL      AST 37 U/L      ALT 33 U/L      Alkaline Phosphatase 78 U/L      Total Protein 7.1 g/dL      Albumin 3.8 g/dL      Total Bilirubin 0.30 mg/dL      eGFR 65 ml/min/1.73sq m     Narrative:      National Kidney Disease Foundation guidelines for Chronic Kidney Disease (CKD):     Stage 1 with normal or high GFR (GFR > 90 mL/min/1.73 square meters)    Stage 2 Mild CKD (GFR = 60-89 mL/min/1.73 square meters)    Stage 3A Moderate CKD (GFR = 45-59 mL/min/1.73 square meters)    Stage 3B Moderate CKD (GFR = 30-44 mL/min/1.73 square meters)    Stage 4 Severe CKD (GFR = 15-29 mL/min/1.73 square meters)    Stage 5 End Stage CKD (GFR <15 mL/min/1.73 square meters)  Note: GFR calculation is accurate only with a steady state creatinine    CBC and differential [[id number]]  (Abnormal) Collected: 07/30/24 2025    Lab Status: Final result Specimen: Blood from Arm, Right Updated: 07/30/24 2038     " WBC 7.86 Thousand/uL      RBC 5.17 Million/uL      Hemoglobin 13.1 g/dL      Hematocrit 40.4 %      MCV 78 fL      MCH 25.3 pg      MCHC 32.4 g/dL      RDW 14.3 %      MPV 9.6 fL      Platelets 219 Thousands/uL      nRBC 0 /100 WBCs      Segmented % 59 %      Immature Grans % 1 %      Lymphocytes % 24 %      Monocytes % 11 %      Eosinophils Relative 4 %      Basophils Relative 1 %      Absolute Neutrophils 4.72 Thousands/µL      Absolute Immature Grans 0.04 Thousand/uL      Absolute Lymphocytes 1.87 Thousands/µL      Absolute Monocytes 0.89 Thousand/µL      Eosinophils Absolute 0.29 Thousand/µL      Basophils Absolute 0.05 Thousands/µL     UA w Reflex to Microscopic w Reflex to Culture [[id number]]     Lab Status: No result Specimen: Urine     POCT alcohol breath test [[id number]]  (Normal) Resulted: 07/30/24 1956    Lab Status: Final result Updated: 07/30/24 1956     EXTBreath Alcohol 0.00    Rapid drug screen, urine [[id number]]     Lab Status: No result Specimen: Urine                    No orders to display              Procedures  ECG 12 Lead Documentation Only    Date/Time: 7/30/2024 7:33 PM    Performed by: Milly Rodriguez PA-C  Authorized by: Milly Rodriguez PA-C    Indications / Diagnosis:  Psychiatry clearance  ECG reviewed by me, the ED Provider: yes (Also reviewed by attending)    Patient location:  ED  Interpretation:     Interpretation: abnormal    Rate:     ECG rate:  65    ECG rate assessment: normal    Rhythm:     Rhythm: sinus rhythm    Ectopy:     Ectopy: none    QRS:     QRS intervals:  Normal (90ms)  ST segments:     ST segments:  Abnormal    Elevation:  AVR    Depression:  II and V5  T waves:     T waves: inverted      Inverted:  II, III, aVF, V3, V4, V5 and V6           ED Course  ED Course as of 07/31/24 0129   Tue Jul 30, 2024 2001 EXTBreath Alcohol: 0.00   2100 Patient told RN that abdominal pain improved after eating. Will hold off on further work up for abdominal pain   2115  TSH 3RD GENERATON(!): 0.346   2136 Patient seen by Dr. Keyes from psychiatry. Plan is to give Seroquel 50mg and reassess tomorrow morning to see if patient meets inpatient criteria.   2244 WBC: 7.86   2244 Hemoglobin: 13.1   2244 Platelet Count: 219   2244 Comprehensive metabolic panel  Normal   2245 Waiting for urine and UDS.    2251 FREE T4: 0.76                                 SBIRT 20yo+      Flowsheet Row Most Recent Value   Initial Alcohol Screen: US AUDIT-C     1. How often do you have a drink containing alcohol? 6 Filed at: 07/30/2024 1915   2. How many drinks containing alcohol do you have on a typical day you are drinking?  4 Filed at: 07/30/2024 1915   3a. Male UNDER 65: How often do you have five or more drinks on one occasion? 6 Filed at: 07/30/2024 1915   Audit-C Score 16 Filed at: 07/30/2024 1915   Full Alcohol Screen: US AUDIT    4. How often during the last year have you found that you were not able to stop drinking once you had started? 0 Filed at: 07/30/2024 1915   5. How often during past year have you failed to do what was normally expected of you because of drinking?  0 Filed at: 07/30/2024 1915   6. How often in past year have you needed a first drink in the morning to get yourself going after a heavy drinking session?  0 Filed at: 07/30/2024 1915   7. How often in past year have you had feeling of guilt or remorse after drinking?  0 Filed at: 07/30/2024 1915   8. How often in past year have you been unable to remember what happened night before because you had been drinking?  0 Filed at: 07/30/2024 1915   9. Have you or someone else been injured as a result of your drinking?  0 Filed at: 07/30/2024 1915   10. Has a relative, friend, doctor or other health worker been concerned about your drinking and suggested you cut down?  0 Filed at: 07/30/2024 1915   AUDIT Total Score 16 Filed at: 07/30/2024 1915   STEPHEN: How many times in the past year have you...    Used an illegal drug or used a  "prescription medication for non-medical reasons? Daily or Almost Daily Filed at: 07/30/2024 1915   DAST-10: In the past 12 months...    1. Have you used drugs other than those required for medical reasons? 1 Filed at: 07/30/2024 1915   2. Do you use more than one drug at a time? 0 Filed at: 07/30/2024 1915   3. Have you had medical problems as a result of your drug use (e.g., memory loss, hepatitis, convulsions, bleeding, etc.)? 0 Filed at: 07/30/2024 1915   4. Have you had \"blackouts\" or \"flashbacks\" as a result of drug use?YesNo 0 Filed at: 07/30/2024 1915   5. Do you ever feel bad or guilty about your drug use? 0 Filed at: 07/30/2024 1915   6. Does your spouse (or parent) ever complain about your involvement with drugs? 0 Filed at: 07/30/2024 1915   7. Have you neglected your family because of your use of drugs? 0 Filed at: 07/30/2024 1915   8. Have you engaged in illegal activities in order to obtain drugs? 0 Filed at: 07/30/2024 1915   9. Have you ever experienced withdrawal symptoms (felt sick) when you stopped taking drugs? 0 Filed at: 07/30/2024 1915   10. Are you always able to stop using drugs when you want to? 0 Filed at: 07/30/2024 1915   DAST-10 Score 1 Filed at: 07/30/2024 1915                      Medical Decision Making  59y.o male presents to the ER for psychiatric evaluation. Vitals are stable. Patient is in no acute distress. On exam, breathing is non-labored. No tachypnea or accessory muscle use. Lungs are clear. Heart is regular rate and rhythm. Abdomen is soft and non-tender. No guarding, rigidity, distention or pulsatile masses palpated. He is calm and cooperative but has a depressed mood and flat affect. He is SI with a plan to overdose on medication. He reports HI. He denies AH or VH. Patient seen and evaluated by Crisis during my exam also. Crisis recommends that Psychiatry see this patient due to his High Utilizer Plan that is in place. Will check labs and have psychiatry see " patient.    2001 EXTBreath Alcohol: 0.00    2100 Patient told RN that abdominal pain improved after eating. Will hold off on further work up for abdominal pain    2115 TSH 3RD GENERATON(!): 0.346    2136 Patient seen by Dr. Keyes from psychiatry. Plan is to give Seroquel 50mg and reassess tomorrow morning to see if patient meets inpatient criteria.    2244 WBC: 7.86    2244 Hemoglobin: 13.1    2244 Platelet Count: 219    2244 Comprehensive metabolic panel - Normal    2245 Waiting for urine and UDS.     2251 FREE T4: 0.76    0100     Patient signed out to Rosy Sanchez PA-C awaiting psychiatry re-evaluation in the morning. Patient stable.    Problems Addressed:  Depression: acute illness or injury  Homicidal ideations: acute illness or injury  Suicidal ideations: acute illness or injury    Amount and/or Complexity of Data Reviewed  Independent Historian:      Details: Patient is historian  Labs: ordered. Decision-making details documented in ED Course.    Risk  Prescription drug management.  Decision regarding hospitalization.                 Disposition  Final diagnoses:   Depression   Suicidal ideations   Homicidal ideations     Time reflects when diagnosis was documented in both MDM as applicable and the Disposition within this note       Time User Action Codes Description Comment    7/30/2024  7:53 PM Milly Rodriguez Add [F32.A] Depression     7/30/2024 10:52 PM Milly Rodriguez Add [R45.851] Suicidal ideations     7/30/2024 10:52 PM Milly Rodriguez Add [R45.850] Homicidal ideations           ED Disposition       ED Disposition   Transfer to Behavioral Health    Condition   --    Date/Time   Tue Jul 30, 2024 2251    Comment   Ananth ARGUETA Alannah should be transferred out to behavioral health and has been medically cleared.               MD Documentation      Flowsheet Row Most Recent Value   Sending MD Dr. Piedad Guallpa          Follow-up Information    None         Patient's Medications   Discharge  Prescriptions    No medications on file       No discharge procedures on file.    PDMP Review         Value Time User    PDMP Reviewed  Yes 8/1/2022  7:40 AM Piyush Corrales MD            ED Provider  Electronically Signed by             Milly Rodriguez PA-C  07/31/24 0129

## 2024-07-31 NOTE — DISCHARGE INSTRUCTIONS
This writer discussed the patients current presentation and recommended discharge plan with .      They agree with the patient being discharged at this time with referrals and/or information about: [facility] and outpatient providers for therapy and psychiatry.      The patient was Instructed to follow up with: your therapist and psychiatrist       This writer and the patient completed a safety plan.  The patient was provided with a copy of their safety plan with encouragement to utilize the plan following discharge.               SAFETY PLAN  Warning Signs (thoughts, images, mood, behavior, situations) increased anxiety and depression, thoughts of suicide        Coping Skills (what can I do to take my mind off the problem, or to keep myself safe): Call your therapist, family member or friend, return to the emergency department if symptoms worsen.                 National Suicide Prevention Hotline:  1-315.658.6944     [facility] [phone] - Crisis   [facility] [phone] - LVF Crisis/Mobile Crisis   [phone] - S[facility] Crisis   [facility]: [phone]  [facility]: [phone]   [facility] [phone] - Crisis   [facility] [phone] - Crisis      [facility] [phone] - Crisis   [facility] [phone] - Crisis   [phone] - Peer Support Talk Line (1-9pm daily)  [phone] - Teen Support Talk Line (1-9pm daily)  [phone] - [facility] [phone]- Crisis    [facility] [phone] - Crisis   [facility] [phone] - Crisis    [facility]) [phone] - [facility] Crisis

## 2024-08-14 ENCOUNTER — DOCUMENTATION (OUTPATIENT)
Dept: CASE MANAGEMENT | Facility: HOSPITAL | Age: 59
End: 2024-08-14

## 2024-08-14 NOTE — BEHAVIORAL HEALTH HIGH UTILIZER
Patient Name:Ananth Jaffe MRN:  [id number]         : 1965     Age: 59 y.o.    Sex: male   Utilization History:  (# of ED visits & IP admits; reasons)  Pt had 3 [facility]-ED visits from 2024-2024. ED presentations were related to chronic polysubstance use (cocaine, THC, K2, meth, alcohol), unstable housing/homelessness, relationship issues (break-up with girlfriend, death of mother & brother), SI with no plan or with a plan to walk into traffic or in front of a vehicle, jump off a bridge, cut himself/cut his throat, overdose on pills or cocaine, HI towards ex-girlfriend to strangle her, her boyfriend or people in general, AH, laying on the streets following substance use.       Medical presentations were related to vomiting, back pain, generalized body pain, non-compliance with medications, chest pain.       Treatment Recommendations & Presentation:  Presentation in the ED: Pt typically presents himself or is accompanied by EMS/police to ED's. ED visits were related to chronic polysubstance use (cocaine, THC, K2, meth, alcohol), unstable housing/homelessness, relationship issues (break-up with girlfriend, death of mother & brother), SI with no plan or with a plan to walk into traffic or in front of a vehicle, jump off a bridge, cut himself/cut his throat, overdose on pills or cocaine, HI towards ex-girlfriend to strangle her, her boyfriend or people in general, AH, laying on the streets following substance use.       Medical visits were related to vomiting, back pain, generalized body pain, non-compliance with medications, chest pain.             ED Recommendations:  Following medical evaluation and treatment, allow pt some time for de-escalation and for Provider to establish acute risk versus pt's chronic behavioral disturbances that are related to homelessness & substance abuse. Pt can be directed to [facility] for inpt addictions treatment or dual treatment.   If pt is not interested in inpt addictions  rehab he can utilize [facility] [facility] at [address] in [place] [phone], [facility] addictions and mental health providers at [address] in [place] [phone], the  [facility] program at [address] in [place] [phone] or [facility] at [address] [phone].   [place] [facility] [phone] at [address] or [facility] clinics at [address] [phone] or [phone]. Pt should have [facility] ICM services and go to [facility] to talk about that at [address] in [place] and also talk to [facility] at that same location about his housing issues [phone].  Pt can also go to the [place] drop in center during the day at [address] or the [facility] at [address].  (Pt has utilized [facility] Nurses [phone] and [facility] in the past) .          Home Medication Regimen: see most recent documentation in Epic.         Recent Medical Work Ups:  (include Psych testing or ECT)     Labs - 2024 Inpatient Recommendations: Collaborate with pt's community sources to develop a comprehensive discharge plan. Pt should have an ICM.          Outpatient recommendations:  Pt should continue his community mental health and medical treatment. Pt should take his medication as prescribed.       Situation/Relevant Background Info:  Pt is a 59 year old male with a diagnosis of Major Depressive Disorder and an extensive history of polysubstance abuse (cocaine, THC, K2, meth, alcohol) along with numerous behavioral health hospitalizations.     Pt appears to have unstable housing and homelessness issues. In the past, pt had reported he lived with a girlfriend but also stated in 2023 that they broke up and he wasn't living with her any longer. Pt had been residing at the [place] [facility] at times.  Pt appears to be mostly non-compliant with any mental health  care and psychiatric medications. Pt did not appear to follow his discharge plans when discharged from behavioral health units. Pt has come to ED's and repeatedly stated that he was not taking his medications. Pt had used [facility] and [facility] in the past.  Pt appears to go from one behavioral health hospitalization to the next and seems to utilize the ED's as his main coping skill, mental health provider and PCP resource. Pt was on the [facility] waiting list for mental health providers as of 12/23 - does not appear to be utilizing that service.                      Diagnoses/Significant Problems (Medical & Psychiatric):    Major depressive disorder, recurrent, severe with psychotic features (HCC)             Cocaine abuse, continuous (HCC)     Cannabis abuse, continuous     Alcohol abuse, episodic     Abnormal EKG     Unspecified mood (affective) disorder (HCC)                Drivers of repeated utilization:   homelessness, chronic polysubstance abuse, non-compliance with community mental health care and medications, limited or no supports, limited to poor coping skills                                                   Existing Community Resources & Supports:                 None known    Patient Medical & Psychiatric Care Team:  [facility] Nurses [phone] or [phone]  [facility] [phone] or [phone].      Care plan date: 08/14/24                   Author:  Ana Washington RN                 Date reviewed with patient: unable to contact 2024

## 2024-10-23 ENCOUNTER — DOCUMENTATION (OUTPATIENT)
Dept: CASE MANAGEMENT | Facility: HOSPITAL | Age: 59
End: 2024-10-23

## 2024-10-23 NOTE — BEHAVIORAL HEALTH HIGH UTILIZER
Patient Name:Ananth Jaffe MRN:  [id number]         : 1965     Age: 59 y.o.    Sex: male   Utilization History:  (# of ED visits & IP admits; reasons)  Pt had 3 [facility]-ED visits from 2024-2024. ED presentations were related to chronic polysubstance use (cocaine, THC, K2, meth, alcohol), unstable housing/homelessness, relationship issues (break-up with girlfriend, death of mother & brother), SI with no plan or with a plan to walk into traffic or in front of a vehicle, jump off a bridge, cut himself/cut his throat, overdose on pills or cocaine, HI towards ex-girlfriend to strangle her, her boyfriend or people in general, AH, laying on the streets following substance use.       Medical presentations were related to vomiting, back pain, generalized body pain, non-compliance with medications, chest pain.       Treatment Recommendations & Presentation:  Presentation in the ED: Pt typically presents himself or is accompanied by EMS/police to ED's. ED visits were related to chronic polysubstance use (cocaine, THC, K2, meth, alcohol), unstable housing/homelessness, relationship issues (break-up with girlfriend, death of mother & brother), SI with no plan or with a plan to walk into traffic or in front of a vehicle, jump off a bridge, cut himself/cut his throat, overdose on pills or cocaine, HI towards ex-girlfriend to strangle her, her boyfriend or people in general, AH, laying on the streets following substance use.       Medical visits were related to vomiting, back pain, generalized body pain, non-compliance with medications, chest pain.          ED Recommendations: Following medical evaluation and treatment, establish acute risk versus pt's chronic behavioral disturbances that are related to homelessness & substance abuse. Pt can consider [facility] for inpt addictions treatment or dual treatment.   If pt is not interested in inpt addictions rehab he can utilize [facility] at [address] [facility] in  [place] [phone], [facility] addictions and mental health providers at [address] [facility] in [place] [phone], the  [facility] program at [address] [facility] in [place] [phone] or [facility] at [address] [facility] [phone].   Pt should go to [facility] for his medical problems [phone] at [address] [facility] or [facility] clinics at [address] [facility] [phone] or [phone]. Pt should have [facility] ICM services and go to [facility] to talk about that at [address] [facility] in [place] and also talk to [facility] at that same location about his housing issues [phone].  Pt can also go to the [place] drop in center during the day at [address] [facility] or the [facility] at [address] [facility].  (Pt has utilized [facility] [phone] and [facility] in the past) .       Home Medication Regimen: see most recent documentation in Epic.         Recent Medical Work Ups:  (include Psych testing or ECT)     Labs - 2024  Inpatient Recommendations:  Collaborate with pt's community sources to develop a comprehensive discharge plan. Pt should have an ICM.             Outpatient recommendations:  Pt should continue his community mental health and medical treatment. Pt should take his medication as prescribed.          Situation/Relevant Background Info: Pt is a 59 year old male with a diagnosis of Major Depressive Disorder and an extensive history of polysubstance abuse (cocaine, THC, K2, meth, alcohol) along with numerous behavioral health hospitalizations.     Pt appears to have unstable housing and homelessness issues. In the past, pt had reported he lived with a girlfriend but also stated in 2023 that they broke up and he wasn't living with her any longer. Pt had been residing at the [place] [facility] at times.  Pt appears to be mostly non-compliant with any mental health care and psychiatric medications. Pt did not appear to follow his  discharge plans when discharged from behavioral health units. Pt has come to ED's and repeatedly stated that he was not taking his medications. Pt had used [facility] and [facility] in the past.  Pt appears to go from one behavioral health hospitalization to the next and seems to utilize the ED's as his main coping skill, mental health provider and PCP resource. Pt was on the [facility] waiting list for mental health providers as of 12/23 - does not appear to be utilizing that service.                          Diagnoses/Significant Problems (Medical & Psychiatric):    Major depressive disorder, recurrent, severe with psychotic features (HCC)             Cocaine abuse, continuous (HCC)     Cannabis abuse, continuous     Alcohol abuse, episodic     Abnormal EKG     Unspecified mood (affective) disorder (HCC)                Drivers of repeated utilization:   homelessness, chronic polysubstance abuse, non-compliance with community mental health care and medications, limited or no supports, limited to poor coping skills                                                   Existing Community Resources & Supports:                 None known     Patient Medical & Psychiatric Care Team:  [facility] [phone] or [phone]  [facility] [phone] or [phone].      Care plan date: 10/23/24                   Author:  Ana Washington RN                 Date reviewed with patient:

## 2025-01-07 ENCOUNTER — DOCUMENTATION (OUTPATIENT)
Dept: CASE MANAGEMENT | Facility: HOSPITAL | Age: 60
End: 2025-01-07

## 2025-01-07 NOTE — BEHAVIORAL HEALTH HIGH UTILIZER
Patient Name:Ananth Jaffe MRN:  [id number]         : 1965     Age: 59 y.o.    Sex: male   Utilization History:  (# of ED visits & IP admits; reasons)  Pt had 3 [facility]-ED visits from 2024-2024. ED presentations were related to chronic polysubstance use (cocaine, THC, K2, meth, alcohol), unstable housing/homelessness, relationship issues (break-up with girlfriend, death of mother & brother), SI with no plan or with a plan to walk into traffic or in front of a vehicle, jump off a bridge, cut himself/cut his throat, overdose on pills or cocaine, HI towards ex-girlfriend to strangle her, her boyfriend or people in general, AH, laying on the streets following substance use.       Medical presentations were related to vomiting, back pain, generalized body pain, non-compliance with medications, chest pain.   Treatment Recommendations & Presentation:  Presentation in the ED: Pt typically presents himself or is accompanied by EMS/police to ED's. ED visits were related to chronic polysubstance use (cocaine, THC, K2, meth, alcohol), unstable housing/homelessness, relationship issues (break-up with girlfriend, death of mother & brother), SI with no plan or with a plan to walk into traffic or in front of a vehicle, jump off a bridge, cut himself/cut his throat, overdose on pills or cocaine, HI towards ex-girlfriend to strangle her, her boyfriend or people in general, AH, laying on the streets following substance use.       Medical visits were related to vomiting, back pain, generalized body pain, non-compliance with medications, chest pain.           ED Recommendations: Following medical evaluation and treatment, establish acute risk versus pt's chronic behavioral disturbances that are related to homelessness & substance abuse. Pt can consider [facility] for inpt addictions treatment or dual treatment.   If pt is not interested in inpt addictions rehab he can utilize [facility] at [address] [facility] in  [place] [phone], [facility] addictions and mental health providers at [address] [facility] in [place] [phone], the  [facility] program at [address] [facility] in [place] [phone] or [facility] at [address] [facility] [phone].   Pt should go to [facility] for his medical problems [phone] at [address] [facility] or [facility] clinics at [address] [facility] [phone] or [phone]. Pt should have [facility] ICM services and go to [facility] to talk about that at [address] [facility] in [place] and also talk to [facility] at that same location about his housing issues [phone].  Pt can also go to the [place] drop in center during the day [facility] [address] [facility] or the [facility] at [address] [facility].  (Pt has utilized [facility] [phone] and [facility] in the past) .        Home Medication Regimen: see most recent documentation in Epic.    Recent Medical Work Ups:  (include Psych testing or ECT)     Labs - 2024  Inpatient Recommendations: Collaborate with pt's community sources to develop a comprehensive discharge plan. Pt should have an ICM.          Outpatient recommendations: Pt should continue his community mental health and medical treatment. Pt should take his medication as prescribed.       Situation/Relevant Background Info:      Pt is a 59 year old male with a diagnosis of Major Depressive Disorder and an extensive history of polysubstance abuse (cocaine, THC, K2, meth, alcohol) along with numerous behavioral health hospitalizations.     Pt appears to have unstable housing and homelessness issues. In the past, pt had reported he lived with a girlfriend but also stated in 2023 that they broke up and he wasn't living with her any longer. Pt had been residing at the [place] [employer] at times.  Pt appears to be mostly non-compliant with any mental health care and psychiatric medications. Pt did not appear to follow his  discharge plans when discharged from behavioral health units. Pt has come to ED's and repeatedly stated that he was not taking his medications. Pt had used [facility] and [facility] in the past.  Pt appears to go from one behavioral health hospitalization to the next and seems to utilize the ED's as his main coping skill, mental health provider and PCP resource. Pt was on the [facility] waiting list for mental health providers as of 12/23 - does not appear to be utilizing that service.       Diagnoses/Significant Problems (Medical & Psychiatric):  Major depressive disorder, recurrent, severe with psychotic features (HCC)             Cocaine abuse, continuous (HCC)     Cannabis abuse, continuous     Alcohol abuse, episodic     Abnormal EKG     Unspecified mood (affective) disorder (HCC)              Drivers of repeated utilization:     homelessness, chronic polysubstance abuse, non-compliance with community mental health care and medications, limited or no supports, limited to poor coping skills                                                Existing Community Resources & Supports:                None known        Patient Medical & Psychiatric Care Team:  [facility] Nurs[facility] [phone] or [phone]  [facility] [phone] or [phone].      Care plan date: 01/07/25                   Author:  Neela Elliott, RN                 Date reviewed with patient:

## 2025-02-04 ENCOUNTER — HOSPITAL ENCOUNTER (EMERGENCY)
Facility: HOSPITAL | Age: 60
Discharge: OTHER INSTITUTION | End: 2025-02-04
Attending: EMERGENCY MEDICINE
Payer: COMMERCIAL

## 2025-02-04 VITALS
RESPIRATION RATE: 16 BRPM | TEMPERATURE: 97.9 F | HEIGHT: 67 IN | BODY MASS INDEX: 22.35 KG/M2 | HEART RATE: 60 BPM | WEIGHT: 142.42 LBS | DIASTOLIC BLOOD PRESSURE: 78 MMHG | OXYGEN SATURATION: 97 % | SYSTOLIC BLOOD PRESSURE: 131 MMHG

## 2025-02-04 DIAGNOSIS — F33.3 MAJOR DEPRESSIVE DISORDER, RECURRENT, SEVERE WITH PSYCHOTIC FEATURES (HCC): Primary | Chronic | ICD-10-CM

## 2025-02-04 LAB
ALBUMIN SERPL BCG-MCNC: 4.1 G/DL (ref 3.5–5)
ALP SERPL-CCNC: 91 U/L (ref 34–104)
ALT SERPL W P-5'-P-CCNC: 63 U/L (ref 7–52)
AMPHETAMINES SERPL QL SCN: NEGATIVE
ANION GAP SERPL CALCULATED.3IONS-SCNC: 5 MMOL/L (ref 4–13)
AST SERPL W P-5'-P-CCNC: 39 U/L (ref 13–39)
ATRIAL RATE: 60 BPM
BARBITURATES UR QL: NEGATIVE
BASOPHILS # BLD AUTO: 0.06 THOUSANDS/ΜL (ref 0–0.1)
BASOPHILS NFR BLD AUTO: 1 % (ref 0–1)
BENZODIAZ UR QL: NEGATIVE
BILIRUB DIRECT SERPL-MCNC: 0.04 MG/DL (ref 0–0.2)
BILIRUB SERPL-MCNC: 0.29 MG/DL (ref 0.2–1)
BILIRUB UR QL STRIP: NEGATIVE
BUN SERPL-MCNC: 13 MG/DL (ref 5–25)
CALCIUM SERPL-MCNC: 9.5 MG/DL (ref 8.4–10.2)
CHLORIDE SERPL-SCNC: 108 MMOL/L (ref 96–108)
CLARITY UR: CLEAR
CO2 SERPL-SCNC: 28 MMOL/L (ref 21–32)
COCAINE UR QL: POSITIVE
COLOR UR: YELLOW
CREAT SERPL-MCNC: 0.87 MG/DL (ref 0.6–1.3)
EOSINOPHIL # BLD AUTO: 0.13 THOUSAND/ΜL (ref 0–0.61)
EOSINOPHIL NFR BLD AUTO: 2 % (ref 0–6)
ERYTHROCYTE [DISTWIDTH] IN BLOOD BY AUTOMATED COUNT: 14.4 % (ref 11.6–15.1)
ETHANOL EXG-MCNC: 0 MG/DL
FENTANYL UR QL SCN: NEGATIVE
FLUAV AG UPPER RESP QL IA.RAPID: NEGATIVE
FLUBV AG UPPER RESP QL IA.RAPID: NEGATIVE
GFR SERPL CREATININE-BSD FRML MDRD: 93 ML/MIN/1.73SQ M
GLUCOSE SERPL-MCNC: 91 MG/DL (ref 65–140)
GLUCOSE UR STRIP-MCNC: NEGATIVE MG/DL
HCT VFR BLD AUTO: 43.8 % (ref 36.5–49.3)
HGB BLD-MCNC: 14.2 G/DL (ref 12–17)
HGB UR QL STRIP.AUTO: NEGATIVE
HYDROCODONE UR QL SCN: NEGATIVE
IMM GRANULOCYTES # BLD AUTO: 0.04 THOUSAND/UL (ref 0–0.2)
IMM GRANULOCYTES NFR BLD AUTO: 1 % (ref 0–2)
KETONES UR STRIP-MCNC: NEGATIVE MG/DL
LEUKOCYTE ESTERASE UR QL STRIP: NEGATIVE
LYMPHOCYTES # BLD AUTO: 1.8 THOUSANDS/ΜL (ref 0.6–4.47)
LYMPHOCYTES NFR BLD AUTO: 22 % (ref 14–44)
MCH RBC QN AUTO: 25.1 PG (ref 26.8–34.3)
MCHC RBC AUTO-ENTMCNC: 32.4 G/DL (ref 31.4–37.4)
MCV RBC AUTO: 78 FL (ref 82–98)
METHADONE UR QL: NEGATIVE
MONOCYTES # BLD AUTO: 0.91 THOUSAND/ΜL (ref 0.17–1.22)
MONOCYTES NFR BLD AUTO: 11 % (ref 4–12)
NEUTROPHILS # BLD AUTO: 5.34 THOUSANDS/ΜL (ref 1.85–7.62)
NEUTS SEG NFR BLD AUTO: 63 % (ref 43–75)
NITRITE UR QL STRIP: NEGATIVE
NRBC BLD AUTO-RTO: 0 /100 WBCS
OPIATES UR QL SCN: NEGATIVE
OXYCODONE+OXYMORPHONE UR QL SCN: NEGATIVE
P AXIS: 57 DEGREES
PCP UR QL: NEGATIVE
PH UR STRIP.AUTO: 5.5 [PH] (ref 4.5–8)
PLATELET # BLD AUTO: 217 THOUSANDS/UL (ref 149–390)
PMV BLD AUTO: 10.1 FL (ref 8.9–12.7)
POTASSIUM SERPL-SCNC: 3.5 MMOL/L (ref 3.5–5.3)
PR INTERVAL: 178 MS
PROT SERPL-MCNC: 7.5 G/DL (ref 6.4–8.4)
PROT UR STRIP-MCNC: NEGATIVE MG/DL
QRS AXIS: 41 DEGREES
QRSD INTERVAL: 90 MS
QT INTERVAL: 414 MS
QTC INTERVAL: 414 MS
RBC # BLD AUTO: 5.65 MILLION/UL (ref 3.88–5.62)
SARS-COV+SARS-COV-2 AG RESP QL IA.RAPID: NEGATIVE
SODIUM SERPL-SCNC: 141 MMOL/L (ref 135–147)
SP GR UR STRIP.AUTO: 1.01 (ref 1–1.03)
T WAVE AXIS: 262 DEGREES
THC UR QL: POSITIVE
TSH SERPL DL<=0.05 MIU/L-ACNC: 0.55 UIU/ML (ref 0.45–4.5)
UROBILINOGEN UR QL STRIP.AUTO: 0.2 E.U./DL
VENTRICULAR RATE: 60 BPM
WBC # BLD AUTO: 8.28 THOUSAND/UL (ref 4.31–10.16)

## 2025-02-04 PROCEDURE — 93010 ELECTROCARDIOGRAM REPORT: CPT

## 2025-02-04 PROCEDURE — 81003 URINALYSIS AUTO W/O SCOPE: CPT

## 2025-02-04 PROCEDURE — 84443 ASSAY THYROID STIM HORMONE: CPT | Performed by: EMERGENCY MEDICINE

## 2025-02-04 PROCEDURE — 82075 ASSAY OF BREATH ETHANOL: CPT | Performed by: EMERGENCY MEDICINE

## 2025-02-04 PROCEDURE — 99285 EMERGENCY DEPT VISIT HI MDM: CPT

## 2025-02-04 PROCEDURE — 87811 SARS-COV-2 COVID19 W/OPTIC: CPT | Performed by: EMERGENCY MEDICINE

## 2025-02-04 PROCEDURE — 99285 EMERGENCY DEPT VISIT HI MDM: CPT | Performed by: EMERGENCY MEDICINE

## 2025-02-04 PROCEDURE — 80076 HEPATIC FUNCTION PANEL: CPT | Performed by: EMERGENCY MEDICINE

## 2025-02-04 PROCEDURE — 36415 COLL VENOUS BLD VENIPUNCTURE: CPT | Performed by: EMERGENCY MEDICINE

## 2025-02-04 PROCEDURE — 80307 DRUG TEST PRSMV CHEM ANLYZR: CPT | Performed by: EMERGENCY MEDICINE

## 2025-02-04 PROCEDURE — 87804 INFLUENZA ASSAY W/OPTIC: CPT | Performed by: EMERGENCY MEDICINE

## 2025-02-04 PROCEDURE — 85025 COMPLETE CBC W/AUTO DIFF WBC: CPT | Performed by: EMERGENCY MEDICINE

## 2025-02-04 PROCEDURE — 80048 BASIC METABOLIC PNL TOTAL CA: CPT | Performed by: EMERGENCY MEDICINE

## 2025-02-04 PROCEDURE — 93005 ELECTROCARDIOGRAM TRACING: CPT

## 2025-02-04 PROCEDURE — 99285 EMERGENCY DEPT VISIT HI MDM: CPT | Performed by: PSYCHIATRY & NEUROLOGY

## 2025-02-04 RX ORDER — HALOPERIDOL 5 MG/1
5 TABLET ORAL EVERY 6 HOURS PRN
Status: DISCONTINUED | OUTPATIENT
Start: 2025-02-04 | End: 2025-02-04 | Stop reason: HOSPADM

## 2025-02-04 RX ORDER — IBUPROFEN 400 MG/1
400 TABLET, FILM COATED ORAL EVERY 8 HOURS PRN
Status: DISCONTINUED | OUTPATIENT
Start: 2025-02-04 | End: 2025-02-04 | Stop reason: HOSPADM

## 2025-02-04 RX ORDER — ACETAMINOPHEN 325 MG/1
650 TABLET ORAL EVERY 8 HOURS PRN
Status: DISCONTINUED | OUTPATIENT
Start: 2025-02-04 | End: 2025-02-04 | Stop reason: HOSPADM

## 2025-02-04 RX ORDER — LORAZEPAM 1 MG/1
2 TABLET ORAL EVERY 6 HOURS PRN
Status: DISCONTINUED | OUTPATIENT
Start: 2025-02-04 | End: 2025-02-04 | Stop reason: HOSPADM

## 2025-02-04 RX ORDER — HALOPERIDOL 5 MG/ML
5 INJECTION INTRAMUSCULAR EVERY 6 HOURS PRN
Status: DISCONTINUED | OUTPATIENT
Start: 2025-02-04 | End: 2025-02-04 | Stop reason: HOSPADM

## 2025-02-04 RX ORDER — ESCITALOPRAM OXALATE 10 MG/1
10 TABLET ORAL EVERY MORNING
Status: CANCELLED | OUTPATIENT
Start: 2025-02-05

## 2025-02-04 RX ORDER — LISINOPRIL 5 MG/1
5 TABLET ORAL EVERY MORNING
Status: CANCELLED | OUTPATIENT
Start: 2025-02-05

## 2025-02-04 RX ORDER — ARIPIPRAZOLE 5 MG/1
10 TABLET ORAL DAILY
Status: CANCELLED | OUTPATIENT
Start: 2025-02-05

## 2025-02-04 RX ORDER — METOPROLOL SUCCINATE 25 MG/1
25 TABLET, EXTENDED RELEASE ORAL DAILY
Status: CANCELLED | OUTPATIENT
Start: 2025-02-05

## 2025-02-04 RX ORDER — LORAZEPAM 2 MG/ML
2 INJECTION INTRAMUSCULAR EVERY 6 HOURS PRN
Status: DISCONTINUED | OUTPATIENT
Start: 2025-02-04 | End: 2025-02-04 | Stop reason: HOSPADM

## 2025-02-04 NOTE — ED NOTES
Insurance Authorization:   Phone call placed to [facility]  Phone number: [phone]     Spoke to: Yanet Coronado approved- 3  Level of care: Inpatient treatment  Review on 2/26/24  Authorization # Facility to call on arrival      EVS (Eligibility Verification System) called [phone]/[facility]  Automated system indicates: [facility]

## 2025-02-04 NOTE — ED PROVIDER NOTES
Time reflects when diagnosis was documented in both MDM as applicable and the Disposition within this note       Time User Action Codes Description Comment    2/4/2025 10:54 AM Hayden Faulkner Add [F33.3] Major depressive disorder, recurrent, severe with psychotic features (HCC)           ED Disposition       ED Disposition   Transfer to Behavioral Health Condition   --    Date/Time   Tue Feb 4, 2025  1:10 PM    Comment   Ananth Jaffe should be transferred out to Behavioral Health and has been medically cleared.               Assessment & Plan       Medical Decision Making  1. Depression - Patient with ongoing issues with depression and SI, worsening over the last few weeks, possibly due to stopping his medications. Will order medical screening labs, have crisis, likely psychiatry evaluate.     Problems Addressed:  Major depressive disorder, recurrent, severe with psychotic features (HCC): chronic illness or injury with exacerbation, progression, or side effects of treatment    Amount and/or Complexity of Data Reviewed  Labs: ordered.    Risk  Decision regarding hospitalization.             Medications - No data to display    ED Risk Strat Scores                          SBIRT 22yo+      Flowsheet Row Most Recent Value   Initial Alcohol Screen: US AUDIT-C     1. How often do you have a drink containing alcohol? 5 Filed at: 02/04/2025 0859   2. How many drinks containing alcohol do you have on a typical day you are drinking?  3 Filed at: 02/04/2025 0859   3a. Male UNDER 65: How often do you have five or more drinks on one occasion? 2 Filed at: 02/04/2025 0859   Audit-C Score 10 Filed at: 02/04/2025 0859   STEPHEN: How many times in the past year have you...    Used an illegal drug or used a prescription medication for non-medical reasons? Daily or Almost Daily Filed at: 02/04/2025 0859   DAST-10: In the past 12 months...    1. Have you used drugs other than those required for medical reasons? 1 Filed at:  "02/04/2025 0859   2. Do you use more than one drug at a time? 1 Filed at: 02/04/2025 0859   3. Have you had medical problems as a result of your drug use (e.g., memory loss, hepatitis, convulsions, bleeding, etc.)? 0 Filed at: 02/04/2025 0859   4. Have you had \"blackouts\" or \"flashbacks\" as a result of drug use?YesNo 1 Filed at: 02/04/2025 0859   5. Do you ever feel bad or guilty about your drug use? 1 Filed at: 02/04/2025 0859   6. Does your spouse (or parent) ever complain about your involvement with drugs? 1 Filed at: 02/04/2025 0859   7. Have you neglected your family because of your use of drugs? 1 Filed at: 02/04/2025 0859   8. Have you engaged in illegal activities in order to obtain drugs? 1 Filed at: 02/04/2025 0859   9. Have you ever experienced withdrawal symptoms (felt sick) when you stopped taking drugs? 1 Filed at: 02/04/2025 0859   10. Are you always able to stop using drugs when you want to? 1 Filed at: 02/04/2025 0859   DAST-10 Score 9 Filed at: 02/04/2025 0859                            History of Present Illness       Chief Complaint   Patient presents with    Suicidal     Patient reports increased depression, increased stressors related to recent break up. Today has been having thoughts of suicide with plan to stab self with knife, but couldn't find one that wasn't dull.        Past Medical History:   Diagnosis Date    Abnormal EKG     Depression     Substance use       Past Surgical History:   Procedure Laterality Date    NO PAST SURGERIES        History reviewed. No pertinent family history.   Social History     Tobacco Use    Smoking status: Former     Current packs/day: 1.00     Types: Cigarettes    Smokeless tobacco: Never   Vaping Use    Vaping status: Never Used   Substance Use Topics    Alcohol use: Yes     Alcohol/week: 3.0 standard drinks of alcohol     Types: 3 Cans of beer per week     Comment: 3 cans beer daily    Drug use: Yes     Types: Marijuana, Cocaine, Other, Methamphetamines, " Mescaline      E-Cigarette/Vaping    E-Cigarette Use Never User       E-Cigarette/Vaping Substances    Nicotine No     THC No     CBD No     Flavoring No     Other No     Unknown No       I have reviewed and agree with the history as documented.     60 YO male presents with depression and SI. He states this has been an ongoing issue but worsening for the last few weeks. He states thoughts of stabbing himself with a knife, additionally some HI focused toward an ex-girlfriend. He has had previous suicide attempts and states previous admissions to psychiatric facilities. He thinks last admission was ~1 year ago. He admits to drinking alcohol recently. Patient stopped taking his medications ~3 weeks ago. Pt denies CP/SOB/F/C/N/V/D/C, no dysuria, burning on urination or blood in urine.       History provided by:  Patient   used: No        Review of Systems   Constitutional:  Negative for fever.   HENT:  Negative for dental problem.    Eyes:  Negative for visual disturbance.   Respiratory:  Negative for shortness of breath.    Cardiovascular:  Negative for chest pain.   Gastrointestinal:  Negative for abdominal pain, nausea and vomiting.   Genitourinary:  Negative for dysuria and frequency.   Musculoskeletal:  Negative for neck pain and neck stiffness.   Skin:  Negative for rash.   Neurological:  Negative for dizziness, weakness and light-headedness.   Psychiatric/Behavioral:  Positive for suicidal ideas. Negative for agitation, behavioral problems and confusion.    All other systems reviewed and are negative.          Objective       ED Triage Vitals   Temperature Pulse Blood Pressure Respirations SpO2 Patient Position - Orthostatic VS   02/04/25 0858 02/04/25 0858 02/04/25 0858 02/04/25 0858 02/04/25 0858 02/04/25 2041   97.9 °F (36.6 °C) 77 139/80 20 98 % Sitting      Temp src Heart Rate Source BP Location FiO2 (%) Pain Score    -- 02/04/25 2041 02/04/25 2041 -- 02/04/25 0858     Monitor Left arm   No Pain      Vitals      Date and Time Temp Pulse SpO2 Resp BP Pain Score FACES Pain Rating User   02/04/25 2041 -- 60 97 % 16 131/78 -- -- KS   02/04/25 2000 -- -- -- -- -- No Pain -- KS   02/04/25 1600 -- 59 99 % 18 118/66 No Pain --    02/04/25 0858 97.9 °F (36.6 °C) 77 98 % 20 139/80 No Pain -- EP            Physical Exam  Vitals and nursing note reviewed.   Constitutional:       Appearance: He is well-developed.   HENT:      Head: Normocephalic and atraumatic.   Eyes:      Extraocular Movements: Extraocular movements intact.   Cardiovascular:      Rate and Rhythm: Normal rate.   Pulmonary:      Effort: Pulmonary effort is normal.   Abdominal:      General: There is no distension.   Musculoskeletal:         General: Normal range of motion.      Cervical back: Normal range of motion.   Skin:     Findings: No rash.   Neurological:      Mental Status: He is alert and oriented to person, place, and time.   Psychiatric:         Mood and Affect: Mood is depressed.         Behavior: Behavior normal. Behavior is cooperative.         Thought Content: Thought content includes homicidal ideation. Thought content does not include homicidal plan.         Results Reviewed       Procedure Component Value Units Date/Time    Rapid drug screen, urine [[id number]]  (Abnormal) Collected: 02/04/25 1254    Lab Status: Final result Specimen: Urine, Clean Catch Updated: 02/04/25 1328     Amph/Meth UR Negative     Barbiturate Ur Negative     Benzodiazepine Urine Negative     Cocaine Urine Positive     Methadone Urine Negative     Opiate Urine Negative     PCP Ur Negative     THC Urine Positive     Oxycodone Urine Negative     Fentanyl Urine Negative     HYDROCODONE URINE Negative    Narrative:      Presumptive report. If requested, specimen will be sent to reference lab for confirmation.  FOR MEDICAL PURPOSES ONLY.   IF CONFIRMATION NEEDED PLEASE CONTACT THE LAB WITHIN 5 DAYS.    Drug Screen Cutoff  Levels:  AMPHETAMINE/METHAMPHETAMINES  1000 ng/mL  BARBITURATES     200 ng/mL  BENZODIAZEPINES     200 ng/mL  COCAINE      300 ng/mL  METHADONE      300 ng/mL  OPIATES      300 ng/mL  PHENCYCLIDINE     25 ng/mL  THC       50 ng/mL  OXYCODONE      100 ng/mL  FENTANYL      5 ng/mL  HYDROCODONE     300 ng/mL    Urine Macroscopic, POC [[id number]] Collected: 02/04/25 1258    Lab Status: Final result Specimen: Urine Updated: 02/04/25 1300     Color, UA Yellow     Clarity, UA Clear     pH, UA 5.5     Leukocytes, UA Negative     Nitrite, UA Negative     Protein, UA Negative mg/dl      Glucose, UA Negative mg/dl      Ketones, UA Negative mg/dl      Urobilinogen, UA 0.2 E.U./dl      Bilirubin, UA Negative     Occult Blood, UA Negative     Specific Gravity, UA 1.010    Narrative:      CLINITEK RESULT    TSH, 3rd generation with Free T4 reflex [[id number]]  (Normal) Collected: 02/04/25 1018    Lab Status: Final result Specimen: Blood from Arm, Right Updated: 02/04/25 1058     TSH 3RD GENERATON 0.545 uIU/mL     FLU/COVID Rapid Antigen (30 min. TAT) - Preferred screening test in ED [[id number]]  (Normal) Collected: 02/04/25 1018    Lab Status: Final result Specimen: Nares from Nose Updated: 02/04/25 1046     SARS COV Rapid Antigen Negative     Influenza A Rapid Antigen Negative     Influenza B Rapid Antigen Negative    Narrative:      This test has been performed using the Quidel Thalia 2 FLU+SARS Antigen test under the Emergency Use Authorization (EUA). This test has been validated by the  and verified by the performing laboratory. The Thalia uses lateral flow immunofluorescent sandwich assay to detect SARS-COV, Influenza A and Influenza B Antigen.     The Quidel Thalia 2 SARS Antigen test does not differentiate between SARS-CoV and SARS-CoV-2.     Negative results are presumptive and may be confirmed with a molecular assay, if necessary, for patient management. Negative results do not rule out SARS-CoV-2 or influenza  infection and should not be used as the sole basis for treatment or patient management decisions. A negative test result may occur if the level of antigen in a sample is below the limit of detection of this test.     Positive results are indicative of the presence of viral antigens, but do not rule out bacterial infection or co-infection with other viruses.     All test results should be used as an adjunct to clinical observations and other information available to the provider.    FOR PEDIATRIC PATIENTS - copy/paste COVID Guidelines URL to browser: [email or web address]    Basic metabolic panel [[id number]] Collected: 02/04/25 1018    Lab Status: Final result Specimen: Blood from Arm, Right Updated: 02/04/25 1043     Sodium 141 mmol/L      Potassium 3.5 mmol/L      Chloride 108 mmol/L      CO2 28 mmol/L      ANION GAP 5 mmol/L      BUN 13 mg/dL      Creatinine 0.87 mg/dL      Glucose 91 mg/dL      Calcium 9.5 mg/dL      eGFR 93 ml/min/1.73sq m     Narrative:      National Kidney Disease Foundation guidelines for Chronic Kidney Disease (CKD):     Stage 1 with normal or high GFR (GFR > 90 mL/min/1.73 square meters)    Stage 2 Mild CKD (GFR = 60-89 mL/min/1.73 square meters)    Stage 3A Moderate CKD (GFR = 45-59 mL/min/1.73 square meters)    Stage 3B Moderate CKD (GFR = 30-44 mL/min/1.73 square meters)    Stage 4 Severe CKD (GFR = 15-29 mL/min/1.73 square meters)    Stage 5 End Stage CKD (GFR <15 mL/min/1.73 square meters)  Note: GFR calculation is accurate only with a steady state creatinine    Hepatic function panel [[id number]]  (Abnormal) Collected: 02/04/25 1018    Lab Status: Final result Specimen: Blood from Arm, Right Updated: 02/04/25 1043     Total Bilirubin 0.29 mg/dL      Bilirubin, Direct 0.04 mg/dL      Alkaline Phosphatase 91 U/L      AST 39 U/L      ALT 63 U/L      Total Protein 7.5 g/dL      Albumin 4.1 g/dL     CBC and differential [[id number]]   (Abnormal) Collected: 02/04/25 1018    Lab Status: Final result Specimen: Blood from Arm, Right Updated: 02/04/25 1025     WBC 8.28 Thousand/uL      RBC 5.65 Million/uL      Hemoglobin 14.2 g/dL      Hematocrit 43.8 %      MCV 78 fL      MCH 25.1 pg      MCHC 32.4 g/dL      RDW 14.4 %      MPV 10.1 fL      Platelets 217 Thousands/uL      nRBC 0 /100 WBCs      Segmented % 63 %      Immature Grans % 1 %      Lymphocytes % 22 %      Monocytes % 11 %      Eosinophils Relative 2 %      Basophils Relative 1 %      Absolute Neutrophils 5.34 Thousands/µL      Absolute Immature Grans 0.04 Thousand/uL      Absolute Lymphocytes 1.80 Thousands/µL      Absolute Monocytes 0.91 Thousand/µL      Eosinophils Absolute 0.13 Thousand/µL      Basophils Absolute 0.06 Thousands/µL     POCT alcohol breath test [[id number]]  (Normal) Resulted: 02/04/25 0951    Lab Status: Final result Updated: 02/04/25 0951     EXTBreath Alcohol 0.000            No orders to display       Procedures    ED Medication and Procedure Management   Prior to Admission Medications   Prescriptions Last Dose Informant Patient Reported? Taking?   ARIPiprazole (ABILIFY) 10 mg tablet   No No   Sig: Take 1 tablet (10 mg total) by mouth daily   Patient not taking: Reported on 11/28/2022   escitalopram (LEXAPRO) 10 mg tablet   Yes No   Sig: Take 1 tablet by mouth every morning   Patient not taking: Reported on 10/9/2023   lisinopril (ZESTRIL) 5 mg tablet   Yes No   Sig: Take 5 mg by mouth every morning   Patient not taking: Reported on 10/9/2023   metoprolol succinate (TOPROL-XL) 25 mg 24 hr tablet   Yes No   Sig: Take 25 mg by mouth   Patient not taking: Reported on 10/9/2023      Facility-Administered Medications: None     Discharge Medication List as of 2/4/2025  9:02 PM        CONTINUE these medications which have NOT CHANGED    Details   ARIPiprazole (ABILIFY) 10 mg tablet Take 1 tablet (10 mg total) by mouth daily, Starting Mon 8/1/2022, Normal      escitalopram  (LEXAPRO) 10 mg tablet Take 1 tablet by mouth every morning, Historical Med      lisinopril (ZESTRIL) 5 mg tablet Take 5 mg by mouth every morning, Historical Med      metoprolol succinate (TOPROL-XL) 25 mg 24 hr tablet Take 25 mg by mouth, Historical Med           No discharge procedures on file.  ED SEPSIS DOCUMENTATION   Time reflects when diagnosis was documented in both MDM as applicable and the Disposition within this note       Time User Action Codes Description Comment    2/4/2025 10:54 AM Hayden Faulkner Add [F33.3] Major depressive disorder, recurrent, severe with psychotic features (HCC)                  Hayden Faulkner MD  02/05/25 5077

## 2025-02-04 NOTE — ED NOTES
"Patient is a 59 year old male presenting to the emergency department with suicidal ideation and a plan to jump in traffic. He says he was living with his friend but he moved to [place] so now patient is homeless. He says he just went through a break up and hes really depressed. Pt was asked if it was the same girlfriend he broke up with before and he says \"no, this is a new one\". He says he has been drinking daily and also using cocaine. He is very adamant about being inpatient and says \"I dont want to be like my bro who is doing life in [facility] because he killed someone\" \"I come for help all the time before it gets to that\" When asked if he follows with any outpatient providers he says \"Oh i see this person\" but then says he doesnt see anyone now but says \"when im admitted I see them and they give me follow up\" \"i probably need to speak with someone because I am just very depressed\". Psychiatry will assess patient since he has a high utilizer plan and we can determine patients needs,   "

## 2025-02-04 NOTE — ASSESSMENT & PLAN NOTE
Patient may sign a 201 for inpatient psychiatric hospitalization.  Defer medication management to U provider

## 2025-02-04 NOTE — ED NOTES
atient Name:Ananth Jaffe MRN:  [id number]         : 1965     Age: 59 y.o.    Sex: male   Utilization History:  (# of ED visits & IP admits; reasons)  Pt had 3 [facility]-ED visits from 2024-2024. ED presentations were related to chronic polysubstance use (cocaine, THC, K2, meth, alcohol), unstable housing/homelessness, relationship issues (break-up with girlfriend, death of mother & brother), SI with no plan or with a plan to walk into traffic or in front of a vehicle, jump off a bridge, cut himself/cut his throat, overdose on pills or cocaine, HI towards ex-girlfriend to strangle her, her boyfriend or people in general, AH, laying on the streets following substance use.       Medical presentations were related to vomiting, back pain, generalized body pain, non-compliance with medications, chest pain.       Treatment Recommendations & Presentation:  Presentation in the ED: Pt typically presents himself or is accompanied by EMS/police to ED's. ED visits were related to chronic polysubstance use (cocaine, THC, K2, meth, alcohol), unstable housing/homelessness, relationship issues (break-up with girlfriend, death of mother & brother), SI with no plan or with a plan to walk into traffic or in front of a vehicle, jump off a bridge, cut himself/cut his throat, overdose on pills or cocaine, HI towards ex-girlfriend to strangle her, her boyfriend or people in general, AH, laying on the streets following substance use.       Medical visits were related to vomiting, back pain, generalized body pain, non-compliance with medications, chest pain.           ED Recommendations: Following medical evaluation and treatment, establish acute risk versus pt's chronic behavioral disturbances that are related to homelessness & substance abuse. Pt can consider [facility] for inpt addictions treatment or dual treatment.   If pt is not interested in inpt addictions rehab he can utilize [facility] at [address] [facility] in  [place] [phone], [facility] addictions and mental health providers at [address] [facility] in [place] [phone], the  [facility] at [address] in [place] [phone] or [facility] at [address] [phone].   Pt should go to [facility] for his medical problems [phone] at [address] [facility] or [facility] at [address] [phone] or [phone]. Pt should have [facility] ICM services and go to [facility] to talk about that at [address] [facility] [address] in [place] and also talk to [facility] at that same location about his housing issues [phone].  Pt can also go to the [place] drop in center during the day at [address] [facility] or the [facility] at [address].  (Pt has utilized [facility] [phone] and [facility] in the past) .        Home Medication Regimen: see most recent documentation in Epic.          Recent Medical Work Ups:  (include Psych testing or ECT)     Labs - 2024  Inpatient Recommendations:  Collaborate with pt's community sources to develop a comprehensive discharge plan. Pt should have an ICM.                Outpatient recommendations:  Pt should continue his community mental health and medical treatment. Pt should take his medication as prescribed.           Situation/Relevant Background Info: Pt is a 59 year old male with a diagnosis of Major Depressive Disorder and an extensive history of polysubstance abuse (cocaine, THC, K2, meth, alcohol) along with numerous behavioral health hospitalizations.     Pt appears to have unstable housing and homelessness issues. In the past, pt had reported he lived with a girlfriend but also stated in 2023 that they broke up and he wasn't living with her any longer. Pt had been residing at the [place] [facility] at times.  Pt appears to be mostly non-compliant with any mental health care and psychiatric medications. Pt did not appear to follow  his discharge plans when discharged from behavioral health units. Pt has come to ED's and repeatedly stated that he was not taking his medications. Pt had used [facility] and [facility] in the past.  Pt appears to go from one behavioral health hospitalization to the next and seems to utilize the ED's as his main coping skill, mental health provider and PCP resource. Pt was on the [facility] waiting list for mental health providers as of 12/23 - does not appear to be utilizing that service.                             Diagnoses/Significant Problems (Medical & Psychiatric):     Major depressive disorder, recurrent, severe with psychotic features (HCC)             Cocaine abuse, continuous (HCC)     Cannabis abuse, continuous     Alcohol abuse, episodic     Abnormal EKG     Unspecified mood (affective) disorder (HCC)                   Drivers of repeated utilization:   homelessness, chronic polysubstance abuse, non-compliance with community mental health care and medications, limited or no supports, limited to poor coping skills                                                      Existing Community Resources & Supports:                 None known      Patient Medical & Psychiatric Care Team:  [facility] [phone] or [phone]  [facility] [phone] or [phone].

## 2025-02-04 NOTE — ED NOTES
[facility] met with patient regarding bed availability, and patient was made aware there are no beds available in network currently.    Patient is amenable to being referred elsewhere.    Joel Woods  Crisis Intervention Specialist II  02/04/25

## 2025-02-04 NOTE — ED NOTES
Bed Search:    [facility] Joy- Will review- Clinical faxed.    Joel Woods  Crisis Intervention Specialist II  02/04/25
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
## 2025-02-05 NOTE — EMTALA/ACUTE CARE TRANSFER
[facility] EMERGENCY DEPARTMENT  [address] [address]  Dept: [phone]      EMTALA TRANSFER CONSENT    NAME Ananth Jaffe                                         1965                              MRN [id number]    I have been informed of my rights regarding examination, treatment, and transfer   by Dr. Marcia Cochran DO    Benefits:      Risks:        Consent for Transfer:  I acknowledge that my medical condition has been evaluated and explained to me by the emergency department physician or other qualified medical person and/or my attending physician, who has recommended that I be transferred to the service of  Accepting Physician: Dr. Velázquez at Accepting Facility Name, City & State : [facility]. The above potential benefits of such transfer, the potential risks associated with such transfer, and the probable risks of not being transferred have been explained to me, and I fully understand them.  The doctor has explained that, in my case, the benefits of transfer outweigh the risks.  I agree to be transferred.    I authorize the performance of emergency medical procedures and treatments upon me in both transit and upon arrival at the receiving facility.  Additionally, I authorize the release of any and all medical records to the receiving facility and request they be transported with me, if possible.  I understand that the safest mode of transportation during a medical emergency is an ambulance and that the Hospital advocates the use of this mode of transport. Risks of traveling to the receiving facility by car, including absence of medical control, life sustaining equipment, such as oxygen, and medical personnel has been explained to me and I fully understand them.    (MARICEL CORRECT BOX BELOW)  [  ]  I consent to the stated transfer and to be transported by ambulance/helicopter.  [  ]  I consent to the stated transfer, but refuse transportation by  ambulance and accept full responsibility for my transportation by car.  I understand the risks of non-ambulance transfers and I exonerate the Hospital and its staff from any deterioration in my condition that results from this refusal.    X___________________________________________    DATE  25  TIME________  Signature of patient or legally responsible individual signing on patient behalf           RELATIONSHIP TO PATIENT_________________________          Provider Certification    NAME Ananth Jaffe                                         1965                              MRN [id number]    A medical screening exam was performed on the above named patient.  Based on the examination:    Condition Necessitating Transfer The encounter diagnosis was Major depressive disorder, recurrent, severe with psychotic features (HCC).    Patient Condition:      Reason for Transfer:      Transfer Requirements: [facility]   Space available and qualified personnel available for treatment as acknowledged by Frank [phone]  Agreed to accept transfer and to provide appropriate medical treatment as acknowledged by       Dr. Velázquez  Appropriate medical records of the examination and treatment of the patient are provided at the time of transfer   STAFF INITIAL WHEN COMPLETED _______  Transfer will be performed by qualified personnel from [facility]  and appropriate transfer equipment as required, including the use of necessary and appropriate life support measures.    Provider Certification: I have examined the patient and explained the following risks and benefits of being transferred/refusing transfer to the patient/family:         Based on these reasonable risks and benefits to the patient and/or the unborn child(flash), and based upon the information available at the time of the patient’s examination, I certify that the medical benefits reasonably to be expected from the provision of appropriate  medical treatments at another medical facility outweigh the increasing risks, if any, to the individual’s medical condition, and in the case of labor to the unborn child, from effecting the transfer.    X____________________________________________ DATE 02/04/25        TIME_______      ORIGINAL - SEND TO MEDICAL RECORDS   COPY - SEND WITH PATIENT DURING TRANSFER

## 2025-02-05 NOTE — ED NOTES
Patient is accepted at [facility].  Patient is accepted by Dr. Eber Fernández in admissions.    Transportation is arranged with [facility]  Transportation is scheduled for 2045.  Patient may go to the floor at No time constraints.      Joel Woods  Crisis Intervention Specialist II  02/04/25

## 2025-02-19 ENCOUNTER — HOSPITAL ENCOUNTER (EMERGENCY)
Facility: HOSPITAL | Age: 60
Discharge: HOME | End: 2025-02-20
Attending: EMERGENCY MEDICINE
Payer: COMMERCIAL

## 2025-02-19 ENCOUNTER — APPOINTMENT (EMERGENCY)
Dept: RADIOLOGY | Facility: HOSPITAL | Age: 60
End: 2025-02-19
Payer: COMMERCIAL

## 2025-02-19 VITALS
BODY MASS INDEX: 23.79 KG/M2 | TEMPERATURE: 99.9 F | WEIGHT: 151.9 LBS | OXYGEN SATURATION: 96 % | DIASTOLIC BLOOD PRESSURE: 84 MMHG | SYSTOLIC BLOOD PRESSURE: 159 MMHG | RESPIRATION RATE: 18 BRPM | HEART RATE: 92 BPM
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
## 2025-02-19 PROCEDURE — 73502 X-RAY EXAM HIP UNI 2-3 VIEWS: CPT

## 2025-02-19 PROCEDURE — 99284 EMERGENCY DEPT VISIT MOD MDM: CPT | Performed by: EMERGENCY MEDICINE

## 2025-02-19 PROCEDURE — 99284 EMERGENCY DEPT VISIT MOD MDM: CPT

## 2025-02-19 RX ORDER — ACETAMINOPHEN 325 MG/1
650 TABLET ORAL ONCE
Status: COMPLETED | OUTPATIENT
Start: 2025-02-19 | End: 2025-02-19

## 2025-02-19 RX ADMIN — ACETAMINOPHEN 650 MG: 325 TABLET, FILM COATED ORAL at 23:26

## 2025-02-20 VITALS
HEART RATE: 96 BPM | BODY MASS INDEX: 23.2 KG/M2 | TEMPERATURE: 99.1 F | WEIGHT: 148.15 LBS | SYSTOLIC BLOOD PRESSURE: 138 MMHG | DIASTOLIC BLOOD PRESSURE: 71 MMHG | RESPIRATION RATE: 18 BRPM | OXYGEN SATURATION: 97 %

## 2025-02-20 DIAGNOSIS — F12.10 CANNABIS ABUSE, CONTINUOUS: Chronic | ICD-10-CM

## 2025-02-20 DIAGNOSIS — Z91.148 HISTORY OF MEDICATION NONCOMPLIANCE: ICD-10-CM

## 2025-02-20 DIAGNOSIS — F14.10 COCAINE ABUSE, CONTINUOUS (HCC): Chronic | ICD-10-CM

## 2025-02-20 DIAGNOSIS — Z59.00 HOMELESSNESS: ICD-10-CM

## 2025-02-20 DIAGNOSIS — F33.3 MAJOR DEPRESSIVE DISORDER, RECURRENT, SEVERE WITH PSYCHOTIC FEATURES (HCC): Primary | Chronic | ICD-10-CM

## 2025-02-20 LAB
AMPHETAMINES SERPL QL SCN: NEGATIVE
BACTERIA UR QL AUTO: NORMAL /HPF
BARBITURATES UR QL: NEGATIVE
BENZODIAZ UR QL: NEGATIVE
BILIRUB UR QL STRIP: NEGATIVE
CLARITY UR: CLEAR
COCAINE UR QL: POSITIVE
COLOR UR: ABNORMAL
ETHANOL EXG-MCNC: 0 MG/DL
FENTANYL UR QL SCN: NEGATIVE
GLUCOSE UR STRIP-MCNC: NEGATIVE MG/DL
HGB UR QL STRIP.AUTO: NEGATIVE
HYDROCODONE UR QL SCN: NEGATIVE
KETONES UR STRIP-MCNC: NEGATIVE MG/DL
LEUKOCYTE ESTERASE UR QL STRIP: NEGATIVE
METHADONE UR QL: NEGATIVE
NITRITE UR QL STRIP: NEGATIVE
NON-SQ EPI CELLS URNS QL MICRO: NORMAL /HPF
OPIATES UR QL SCN: NEGATIVE
OXYCODONE+OXYMORPHONE UR QL SCN: NEGATIVE
PCP UR QL: NEGATIVE
PH UR STRIP.AUTO: 6.5 [PH]
PROT UR STRIP-MCNC: ABNORMAL MG/DL
RBC #/AREA URNS AUTO: NORMAL /HPF
SP GR UR STRIP.AUTO: 1.01 (ref 1–1.03)
THC UR QL: POSITIVE
UROBILINOGEN UR STRIP-ACNC: 2 MG/DL
WBC #/AREA URNS AUTO: NORMAL /HPF

## 2025-02-20 PROCEDURE — 82075 ASSAY OF BREATH ETHANOL: CPT | Performed by: EMERGENCY MEDICINE

## 2025-02-20 PROCEDURE — 80307 DRUG TEST PRSMV CHEM ANLYZR: CPT

## 2025-02-20 PROCEDURE — 81001 URINALYSIS AUTO W/SCOPE: CPT

## 2025-02-20 PROCEDURE — 99285 EMERGENCY DEPT VISIT HI MDM: CPT

## 2025-02-20 PROCEDURE — 99284 EMERGENCY DEPT VISIT MOD MDM: CPT | Performed by: EMERGENCY MEDICINE

## 2025-02-20 RX ORDER — QUETIAPINE FUMARATE 25 MG/1
50 TABLET, FILM COATED ORAL ONCE
Status: COMPLETED | OUTPATIENT
Start: 2025-02-20 | End: 2025-02-20

## 2025-02-20 RX ADMIN — QUETIAPINE FUMARATE 50 MG: 25 TABLET ORAL at 05:52

## 2025-02-20 SDOH — ECONOMIC STABILITY - HOUSING INSECURITY: HOMELESSNESS UNSPECIFIED: Z59.00

## 2025-02-20 NOTE — DISCHARGE INSTRUCTIONS
Follow-up with outpatient resources  Head to pharmacy and  antidepressants  Return to emergency room for increasing fever, body aches, chills, blurred vision, worsening substance abuse, worsening homicidal suicidal ideations, or visual auditory hallucinations

## 2025-02-20 NOTE — ED PROVIDER NOTES
Time reflects when diagnosis was documented in both MDM as applicable and the Disposition within this note       Time User Action Codes Description Comment    2/20/2025  5:34 AM Leonard Evans Add [F33.3] Major depressive disorder, recurrent, severe with psychotic features (HCC)     2/20/2025  5:34 AM Leonard Evans Add [Z59.00] Homelessness     2/20/2025  5:34 AM Leonard Evans Ton [Z91.148] History of medication noncompliance     2/20/2025  5:34 AM Evans Leonard Camilo [F12.10] Cannabis abuse, continuous     2/20/2025  5:34 AM Leonard Evans Add [F14.10] Cocaine abuse, continuous (HCC)           ED Disposition       ED Disposition   Discharge    Condition   Stable    Date/Time   Thu Feb 20, 2025  5:48 AM    Comment   Ananth Jaffe discharge to home/self care.                   Assessment & Plan       Medical Decision Making  Patient is a 59-year-old male with past medical history of major depressive disorder with psychotic features patient to ED reporting that he didn't  his Seroquel after his discharge from inpatient almost a week ago, and he states that he needs it to make the negative voices stop.  Patient does have a behavioral health high utilizer plan, and his presentation tonight is almost word for word the lesser of his baseline reportings.  Physical exam shows patient alert oriented x 4, no signs of acute intoxication, no slurred speech, patient with appropriate affect, appropriate speech pattern with good insight into condition stating that he needs his Seroquel to make his depressive symptoms domonique. DDx including but not limited to: depression, anxiety, PTSD, bipolar disorder, suicidal ideation, schizophrenia, schizoaffective disorder, personality disorder, substance abuse, metabolic abnormality, thyroid disease.  Findings most consistent with patient's baseline.  Discussed findings with crisis worker.  Crisis worker discussed with patient. CIS reviewed his behavioral health high utilizer  "list of community providers and support services with him, then gave him a copy. He stated that he planned to return to [facility] to set up therapy and psychiatry services, and that he plans to go to [facility] \"and take a friend of mine with me\" starting tomorrow.  Patient stating he was very sleepy and knew when he should go to and ER and how to get to them. CIS discussed case and recommendations with ED provider.  Discussed findings with patient, patient states that he is heading straight to the pharmacy to  his meds to restart medications.  Patient verbalized understanding and agrees to current plan.  Patient discharged home in stable condition at this.    Amount and/or Complexity of Data Reviewed  Labs: ordered.    Risk  Prescription drug management.             Medications   QUEtiapine (SEROquel) tablet 50 mg (50 mg Oral Given 2/20/25 0552)       ED Risk Strat Scores                            SBIRT 22yo+      Flowsheet Row Most Recent Value   Initial Alcohol Screen: US AUDIT-C     1. How often do you have a drink containing alcohol? 3 Filed at: 02/20/2025 0304   2. How many drinks containing alcohol do you have on a typical day you are drinking?  1 Filed at: 02/20/2025 0304   3a. Male UNDER 65: How often do you have five or more drinks on one occasion? 0 Filed at: 02/20/2025 0304   3b. FEMALE Any Age, or MALE 65+: How often do you have 4 or more drinks on one occassion? 0 Filed at: 02/20/2025 0304   Audit-C Score 4 Filed at: 02/20/2025 0304   STEPHEN: How many times in the past year have you...    Used an illegal drug or used a prescription medication for non-medical reasons? Never Filed at: 02/20/2025 0304                            History of Present Illness       Chief Complaint   Patient presents with    Psychiatric Evaluation     Pt hearing voices tell him to kill himself ( jump off a bridge, Jump in front of a car). Pt doesn't wish to go through with it. Pt states he has tried to hurt him self in the past " "but wont elaborate on it Pt is very anxious in triage. No HI. Pt doesn't  want to sign himself in, just wants Seroquel to help with the voices     Medication Refill     Pt has been off of Seroquel for a week.       Past Medical History:   Diagnosis Date    Abnormal EKG     Depression     Substance use       Past Surgical History:   Procedure Laterality Date    NO PAST SURGERIES        History reviewed. No pertinent family history.   Social History     Tobacco Use    Smoking status: Former     Current packs/day: 1.00     Types: Cigarettes    Smokeless tobacco: Never   Vaping Use    Vaping status: Never Used   Substance Use Topics    Alcohol use: Yes     Alcohol/week: 3.0 standard drinks of alcohol     Types: 3 Cans of beer per week     Comment: 3 cans beer daily    Drug use: Yes     Types: Marijuana, Cocaine, Other, Methamphetamines, Mescaline      E-Cigarette/Vaping    E-Cigarette Use Never User       E-Cigarette/Vaping Substances    Nicotine No     THC No     CBD No     Flavoring No     Other No     Unknown No       I have reviewed and agree with the history as documented.     Patient is a 59-year-old male with past medical history of major depressive disorder with psychotic features patient to ED reporting that he didn't  his Seroquel after his discharge from inpatient almost a week ago, and he states that he needs it to make the negative voices stop.  Patient does have a behavioral health high utilizer plan, and his presentation tonight is almost word for word the lesser of his baseline reportings.  Patient initially told ED staff that he has suicidal thoughts, but after being placed in a room and allowed to rest a few moments he told me he does not need or want inpatient treatment. He said he only needs somewhere to sleep tonight, and he can go to the pharmacy and get his meds in the morning and he \"will be good\". He stressed several times that he is safe, and will not attempt to harm or kill himself, has " no HI and knows with certainty that all he needs is a good night's sleep, maybe some food, and allowed to leave in the morning to get his medications.  Patient denies any fever, body, chills, blurred vision, double vision, drug intoxication, alcohol intoxication, HI, VH at this time             Psychiatric Evaluation  Presenting symptoms: no agitation, no hallucinations, no self-mutilation and no suicidal thoughts    Associated symptoms: anxiety    Associated symptoms: no abdominal pain and no chest pain    Medication Refill      Review of Systems   Constitutional:  Negative for chills and fever.   HENT:  Negative for ear pain and sore throat.    Eyes:  Negative for pain and visual disturbance.   Respiratory:  Negative for cough and shortness of breath.    Cardiovascular:  Negative for chest pain and palpitations.   Gastrointestinal:  Negative for abdominal pain and vomiting.   Genitourinary:  Negative for dysuria and hematuria.   Musculoskeletal:  Negative for arthralgias and back pain.   Skin:  Negative for color change and rash.   Neurological:  Negative for seizures and syncope.   Psychiatric/Behavioral:  Positive for dysphoric mood. Negative for agitation, behavioral problems, confusion, decreased concentration, hallucinations, self-injury, sleep disturbance and suicidal ideas. The patient is nervous/anxious. The patient is not hyperactive.    All other systems reviewed and are negative.          Objective       ED Triage Vitals [02/20/25 0302]   Temperature Pulse Blood Pressure Respirations SpO2 Patient Position - Orthostatic VS   99.1 °F (37.3 °C) 96 138/71 18 97 % Sitting      Temp Source Heart Rate Source BP Location FiO2 (%) Pain Score    Oral Monitor Right arm -- --      Vitals      Date and Time Temp Pulse SpO2 Resp BP Pain Score FACES Pain Rating User   02/20/25 0302 99.1 °F (37.3 °C) 96 97 % 18 138/71 -- -- RK            Physical Exam  Vitals and nursing note reviewed.   Constitutional:       General:  He is not in acute distress.     Appearance: Normal appearance. He is well-developed. He is not ill-appearing.   HENT:      Head: Normocephalic and atraumatic.      Right Ear: Tympanic membrane and external ear normal. There is no impacted cerumen.      Left Ear: Tympanic membrane and external ear normal. There is no impacted cerumen.      Nose: Nose normal. No congestion or rhinorrhea.      Mouth/Throat:      Mouth: Mucous membranes are moist.      Pharynx: Oropharynx is clear. No oropharyngeal exudate or posterior oropharyngeal erythema.   Eyes:      General:         Right eye: No discharge.         Left eye: No discharge.      Extraocular Movements: Extraocular movements intact.      Conjunctiva/sclera: Conjunctivae normal.      Pupils: Pupils are equal, round, and reactive to light.   Cardiovascular:      Rate and Rhythm: Normal rate and regular rhythm.      Heart sounds: No murmur heard.     No friction rub. No gallop.   Pulmonary:      Effort: Pulmonary effort is normal. No respiratory distress.      Breath sounds: Normal breath sounds. No stridor. No wheezing, rhonchi or rales.   Chest:      Chest wall: No tenderness.   Abdominal:      General: There is no distension.      Palpations: Abdomen is soft. There is no mass.      Tenderness: There is no abdominal tenderness. There is no right CVA tenderness, left CVA tenderness or guarding.   Musculoskeletal:         General: No swelling.      Cervical back: Neck supple. No rigidity or tenderness.   Lymphadenopathy:      Cervical: No cervical adenopathy.   Skin:     General: Skin is warm and dry.      Capillary Refill: Capillary refill takes less than 2 seconds.   Neurological:      Mental Status: He is alert.   Psychiatric:         Attention and Perception: Attention and perception normal. He is attentive. He does not perceive auditory or visual hallucinations.         Mood and Affect: Mood is anxious and depressed. Mood is not elated. Affect is blunt. Affect is  not labile, flat, angry, tearful or inappropriate.         Speech: He is communicative. Speech is rapid and pressured. Speech is not delayed, slurred or tangential.         Behavior: Behavior normal. Behavior is not agitated, slowed, aggressive, withdrawn, hyperactive or combative.         Thought Content: Thought content is not paranoid or delusional. Thought content includes suicidal ideation. Thought content does not include homicidal ideation. Thought content does not include homicidal or suicidal plan.         Cognition and Memory: Cognition is not impaired. Memory is not impaired. He does not exhibit impaired recent memory or impaired remote memory.         Judgment: Judgment is not impulsive or inappropriate.         Results Reviewed       Procedure Component Value Units Date/Time    POCT alcohol breath test [[id number]]  (Normal) Resulted: 02/20/25 0415    Lab Status: Final result Updated: 02/20/25 0456     [facility]ath Alcohol 0.00    Rapid drug screen, urine [[id number]]  (Abnormal) Collected: 02/20/25 0344    Lab Status: Final result Specimen: Urine, Clean Catch Updated: 02/20/25 0410     Amph/Meth UR Negative     Barbiturate Ur Negative     Benzodiazepine Urine Negative     Cocaine Urine Positive     Methadone Urine Negative     Opiate Urine Negative     PCP Ur Negative     THC Urine Positive     Oxycodone Urine Negative     Fentanyl Urine Negative     HYDROCODONE URINE Negative    Narrative:      Presumptive report. If requested, specimen will be sent to reference lab for confirmation.  FOR MEDICAL PURPOSES ONLY.   IF CONFIRMATION NEEDED PLEASE CONTACT THE LAB WITHIN 5 DAYS.    Drug Screen Cutoff Levels:  AMPHETAMINE/METHAMPHETAMINES  1000 ng/mL  BARBITURATES     200 ng/mL  BENZODIAZEPINES     200 ng/mL  COCAINE      300 ng/mL  METHADONE      300 ng/mL  OPIATES      300 ng/mL  PHENCYCLIDINE     25 ng/mL  THC       50 ng/mL  OXYCODONE      100 ng/mL  FENTANYL      5 ng/mL  HYDROCODONE     300 ng/mL    Urine  Microscopic [[id number]]  (Normal) Collected: 02/20/25 0344    Lab Status: Final result Specimen: Urine, Clean Catch Updated: 02/20/25 0400     RBC, UA None Seen /hpf      WBC, UA None Seen /hpf      Epithelial Cells None Seen /hpf      Bacteria, UA None Seen /hpf     UA w Reflex to Microscopic w Reflex to Culture [[id number]]  (Abnormal) Collected: 02/20/25 0344    Lab Status: Final result Specimen: Urine, Clean Catch Updated: 02/20/25 0359     Color, UA Light Yellow     Clarity, UA Clear     Specific Gravity, UA 1.012     pH, UA 6.5     Leukocytes, UA Negative     Nitrite, UA Negative     Protein, UA Trace mg/dl      Glucose, UA Negative mg/dl      Ketones, UA Negative mg/dl      Urobilinogen, UA 2.0 mg/dl      Bilirubin, UA Negative     Occult Blood, UA Negative            No orders to display       Procedures    ED Medication and Procedure Management   Prior to Admission Medications   Prescriptions Last Dose Informant Patient Reported? Taking?   ARIPiprazole (ABILIFY) 10 mg tablet Not Taking  No No   Sig: Take 1 tablet (10 mg total) by mouth daily   Patient not taking: Reported on 2/20/2025   escitalopram (LEXAPRO) 10 mg tablet Not Taking  Yes No   Sig: Take 1 tablet by mouth every morning   Patient not taking: Reported on 2/20/2025   lisinopril (ZESTRIL) 5 mg tablet Not Taking  Yes No   Sig: Take 5 mg by mouth every morning   Patient not taking: Reported on 2/20/2025   metoprolol succinate (TOPROL-XL) 25 mg 24 hr tablet Not Taking  Yes No   Sig: Take 25 mg by mouth   Patient not taking: Reported on 2/20/2025      Facility-Administered Medications: None     Discharge Medication List as of 2/20/2025  5:48 AM        CONTINUE these medications which have NOT CHANGED    Details   ARIPiprazole (ABILIFY) 10 mg tablet Take 1 tablet (10 mg total) by mouth daily, Starting Mon 8/1/2022, Normal      escitalopram (LEXAPRO) 10 mg tablet Take 1 tablet by mouth every morning, Historical Med      lisinopril (ZESTRIL) 5 mg tablet  Take 5 mg by mouth every morning, Historical Med      metoprolol succinate (TOPROL-XL) 25 mg 24 hr tablet Take 25 mg by mouth, Historical Med           No discharge procedures on file.  ED SEPSIS DOCUMENTATION   Time reflects when diagnosis was documented in both MDM as applicable and the Disposition within this note       Time User Action Codes Description Comment    2/20/2025  5:34 AM Leonard Evans [F33.3] Major depressive disorder, recurrent, severe with psychotic features (HCC)     2/20/2025  5:34 AM Leonard Evans [Z59.00] Homelessness     2/20/2025  5:34 AM Leonard Evans [Z91.148] History of medication noncompliance     2/20/2025  5:34 AM Leonard Evans [F12.10] Cannabis abuse, continuous     2/20/2025  5:34 AM Leonard Evans [F14.10] Cocaine abuse, continuous (HCC)                  Leonard Evans PA-C  02/20/25 0703

## 2025-02-20 NOTE — ED NOTES
Signed         Patient Name:Ananth Jaffe MRN:  [id number]         : 1965     Age: 59 y.o.    Sex: male   Utilization History:  (# of ED visits & IP admits; reasons)  Pt had 3 [facility]-ED visits from 2024-2024. ED presentations were related to chronic polysubstance use (cocaine, THC, K2, meth, alcohol), unstable housing/homelessness, relationship issues (break-up with girlfriend, death of mother & brother), SI with no plan or with a plan to walk into traffic or in front of a vehicle, jump off a bridge, cut himself/cut his throat, overdose on pills or cocaine, HI towards ex-girlfriend to strangle her, her boyfriend or people in general, AH, laying on the streets following substance use.       Medical presentations were related to vomiting, back pain, generalized body pain, non-compliance with medications, chest pain.    Treatment Recommendations & Presentation:  Presentation in the ED: Pt typically presents himself or is accompanied by EMS/police to ED's. ED visits were related to chronic polysubstance use (cocaine, THC, K2, meth, alcohol), unstable housing/homelessness, relationship issues (break-up with girlfriend, death of mother & brother), SI with no plan or with a plan to walk into traffic or in front of a vehicle, jump off a bridge, cut himself/cut his throat, overdose on pills or cocaine, HI towards ex-girlfriend to strangle her, her boyfriend or people in general, AH, laying on the streets following substance use.       Medical visits were related to vomiting, back pain, generalized body pain, non-compliance with medications, chest pain.           ED Recommendations: Following medical evaluation and treatment, establish acute risk versus pt's chronic behavioral disturbances that are related to homelessness & substance abuse. Pt can consider [facility] for inpt addictions treatment or dual treatment.   If pt is not interested in inpt addictions rehab he can utilize [facility] at [address] in [place] [phone], [facility] addictions [facility] mental health providers at [address] in [place] [phone], the  [facility] program at [address] [phone] or [facility] [address] [phone].   Pt should go to [facility] for his medical problems [phone] at [address] or [facility] [place] [facility] clinics at [address] [phone] or [phone]. Pt should have [facility] services and go to [facility] to talk about that at [address] in [place] and also talk to [facility] at that same location about his housing issues [phone].  Pt can also go to the [place] drop in center during the day at [address] [facility] or the [facility] at [address].  (Pt has utilized [facility] [phone] and [facility] in the past) .        Home Medication Regimen: see most recent documentation in Epic.
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
## 2025-02-20 NOTE — ED NOTES
"Patient to ED reporting that he didn't  his Seroquel after his discharge from inpatient almost a week ago, and can tell that he needs it. He has a behavioral health high utilizer plan, and his presentation tonight is almost word for word the lesser of his baseline reportings. He initially told ED staff that he has suicidal thoughts, but after being placed in a room and allowed to rest a few moments he told [facility] he does not need or want inpatient treatment. He said he only needs somewhere to sleep tonight, and he can go to the pharmacy and get his meds in the morning and he \"will be good\". He stressed several times that he is safe, and will not attempt to harm or kill himself, has no HI and knows with certainty that all he needs is a good night's sleep, maybe some food, and allowed to leave in the morning to get his medications. He added that if he could get a dose of Seroquel tonight that would be even better.     [facility] reviewed his behavioral health high utilizer list of community providers and support services with him, then gave him a copy. He stated that he planned to return to [facility] to set up therapy and psychiatry services, and that he plans to go to [facility] \"and take a friend of mine with me\" starting tomorrow.  [facility] asked if he would participate in developing a discharge safety plan, but he declined, stating he was very sleepy and knew when he should go to and ER and how to get to them. [facility] discussed case and recommendations with ED provider.  "

## 2025-03-24 ENCOUNTER — DOCUMENTATION (OUTPATIENT)
Dept: CASE MANAGEMENT | Facility: HOSPITAL | Age: 60
End: 2025-03-24
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
## 2025-05-07 ENCOUNTER — DOCUMENTATION (OUTPATIENT)
Dept: CASE MANAGEMENT | Facility: HOSPITAL | Age: 60
End: 2025-05-07
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
## 2025-07-09 ENCOUNTER — DOCUMENTATION (OUTPATIENT)
Dept: CASE MANAGEMENT | Facility: HOSPITAL | Age: 60
End: 2025-07-09
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
